# Patient Record
Sex: FEMALE | Race: WHITE | NOT HISPANIC OR LATINO | Employment: OTHER | URBAN - METROPOLITAN AREA
[De-identification: names, ages, dates, MRNs, and addresses within clinical notes are randomized per-mention and may not be internally consistent; named-entity substitution may affect disease eponyms.]

---

## 2017-01-06 ENCOUNTER — ALLSCRIPTS OFFICE VISIT (OUTPATIENT)
Dept: OTHER | Facility: OTHER | Age: 82
End: 2017-01-06

## 2017-01-06 DIAGNOSIS — I50.32 CHRONIC DIASTOLIC HEART FAILURE (HCC): ICD-10-CM

## 2017-02-03 ENCOUNTER — GENERIC CONVERSION - ENCOUNTER (OUTPATIENT)
Dept: OTHER | Facility: OTHER | Age: 82
End: 2017-02-03

## 2017-03-02 ENCOUNTER — ALLSCRIPTS OFFICE VISIT (OUTPATIENT)
Dept: OTHER | Facility: OTHER | Age: 82
End: 2017-03-02

## 2017-03-10 DIAGNOSIS — I50.32 CHRONIC DIASTOLIC HEART FAILURE (HCC): ICD-10-CM

## 2017-03-15 ENCOUNTER — ALLSCRIPTS OFFICE VISIT (OUTPATIENT)
Dept: OTHER | Facility: OTHER | Age: 82
End: 2017-03-15

## 2017-04-06 ENCOUNTER — LAB REQUISITION (OUTPATIENT)
Dept: LAB | Facility: HOSPITAL | Age: 82
End: 2017-04-06
Payer: MEDICARE

## 2017-04-06 DIAGNOSIS — I50.32 CHRONIC DIASTOLIC HEART FAILURE (HCC): ICD-10-CM

## 2017-04-06 LAB
ANION GAP SERPL CALCULATED.3IONS-SCNC: 10 MMOL/L (ref 4–13)
BASOPHILS # BLD AUTO: 0.01 THOUSANDS/ΜL (ref 0–0.1)
BASOPHILS NFR BLD AUTO: 0 % (ref 0–1)
BUN SERPL-MCNC: 35 MG/DL (ref 5–25)
CALCIUM SERPL-MCNC: 8.6 MG/DL (ref 8.3–10.1)
CHLORIDE SERPL-SCNC: 103 MMOL/L (ref 100–108)
CO2 SERPL-SCNC: 31 MMOL/L (ref 21–32)
CREAT SERPL-MCNC: 1.39 MG/DL (ref 0.6–1.3)
EOSINOPHIL # BLD AUTO: 0.06 THOUSAND/ΜL (ref 0–0.61)
EOSINOPHIL NFR BLD AUTO: 1 % (ref 0–6)
ERYTHROCYTE [DISTWIDTH] IN BLOOD BY AUTOMATED COUNT: 16.3 % (ref 11.6–15.1)
GFR SERPL CREATININE-BSD FRML MDRD: 36 ML/MIN/1.73SQ M
GLUCOSE SERPL-MCNC: 104 MG/DL (ref 65–140)
HCT VFR BLD AUTO: 35.3 % (ref 34.8–46.1)
HGB BLD-MCNC: 10.7 G/DL (ref 11.5–15.4)
LYMPHOCYTES # BLD AUTO: 0.82 THOUSANDS/ΜL (ref 0.6–4.47)
LYMPHOCYTES NFR BLD AUTO: 14 % (ref 14–44)
MCH RBC QN AUTO: 29.2 PG (ref 26.8–34.3)
MCHC RBC AUTO-ENTMCNC: 30.3 G/DL (ref 31.4–37.4)
MCV RBC AUTO: 96 FL (ref 82–98)
MONOCYTES # BLD AUTO: 0.65 THOUSAND/ΜL (ref 0.17–1.22)
MONOCYTES NFR BLD AUTO: 11 % (ref 4–12)
NEUTROPHILS # BLD AUTO: 4.14 THOUSANDS/ΜL (ref 1.85–7.62)
NEUTS SEG NFR BLD AUTO: 74 % (ref 43–75)
PLATELET # BLD AUTO: 161 THOUSANDS/UL (ref 149–390)
PMV BLD AUTO: 11.7 FL (ref 8.9–12.7)
POTASSIUM SERPL-SCNC: 4.3 MMOL/L (ref 3.5–5.3)
RBC # BLD AUTO: 3.66 MILLION/UL (ref 3.81–5.12)
SODIUM SERPL-SCNC: 144 MMOL/L (ref 136–145)
WBC # BLD AUTO: 5.68 THOUSAND/UL (ref 4.31–10.16)

## 2017-04-06 PROCEDURE — 80048 BASIC METABOLIC PNL TOTAL CA: CPT | Performed by: INTERNAL MEDICINE

## 2017-04-06 PROCEDURE — 85025 COMPLETE CBC W/AUTO DIFF WBC: CPT | Performed by: INTERNAL MEDICINE

## 2017-04-07 ENCOUNTER — GENERIC CONVERSION - ENCOUNTER (OUTPATIENT)
Dept: OTHER | Facility: OTHER | Age: 82
End: 2017-04-07

## 2017-04-21 ENCOUNTER — ALLSCRIPTS OFFICE VISIT (OUTPATIENT)
Dept: OTHER | Facility: OTHER | Age: 82
End: 2017-04-21

## 2017-05-10 ENCOUNTER — GENERIC CONVERSION - ENCOUNTER (OUTPATIENT)
Dept: OTHER | Facility: OTHER | Age: 82
End: 2017-05-10

## 2017-05-10 ENCOUNTER — ALLSCRIPTS OFFICE VISIT (OUTPATIENT)
Dept: OTHER | Facility: OTHER | Age: 82
End: 2017-05-10

## 2017-06-01 ENCOUNTER — ALLSCRIPTS OFFICE VISIT (OUTPATIENT)
Dept: OTHER | Facility: OTHER | Age: 82
End: 2017-06-01

## 2017-06-15 ENCOUNTER — GENERIC CONVERSION - ENCOUNTER (OUTPATIENT)
Dept: OTHER | Facility: OTHER | Age: 82
End: 2017-06-15

## 2017-06-16 ENCOUNTER — GENERIC CONVERSION - ENCOUNTER (OUTPATIENT)
Dept: OTHER | Facility: OTHER | Age: 82
End: 2017-06-16

## 2017-06-21 ENCOUNTER — ALLSCRIPTS OFFICE VISIT (OUTPATIENT)
Dept: OTHER | Facility: OTHER | Age: 82
End: 2017-06-21

## 2017-07-18 ENCOUNTER — ALLSCRIPTS OFFICE VISIT (OUTPATIENT)
Dept: OTHER | Facility: OTHER | Age: 82
End: 2017-07-18

## 2017-08-01 DIAGNOSIS — N18.30 CHRONIC KIDNEY DISEASE, STAGE III (MODERATE) (HCC): ICD-10-CM

## 2017-08-01 DIAGNOSIS — I50.32 CHRONIC DIASTOLIC HEART FAILURE (HCC): ICD-10-CM

## 2017-08-01 DIAGNOSIS — M81.0 AGE-RELATED OSTEOPOROSIS WITHOUT CURRENT PATHOLOGICAL FRACTURE: ICD-10-CM

## 2017-08-16 ENCOUNTER — GENERIC CONVERSION - ENCOUNTER (OUTPATIENT)
Dept: OTHER | Facility: OTHER | Age: 82
End: 2017-08-16

## 2017-08-30 ENCOUNTER — ALLSCRIPTS OFFICE VISIT (OUTPATIENT)
Dept: OTHER | Facility: OTHER | Age: 82
End: 2017-08-30

## 2017-08-30 ENCOUNTER — GENERIC CONVERSION - ENCOUNTER (OUTPATIENT)
Dept: OTHER | Facility: OTHER | Age: 82
End: 2017-08-30

## 2017-09-18 ENCOUNTER — ALLSCRIPTS OFFICE VISIT (OUTPATIENT)
Dept: OTHER | Facility: OTHER | Age: 82
End: 2017-09-18

## 2017-10-04 ENCOUNTER — GENERIC CONVERSION - ENCOUNTER (OUTPATIENT)
Dept: OTHER | Facility: OTHER | Age: 82
End: 2017-10-04

## 2017-10-06 ENCOUNTER — GENERIC CONVERSION - ENCOUNTER (OUTPATIENT)
Dept: OTHER | Facility: OTHER | Age: 82
End: 2017-10-06

## 2017-12-06 ENCOUNTER — ALLSCRIPTS OFFICE VISIT (OUTPATIENT)
Dept: OTHER | Facility: OTHER | Age: 82
End: 2017-12-06

## 2017-12-06 DIAGNOSIS — R06.02 SHORTNESS OF BREATH: ICD-10-CM

## 2017-12-19 NOTE — PROGRESS NOTES
Assessment  1  Shortness of breath on exertion (786 05) (R06 02)  2  Ascending aortic aneurysm (441 2) (I71 2)  3  Bilateral leg edema (782 3) (R60 0)  4  Chronic diastolic CHF (congestive heart failure), NYHA class 3 (428 32) (I50 32)  5  Chronic kidney disease, stage 3 (585 3) (N18 3)  6  Hypertension (401 9) (I10)  7  Lymphedema of both lower extremities (457 1) (I89 0)  8  Paroxysmal atrial fibrillation (427 31) (I48 0)    Plan  Chronic diastolic CHF (congestive heart failure), NYHA class 3    · Renew: MetOLazone 2 5 MG Oral Tablet; Take one tablet as directed only  Hypertension    · Renew: Ramipril 10 MG Oral Capsule; Take 1 capsule twice daily  Rhinitis    · Renew: Fluticasone Propionate 50 MCG/ACT Nasal Suspension; use 1 spray in each nostril twicedaily  Shortness of breath on exertion    · (1) BASIC METABOLIC PROFILE; Status:Active; Requested for:58Ans9892;     Discussion/Summary  Discussion Summary:   Cold symptomsCont FlonaseNo indication to start abxTake Metolazone daily for 1 week , continue torsemideBMP next weekWe shall reenroll her in [facility]  Counseling Documentation With Imm: The patient was counseled regarding impressions  Medication SE Review and Pt Understands Tx: Possible side effects of new medications were reviewed with the patient/guardian today  Chief Complaint  Chief Complaint Free Text Note Form: Patient presents to office today for a follow-up visit  Chief Complaint Chronic Condition [facility]: Patient is here today for follow up of chronic conditions described in HPI        History of Present Illness  HPI: She was discharged from [facility] 2 weeks agoShe does c/o increase SOB with exertion and [facility] weight has been stableNo increase in LE edema, she uses the leg pumps which has been very helpfulShe is able to put on the compression stocking on her own with a much difficulty and effortShe restricts her fluid intake to 50oz a day      Review of Systems  Complete-Female:  Constitutional: no fever,-- no recent weight gain,-- no chills-- and-- no recent weight loss  ENT: sore throat-- and-- colds  Cardiovascular: lower extremity edema, but-- no chest pain-- and-- no palpitations  Respiratory: cough-- and-- shortness of breath during exertion  Gastrointestinal: no abdominal pain,-- no constipation-- and-- no diarrhea  Active Problems  1  Advance directive discussed with patient (V65 49) (Z71 89)  2  Allergic rhinitis (477 9) (J30 9)  3  Ascending aortic aneurysm (441 2) (I71 2)  4  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)  5  Bilateral leg edema (782 3) (R60 0)  6  Cataract of right eye (366 9) (H26 9)  7  Chest pain (786 50) (R07 9)  8  Chronic diastolic CHF (congestive heart failure), NYHA class 3 (428 32) (I50 32)  9  Chronic kidney disease, stage 3 (585 3) (N18 3)  10  Chronic stasis dermatitis of right lower extremity (454 1) (I87 2)  11  Chronic venous insufficiency (459 81) (I87 2)  12  Diverticulosis (562 10) (K57 90)  13  Esophageal reflux (530 81) (K21 9)  14  Gait disturbance (781 2) (R26 9)  15  Hyperlipidemia (272 4) (E78 5)  16  Hypertension (401 9) (I10)  17  Inguinal hernia (550 90) (K40 90)  18  Insomnia (780 52) (G47 00)  19  Lymphedema of both lower extremities (457 1) (I89 0)  20  Need for prophylactic vaccination and inoculation against influenza (V04 81) (Z23)  21  Nonrheumatic aortic valve insufficiency (424 1) (I35 1)  22  Non-toxic multinodular goiter (241 1) (E04 2)  23  Osteoarthritis of knee (715 36) (M17 10)  24  Osteoporosis (733 00) (M81 0)  25  Paroxysmal atrial fibrillation (427 31) (I48 0)  26  Rhinitis (472 0) (J31 0)  27  Screening for genitourinary condition (V81 6) (Z13 89)  28  Screening for neurological condition (V80 09) (Z13 89)  29  Urinary urgency (788 63) (R39 15)  30  Urine, incontinence, stress female (625 6) (N39 3)    Past Medical History  1  History of Acute upper respiratory infection (465 9) (J06 9)  2  History of Acute upper respiratory infection (465 9) (J06 9)  3  History of Aneurysm Of The Ascending Aorta (441 9)  4  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)  5  History of anemia (V12 3) (Z86 2)  6  History of hematuria (V13 09) (Z87 448)  7  History of Intertrigo (695 89) (L30 4)  8  History of Open wound of foot, left, initial encounter (892 0) (S91 302A)  9  History of Shortness of breath (786 05) (R06 02)  10  History of Urinary tract infection (599 0) (N39 0)  Active Problems And Past Medical History Reviewed: The active problems and past medical history were reviewed and updated today  Surgical History  1  History of Cholecystectomy  2  History of Gynecologic Services Insertion Of Pessary  3  History of Salpingo-oophorectomy Bilateral  4  History of Tonsillectomy  Surgical History Reviewed: The surgical history was reviewed and updated today  Family History  Mother   1  No pertinent family history  Father   2  Family history of malignant neoplasm of stomach (V16 0) (Z80 0)  Sister   3  Family history of cardiac disorder (V17 49) (Z82 49)  4  Family history of malignant neoplasm of breast (V16 3) (Z80 3)  Brother   5  Family history of colon cancer (V16 0) (Z80 0)  6  Family history of malignant neoplasm (V16 9) (Z80 9)  Family History Reviewed: The family history was reviewed and updated today  Social History   · Assistive Devices: Walker   · Denies alcohol consumption (V49 89) (Z78 9)   · Never a smoker  Social History Reviewed: The social history was reviewed and updated today  Current Meds  1  Amiodarone HCl - 200 MG Oral Tablet; Take one tablet daily; Therapy: 12VXY2833 to (Evaluate:10Mar2018)  Requested for: 04MJR6982; Last Rx:15Mar2017 Ordered  2  AmLODIPine Besylate 10 MG Oral Tablet; TAKE 1 TABLET DAILY; Therapy: 69DWV7973 to (Liliana Kapadia)  Requested for: 75HPB2734; Last Rx:11Oct2017 Ordered  3  Caltrate 600 TABS;  Therapy: (Recorded:56Gky6944) to Recorded  4  Carvedilol 3 125 MG Oral Tablet; take 1 tablet twice daily,  with morning and evening meal; Therapy: 23NTE2237 to (Evaluate:16Jun2018)  Requested for: 21Jun2017; Last EE:58FVI5121 Ordered  5  Diclofenac Sodium 1 % Transdermal Gel; APPLY 2 GRAMS TO BOTH KNEES4 TIMES A DAY AS NEEDED; Therapy: 68Bgp8029 to (Taffy Hidden)  Requested for: 11Uin8424; Last Rx:26Iya0406 Ordered  6  Econazole Nitrate 1 % External Cream; APPLY AND GENTLY MASSAGE INTO AFFECTED AREA(S) TWICE DAILY; Therapy: 24JBM8796 to (Last Rx:28Jjk4302)  Requested for: 31NIE8994 Ordered  7  Eliquis 5 MG Oral Tablet; take 1 tablet every twelve hours; Therapy: 35VCU2573 to Lisdaniel Favors)  Requested for: 04CYH3655; Last Rx:01Nov2017 Ordered  8  Fluticasone Propionate 50 MCG/ACT Nasal Suspension; use 1 spray in each nostril twice daily; Therapy: 63ZXT1718 to (Evaluate:05Yyl9666)  Requested for: 63SBO0724; Last Rx:98Eyu6010 Ordered  9  Isosorbide Mononitrate ER 30 MG Oral Tablet Extended Release 24 Hour; take 1 tablet once daily; Therapy: 06DKV6008 to (Lisabeth Favors)  Requested for: 56BCE8161; Last Rx:01Nov2017 Ordered  10  MetOLazone 2 5 MG Oral Tablet; Take one tablet as directed only; Therapy: 33Gmn3343 to (Last Rx:20Xot5394)  Requested for: 32Yha2324 Ordered  11  Nitrostat 0 4 MG Sublingual Tablet Sublingual; PLACE 1 TABLET UNDER THE TONGUE EVERY 5  MINUTES FOR UP TO 3 DOSES AS NEEDED FOR CHEST PAIN  CALL 911 IF PAIN PERSISTS; Therapy: 58Psl4411 to (Evaluate:30Nov2016)  Requested for: 23Ako5352; Last Rx:44Pxw4429  Ordered  12  Omeprazole 20 MG Oral Capsule Delayed Release; 1 cap PO daily half an hour before breakfast;  Therapy: 07RKZ0793 to (Evaluate:05Mar2017)  Requested for: 58EOG0898; Last Rx:11Mar2016  Ordered  13  Potassium Chloride Nena ER 20 MEQ Oral Tablet Extended Release; take one tablet by mouth every  day;   Therapy: 21Apr2016 to (Evaluate:15Oct2018)  Requested for: 74AEV6451; Last Rx:20Oct2017 Ordered  14  Pravachol 40 MG Oral Tablet; take 1 tablet daily at bedtime; Therapy: 12Cth0250 to (Evaluate:88Hhh1554)  Requested for: 05Bxs8528; Last Rx:12Jul2017  Ordered  15  Prazosin HCl - 2 MG Oral Capsule; TAKE 1 CAPSULE Every morning; Therapy: 83SCQ8570 to (Evaluate:17Mar2018)  Requested for: 56WGT9593; Last Rx:22Mar2017  Ordered  16  Prazosin HCl - 5 MG Oral Capsule; TAKE 1 CAPSULE AT BEDTIME; Therapy: 30AHL0022 to (Evaluate:24Mar2018)  Requested for: 88DRZ1007; Last Rx:29Mar2017  Ordered  17  Ramipril 10 MG Oral Capsule; Take 1 capsule twice daily; Therapy: 07HGE4913 to (AOMECECU:76PGZ4214)  Requested for: 18EAJ7905; Last Rx:25Jan2017  Ordered  18  Torsemide 20 MG Oral Tablet; take 1 tablet by mouth twice a day; Therapy: 46AKN8567 to (Evaluate:82Jru7295)  Requested for: 02XNQ2541; Last Rx:04Oct2017  Ordered  19  Tylenol Arthritis Pain 650 MG TBCR; PRN; Therapy: (Recorded:52Krs1711) to Recorded  Medication List Reviewed: The medication list was reviewed and updated today  Allergies  1  Aspirin TABS  2  Percocet TABS    Vitals  Vital Signs    Recorded: 06HLI7076 11:26AM   Heart Rate 54   Systolic 264   Diastolic 66   Height 5 ft 1 in   Weight 161 lb 4 oz   BMI Calculated 30 47   BSA Calculated 1 72   O2 Saturation 95     Physical Exam   Constitutional  General appearance: No acute distress, well appearing and well nourished  obese  Ears, Nose, Mouth, and Throat  Oropharynx: Normal with no erythema, edema, exudate or lesions  Pulmonary  Respiratory effort: No increased work of breathing or signs of respiratory distress  Auscultation of lungs: Clear to auscultation  Cardiovascular  Auscultation of heart: Normal rate and rhythm, normal S1 and S2, without murmurs  Examination of extremities for edema and/or varicosities: Abnormal   nonpitting edema present,-- bilateral ankle 1+ pitting edema-- and-- bilateral pretibial 1+ pitting edema    Lymphatic  Palpation of lymph nodes in neck: No lymphadenopathy  Musculoskeletal  Gait and station: Abnormal    Psychiatric  Orientation to person, place, and time: Normal    Mood and affect: Normal          Future Appointments    Date/Time Provider Specialty Site   01/17/2018 11:30 AM JIM Nava   [facility] [place] [facility]     Signatures   Electronically signed by : JIM Greer ; Dec 18 2017 11:24AM EST                       (Author)    Electronically signed by : JIM Greer ; Dec 18 2017 11:28AM EST                       (Author)

## 2018-01-04 ENCOUNTER — GENERIC CONVERSION - ENCOUNTER (OUTPATIENT)
Dept: OTHER | Facility: OTHER | Age: 83
End: 2018-01-04

## 2018-01-10 NOTE — PROGRESS NOTES
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)   3  Cataract of right eye (366 9) (H26 9)   4  Diverticulosis (562 10) (K57 90)   5  Esophageal reflux (530 81) (K21 9)   6  Gait disturbance (781 2) (R26 9)   7  Hematuria (599 70) (R31 9)   8  Hyperlipidemia (272 4) (E78 5)   9  Hypertension (401 9) (I10)   10  Inguinal hernia (550 90) (K40 90)   11  Insomnia (780 52) (G47 00)   12  Need for prophylactic vaccination and inoculation against influenza (V04 81) (Z23)   13  Non-toxic multinodular goiter (241 1) (E04 2)   14  Osteoarthritis of knee (715 36) (M17 9)   15  Osteoporosis (733 00) (M81 0)   16  Paroxysmal atrial fibrillation (427 31) (I48 0)   17  Rhinitis (472 0) (J31 0)   18  Screening for genitourinary condition (V81 6) (Z13 89)   19  Screening for neurological condition (V80 09) (Z13 89)   20  Urinary urgency (788 63) (R39 15)   21  Urine, incontinence, stress female (625 6) (N39 3)    Ascending aortic aneurysm (441 2) (I71 2)       Bilateral leg edema (782 3) (R60 0)       Chronic diastolic CHF (congestive heart failure), NYHA class 3 (428 32) (I50 32)       Stasis dermatitis, acute, right (454 1) (I83 11)          Current Meds   1  Amiodarone HCl - 200 MG Oral Tablet; Take one tablet daily; Therapy: 18HEZ4395 to (Evaluate:32Rnk1676)  Requested for: 25HOW1552; Last   Rx:01Mar2016 Ordered   2  AmLODIPine Besylate 10 MG Oral Tablet; TAKE 1 TABLET DAILY; Therapy: 08JIM5503 to (Evaluate:41Vah4715)  Requested for: 10Aug2016; Last   Rx:10Aug2016 Ordered   3  Caltrate 600 TABS; Therapy: (Recorded:35Fxl3753) to Recorded   4   Carvedilol 3 125 MG Oral Tablet; take 1 tablet twice daily,  with morning and evening   meal;   Therapy: 97QIO1783 to (Last Rx:43Gtq7557)  Requested for: 82DUG3006 Ordered 5  Carvedilol 3 125 MG Oral Tablet; take 1 tablet twice daily,  with morning and evening   meal;   Therapy: 44BGR3396 to (Last Rx:21Apr2016)  Requested for: 21Apr2016 Ordered   6  Diclofenac Sodium 1 % Transdermal Gel; APPLY 2 GRAMS TO BOTH KNEES4 TIMES A   DAY AS NEEDED; Therapy: 30Apr2014 to (Evaluate:67Tcd7791)  Requested for: 52NBK5828; Last   Rx:24Qsa3319 Ordered   7  Econazole Nitrate 1 % External Cream; APPLY AND GENTLY MASSAGE INTO   AFFECTED AREA(S) TWICE DAILY; Therapy: 24KJQ1688 to (Last Rx:19Mar2014)  Requested for: 87BBR0477 Ordered   8  Eliquis 5 MG Oral Tablet; take 1 tablet every twelve hours; Therapy: 76WHO3087 to [phone])  Requested for: [phone]; Last   Rx:13Ozn0896 Ordered   9  Fluticasone Propionate 50 MCG/ACT Nasal Suspension; use 1 spray in each nostril twice   daily; Therapy: 75JWU0625 to (Heidy Merck)  Requested for: 89BPA6033; Last   Rx:44Sam4168 Ordered   10  Omeprazole 20 MG Oral Capsule Delayed Release; 1 cap PO daily half an hour before    breakfast;    Therapy: 23FKU9944 to (Evaluate:05Mar2017)  Requested for: 22UQT9919; Last    Rx:11Mar2016 Ordered   11  Potassium Chloride Nena ER 20 MEQ Oral Tablet Extended Release; take one tablet by    mouth every day; Therapy: 21Apr2016 to (Augustin Kenny)  Requested for: 21Apr2016; Last    Rx:21Apr2016 Ordered   12  Potassium Chloride Nena ER 20 MEQ Oral Tablet Extended Release; take one tablet by    mouth every day; Therapy: 21Apr2016 to (Last Rx:21Apr2016)  Requested for: 21Apr2016 Ordered   13  Pravachol 40 MG Oral Tablet; TAKE 1 TABLET DAILY AT BEDTIME; Therapy: 97Abm9184 to (Last Rx:15Jun2016)  Requested for: 74KDO9040 Ordered   14  Prazosin HCl - 2 MG Oral Capsule; TAKE 1 CAPSULE Every morning; Therapy: 30BDB6521 to (Karyle Setter)  Requested for: 80III6877; Last    Rx:26Nto3074 Ordered   15  Prazosin HCl - 5 MG Oral Capsule; TAKE 1 CAPSULE AT BEDTIME;     Therapy: 74ZOW3827 to (Delfino Pascual)  Requested for: 43UYZ3420; Last    Rx:11Mar2016 Ordered   16  Ramipril 10 MG Oral Capsule; 2 tabs PO QHS; Therapy: 18MYM1751 to (Evaluate:90Eog0980)  Requested for: 10JLY2024; Last    Rx:21Oct2015 Ordered   17  Torsemide 20 MG Oral Tablet; take 2 tablet daily in am 1 tab in pm;    Therapy: 28VWU1349 to (Evaluate:44Wam0495)  Requested for: 05SEW9230; Last    Rx:21Mis2658 Ordered   18  Triamcinolone Acetonide 0 1 % External Cream; Apply thinly to affected areas twice daily; Therapy: 30SSQ3018 to (Last Green Low)  Requested for: 09OWD6019 Ordered   19  Tylenol Arthritis Pain 650 MG Oral Tablet Extended Release; PRN; Therapy: (Recorded:53Mpl4283) to Recorded    Allergies    1  Aspirin TABS   2  Percocet TABS    Discussion/Summary    Spoke with Anna Gonzales sent  [employer]        Future Appointments    Date/Time Provider Specialty Site   09/26/2016 01:40 PM Lydia Huitron MD Cardiology [facility]     Signatures   Electronically signed by : JIM Buitrago ; Aug 11 2016  7:21PM EST                       (Author)

## 2018-01-10 NOTE — PROGRESS NOTES
Assessment  Assessed    1  Chronic diastolic CHF (congestive heart failure), NYHA class 3 (428 32) (I50 32)   2  Chest pain (786 50) (R07 9)   3  Paroxysmal atrial fibrillation (427 31) (I48 0)   4  Bilateral leg edema (782 3) (R60 0)    Plan  Chronic diastolic CHF (congestive heart failure), NYHA class 3, Nonrheumatic aortic  valve insufficiency    · Torsemide 20 MG Oral Tablet; Take two tablets twice daily   Rx By: Kerwin Card; Dispense: 30 Days ; #:120 Tablet; Refill: 3; For: Chronic diastolic CHF (congestive heart failure), NYHA class 3, Nonrheumatic aortic valve insufficiency; LUCHO = N; Verified Transmission to [facility]); Last Updated By: System, [employer]; 3/2/2017 12:49:11 PM  Chronic diastolic CHF (congestive heart failure), NYHA class 3, Paroxysmal atrial  fibrillation    · Limit your liquids to 6 glasses throughout the day ; Status:Complete;   Done: 53AMY6546   Ordered; For:Chronic diastolic CHF (congestive heart failure), NYHA class 3, Paroxysmal atrial fibrillation; Ordered By:Samara Kat;  Paroxysmal atrial fibrillation    · Restrict the salt in your diet by avoiding highly salted foods ; Status:Complete;   Done:  21DQV1662   Ordered; For:Paroxysmal atrial fibrillation; Ordered By:Edgar Kat;   · Weigh yourself every day ; Status:Complete;   Done: 80NNJ0291   Ordered; For:Paroxysmal atrial fibrillation; Ordered By:Edgar Kat;    Discussion/Summary  Cardiology Discussion Summary Free Text Note Form [facility]:   I had the pleasure of seeing Esperanza Caceres today in her apartment at [facility]  She is complaining of jose LE edema L>R  Her weight today is 167 2 pounds  She presents with rales in left base and LE edema  Esperanza Caceres admits to dyspnea with moderate ambulation  She appears slightly decompensated in regards to chronic diastolic heart failure, ejection fraction 60%, NYHA class III Stage C   Esperanza Caceres is refusing surgical intervention for aortic insufficiency and aortic aneurysm  Her CP is improved with Imdur  She is followed by the heart failure palliative care program  Her weight today is 167 2 pounds  I have increased her torsemide to 40mg BID  She will undergo a BMP tomorrow  I have made no other changes in her medical regimen today  She will continue on a 2 g sodium diet of 1200 cc fluid or stricture  She'll continue to be weighed daily  Our office will be notified for a week and a 3 pounds in one day, 5 pounds in one week, worsening lower extremity edema or shortness of breath  I feel there may be a component of LLE lymphedema  She is wearing compression stockings I will touch base with her PCP in regards to possible LLE lymphedema therapy  She will follow up with Dr Ángel Sanchez in April  Chief Complaint  Chief Complaint Free Text Note Form: Cardiology follow up visit in the patient's home      History of Present Illness  Cardiology HPI Free Text Note Form [facility]: Ms Hunter Hung is an 80year old male with a known past medical history of paroxysmal atrial fibrillation status post APOLINAR cardioversion on February 17, 2016 on amiodarone to help maintain her in normal sinus rhythm as well as Eliquis, descending aortic aneurysm with known moderate to severe aortic insufficiency, hypertension, hyperlipidemia, who is known to Dr Ángel Sanchez as an outpatient  She was last seen in the office by Dr Ángel Sanchez on March 1, 2016 after her cardioversion  She maintained a normal sinus rhythm on amiodarone  Lloyd Perez home dose of diuretic was torsemide 20 mg daily  She continued with significant lower extremity edema  Her home dose of Torsemide was increased to 40 mg daily  Joslyn Granados was recently admitted to [address] [place] [address] on March 31, 2016 to April 10, 2016 with acute on chronic diastolic heart failure secondary to valvular disease  Prior to admission Lloyd Perez experienced progressive dyspnea and felt she needed to come to the emergency room   She was found to be hypoxic and placed on 4 L nasal cannula  Chest x-ray showed pleural effusions  Lungs were hyperinflated  She was diuresed with IV Lasix  A 12-lead EKG showed continued normal sinus rhythm  Ashley Meyer had been seen by cardiothoracic surgery in regards to her descending aortic aneurysm of moderate to severe aortic insufficiency  She decided not to pursue cardiac surgery treatment  Her weight at discharge 160 2 pounds  Kidney function remained stable  She was discharged to [facility] for short-term rehabilitation  Ashley Meyer is followed by the heart failure palliative care program  She was last seen in our office by Dr Jim Rodriguez on 1/06/17  She appeared mildly volume overloaded  Her torsemide was increased to 40 mg twice a day for 5 days  Her blood pressure remained controlled  Ashley Meyer did not want to pursue further testing for Known ascending aortic aneurysm  Her chest pain has been controlled on Imdur  We at this office visit was 173 pounds  APOLINAR done on 2/27/16, left ventricular systolic function is normal with an ejection fraction of 60%, Aortic valve moderate regurgitation Right ventricle size is normal  Systolic function was normal  Left atrium was dilated  Right atrium is dilated  Trace MR, trace TR     Ashley Meyer has been followed by the heart failure palliative care program  He increased to [id number] heard torsemide was increased to 40 mg twice a day Ã5 days  Airway improved  Once again, her weight increased to 20/1/2017 with a weight of 171 2 pounds  She was given metolazone 2 5 mg one dose  Her weight increased to 165 pounds  Continued to complain of left lower extremity edema  Today I had the pleasure of seeing Ashley Meyer and her apartment at [facility]  She admits to continued fatigue and dyspnea with moderate exertion  Her weight at home today is 67 2 pounds  She complains of left lower extremity edema  She is very diligent in her weights, sodium intake and fluid intake   She is taking all her medications as prescribed  Should she feels her chest pain is controlled with Imdur  She denies lightheadedness or dizziness  Parents able to tolerate activities such as dusting  Lab studies done 1/17/17 , sodium 143, potassium 4 0 p m  33, creatinine 1 23, GFR 40  Review of Systems  Cardiology Female ROS:     Cardiac: has swelling in the L>R jose LE edema  Skin: No complaints of nonhealing sores or skin rash  Genitourinary: No complaints of recurrent urinary tract infections, frequent urination at night, difficult urination, blood in urine, kidney stones, loss of bladder control, kidney problems, denies any birth control or hormone replacement, is not post menopausal, not currently pregnant  Psychological: No complaints of feeling depressed, anxiety, panic attacks, or difficulty concentrating  Respiratory: shortness of breath, but as noted in HPI and no cough/sputum   dry  HEENT: No complaints of serious problems, hearing problems, nose problems, throat problems, or snoring  Gastrointestinal: No complaints of liver problems, nausea, vomiting, heartburn, constipation, bloody stools, diarrhea, problems swallowing, adbominal pain, or rectal bleeding  Hematologic: No complaints of bleeding disorders, anemia, blood clots, or excessive brusing  Neurological: No complaints of numbness, tingling, dizziness, weakness, seizures, headaches, syncope or fainting, AM fatigue, daytime sleepiness, no witnessed apnea episodes  Musculoskeletal: arthritis      Active Problems  Problems    1  Ascending aortic aneurysm (441 2) (I71 2)   2  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)   3  Bilateral leg edema (782 3) (R60 0)   4  Cataract of right eye (366 9) (H26 9)   5  Chest pain (786 50) (R07 9)   6  Chronic diastolic CHF (congestive heart failure), NYHA class 3 (428 32) (I50 32)   7  Diverticulosis (562 10) (K57 90)   8  Elevated serum creatinine (790 99) (R79 89)   9  Esophageal reflux (530 81) (K21 9)   10   Gait disturbance (781  2) (R26 9)   11  Hematuria (599 70) (R31 9)   12  Hyperlipidemia (272 4) (E78 5)   13  Hypertension (401 9) (I10)   14  Inguinal hernia (550 90) (K40 90)   15  Insomnia (780 52) (G47 00)   16  Need for prophylactic vaccination and inoculation against influenza (V04 81) (Z23)   17  Nonrheumatic aortic valve insufficiency (424 1) (I35 1)   18  Non-toxic multinodular goiter (241 1) (E04 2)   19  Osteoarthritis of knee (715 36) (M17 9)   20  Osteoporosis (733 00) (M81 0)   21  Paroxysmal atrial fibrillation (427 31) (I48 0)   22  Rhinitis (472 0) (J31 0)   23  Screening for genitourinary condition (V81 6) (Z13 89)   24  Screening for neurological condition (V80 09) (Z13 89)   25  Stasis dermatitis, acute, right (454 1) (I83 11)   26  Urinary urgency (788 63) (R39 15)   27  Urine, incontinence, stress female (625 6) (N39 3)    Past Medical History  Problems    1  History of Acute upper respiratory infection (465 9) (J06 9)   2  History of Acute upper respiratory infection (465 9) (J06 9)   3  History of Aneurysm Of The Ascending Aorta (441 9)   4  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)   5  History of anemia (V12 3) (Z86 2)   6  History of Intertrigo (695 89) (L30 4)   7  History of Open wound of foot, left, initial encounter (892 0) (S91 302A)   8  History of Shortness of breath (786 05) (R06 02)   9  History of Urinary tract infection (599 0) (N39 0)  Active Problems And Past Medical History Reviewed: The active problems and past medical history were reviewed and updated today  Surgical History  Problems    1  History of Cholecystectomy   2  History of Gynecologic Services Insertion Of Pessary   3  History of Salpingo-oophorectomy Bilateral   4  History of Tonsillectomy  Surgical History Reviewed: The surgical history was reviewed and updated today  Family History  Mother    1  No pertinent family history  Father    2   Family history of malignant neoplasm of stomach (V16 0) (Z80 0)  Sister    3  Family history of cardiac disorder (V17 49) (Z82 49)   4  Family history of malignant neoplasm of breast (V16 3) (Z80 3)  Brother    5  Family history of colon cancer (V16 0) (Z80 0)   6  Family history of malignant neoplasm (V16 9) (Z80 9)  Family History Reviewed: The family history was reviewed and updated today  Social History  Problems    · Assistive Devices: Walker   · Denies alcohol consumption (V49 89) (Z78 9)   · Never a smoker    Current Meds   1  Amiodarone HCl - 200 MG Oral Tablet; Take one tablet daily; Therapy: 14ATN5230 to (Evaluate:03Sfm2865)  Requested for: 60RLL9189; Last   Rx:01Mar2016 Ordered   2  AmLODIPine Besylate 10 MG Oral Tablet; TAKE 1 TABLET DAILY; Therapy: 84TMS0189 to (Evaluate:74Pvc2521)  Requested for: 10Aug2016; Last   Rx:10Aug2016 Ordered   3  Caltrate 600 TABS; Therapy: (Recorded:44Fqy8767) to Recorded   4  Carvedilol 3 125 MG Oral Tablet; take 1 tablet twice daily,  with morning and evening   meal;   Therapy: 49GPH7233 to (Last Rx:21Apr2016)  Requested for: 21Apr2016 Ordered   5  Diclofenac Sodium 1 % Transdermal Gel; APPLY 2 GRAMS TO BOTH KNEES4 TIMES A   DAY AS NEEDED; Therapy: 28Sqq0982 to (Evaluate:45Wel6022)  Requested for: 75IBZ1092; Last   Rx:05Jan2017 Ordered   6  Econazole Nitrate 1 % External Cream; APPLY AND GENTLY MASSAGE INTO AFFECTED   AREA(S) TWICE DAILY; Therapy: 50FJH1411 to (Last Rx:78Zwf9330)  Requested for: 27ULJ0329 Ordered   7  Eliquis 5 MG Oral Tablet; take 1 tablet every twelve hours; Therapy: 37SHS9669 to (Yazan Obrieno)  Requested for: 78GUR7447; Last   Rx:26Hoa6655 Ordered   8  Fluticasone Propionate 50 MCG/ACT Nasal Suspension; use 1 spray in each nostril   twice daily; Therapy: 78XKB0835 to (Esthela Bodjeff)  Requested for: 54ZWN7254; Last   Rx:49Ewq3058 Ordered   9  Isosorbide Mononitrate ER 30 MG Oral Tablet Extended Release 24 Hour; take 1 tablet   once daily;    Therapy: 85AXB4157 to (Ellyn Mahan)  Requested for: 31SWZ3332; Last   Rx:68Fuk2749 Ordered   10  MetOLazone 2 5 MG Oral Tablet; Take one tablet as directed only; Therapy: 71Vpm2332 to (Last Rx:64Ebn6318)  Requested for: 62Wte7404 Ordered   11  Nitrostat 0 4 MG Sublingual Tablet Sublingual; PLACE 1 TABLET UNDER THE TONGUE    EVERY 5 MINUTES FOR UP TO 3 DOSES AS NEEDED FOR CHEST PAIN  CALL    911 IF PAIN PERSISTS; Therapy: 10Stz8778 to (Evaluate:30Nov2016)  Requested for: 06Avg1878; Last    Rx:01Sep2016 Ordered   12  Omeprazole 20 MG Oral Capsule Delayed Release; 1 cap PO daily half an hour before    breakfast;    Therapy: 52WWV2721 to (Evaluate:05Mar2017)  Requested for: 08DRK5880; Last    Rx:11Mar2016 Ordered   13  Potassium Chloride Nena ER 20 MEQ Oral Tablet Extended Release; take one tablet by    mouth every day; Therapy: 70Cbv8193 to (Evaluate:12Oct2017)  Requested for: 17Oct2016; Last    Rx:17Oct2016 Ordered   14  Pravachol 40 MG Oral Tablet; TAKE 1 TABLET DAILY AT BEDTIME; Therapy: 97Igq0490 to (Last Rx:15Jun2016)  Requested for: 61QZX7526 Ordered   15  Prazosin HCl - 2 MG Oral Capsule; TAKE 1 CAPSULE Every morning; Therapy: 29ARR0604 to (Tarri Le)  Requested for: 20HBO8432; Last    Rx:11May2016 Ordered   16  Prazosin HCl - 5 MG Oral Capsule; TAKE 1 CAPSULE AT BEDTIME; Therapy: 13AGV5002 to (Tarri Le)  Requested for: 29OMG3317; Last    Rx:11Mar2016 Ordered   17  Ramipril 10 MG Oral Capsule; Take 1 capsule twice daily; Therapy: 52EUA2144 to (JXOVXPTT:68DXY2035)  Requested for: 25RZL0840; Last    Rx:25Jan2017 Ordered   18  Torsemide 20 MG Oral Tablet; take 2 tablet daily in am 1 tab in pm;    Therapy: 73BTP2139 to (Evaluate:13May2017)  Requested for: 72EGU7713; Last    Rx:18May2016 Ordered   19  Tylenol Arthritis Pain 650 MG TBCR; PRN; Therapy: (Recorded:22Vow4155) to Recorded    Allergies  Medication    1  Aspirin TABS   2   Percocet TABS    Vitals  Vital Signs    Recorded: 52VUL1813 01: 11PM   Weight 167 lb 3 2 oz   BMI Calculated 31 59   BSA Calculated 1 75     Physical Exam    Constitutional   General appearance: No acute distress, well appearing and well nourished  Neck   Neck and thyroid: Normal, supple, trachea midline, no thyromegaly  Pulmonary   Respiratory effort: No increased work of breathing or signs of respiratory distress  Auscultation of lungs: Abnormal   rales in left base  Cardiovascular   Auscultation of heart: Normal rate and rhythm, normal S1 and S2, no murmurs  Examination of extremities for edema and/or varicosities: Abnormal   1+ RLE edema, 3+ LLE edema  Abdomen   Abdomen: Abnormal   0bese  Musculoskeletal sitting in chair  Skin - Skin and subcutaneous tissue: Normal without rashes or lesions  Skin is warm and well perfused, normal turgor  jose LE compression stockings  Neurologic - Speech: Normal     Psychiatric - Orientation to person, place, and time: Normal  Mood and affect: Normal       Results/Data  Diagnostic Studies Reviewed Cardio:   Lab Review: 4/14/16 sodium 144, potassium 3 5, BUN 29, creatinine 1 35, hemoglobin 9 6, hematocrit 29 4, platelets 046, mag 2 3  Future Appointments    Date/Time Provider Specialty Site   04/21/2017 01:00 PM Jani Mae MD Cardiology [facility]   03/15/2017 11:30 AM JIM Pickard   Internal Medicine [facility] OFFICE     Signatures   Electronically signed by : Haily Beltran, [address] [place] [address]; Mar  2 2017  1:19PM EST                       (Author)

## 2018-01-10 NOTE — MISCELLANEOUS
Message   Recorded as Task   Date: 10/04/2017 02:17 PM, Created By: Lesly Hernandez   Task Name: Care Coordination   Assigned To: Awa Avelar   Regarding Patient: Citlaly Mccurdy, Status: Active   Comment:    Awa Avelar - 04 Oct 2017 2:17 PM     TASK CREATED  Call from home nurse to report BP trending up, today it was R-172/54, L 170/52 HR 48 usually runs low but asymptomatic  Weight down to 160 8 and LE lymphaedema much improved  She continues to void frequent large amounts  She doesn't have a working bp cuff but asked is she may try the Pharmacy cuffs weekly just to trend the bp  Is threre a chance of decresing her diuretic? 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)   5  Bilateral leg edema (782 3) (R60 0)   6  Cataract of right eye (366 9) (H26 9)   7  Chest pain (786 50) (R07 9)   8  Chronic diastolic CHF (congestive heart failure), NYHA class 3 (428 32) (I50 32)   9  Chronic kidney disease, stage 3 (585 3) (N18 3)   10  Chronic stasis dermatitis of right lower extremity (454 1) (I87 2)   11  Chronic venous insufficiency (459 81) (I87 2)   12  Diverticulosis (562 10) (K57 90)   13  Esophageal reflux (530 81) (K21 9)   14  Gait disturbance (781 2) (R26 9)   15  Hyperlipidemia (272 4) (E78 5)   16  Hypertension (401 9) (I10)   17  Inguinal hernia (550 90) (K40 90)   18  Insomnia (780 52) (G47 00)   19  Lymphedema of both lower extremities (457 1) (I89 0)   20  Need for prophylactic vaccination and inoculation against influenza (V04 81) (Z23)   21   Nonrheumatic aortic valve insufficiency (424 1) (I35 1)   22  Non-toxic multinodular goiter (241 1) (E04 2)   23  Osteoarthritis of knee (715 36) (M17 10)   24  Osteoporosis (733 00) (M81 0)   25  Paroxysmal atrial fibrillation (427 31) (I48 0)   26  Rhinitis (472 0) (J31 0)   27  Screening for genitourinary condition (V81 6) (Z13 89)   28  Screening for neurological condition (V80 09) (Z13 89)   29  Urinary urgency (788 63) (R39 15)   30  Urine, incontinence, stress female (625 6) (N39 3)    Current Meds   1  Amiodarone HCl - 200 MG Oral Tablet; Take one tablet daily; Therapy: 81AHB6615 to (Evaluate:10Mar2018)  Requested for: 75EVU2923; Last   Rx:15Mar2017 Ordered   2  AmLODIPine Besylate 10 MG Oral Tablet; TAKE 1 TABLET DAILY; Therapy: 88ZBP0376 to (Evaluate:02Apr2018)  Requested for: 82EAB4015; Last   Rx:04Oct2017 Ordered   3  Caltrate 600 TABS; Therapy: (Recorded:15Kdq6453) to Recorded   4  Carvedilol 3 125 MG Oral Tablet; take 1 tablet twice daily,  with morning and evening   meal;   Therapy: 62KBF2084 to (Evaluate:16Jun2018)  Requested for: 21Jun2017; Last   RT:27WUY6448 Ordered   5  Diclofenac Sodium 1 % Transdermal Gel (Voltaren); APPLY 2 GRAMS TO BOTH   KNEES4 TIMES A DAY AS NEEDED; Therapy: 30Apr2014 to ([id number])  Requested for: 77Iin7527; Last   Rx:62Qfs3583 Ordered   6  Econazole Nitrate 1 % External Cream; APPLY AND GENTLY MASSAGE INTO   AFFECTED AREA(S) TWICE DAILY; Therapy: 07SCC2308 to (Last Rx:22Qkz6623)  Requested for: 79XVV8747 Ordered   7  Eliquis 5 MG Oral Tablet; take 1 tablet every twelve hours; Therapy: 32QJZ5163 to (Marianela Meraz)  Requested for: 63ZVD3989; Last   Rx:05Gyv5074 Ordered   8  Fluticasone Propionate 50 MCG/ACT Nasal Suspension; use 1 spray in each nostril twice   daily; Therapy: 87RIS7457 to (Tuyet Palmer)  Requested for: 23HPV9568; Last   Rx:60Xzo0290 Ordered   9   Isosorbide Mononitrate ER 30 MG Oral Tablet Extended Release 24 Hour; take 1 tablet   once daily; Therapy: 74GSM2984 to (Tomasz Barger)  Requested for: 38WQM1954; Last   Rx:31Ndd1584 Ordered   10  MetOLazone 2 5 MG Oral Tablet; Take one tablet as directed only; Therapy: 07App2620 to (Last Rx:21Feb2017)  Requested for: 35Odl6958 Ordered   11  Nitrostat 0 4 MG Sublingual Tablet Sublingual (Nitroglycerin); PLACE 1 TABLET UNDER    THE TONGUE EVERY 5 MINUTES FOR UP TO 3 DOSES AS NEEDED    FOR CHEST PAIN  CALL 911 IF PAIN PERSISTS; Therapy: 11Mzb7448 to (Evaluate:30Nov2016)  Requested for: 48Zrn6440; Last    Rx:01Sep2016 Ordered   12  Omeprazole 20 MG Oral Capsule Delayed Release; 1 cap PO daily half an hour before    breakfast;    Therapy: 75XAP1387 to (Evaluate:05Mar2017)  Requested for: 71QGI0684; Last    Rx:11Mar2016 Ordered   13  Potassium Chloride Nena ER 20 MEQ Oral Tablet Extended Release; take one tablet by    mouth every day; Therapy: 07Vxj1744 to (Evaluate:12Oct2017)  Requested for: 17Oct2016; Last    Rx:17Oct2016 Ordered   14  Pravachol 40 MG Oral Tablet (Pravastatin Sodium); take 1 tablet daily at bedtime; Therapy: 03Voh6029 to (Evaluate:07Jul2018)  Requested for: 78Eyl6472; Last    Rx:07Dgn9185 Ordered   15  Prazosin HCl - 2 MG Oral Capsule; TAKE 1 CAPSULE Every morning; Therapy: 24PSE9661 to (Evaluate:17Mar2018)  Requested for: 07ZUW7542; Last    Rx:22Mar2017 Ordered   16  Prazosin HCl - 5 MG Oral Capsule; TAKE 1 CAPSULE AT BEDTIME; Therapy: 78WWU7971 to (Evaluate:24Mar2018)  Requested for: 37GIJ2436; Last    Rx:29Mar2017 Ordered   17  Ramipril 10 MG Oral Capsule; Take 1 capsule twice daily; Therapy: 05QOM2073 to (JSNHKDQJ:61IQO2227)  Requested for: 11DYC6005; Last    Rx:25Jan2017 Ordered   18  Torsemide 20 MG Oral Tablet; take 1 tablet by mouth twice a day; Therapy: 58UDQ1395 to (Evaluate:13Ria6611)  Requested for: 95LAV5452; Last    Rx:04Oct2017 Ordered   19  Tylenol Arthritis Pain 650 MG TBCR; PRN; Therapy: (Recorded:54Yvw0949) to Recorded    Allergies    1  Aspirin TABS   2   Percocet TABS    Signatures   Electronically signed by : Jb Brown, ; Oct  4 2017  3:10PM EST                       (Author)

## 2018-01-11 NOTE — MISCELLANEOUS
Message  Message Free Text Note Form: Spoke with patient, she has been taking Torsemide 10 mg daily, but has not noted any significant decrease in weight or significant improvement in LE edema (usually better in morning, but reaccumulates over course of day)  I will increase Torsemide to 20 mg daily, she is scheduled for CV this Wednesday she reports  Plan    1  From  Torsemide 10 MG Oral Tablet TAKE 1 TABLET DAILY To Torsemide 20   MG Oral Tablet Take 1 tablet daily    Signatures   Electronically signed by :  Salomon Mustafa MD; Feb 15 2016  9:16AM EST                       (Author)

## 2018-01-11 NOTE — MISCELLANEOUS
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)   5  Bilateral leg edema (782 3) (R60 0)   6  Cataract of right eye (366 9) (H26 9)   7  Chest pain (786 50) (R07 9)   8  Chronic diastolic CHF (congestive heart failure), NYHA class 3 (428 32) (I50 32)   9  Chronic kidney disease, stage 3 (585 3) (N18 3)   10  Chronic stasis dermatitis of right lower extremity (454 1) (I87 2)   11  Chronic venous insufficiency (459 81) (I87 2)   12  Diverticulosis (562 10) (K57 90)   13  Esophageal reflux (530 81) (K21 9)   14  Gait disturbance (781 2) (R26 9)   15  Hyperlipidemia (272 4) (E78 5)   16  Hypertension (401 9) (I10)   17  Inguinal hernia (550 90) (K40 90)   18  Insomnia (780 52) (G47 00)   19  Lymphedema of both lower extremities (457 1) (I89 0)   20  Need for prophylactic vaccination and inoculation against influenza (V04 81) (Z23)   21  Nonrheumatic aortic valve insufficiency (424 1) (I35 1)   22  Non-toxic multinodular goiter (241 1) (E04 2)   23  Osteoarthritis of knee (715 36) (M17 10)   24  Osteoporosis (733 00) (M81 0)   25  Paroxysmal atrial fibrillation (427 31) (I48 0)   26  Rhinitis (472 0) (J31 0)   27  Screening for genitourinary condition (V81 6) (Z13 89)   28  Screening for neurological condition (V80 09) (Z13 89)   29  Urinary urgency (788 63) (R39 15)   30  Urine, incontinence, stress female (625 6) (N39 3)    Current Meds   1  Amiodarone HCl - 200 MG Oral Tablet; Take one tablet daily; Therapy: 79WSN5201 to (Evaluate:10Mar2018)  Requested for: 29QRT9128; Last   Rx:15Mar2017 Ordered   2  AmLODIPine Besylate 10 MG Oral Tablet; TAKE 1 TABLET DAILY;    Therapy: 10TQB9649 to [phone])  Requested for: 95JHB4097; Last   Rx:04Oct2017 Ordered   3  Caltrate 600 TABS; Therapy: (Recorded:65Vjn0985) to Recorded   4  Carvedilol 3 125 MG Oral Tablet; take 1 tablet twice daily,  with morning and evening   meal;   Therapy: 23XQN5596 to (Evaluate:16Jun2018)  Requested for: 21Jun2017; Last   LV:67TYJ4263 Ordered   5  Diclofenac Sodium 1 % Transdermal Gel (Voltaren); APPLY 2 GRAMS TO BOTH   KNEES4 TIMES A DAY AS NEEDED; Therapy: 57Csq5908 to (Jonathan Orosco)  Requested for: 91Ajw6434; Last   Rx:14Xmi3234 Ordered   6  Econazole Nitrate 1 % External Cream; APPLY AND GENTLY MASSAGE INTO   AFFECTED AREA(S) TWICE DAILY; Therapy: 13THK6665 to (Last Rx:54Kxg3664)  Requested for: 34XPY5768 Ordered   7  Eliquis 5 MG Oral Tablet; take 1 tablet every twelve hours; Therapy: 54SME0284 to (Eliberto Klinefelter)  Requested for: 43YEL3351; Last   Rx:55Zzr4976 Ordered   8  Fluticasone Propionate 50 MCG/ACT Nasal Suspension; use 1 spray in each nostril twice   daily; Therapy: 54ZFU2371 to (Rhoderick Bamberger)  Requested for: 26QQD7904; Last   Rx:92Laj6177 Ordered   9  Isosorbide Mononitrate ER 30 MG Oral Tablet Extended Release 24 Hour; take 1 tablet   once daily; Therapy: 71TPW5019 to (Eliberto Klinefelter)  Requested for: 01PYC0212; Last   Rx:30Fve4149 Ordered   10  MetOLazone 2 5 MG Oral Tablet; Take one tablet as directed only; Therapy: 94Rqg0177 to (Last Rx:73Btk4267)  Requested for: 50Yfh9328 Ordered   11  Nitrostat 0 4 MG Sublingual Tablet Sublingual (Nitroglycerin); PLACE 1 TABLET UNDER    THE TONGUE EVERY 5 MINUTES FOR UP TO 3 DOSES AS NEEDED    FOR CHEST PAIN  CALL 911 IF PAIN PERSISTS; Therapy: 21Pik5949 to (Evaluate:30Nov2016)  Requested for: 90Abd2527; Last    Rx:79Uuj5184 Ordered   12  Omeprazole 20 MG Oral Capsule Delayed Release; 1 cap PO daily half an hour before    breakfast;    Therapy: 07ONH1915 to (Evaluate:05Mar2017)  Requested for: 48LWL2960; Last    Rx:11Mar2016 Ordered   13   Potassium Chloride Nena ER 20 MEQ Oral Tablet Extended Release; take one tablet by    mouth every day; Therapy: 78Doq5513 to (Evaluate:12Oct2017)  Requested for: 17Oct2016; Last    Rx:17Oct2016 Ordered   14  Pravachol 40 MG Oral Tablet (Pravastatin Sodium); take 1 tablet daily at bedtime; Therapy: 65Ceb6331 to (Evaluate:89Ktw8005)  Requested for: 47Qvj6317; Last    Rx:74Lvh6271 Ordered   15  Prazosin HCl - 2 MG Oral Capsule; TAKE 1 CAPSULE Every morning; Therapy: 98MZK4548 to (Evaluate:17Mar2018)  Requested for: 24SHZ0862; Last    Rx:22Mar2017 Ordered   16  Prazosin HCl - 5 MG Oral Capsule; TAKE 1 CAPSULE AT BEDTIME; Therapy: 00MMT0714 to (Evaluate:24Mar2018)  Requested for: 09SMA1718; Last    Rx:29Mar2017 Ordered   17  Ramipril 10 MG Oral Capsule; Take 1 capsule twice daily; Therapy: 37PNI7276 to ([facility]:99BCL9268)  Requested for: 28JRU5064; Last    Rx:25Jan2017 Ordered   18  Torsemide 20 MG Oral Tablet; take 1 tablet by mouth twice a day; Therapy: 18ICW5458 to (Evaluate:88Nej5236)  Requested for: 57SAR4712; Last    Rx:04Oct2017 Ordered   19  Tylenol Arthritis Pain 650 MG TBCR; PRN; Therapy: (Recorded:08Vsa5790) to Recorded    Allergies    1  Aspirin TABS   2  Percocet TABS    Signatures   Electronically signed by :  Laurel Rincon, ; Oct  6 2017  2:48PM EST                       (Author)

## 2018-01-12 VITALS
BODY MASS INDEX: 33.07 KG/M2 | RESPIRATION RATE: 16 BRPM | SYSTOLIC BLOOD PRESSURE: 146 MMHG | WEIGHT: 175.19 LBS | HEART RATE: 60 BPM | DIASTOLIC BLOOD PRESSURE: 64 MMHG | HEIGHT: 61 IN

## 2018-01-12 VITALS
HEART RATE: 83 BPM | SYSTOLIC BLOOD PRESSURE: 124 MMHG | OXYGEN SATURATION: 97 % | WEIGHT: 171.25 LBS | BODY MASS INDEX: 32.33 KG/M2 | DIASTOLIC BLOOD PRESSURE: 74 MMHG | HEIGHT: 61 IN

## 2018-01-12 VITALS — TEMPERATURE: 98.1 F

## 2018-01-12 VITALS
HEIGHT: 61 IN | SYSTOLIC BLOOD PRESSURE: 158 MMHG | OXYGEN SATURATION: 94 % | WEIGHT: 162 LBS | BODY MASS INDEX: 30.58 KG/M2 | HEART RATE: 59 BPM | DIASTOLIC BLOOD PRESSURE: 66 MMHG

## 2018-01-12 NOTE — MISCELLANEOUS
Message   Recorded as Task   Date: 06/14/2017 01:36 PM, Created By: Zion Bourgeois   Task Name: Care Coordination   Assigned To: Awa Avelar   Regarding Patient: Mike Burkitt, Status: Active   Comment:    Awa Avelar - 14 Jun 2017 1:36 PM     Health system nurse in to see patient today and noted increase in weight from 169 4 to 172 since her last visit 1 month ago  She did want it brought to your attention that the patient had an OV with vascular surgery on 6/1 and they did order Lymphedema compression stockings and would like her to use lymphedema pumps twice daily for 1 hour each time, they also suggested decreasing the amlodipine to 5 mg daily 2/2 the edema  Vascular is requesting Cardiac clearance to use the pumps  The nurse noted the weight gain is only in her legs, no SOB  She is taking Torsemide 20 mg tabs, 2 in the AM and 1 in the PM  Please advise  TY   Serenity Patricio - 15 Ricky 2017 1:28 PM     TASK REPLIED TO: Previously Assigned To Serenity Patricio  She can increase Torsemide to 2 tabs in AM and 2 tabs in PM for 3 days, then go back to usual dosing  She has clearance for lymphedema pumps, I already cleared her  She can decrease Amlodipine to 5 mt daily and see if BP goes up with that change  Active Problems    1  Ascending aortic aneurysm (441 2) (I71 2)   2  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)   3  Bilateral leg edema (782 3) (R60 0)   4  Cataract of right eye (366 9) (H26 9)   5  Chest pain (786 50) (R07 9)   6  Chronic diastolic CHF (congestive heart failure), NYHA class 3 (428 32) (I50 32)   7  Chronic kidney disease, stage 3 (585 3) (N18 3)   8  Chronic stasis dermatitis of right lower extremity (454 1) (I87 2)   9  Diverticulosis (562 10) (K57 90)   10  Esophageal reflux (530 81) (K21 9)   11  Gait disturbance (781 2) (R26 9)   12  Hematuria (599 70) (R31 9)   13  Hyperlipidemia (272 4) (E78 5)   14  Hypertension (401 9) (I10)   15  Inguinal hernia (550 90) (K40 90)   16   Insomnia (780 52) (G47 00) 17  Lymphedema of both lower extremities (457 1) (I89 0)   18  Need for prophylactic vaccination and inoculation against influenza (V04 81) (Z23)   19  Nonrheumatic aortic valve insufficiency (424 1) (I35 1)   20  Non-toxic multinodular goiter (241 1) (E04 2)   21  Osteoarthritis of knee (715 36) (M17 10)   22  Osteoporosis (733 00) (M81 0)   23  Paroxysmal atrial fibrillation (427 31) (I48 0)   24  Rhinitis (472 0) (J31 0)   25  Screening for genitourinary condition (V81 6) (Z13 89)   26  Screening for neurological condition (V80 09) (Z13 89)   27  Urinary urgency (788 63) (R39 15)   28  Urine, incontinence, stress female (625 6) (N39 3)    Current Meds   1  Amiodarone HCl - 200 MG Oral Tablet; Take one tablet daily; Therapy: 93OKI9587 to (Evaluate:10Mar2018)  Requested for: 40BPV9220; Last   Rx:15Mar2017 Ordered   2  AmLODIPine Besylate 5 MG Oral Tablet; Take 1 tablet daily; Therapy: 56CLE2415 to (Evaluate:53Tdy6058)  Requested for: 95JLS5498; Last   Rx:15Jun2017 Ordered   3  Caltrate 600 TABS; Therapy: (Recorded:16Lcg9476) to Recorded   4  Carvedilol 3 125 MG Oral Tablet; take 1 tablet twice daily,  with morning and evening   meal;   Therapy: 21HCO0773 to (Last Rx:21Apr2016)  Requested for: 94Rga4381 Ordered   5  Diclofenac Sodium 1 % Transdermal Gel (Voltaren); APPLY 2 GRAMS TO BOTH   KNEES4 TIMES A DAY AS NEEDED; Therapy: 56Exw3731 to (Evaluate:48Tbu9338)  Requested for: 31RKH5525; Last   Rx:05Jan2017 Ordered   6  Econazole Nitrate 1 % External Cream; APPLY AND GENTLY MASSAGE INTO   AFFECTED AREA(S) TWICE DAILY; Therapy: 74CFA1445 to (Last Rx:56Dzb6952)  Requested for: 99RPW0365 Ordered   7  Eliquis 5 MG Oral Tablet; take 1 tablet every twelve hours; Therapy: 89RPV2890 to (Winston Zhang)  Requested for: 00DBT0674; Last   Rx:70Ilv2822 Ordered   8  Fluticasone Propionate 50 MCG/ACT Nasal Suspension; use 1 spray in each nostril twice   daily;    Therapy: 98ZLG2344 to (Alyssa Meraz)  Requested for: 11SSJ6024; Last   Rx:41Swd8638 Ordered   9  Isosorbide Mononitrate ER 30 MG Oral Tablet Extended Release 24 Hour; take 1 tablet   once daily; Therapy: 33AKY0424 to (Marlinda Shows)  Requested for: 23QNG7515; Last   Rx:44Caw3803 Ordered   10  MetOLazone 2 5 MG Oral Tablet; Take one tablet as directed only; Therapy: 97Poe5216 to (Last Rx:21Feb2017)  Requested for: 33Bmt3663 Ordered   11  Nitrostat 0 4 MG Sublingual Tablet Sublingual (Nitroglycerin); PLACE 1 TABLET UNDER    THE TONGUE EVERY 5 MINUTES FOR UP TO 3 DOSES AS NEEDED    FOR CHEST PAIN  CALL 911 IF PAIN PERSISTS; Therapy: 83Jde7981 to (Evaluate:30Nov2016)  Requested for: 15Ibd4150; Last    Rx:01Sep2016 Ordered   12  Omeprazole 20 MG Oral Capsule Delayed Release; 1 cap PO daily half an hour before    breakfast;    Therapy: 64RTH9955 to (Evaluate:05Mar2017)  Requested for: 65XLJ9800; Last    Rx:11Mar2016 Ordered   13  Potassium Chloride Nena ER 20 MEQ Oral Tablet Extended Release; take one tablet by    mouth every day; Therapy: 46Uiq2986 to (Evaluate:12Oct2017)  Requested for: 17Oct2016; Last    Rx:17Oct2016 Ordered   14  Pravachol 40 MG Oral Tablet (Pravastatin Sodium); TAKE 1 TABLET DAILY AT BEDTIME; Therapy: 82Wis7165 to (Last Rx:15Jun2016)  Requested for: 35CFH8272 Ordered   15  Prazosin HCl - 2 MG Oral Capsule; TAKE 1 CAPSULE Every morning; Therapy: 74TWK2829 to (Evaluate:17Mar2018)  Requested for: 80XZH9823; Last    Rx:22Mar2017 Ordered   16  Prazosin HCl - 5 MG Oral Capsule; TAKE 1 CAPSULE AT BEDTIME; Therapy: 76ZAA0730 to (Evaluate:24Mar2018)  Requested for: 81VBW3901; Last    Rx:29Mar2017 Ordered   17  Ramipril 10 MG Oral Capsule; Take 1 capsule twice daily; Therapy: 76FIY8157 to (CVBQNPRY:72NPS0241)  Requested for: 05AMG7802; Last    Rx:25Jan2017 Ordered   18   Torsemide 20 MG Oral Tablet; take 2 tablet daily in am 1 tab in pm;    Therapy: 82MNT4551 to (Evaluate:16Apr2018)  Requested for: 21Apr2017; Last    Rx:21Apr2017 Ordered   19  Tylenol Arthritis Pain 650 MG TBCR; PRN; Therapy: (Recorded:69Sqw8939) to Recorded    Allergies    1  Aspirin TABS   2   Percocet TABS    Signatures   Electronically signed by : Quinn Whitfield, ; Jun 16 2017  3:15PM EST                       (Author)

## 2018-01-12 NOTE — MISCELLANEOUS
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)   3  Bilateral leg edema (782 3) (R60 0)   4  Cataract of right eye (366 9) (H26 9)   5  Chest pain (786 50) (R07 9)   6  Chronic diastolic CHF (congestive heart failure), NYHA class 3 (428 32) (I50 32)   7  Chronic kidney disease, stage 3 (585 3) (N18 3)   8  Chronic stasis dermatitis of right lower extremity (454 1) (I87 2)   9  Diverticulosis (562 10) (K57 90)   10  Esophageal reflux (530 81) (K21 9)   11  Gait disturbance (781 2) (R26 9)   12  Hematuria (599 70) (R31 9)   13  Hyperlipidemia (272 4) (E78 5)   14  Hypertension (401 9) (I10)   15  Inguinal hernia (550 90) (K40 90)   16  Insomnia (780 52) (G47 00)   17  Lymphedema of both lower extremities (457 1) (I89 0)   18  Need for prophylactic vaccination and inoculation against influenza (V04 81) (Z23)   19  Nonrheumatic aortic valve insufficiency (424 1) (I35 1)   20  Non-toxic multinodular goiter (241 1) (E04 2)   21  Osteoarthritis of knee (715 36) (M17 10)   22  Osteoporosis (733 00) (M81 0)   23  Paroxysmal atrial fibrillation (427 31) (I48 0)   24  Rhinitis (472 0) (J31 0)   25  Screening for genitourinary condition (V81 6) (Z13 89)   26  Screening for neurological condition (V80 09) (Z13 89)   27  Urinary urgency (788 63) (R39 15)   28  Urine, incontinence, stress female (625 6) (N39 3)    Current Meds   1  Amiodarone HCl - 200 MG Oral Tablet; Take one tablet daily; Therapy: 99QZV5780 to (Evaluate:10Mar2018)  Requested for: 89ABM7008; Last   Rx:15Mar2017 Ordered   2  AmLODIPine Besylate 5 MG Oral Tablet; Take 1 tablet daily;    Therapy: 11SRU2577 to (Evaluate:16Jun2018)  Requested for: 21Jun2017; Last   Rx:21Jun2017 Ordered   3  Caltrate 600 TABS; Therapy: (Recorded:77Kzn4667) to Recorded   4  Carvedilol 3 125 MG Oral Tablet; take 1 tablet twice daily,  with morning and evening   meal;   Therapy: 27JYU2914 to (Evaluate:16Jun2018)  Requested for: 21Jun2017; Last   HJ:92BPH5377 Ordered   5  Diclofenac Sodium 1 % Transdermal Gel (Voltaren); APPLY 2 GRAMS TO BOTH   KNEES4 TIMES A DAY AS NEEDED; Therapy: 82Voj0202 to (26 747821)  Requested for: 01Fmc1790; Last   Rx:69Hnx6523 Ordered   6  Econazole Nitrate 1 % External Cream; APPLY AND GENTLY MASSAGE INTO   AFFECTED AREA(S) TWICE DAILY; Therapy: 75JDU4612 to (Last Rx:97Xhc7844)  Requested for: 84TCP0021 Ordered   7  Eliquis 5 MG Oral Tablet; take 1 tablet every twelve hours; Therapy: 59COW5227 to (Johnisa Collie)  Requested for: 75YCQ9953; Last   Rx:49Hoe9945 Ordered   8  Fluticasone Propionate 50 MCG/ACT Nasal Suspension; use 1 spray in each nostril twice   daily; Therapy: 38DWV7756 to (Pascale Phillips)  Requested for: 22DLB9632; Last   Rx:47Apc9996 Ordered   9  Isosorbide Mononitrate ER 30 MG Oral Tablet Extended Release 24 Hour; take 1 tablet   once daily; Therapy: 29EYN7640 to (Johnisa Collkirit)  Requested for: 59CGG5528; Last   Rx:88Zxw3254 Ordered   10  MetOLazone 2 5 MG Oral Tablet; Take one tablet as directed only; Therapy: 27Dkj2080 to (Last Rx:23Xrq7627)  Requested for: 21Mjd9130 Ordered   11  Nitrostat 0 4 MG Sublingual Tablet Sublingual (Nitroglycerin); PLACE 1 TABLET UNDER    THE TONGUE EVERY 5 MINUTES FOR UP TO 3 DOSES AS NEEDED    FOR CHEST PAIN  CALL 911 IF PAIN PERSISTS; Therapy: 07Yec7590 to (Evaluate:30Nov2016)  Requested for: 35Apz5447; Last    Rx:89Gis5869 Ordered   12  Omeprazole 20 MG Oral Capsule Delayed Release; 1 cap PO daily half an hour before    breakfast;    Therapy: 43MXE7659 to (Evaluate:05Mar2017)  Requested for: 57CAO5361; Last    Rx:11Mar2016 Ordered   13   Potassium Chloride Nena ER 20 MEQ Oral Tablet Extended Release; take one tablet by    mouth every day; Therapy: 23Syt5787 to (Evaluate:12Oct2017)  Requested for: 84Llq7934; Last    Rx:17Oct2016 Ordered   14  Pravachol 40 MG Oral Tablet (Pravastatin Sodium); take 1 tablet daily at bedtime; Therapy: 58Npe2245 to (Evaluate:57Phx7158)  Requested for: 21Xpo0158; Last    Rx:64Vwa5255 Ordered   15  Prazosin HCl - 2 MG Oral Capsule; TAKE 1 CAPSULE Every morning; Therapy: 53LQL8043 to (Evaluate:17Mar2018)  Requested for: 81PES4491; Last    Rx:22Mar2017 Ordered   16  Prazosin HCl - 5 MG Oral Capsule; TAKE 1 CAPSULE AT BEDTIME; Therapy: 37SVI6665 to (Evaluate:24Mar2018)  Requested for: 27MTF0361; Last    Rx:29Mar2017 Ordered   17  Ramipril 10 MG Oral Capsule; Take 1 capsule twice daily; Therapy: 56VQG5790 to (ADBXBKAT:58MUS5793)  Requested for: 86TAE8097; Last    Rx:25Jan2017 Ordered   18  Torsemide 20 MG Oral Tablet; take 2 tablet daily in am 1 tab in pm;    Therapy: 02WHC0445 to (Evaluate:16Apr2018)  Requested for: 21Apr2017; Last    Rx:21Apr2017 Ordered   19  Tylenol Arthritis Pain 650 MG TBCR; PRN; Therapy: (Recorded:27Uas0334) to Recorded    Allergies    1  Aspirin TABS   2  Percocet TABS    Signatures   Electronically signed by :  Tan Troy, ; Aug 16 2017 10:09AM EST                       (Author)

## 2018-01-13 VITALS
HEART RATE: 60 BPM | HEIGHT: 61 IN | DIASTOLIC BLOOD PRESSURE: 40 MMHG | BODY MASS INDEX: 32.58 KG/M2 | SYSTOLIC BLOOD PRESSURE: 146 MMHG | WEIGHT: 172.56 LBS

## 2018-01-13 VITALS
HEART RATE: 56 BPM | HEIGHT: 61 IN | WEIGHT: 173.38 LBS | SYSTOLIC BLOOD PRESSURE: 142 MMHG | BODY MASS INDEX: 32.73 KG/M2 | DIASTOLIC BLOOD PRESSURE: 48 MMHG

## 2018-01-13 VITALS
RESPIRATION RATE: 16 BRPM | BODY MASS INDEX: 32.29 KG/M2 | WEIGHT: 171.04 LBS | SYSTOLIC BLOOD PRESSURE: 138 MMHG | HEART RATE: 54 BPM | OXYGEN SATURATION: 94 % | HEIGHT: 61 IN | DIASTOLIC BLOOD PRESSURE: 50 MMHG

## 2018-01-13 VITALS — BODY MASS INDEX: 31.59 KG/M2 | WEIGHT: 167.2 LBS

## 2018-01-13 VITALS — HEIGHT: 61 IN | BODY MASS INDEX: 31.58 KG/M2 | WEIGHT: 167.25 LBS

## 2018-01-13 NOTE — RESULT NOTES
Message   Recent blood work shows that she is slightly anemic, hemoglobin 10 7, normal is 11 6  , kidney function is stable  Is she still having diarrhea? Verified Results  (1) CBC/PLT/DIFF 55CCH6549 10:35AM Vanita Brock     Test Name Result Flag Reference   WBC COUNT 5 68 Thousand/uL  4 31-10 16   RBC COUNT 3 66 Million/uL L 3 81-5 12   HEMOGLOBIN 10 7 g/dL L 11 5-15 4   HEMATOCRIT 35 3 %  34 8-46  1   MCV 96 fL  82-98   MCH 29 2 pg  26 8-34 3   MCHC 30 3 g/dL L 31 4-37 4   RDW 16 3 % H 11 6-15 1   MPV 11 7 fL  8 9-12 7   PLATELET COUNT 156 Thousands/uL  149-390   NEUTROPHILS RELATIVE PERCENT 74 %  43-75   LYMPHOCYTES RELATIVE PERCENT 14 %  14-44   MONOCYTES RELATIVE PERCENT 11 %  4-12   EOSINOPHILS RELATIVE PERCENT 1 %  0-6   BASOPHILS RELATIVE PERCENT 0 %  0-1   NEUTROPHILS ABSOLUTE COUNT 4 14 Thousands/? ??L  1 85-7 62   LYMPHOCYTES ABSOLUTE COUNT 0 82 Thousands/? ??L  0 60-4 47   MONOCYTES ABSOLUTE COUNT 0 65 Thousand/? ??L  0 17-1 22   EOSINOPHILS ABSOLUTE COUNT 0 06 Thousand/? ??L  0 00-0 61   BASOPHILS ABSOLUTE COUNT 0 01 Thousands/? ??L  0 00-0 10   This bloodwork is non-fasting  Please drink two glasses of water morning of  bloodwork  This bloodwork is non-fasting  Please drink two glasses of water morning of bloodwork  (1) BASIC METABOLIC PROFILE 92URN1227 10:35AM Vanita Brock     Test Name Result Flag Reference   GLUCOSE,RANDM 104 mg/dL     If the patient is fasting, the ADA then defines impaired fasting glucose as > 100 mg/dL and diabetes as > or equal to 123 mg/dL     SODIUM 144 mmol/L  136-145   POTASSIUM 4 3 mmol/L  3 5-5 3   CHLORIDE 103 mmol/L  100-108   CARBON DIOXIDE 31 mmol/L  21-32   ANION GAP (CALC) 10 mmol/L  4-13   BLOOD UREA NITROGEN 35 mg/dL H 5-25   CREATININE 1 39 mg/dL H 0 60-1 30   Standardized to IDMS reference method   CALCIUM 8 6 mg/dL  8 3-10 1   eGFR Non-African American 36 0 ml/min/1 73sq m     [facility] Disease Education Program recommendations are as

## 2018-01-13 NOTE — MISCELLANEOUS
Message   Recorded as Task   Date: 02/01/2017 02:06 PM, Created By: Kurt Jimenez   Task Name: Care Coordination   Assigned To: Awa Avelar   Regarding Patient: Fabi Ferro, Status: Active   Comment:    Awa Avelar - 01 Feb 2017 2:06 PM     TASK CREATED  Patient weight at 169 on the higher end of usual for her, home nurse in to see her today the patient is complaining of LE edema and is having problems with compression stockings not going all the way to her knees  Would you like any increase in diuretics? 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)   3  Bilateral leg edema (782 3) (R60 0)   4  Cataract of right eye (366 9) (H26 9)   5  Chest pain (786 50) (R07 9)   6  Chronic diastolic CHF (congestive heart failure), NYHA class 3 (428 32) (I50 32)   7  Diverticulosis (562 10) (K57 90)   8  Elevated serum creatinine (790 99) (R79 89)   9  Esophageal reflux (530 81) (K21 9)   10  Gait disturbance (781 2) (R26 9)   11  Hematuria (599 70) (R31 9)   12  Hyperlipidemia (272 4) (E78 5)   13  Hypertension (401 9) (I10)   14  Inguinal hernia (550 90) (K40 90)   15  Insomnia (780 52) (G47 00)   16  Need for prophylactic vaccination and inoculation against influenza (V04 81) (Z23)   17  Nonrheumatic aortic valve insufficiency (424 1) (I35 1)   18  Non-toxic multinodular goiter (241 1) (E04 2)   19  Osteoarthritis of knee (715 36) (M17 9)   20  Osteoporosis (733 00) (M81 0)   21  Paroxysmal atrial fibrillation (427 31) (I48 0)   22  Rhinitis (472 0) (J31 0)   23  Screening for genitourinary condition (V81 6) (Z13 89)   24  Screening for neurological condition (V80 09) (Z13 89)   25   Stasis dermatitis, acute, right (454 1) (I83 11)   26  Urinary urgency (788 63) (R39 15)   27  Urine, incontinence, stress female (625 6) (N39 3)    Current Meds   1  Amiodarone HCl - 200 MG Oral Tablet; Take one tablet daily; Therapy: 66NCI8045 to (Evaluate:44Umr1418)  Requested for: 64PAY8908; Last   Rx:01Mar2016 Ordered   2  AmLODIPine Besylate 10 MG Oral Tablet; TAKE 1 TABLET DAILY; Therapy: 92MEC1475 to (Evaluate:41Vle1700)  Requested for: 10Aug2016; Last   Rx:10Aug2016 Ordered   3  Caltrate 600 TABS; Therapy: (Recorded:28Rwv1886) to Recorded   4  Carvedilol 3 125 MG Oral Tablet; take 1 tablet twice daily,  with morning and evening   meal;   Therapy: 15ZRZ4009 to (Last Rx:21Apr2016)  Requested for: 21Apr2016 Ordered   5  Diclofenac Sodium 1 % Transdermal Gel (Voltaren); APPLY 2 GRAMS TO BOTH   KNEES4 TIMES A DAY AS NEEDED; Therapy: 51Hiv2273 to (Evaluate:56Sec3650)  Requested for: 14UYS0120; Last   Rx:05Jan2017 Ordered   6  Econazole Nitrate 1 % External Cream; APPLY AND GENTLY MASSAGE INTO   AFFECTED AREA(S) TWICE DAILY; Therapy: 82NCS0025 to (Last Rx:06Aug3235)  Requested for: 37BHG3560 Ordered   7  Eliquis 5 MG Oral Tablet; take 1 tablet every twelve hours; Therapy: 81YXZ1020 to (Donal Fabry)  Requested for: 42DTT5987; Last   Rx:55Pjg9978 Ordered   8  Fluticasone Propionate 50 MCG/ACT Nasal Suspension; use 1 spray in each nostril twice   daily; Therapy: 81EKG6681 to (Chip Curiel)  Requested for: 94TMX4216; Last   Rx:40Rtj7976 Ordered   9  Isosorbide Mononitrate ER 30 MG Oral Tablet Extended Release 24 Hour; take 1 tablet   once daily; Therapy: 75EWP0200 to (Donal Fabry)  Requested for: 04PFA1681; Last   Rx:46Mhf5950 Ordered   10  Nitrostat 0 4 MG Sublingual Tablet Sublingual (Nitroglycerin); PLACE 1 TABLET UNDER    THE TONGUE EVERY 5 MINUTES FOR UP TO 3 DOSES AS NEEDED    FOR CHEST PAIN  CALL 911 IF PAIN PERSISTS;     Therapy: 01Sep2016 to (Severiano Calico)  Requested for: 01Sep2016; Last    Rx:01Sep2016 Ordered   11  Omeprazole 20 MG Oral Capsule Delayed Release; 1 cap PO daily half an hour before    breakfast;    Therapy: 41ZIK0649 to (Evaluate:05Mar2017)  Requested for: 74LEE8921; Last    Rx:11Mar2016 Ordered   12  Potassium Chloride Nena ER 20 MEQ Oral Tablet Extended Release; take one tablet by    mouth every day; Therapy: 11Dnf1928 to (Evaluate:12Oct2017)  Requested for: 15Peh9971; Last    Rx:17Oct2016 Ordered   13  Pravachol 40 MG Oral Tablet (Pravastatin Sodium); TAKE 1 TABLET DAILY AT BEDTIME; Therapy: 39Gnz6681 to (Last Rx:15Jun2016)  Requested for: 09UWG3996 Ordered   14  Prazosin HCl - 2 MG Oral Capsule; TAKE 1 CAPSULE Every morning; Therapy: 99LHL0206 to (Tiffanie Burrell)  Requested for: 60ILS7403; Last    Rx:11May2016 Ordered   15  Prazosin HCl - 5 MG Oral Capsule; TAKE 1 CAPSULE AT BEDTIME; Therapy: 48HYQ8697 to (Arielle Johnson)  Requested for: 00QGQ3487; Last    Rx:11Mar2016 Ordered   16  Ramipril 10 MG Oral Capsule; Take 1 capsule twice daily; Therapy: 74WNO9401 to (XXYTPBJH:64PXS4738)  Requested for: 65AEU4521; Last    Rx:25Jan2017 Ordered   17  Torsemide 20 MG Oral Tablet; take 2 tablet daily in am 1 tab in pm;    Therapy: 85BNL5257 to (Evaluate:13May2017)  Requested for: 26NUT6804; Last    Rx:18May2016 Ordered   18  Tylenol Arthritis Pain 650 MG TBCR; PRN; Therapy: (Recorded:21Ohz5535) to Recorded    Allergies    1  Aspirin TABS   2   Percocet TABS    Signatures   Electronically signed by : Hubert Luther, ; Feb  3 2017  2:30PM EST                       (Author)

## 2018-01-14 VITALS
HEIGHT: 61 IN | HEART RATE: 52 BPM | DIASTOLIC BLOOD PRESSURE: 40 MMHG | WEIGHT: 170.06 LBS | BODY MASS INDEX: 32.11 KG/M2 | SYSTOLIC BLOOD PRESSURE: 138 MMHG

## 2018-01-15 NOTE — MISCELLANEOUS
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)   4  Bilateral leg edema (782 3) (R60 0)   5  Cataract of right eye (366 9) (H26 9)   6  Diverticulosis (562 10) (K57 90)   7  Esophageal reflux (530 81) (K21 9)   8  Gait disturbance (781 2) (R26 9)   9  Hematuria (599 70) (R31 9)   10  Hyperlipidemia (272 4) (E78 5)   11  Hypertension (401 9) (I10)   12  Inguinal hernia (550 90) (K40 90)   13  Insomnia (780 52) (G47 00)   14  Murmur (785 2) (R01 1)   15  Need for prophylactic vaccination and inoculation against influenza (V04 81) (Z23)   16  Non-toxic multinodular goiter (241 1) (E04 2)   17  Osteoarthritis of knee (715 36) (M17 9)   18  Osteoporosis (733 00) (M81 0)   19  Paroxysmal atrial fibrillation (427 31) (I48 0)   20  Rhinitis (472 0) (J31 0)   21  Screening for genitourinary condition (V81 6) (Z13 89)   22  Screening for neurological condition (V80 09) (Z13 89)   23  Urinary urgency (788 63) (R39 15)   24  Urine, incontinence, stress female (625 6) (N39 3)    Past Medical History    1  History of Acute upper respiratory infection (465 9) (J06 9)   2  History of Acute upper respiratory infection (465 9) (J06 9)   3  History of Aneurysm Of The Ascending Aorta (441 9)   4  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)   5  History of Intertrigo (695 89) (L30 4)   6  History of Open wound of foot, left, initial encounter (892 0) (S91 302A)   7  History of Shortness of breath (786 05) (R06 02)   8  History of Urinary tract infection (599 0) (N39 0)    Surgical History    1  History of Cholecystectomy   2  History of Gynecologic Services Insertion Of Pessary   3  History of Salpingo-oophorectomy Bilateral   4   History of Tonsillectomy    Family History    1  No pertinent family history    2  Family history of malignant neoplasm of stomach (V16 0) (Z80 0)    3  Family history of cardiac disorder (V17 49) (Z82 49)   4  Family history of malignant neoplasm of breast (V16 3) (Z80 3)    5  Family history of colon cancer (V16 0) (Z80 0)   6  Family history of malignant neoplasm (V16 9) (Z80 9)    Social History    · Assistive Devices: Walker   · Denies alcohol consumption (V49 89) (Z78 9)   · Never a smoker    Current Meds   1  Amiodarone HCl - 200 MG Oral Tablet; Take one tablet daily; Therapy: 05AKZ7824 to (Evaluate:30Yoe7316)  Requested for: 99JAQ9134; Last   Rx:01Mar2016 Ordered   2  AmLODIPine Besylate 10 MG Oral Tablet; TAKE 1 TABLET DAILY; Therapy: 84MWR1005 to (Evaluate:15Oct2016)  Requested for: 40GYH1070; Last   Rx:47Env4067 Ordered   3  Caltrate 600 TABS; Therapy: (Recorded:15Pcl9316) to Recorded   4  Carvedilol 6 25 MG Oral Tablet; TAKE 1 TABLET TWICE DAILY WITH MEALS; Therapy: 90YMU5859 to (Evaluate:30Wfj8669)  Requested for: 42AXY3858; Last   Rx:88Kou6657 Ordered   5  Econazole Nitrate 1 % External Cream; APPLY AND GENTLY MASSAGE INTO AFFECTED   AREA(S) TWICE DAILY; Therapy: 27TDI1438 to (Last Rx:19Mar2014)  Requested for: 09LKA6582 Ordered   6  Eliquis 5 MG Oral Tablet; take 1 tablet every twelve hours; Therapy: 26RAV3107 to [phone])  Requested for: [phone]; Last   Rx:61Cvl3333 Ordered   7  Fluticasone Propionate 50 MCG/ACT Nasal Suspension; use 1 spray in each nostril   twice daily; Therapy: 92AAO0683 to (Gerardo Peacock)  Requested for: 62ESA6754; Last   Rx:58Jhh4818 Ordered   8  Omeprazole 20 MG Oral Capsule Delayed Release; 1 cap PO daily half an hour before   breakfast;   Therapy: 83WOX9677 to (Evaluate:05Mar2017)  Requested for: 07DLH3623; Last   Rx:11Mar2016 Ordered   9  Pravachol 40 MG Oral Tablet; TAKE 1 TABLET DAILY AT BEDTIME;    Therapy: 94Iuz9217 to (Last Rx:03Jun2015)  Requested for: 10JTK1972 Ordered   10  Prazosin HCl - 2 MG Oral Capsule; TAKE 1 CAPSULE Every morning; Therapy: 22MBQ9126 to (Evaluate:15Oct2016)  Requested for: 30PXS3307; Last    Rx:21Oct2015 Ordered   11  Prazosin HCl - 5 MG Oral Capsule; TAKE 1 CAPSULE AT BEDTIME; Therapy: 55NNQ7376 to (Flo Bone)  Requested for: 05REH9246; Last    Rx:11Mar2016 Ordered   12  Ramipril 10 MG Oral Capsule; 2 tabs PO QHS; Therapy: 62KYV4371 to (Evaluate:15Oct2016)  Requested for: 51ZLE5806; Last    Rx:21Oct2015 Ordered   13  Torsemide 20 MG Oral Tablet; take 2 tablet daily; Therapy: 31PDG8735 to (Evaluate:48Svo4159)  Requested for: 12KYP9772; Last    Rx:01Mar2016 Ordered   14  Tylenol Arthritis Pain 650 MG Oral Tablet Extended Release; PRN; Therapy: (Recorded:15Wln0479) to Recorded   15  Voltaren 1 % Transdermal Gel; Apply to both knees 2 g  QID prn; Therapy: 30Apr2014 to (Last ZOLTAN:66CVX1117)  Requested for: 03Jun2015 Ordered    Allergies    1  Aspirin TABS   2   Percocet TABS    Future Appointments    Date/Time Provider Specialty Site   06/01/2016 11:00 AM Yumiko Prince DO [facility]   Electronically signed by : Creasie Jeans, ; Apr 11 2016  8:08AM EST                       (Author)    Electronically signed by : JIM Chaney ; Apr 11 2016 12:59PM EST                       (Author)

## 2018-01-15 NOTE — PROGRESS NOTES
Assessment    1  Lymphedema of both lower extremities (457 1) (I89 0)   2  Chronic stasis dermatitis of right lower extremity (454 1) (I87 2)   3  Bilateral leg edema (782 3) (R60 0)   4  Chronic venous insufficiency (459 81) (I87 2)    Plan  Bilateral leg edema, Chronic stasis dermatitis of right lower extremity, Chronic venous  insufficiency, Lymphedema of both lower extremities    · Apply moisturizing cream or lotion several times a day ; Status:Complete;   Done:  78MPG7195   Ordered; For:Bilateral leg edema, Chronic stasis dermatitis of right lower extremity, Chronic venous insufficiency, Lymphedema of both lower extremities; Ordered By:Haven Lang;   · Keep your leg elevated whenever you can to decrease swelling and increase circulation ;  Status:Complete;   Done: 60SHR8840   Ordered; For:Bilateral leg edema, Chronic stasis dermatitis of right lower extremity, Chronic venous insufficiency, Lymphedema of both lower extremities; Ordered By:Haven Lang;   · Support stockings can help keep the blood from pooling in the small veins in your feet  and legs ; Status:Complete;   Done: 78QUV8641   Ordered; For:Bilateral leg edema, Chronic stasis dermatitis of right lower extremity, Chronic venous insufficiency, Lymphedema of both lower extremities; Ordered By:Haven Lang;   · Follow-up PRN Evaluation and Treatment  Follow-up  Status: Complete  Done:  93GLH8888   Ordered; For: Bilateral leg edema, Chronic stasis dermatitis of right lower extremity, Chronic venous insufficiency, Lymphedema of both lower extremities; Ordered By: Meron Giraldo Performed:  Due: 92OXX7718  Bilateral leg edema, Chronic stasis dermatitis of right lower extremity, Lymphedema of  both lower extremities    · Assistive Device (stocking application); Status:Complete;   Done: 86KAK5890   Perform:Other; Due:17Biv5899;Ordered;   For:Bilateral leg edema, Chronic stasis dermatitis of right lower extremity, Lymphedema of both lower extremities; Ordered By:Haven Lang;   · Gradient compression stocking, below knee, 20-30mm Hg, each; Status:Complete;    Done: 68KDD7893   Perform:Not Applicable; [facility]:05SEX5651;[facility]; For:Bilateral leg edema, Chronic stasis dermatitis of right lower extremity, Lymphedema of both lower extremities; Ordered By:Bibiana Lang; Discussion/Summary  Discussion Summary:   80year old female with HTN,HLD,Aortic Aneurysm, CHF, CKD, chronic venous insufficiency and secondary lymphedema who returns to the office to check lower extremity swelling with the use of pneumatic compression pumps    -Lower extremity swelling is much improved with use of pumps  Continue to use daily    -Continue compression  Current custom compression is too large as her legs have decreased in size with use of pumps  New Rx given for compression and script for assistive device to help with donning  Her leg size is greatly reduced and I expect she can get fitted for formal gradient compression  If not will consider CircAid or Compreflex compression for ease of use as custom compression is too costly    -Continue moisturizers to maintain skin integrity, periodic leg elevation and reduced sodium diet  -I will see her back in the office on an as needed basis  Counseling Documentation With Imm: The patient was counseled regarding instructions for management, risk factor reductions, prognosis, patient and family education, impressions, risks and benefits of treatment options, importance of compliance with treatment  Chief Complaint  Chief Complaint Free Text Note Form: " I am here to get my legs checked "  Ms Mendoza is here for her 3M F/U for lymphedema pumps  Pt states since wearing the compression stockings and pumps her swelling has went down a lot  Pt denies redness  Pt denies open areas or weeping  History of Present Illness  HPI: Patient returns to the office to check lower extremity swelling with use of pumps   She has been using pumps twice daily for the last 3 months and notes an improvement in her lower extremity swelling  She had difficulty donning compression secondary to SOB with bending over  She required custom zipper compression due to the size and shape of her legs for $485/pair  Her leg size is reduced with the use of pumps and her custom compression stockings are loose  She is using moisturizers on the legs daily  She closely monitors her salt intake and elevates her legs when siting  She has no increase in SOB with use of pumps  He medial ankle weeping healed with pumps and has not re occurred  Review of Systems  Complete Female - Vasc:   Constitutional: No fever or chills, feels well, no tiredness, no recent weight gain or weight loss  Eyes: No sudden vision loss, no blurred vision, no double vision  ENT: no loss of hearing, no nosebleeds, no hoarseness  Respiratory: shortness of breath during exertion  Gastrointestinal: No nausea, No vomiting, no diarrhea, no blood in stool  Genitourinary: no dysuria, no Hematuria,no urinary incontinence  Musculoskeletal: limb swelling  Integumentary: dry skin  Psychiatric: no depression, no mood disorders, no anxiety  Hematologic/Lymphatic: no bleeding disorder, no easy bruising  ROS Reviewed:   ROS reviewed  Active Problems    1  Advance directive discussed with patient (V65 49) (Z71 89)   2  Allergic rhinitis (477 9) (J30 9)   3  Ascending aortic aneurysm (441 2) (I71 2)   4  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)   5  Bilateral leg edema (782 3) (R60 0)   6  Cataract of right eye (366 9) (H26 9)   7  Chest pain (786 50) (R07 9)   8  Chronic diastolic CHF (congestive heart failure), NYHA class 3 (428 32) (I50 32)   9  Chronic kidney disease, stage 3 (585 3) (N18 3)   10  Chronic stasis dermatitis of right lower extremity (454 1) (I87 2)   11  Diverticulosis (562 10) (K57 90)   12  Esophageal reflux (530 81) (K21 9)   13   Gait disturbance (781 2) (R26 9) 14  Hyperlipidemia (272 4) (E78 5)   15  Hypertension (401 9) (I10)   16  Inguinal hernia (550 90) (K40 90)   17  Insomnia (780 52) (G47 00)   18  Lymphedema of both lower extremities (457 1) (I89 0)   19  Need for prophylactic vaccination and inoculation against influenza (V04 81) (Z23)   20  Nonrheumatic aortic valve insufficiency (424 1) (I35 1)   21  Non-toxic multinodular goiter (241 1) (E04 2)   22  Osteoarthritis of knee (715 36) (M17 10)   23  Osteoporosis (733 00) (M81 0)   24  Paroxysmal atrial fibrillation (427 31) (I48 0)   25  Rhinitis (472 0) (J31 0)   26  Screening for genitourinary condition (V81 6) (Z13 89)   27  Screening for neurological condition (V80 09) (Z13 89)   28  Urinary urgency (788 63) (R39 15)   29  Urine, incontinence, stress female (625 6) (N39 3)    Past Medical History    1  History of Acute upper respiratory infection (465 9) (J06 9)   2  History of Acute upper respiratory infection (465 9) (J06 9)   3  History of Aneurysm Of The Ascending Aorta (441 9)   4  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)   5  History of anemia (V12 3) (Z86 2)   6  History of hematuria (V13 09) (Z87 448)   7  History of Intertrigo (695 89) (L30 4)   8  History of Open wound of foot, left, initial encounter (892 0) (S91 302A)   9  History of Shortness of breath (786 05) (R06 02)   10  History of Urinary tract infection (599 0) (N39 0)  Active Problems And Past Medical History Reviewed: The active problems and past medical history were reviewed and updated today  Surgical History    1  History of Cholecystectomy   2  History of Gynecologic Services Insertion Of Pessary   3  History of Salpingo-oophorectomy Bilateral   4  History of Tonsillectomy  Surgical History Reviewed: The surgical history was reviewed and updated today  Family History  Mother    1  No pertinent family history  Father    2   Family history of malignant neoplasm of stomach (V16 0) (Z80 0)  Sister 3  Family history of cardiac disorder (V17 49) (Z82 49)   4  Family history of malignant neoplasm of breast (V16 3) (Z80 3)  Brother    5  Family history of colon cancer (V16 0) (Z80 0)   6  Family history of malignant neoplasm (V16 9) (Z80 9)  Family History Reviewed: The family history was reviewed and updated today  Social History    · Assistive Devices: Walker   · Denies alcohol consumption (V49 89) (Z78 9)   · Never a smoker  Social History Reviewed: The social history was reviewed and updated today  Current Meds   1  Amiodarone HCl - 200 MG Oral Tablet; Take one tablet daily; Therapy: 80VCN5952 to (Evaluate:10Mar2018)  Requested for: 52BDK8898; Last   Rx:15Mar2017 Ordered   2  AmLODIPine Besylate 5 MG Oral Tablet; Take 1 tablet daily; Therapy: 17KPG6583 to (Evaluate:16Jun2018)  Requested for: 21Jun2017; Last   FC:54PTG1873 Ordered   3  Caltrate 600 TABS; Therapy: (Recorded:42Ukm4839) to Recorded   4  Carvedilol 3 125 MG Oral Tablet; take 1 tablet twice daily,  with morning and evening   meal;   Therapy: 92SPO6742 to (Evaluate:16Jun2018)  Requested for: 21Jun2017; Last   SI:28LSH4151 Ordered   5  Diclofenac Sodium 1 % Transdermal Gel; APPLY 2 GRAMS TO BOTH KNEES4 TIMES A   DAY AS NEEDED; Therapy: 94Sij2275 to (Zoraida Juan J)  Requested for: 25Wnv9481; Last   Rx:95Qsw0951 Ordered   6  Econazole Nitrate 1 % External Cream; APPLY AND GENTLY MASSAGE INTO AFFECTED   AREA(S) TWICE DAILY; Therapy: 66LNL2662 to (Last Rx:80Wth5075)  Requested for: 91TKW3602 Ordered   7  Eliquis 5 MG Oral Tablet; take 1 tablet every twelve hours; Therapy: 46IYJ3079 to (Parul Wilson)  Requested for: 27TEJ4043; Last   Rx:46Kpy9324 Ordered   8  Fluticasone Propionate 50 MCG/ACT Nasal Suspension; use 1 spray in each nostril   twice daily; Therapy: 44PLT4129 to (Ashwini Schafer)  Requested for: 63EOJ7377; Last   Rx:71Fpu4483 Ordered   9   Isosorbide Mononitrate ER 30 MG Oral Tablet Extended Release 24 Hour; take 1 tablet   once daily; Therapy: 29NDE5671 to (Jerome Nj)  Requested for: 66UMF0642; Last   Rx:43Mvi7778 Ordered   10  MetOLazone 2 5 MG Oral Tablet; Take one tablet as directed only; Therapy: 47Xpb7698 to (Last Rx:38Oyw0443)  Requested for: 94Qpn4461 Ordered   11  Nitrostat 0 4 MG Sublingual Tablet Sublingual; PLACE 1 TABLET UNDER THE TONGUE    EVERY 5 MINUTES FOR UP TO 3 DOSES AS NEEDED FOR CHEST PAIN  CALL    911 IF PAIN PERSISTS; Therapy: 81Eof4544 to (Evaluate:30Nov2016)  Requested for: 41Zlx6924; Last    Rx:50Gdt6841 Ordered   12  Omeprazole 20 MG Oral Capsule Delayed Release; 1 cap PO daily half an hour before    breakfast;    Therapy: 62CNR9904 to (Evaluate:05Mar2017)  Requested for: 57ACM8180; Last    Rx:11Mar2016 Ordered   13  Potassium Chloride Nena ER 20 MEQ Oral Tablet Extended Release; take one tablet by    mouth every day; Therapy: 54Edn0373 to (Evaluate:12Oct2017)  Requested for: 17Oct2016; Last    Rx:17Oct2016 Ordered   14  Pravachol 40 MG Oral Tablet; take 1 tablet daily at bedtime; Therapy: 52Wzz9170 to (Evaluate:35Oxw7552)  Requested for: 92Ehm9655; Last    Rx:94Shb2851 Ordered   15  Prazosin HCl - 2 MG Oral Capsule; TAKE 1 CAPSULE Every morning; Therapy: 12YCA6902 to (Evaluate:17Mar2018)  Requested for: 54FRV9638; Last    Rx:22Mar2017 Ordered   16  Prazosin HCl - 5 MG Oral Capsule; TAKE 1 CAPSULE AT BEDTIME; Therapy: 75NIZ1164 to (Evaluate:24Mar2018)  Requested for: 32PHQ1964; Last    Rx:29Mar2017 Ordered   17  Ramipril 10 MG Oral Capsule; Take 1 capsule twice daily; Therapy: 38VSH5596 to (FUKWPNLV:19UWR4889)  Requested for: 63TET5211; Last    Rx:25Jan2017 Ordered   18  Torsemide 20 MG Oral Tablet; take 2 tablet daily in am 1 tab in pm;    Therapy: 63HSP8767 to (Evaluate:16Apr2018)  Requested for: 21Apr2017; Last    Rx:21Apr2017 Ordered   19  Tylenol Arthritis Pain 650 MG TBCR; PRN;     Therapy: (Recorded:70Jxn0613) to Recorded  Medication List Reviewed: The medication list was reviewed and updated today  Allergies    1  Aspirin TABS   2  Percocet TABS    Vitals  Vital Signs    Recorded: 36Xdu5528 02:11PM   Heart Rate 66   Respiration Quality Normal   Respiration 18   Systolic 146, RUE, Sitting   Diastolic 76, RUE, Sitting   Height 5 ft 1 in     Physical Exam    Extremities: bilateral pretibial 1-2+ pitting edema  LE Varicose Veins: right leg reticular veins, left leg reticular veins, right leg spider veins and left leg spider veins  The heart rate was normal  The rhythm was regular  Heart sounds: normal S1 and normal S2    Murmurs: systolic ejection murmur 2/6 Left sternal border   Pulmonary   Respiratory effort: No increased work of breathing or signs of respiratory distress  Auscultation of lungs: Clear to auscultation  No wheezing, no rales, no rhonchi  Psychiatric   Orientation to person, place and time: Normal    Mood and affect: Normal    Neurologic Sensory exam normal   Motor skills intact  Musculoskeletal ambulating with rolling walker  Skin chronic stasis changes bilaterally R>L  No wounds, ulcerations or weeping  Skin is dry  Venous Disease: Lipodermatosclerosis, hyperpigmentation      Future Appointments    Date/Time Provider Specialty Site   12/06/2017 11:30 AM JIM Rhodes   [facility] OFF[facility]     Signatures   Electronically signed by : Sachi Beltran; Sep 18 2017  3:09PM EST                       (Author)    Electronically signed by : Elizabeth Vargas MD; Sep 21 2017  3:47PM EST                       (Author)

## 2018-01-16 NOTE — RESULT NOTES
Message  Patient having worsening LE edema, Vascular recommending decreasing Amlodipine from 10 to 5  OK to decrease Amlodipine to 5 mg, monitor BP to ensure if remains controlled  Plan  Hypertension    · From  AmLODIPine Besylate 10 MG Oral Tablet TAKE 1 TABLET DAILY To  AmLODIPine Besylate 5 MG Oral Tablet Take 1 tablet daily    Signatures   Electronically signed by :  Dana Carlos MD; Ricky 15 2017  1:31PM EST                       (Author)

## 2018-01-17 ENCOUNTER — ALLSCRIPTS OFFICE VISIT (OUTPATIENT)
Dept: OTHER | Facility: OTHER | Age: 83
End: 2018-01-17

## 2018-01-17 NOTE — MISCELLANEOUS
Message   Recorded as Task   Date: 02/04/2016 03:51 PM, Created By: Elif Day   Task Name: Medical Complaint Callback   Assigned To: Arturo Pak   Regarding Patient: Boy Arzate, Status: Active   CommentJonathan Ayala - 04 Feb 2016 3:51 PM     TASK CREATED  Caller: Self; Medical Complaint; [phone] (Home)  Patient called because she said that Dr DULCE CALZADA started her on Furosemide 20mg and she took the first pill this mornig and after taking it she felt weak and sweaty  She said that since then it has passed over and when she went to stand up this afternoon she felt dizzy so she wanted Dr DULCE CALZADA to be aware of this and does she want her to stop the Furosemide or maybe cut the pill in half? Call pt to let her know at [phone]  Janine Painting - 04 Feb 2016 3:53 PM     TASK REASSIGNED: Previously Assigned To Arturo Pak  She can also be reached at [phone]     Spoke with pt  Feeling normal now  I asked her to take only half of the Furosemide so 10mg only and skip the potassium  Call me hai with how she does on this dose      Plan  Paroxysmal atrial fibrillation    · Potassium Chloride Nena ER 10 MEQ Oral Tablet Extended Release   · From  Furosemide 20 MG Oral Tablet TAKE 1 TABLET DAILY AS DIRECTED  To Furosemide 20 MG Oral Tablet TAKE 1/2 TABLET DAILY AS DIRECTED    Signatures   Electronically signed by : JIM Kay ; Feb 4 2016  4:26PM EST                       (Author)

## 2018-01-18 NOTE — PROGRESS NOTES
Assessment   1  Ascending aortic aneurysm (441 2) (I71 2)   2  Chronic diastolic CHF (congestive heart failure), NYHA class 3 (428 32) (I50 32)   3  Chronic kidney disease, stage 3 (585 3) (N18 3)   4  Hypertension (401 9) (I10)    Plan   Hypertension    · Isosorbide Mononitrate ER 30 MG Oral Tablet Extended Release 24 Hour    Discussion/Summary   Discussion Summary:    BP controlled on current dose of isosorbide at 60mg a day instructed her to continue at this dose and to not increase to 90mg once she gets the new supply monitoring BP, VNA coming weekly stabel  Counseling Documentation With Imm: The patient was counseled regarding impressions  Medication SE Review and Pt Understands Tx: Possible side effects of new medications were reviewed with the patient/guardian today  The treatment plan was reviewed with the patient/guardian  The patient/guardian understands and agrees with the treatment plan      Chief Complaint   Chief Complaint Chronic Condition  Julia Helen: Patient is here today for follow up of chronic conditions described in HPI  History of Present Illness   HPI: PALS VNA requested to come in again for SOB increasing LE edema, knows aortic aneurysm   BP high 2 weeks ago 180-200 /50s and isosorbide increased to 60mg a day was reported to improve but was still high 150s/50s a few days later and isosorbide was increased to 90mg a day  Brandee Contreras has been unable to increase up to 90mg a day bec she is afraid she will run out of the 30mg has yet to receive the new supply from her mail order  She is currently on 60mg a day weight has been stable 156lbs was in to see her yesterday -she recalls that her SBP was 130s          Review of Systems   Complete-Female:      Constitutional: no fever,-- no recent weight gain,-- no chills-- and-- no recent weight loss  Cardiovascular: lower extremity edema, but-- no chest pain-- and-- no palpitations        Respiratory: shortness of breath-- and-- shortness of breath during exertion  Active Problems   1  Advance directive discussed with patient (V65 49) (Z71 89)   2  Allergic rhinitis (477 9) (J30 9)   3  Ascending aortic aneurysm (441 2) (I71 2)   4  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)   5  Bilateral leg edema (782 3) (R60 0)   6  Cataract of right eye (366 9) (H26 9)   7  Chest pain (786 50) (R07 9)   8  Chronic diastolic CHF (congestive heart failure), NYHA class 3 (428 32) (I50 32)   9  Chronic kidney disease, stage 3 (585 3) (N18 3)   10  Chronic stasis dermatitis of right lower extremity (454 1) (I87 2)   11  Chronic venous insufficiency (459 81) (I87 2)   12  Diverticulosis (562 10) (K57 90)   13  Esophageal reflux (530 81) (K21 9)   14  Gait disturbance (781 2) (R26 9)   15  Hyperlipidemia (272 4) (E78 5)   16  Hypertension (401 9) (I10)   17  Inguinal hernia (550 90) (K40 90)   18  Insomnia (780 52) (G47 00)   19  Lymphedema of both lower extremities (457 1) (I89 0)   20  Need for prophylactic vaccination and inoculation against influenza (V04 81) (Z23)   21  Nonrheumatic aortic valve insufficiency (424 1) (I35 1)   22  Non-toxic multinodular goiter (241 1) (E04 2)   23  Osteoarthritis of knee (715 36) (M17 10)   24  Osteoporosis (733 00) (M81 0)   25  Paroxysmal atrial fibrillation (427 31) (I48 0)   26  Rhinitis (472 0) (J31 0)   27  Screening for genitourinary condition (V81 6) (Z13 89)   28  Screening for neurological condition (V80 09) (Z13 89)   29  Shortness of breath on exertion (786 05) (R06 02)   30  Urinary urgency (788 63) (R39 15)   31  Urine, incontinence, stress female (625 6) (N39 3)    Past Medical History   1  History of Acute upper respiratory infection (465 9) (J06 9)   2  History of Acute upper respiratory infection (465 9) (J06 9)   3  History of Aneurysm Of The Ascending Aorta (441 9)   4  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)   5  History of anemia (V12 3) (Z86 2)   6   History of hematuria (V13 09) (Z87 448)   7  History of Intertrigo (695 89) (L30 4)   8  History of Open wound of foot, left, initial encounter (892 0) (S91 302A)   9  History of Shortness of breath (786 05) (R06 02)   10  History of Urinary tract infection (599 0) (N39 0)  Active Problems And Past Medical History Reviewed: The active problems and past medical history were reviewed and updated today  Surgical History   1  History of Cholecystectomy   2  History of Gynecologic Services Insertion Of Pessary   3  History of Salpingo-oophorectomy Bilateral   4  History of Tonsillectomy  Surgical History Reviewed: The surgical history was reviewed and updated today  Family History   Mother    1  No pertinent family history  Father    2  Family history of malignant neoplasm of stomach (V16 0) (Z80 0)  Sister    3  Family history of cardiac disorder (V17 49) (Z82 49)   4  Family history of malignant neoplasm of breast (V16 3) (Z80 3)  Brother    5  Family history of colon cancer (V16 0) (Z80 0)   6  Family history of malignant neoplasm (V16 9) (Z80 9)  Family History Reviewed: The family history was reviewed and updated today  Social History    · Assistive Devices: Walker   · Denies alcohol consumption (V49 89) (Z78 9)   · Never a smoker  Social History Reviewed: The social history was reviewed and updated today  Current Meds    1  Amiodarone HCl - 200 MG Oral Tablet; Take one tablet daily; Therapy: 00ANS7621 to (Evaluate:10Mar2018)  Requested for: 21YCL9950; Last Rx:15Mar2017     Ordered   2  AmLODIPine Besylate 10 MG Oral Tablet; TAKE 1 TABLET DAILY; Therapy: 15HFO6242 to [phone])  Requested for: 91CFX6989; Last Rx:11Oct2017     Ordered   3  Caltrate 600 TABS; Therapy: (Recorded:76Jtr9321) to Recorded   4  Carvedilol 3 125 MG Oral Tablet; TAKE 1 TABLET TWICE DAILY WITH MEALS; Therapy: 47FYP0922 to (Adriane Calvillo)  Requested for: 39XGX5567; Last Rx:02Jan2018     Ordered   5   Diclofenac Sodium 1 % Transdermal Gel; APPLY 2 GRAMS TO BOTH KNEES4 TIMES A DAY AS     NEEDED; Therapy: 32Xlb2381 to (Grant Galarza)  Requested for: 73Rrw1982; Last Rx:41Mau8877     Ordered   6  Econazole Nitrate 1 % External Cream; APPLY AND GENTLY MASSAGE INTO AFFECTED AREA(S)     TWICE DAILY; Therapy: 22WKO4735 to (Last Rx:54Xhe5690)  Requested for: 23HEU1087 Ordered   7  Eliquis 5 MG Oral Tablet; take 1 tablet every twelve hours; Therapy: 89DRD9218 to [phone])  Requested for: 53XGS9298; Last Rx:01Nov2017     Ordered   8  Fluticasone Propionate 50 MCG/ACT Nasal Suspension; use 1 spray in each nostril twice daily; Therapy: 20KNN2436 to (Evaluate:04Jun2018)  Requested for: 81RPT6158; Last Rx:51Zki6817     Ordered   9  Isosorbide Mononitrate ER 30 MG Oral Tablet Extended Release 24 Hour; take 1 tablet every day     with 60mg total of 90mg; Therapy: 40NNH6668 to (Grant Galarza)  Requested for: 08ADV4095; Last Rx:09Jan2018     Ordered   10  Isosorbide Mononitrate ER 60 MG Oral Tablet Extended Release 24 Hour; take 1 tablet once daily      with 30mg total of 90mg; Therapy: 32ALP3271 to (Garnt Galarza)  Requested for: 86BYH6802; Last Rx:09Jan2018      Ordered   11  MetOLazone 2 5 MG Oral Tablet; Take one tablet as directed only; Therapy: 09Avq1847 to (Last Rx:65Mzc3214)  Requested for: 50ATK5531 Ordered   12  Nitrostat 0 4 MG Sublingual Tablet Sublingual; PLACE 1 TABLET UNDER THE TONGUE EVERY 5      MINUTES FOR UP TO 3 DOSES AS NEEDED FOR CHEST PAIN  CALL 911 IF PAIN PERSISTS; Therapy: 98Wjb5398 to (Evaluate:30Nov2016)  Requested for: 98Jsr7586; Last Rx:01Sep2016      Ordered   13  Omeprazole 20 MG Oral Capsule Delayed Release; 1 cap PO daily half an hour before breakfast;      Therapy: 91VQD7248 to (Evaluate:05Mar2017)  Requested for: 04JSS4939; Last Rx:11Mar2016      Ordered   14   Potassium Chloride Nena ER 20 MEQ Oral Tablet Extended Release; take one tablet by mouth every      day; Therapy: 24Mdt5037 to (Evaluate:15Oct2018)  Requested for: 20Oct2017; Last Rx:20Oct2017      Ordered   15  Pravachol 40 MG Oral Tablet; take 1 tablet daily at bedtime; Therapy: 67Bmr7161 to (Evaluate:52Iis1249)  Requested for: 50Ihb3280; Last Rx:70Lop1020      Ordered   16  Prazosin HCl - 2 MG Oral Capsule; TAKE 1 CAPSULE Every morning; Therapy: 99UUG2160 to (Evaluate:17Mar2018)  Requested for: 50TIS1704; Last Rx:22Mar2017      Ordered   17  Prazosin HCl - 5 MG Oral Capsule; TAKE 1 CAPSULE AT BEDTIME; Therapy: 19ZXE6159 to (Evaluate:24Mar2018)  Requested for: 80SLM4994; Last Rx:29Mar2017      Ordered   18  Ramipril 10 MG Oral Capsule; Take 1 capsule twice daily; Therapy: 10AIR0559 to (Fawn Lr)  Requested for: 03GAM3856; Last Rx:50Yqh1522      Ordered   19  Torsemide 20 MG Oral Tablet; take 1 tablet by mouth twice a day; Therapy: 42YFD6352 to (Evaluate:36Muf1582)  Requested for: 58GTS4904; Last Rx:04Oct2017      Ordered   20  Tylenol Arthritis Pain 650 MG TBCR; PRN; Therapy: (Recorded:18Jho5921) to Recorded  Medication List Reviewed: The medication list was reviewed and updated today  Allergies   1  Aspirin TABS   2  Percocet TABS    Vitals   Vital Signs    Recorded: 90BMF4972 10:21AM Recorded: 33UNE4843 10:12AM   Heart Rate  54   Systolic 746 784   Diastolic 50 66   Height  5 ft 1 in   Weight  156 lb 9 6 oz   BMI Calculated  29 59   BSA Calculated  1 7   O2 Saturation  94     Physical Exam        Constitutional      General appearance: Abnormal   chronically ill  Pulmonary      Respiratory effort: No increased work of breathing or signs of respiratory distress  Auscultation of lungs: Clear to auscultation  Cardiovascular      Auscultation of heart: Abnormal   The heart rate was normal  The rhythm was regular  A grade 3 systolic murmur was heard at the RUSB        Examination of extremities for edema and/or varicosities: Abnormal  nonpitting edema present-- and-- bilateral ankle 1+ pitting edema        Musculoskeletal      Gait and station: Abnormal        Psychiatric      Orientation to person, place, and time: Normal        Mood and affect: Normal           Signatures    Electronically signed by : JIM Hunt ; Jan 17 2018 10:35AM EST                       (Author)

## 2018-01-18 NOTE — PROGRESS NOTES
Assessment    1  Ascending aortic aneurysm (441 2) (I71 2)   2  Bilateral leg edema (782 3) (R60 0)   3  Chronic diastolic CHF (congestive heart failure), NYHA class 3 (428 32) (I50 32)   4  Chronic kidney disease, stage 3 (585 3) (N18 3)   5  Chronic stasis dermatitis of right lower extremity (454 1) (I87 2)   6  Esophageal reflux (530 81) (K21 9)   7  Hyperlipidemia (272 4) (E78 5)   8  Lymphedema of both lower extremities (457 1) (I89 0)   9  Hypertension (401 9) (I10)   10  Allergic rhinitis (477 9) (J30 9)    Discussion/Summary  Discussion Summary:   She is extremely compliant with daily weight, fluid restrictions   She is on the PALS for CHF and is seen by VNA once a month  The LE edema has responded well to the pumps   I do not think she needs to use new compression stockings  Recent labs- creatinine stable  BP acceptable  Resume Fluticasone nasal spray BID and if not improving, will try Azelastine or an oral antihistamine  RTO in 3months  Counseling Documentation With Imm: The patient was counseled regarding diagnostic results, impressions  Medication SE Review and Pt Understands Tx: Possible side effects of new medications were reviewed with the patient/guardian today  The treatment plan was reviewed with the patient/guardian  The patient/guardian understands and agrees with the treatment plan      Chief Complaint  Chief Complaint Chronic Condition [facility]: Patient is here today for follow up of chronic conditions described in HPI        History of Present Illness  HPI: She has pneumatic pumps now which has significantly helped with the LE edema  Her $500 compression zip up stockings are loose now but she continues to wear them  She thinks the pull up stocking will be too tight and difficult to put on  Weight is now in the low 160s lbs from 170s prior to to the pump  She is very compliant with fluid restriction  SOB with exertion unchanged  Post nasal drip, sneezing, mucus in the throat/post nasal drip when she is laying down, in the morning when she wakes up and at in the dining room  She does not feel that the [address] worked but she did not use it regularly in the past      Review of Systems  Complete-Female:   Constitutional: recent weight loss, but no fever and no chills  Cardiovascular: lower extremity edema, but as noted in HPI, no chest pain and no palpitations  Respiratory: shortness of breath during exertion  Gastrointestinal: no abdominal pain  Preventive Quality 65 Older:   Preventive Quality 65 and Older: The patient currently has no urinary incontinence symptoms  Active Problems    1  Advance directive discussed with patient (V65 49) (Z71 89)   2  Ascending aortic aneurysm (441 2) (I71 2)   3  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)   4  Bilateral leg edema (782 3) (R60 0)   5  Cataract of right eye (366 9) (H26 9)   6  Chest pain (786 50) (R07 9)   7  Chronic diastolic CHF (congestive heart failure), NYHA class 3 (428 32) (I50 32)   8  Chronic kidney disease, stage 3 (585 3) (N18 3)   9  Chronic stasis dermatitis of right lower extremity (454 1) (I87 2)   10  Diverticulosis (562 10) (K57 90)   11  Esophageal reflux (530 81) (K21 9)   12  Gait disturbance (781 2) (R26 9)   13  Hyperlipidemia (272 4) (E78 5)   14  Hypertension (401 9) (I10)   15  Inguinal hernia (550 90) (K40 90)   16  Insomnia (780 52) (G47 00)   17  Lymphedema of both lower extremities (457 1) (I89 0)   18  Need for prophylactic vaccination and inoculation against influenza (V04 81) (Z23)   19  Nonrheumatic aortic valve insufficiency (424 1) (I35 1)   20  Non-toxic multinodular goiter (241 1) (E04 2)   21  Osteoarthritis of knee (715 36) (M17 10)   22  Osteoporosis (733 00) (M81 0)   23  Paroxysmal atrial fibrillation (427 31) (I48 0)   24  Rhinitis (472 0) (J31 0)   25  Screening for genitourinary condition (V81 6) (Z13 89)   26  Screening for neurological condition (V80 09) (Z13 89)   27   Urinary urgency (788 63) (R39 15)   28  Urine, incontinence, stress female (625 6) (N39 3)    Past Medical History    1  History of Acute upper respiratory infection (465 9) (J06 9)   2  History of Acute upper respiratory infection (465 9) (J06 9)   3  History of Aneurysm Of The Ascending Aorta (441 9)   4  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)   5  History of anemia (V12 3) (Z86 2)   6  History of hematuria (V13 09) (Z87 448)   7  History of Intertrigo (695 89) (L30 4)   8  History of Open wound of foot, left, initial encounter (892 0) (S91 302A)   9  History of Shortness of breath (786 05) (R06 02)   10  History of Urinary tract infection (599 0) (N39 0)  Active Problems And Past Medical History Reviewed: The active problems and past medical history were reviewed and updated today  Surgical History    1  History of Cholecystectomy   2  History of Gynecologic Services Insertion Of Pessary   3  History of Salpingo-oophorectomy Bilateral   4  History of Tonsillectomy  Surgical History Reviewed: The surgical history was reviewed and updated today  Family History  Mother    1  No pertinent family history  Father    2  Family history of malignant neoplasm of stomach (V16 0) (Z80 0)  Sister    3  Family history of cardiac disorder (V17 49) (Z82 49)   4  Family history of malignant neoplasm of breast (V16 3) (Z80 3)  Brother    5  Family history of colon cancer (V16 0) (Z80 0)   6  Family history of malignant neoplasm (V16 9) (Z80 9)  Family History Reviewed: The family history was reviewed and updated today  Social History    · Assistive Devices: Walker   · Denies alcohol consumption (V49 89) (Z78 9)   · Never a smoker  Social History Reviewed: The social history was reviewed and updated today  Current Meds   1  Amiodarone HCl - 200 MG Oral Tablet; Take one tablet daily; Therapy: 49ZQC5800 to (Evaluate:10Mar2018)  Requested for: 81RRZ3874; Last Rx:15Mar2017   Ordered   2   AmLODIPine Besylate 5 MG Oral Tablet; Take 1 tablet daily; Therapy: 43WEU0267 to (Evaluate:16Jun2018)  Requested for: 21Jun2017; Last HK:60NRL7879   Ordered   3  Caltrate 600 TABS; Therapy: (Recorded:55Nvb4616) to Recorded   4  Carvedilol 3 125 MG Oral Tablet; take 1 tablet twice daily,  with morning and evening meal;   Therapy: 41FMB1004 to (Evaluate:16Jun2018)  Requested for: 21Jun2017; Last ZL:04ROY1789   Ordered   5  Diclofenac Sodium 1 % Transdermal Gel; APPLY 2 GRAMS TO BOTH KNEES4 TIMES A DAY AS   NEEDED; Therapy: 41Eih3461 to (Sarahi Frank)  Requested for: 65Ujq7330; Last Rx:35Jwp0498   Ordered   6  Econazole Nitrate 1 % External Cream; APPLY AND GENTLY MASSAGE INTO AFFECTED AREA(S)   TWICE DAILY; Therapy: 55QVX7353 to (Last Rx:98Kss7304)  Requested for: 65GBZ1324 Ordered   7  Eliquis 5 MG Oral Tablet; take 1 tablet every twelve hours; Therapy: 58NCW7846 to (Ashley Delcid)  Requested for: 43MVS6937; Last Rx:63Hcc5922   Ordered   8  Fluticasone Propionate 50 MCG/ACT Nasal Suspension; use 1 spray in each nostril twice daily; Therapy: 86INF1044 to (Wild Thomas)  Requested for: 39RSV6379; Last Rx:02Zsh2021   Ordered   9  Isosorbide Mononitrate ER 30 MG Oral Tablet Extended Release 24 Hour; take 1 tablet once daily; Therapy: 60WGM6499 to (Ashley Delcid)  Requested for: 26RCI7028; Last Rx:89Ntx6372   Ordered   10  MetOLazone 2 5 MG Oral Tablet; Take one tablet as directed only; Therapy: 16Usf8250 to (Last Rx:01Mwc2321)  Requested for: 90Rrs2702 Ordered   11  Nitrostat 0 4 MG Sublingual Tablet Sublingual; PLACE 1 TABLET UNDER THE TONGUE EVERY 5    MINUTES FOR UP TO 3 DOSES AS NEEDED FOR CHEST PAIN  CALL 911 IF PAIN PERSISTS; Therapy: 96Agl3215 to (Evaluate:30Nov2016)  Requested for: 37Sib3609; Last Rx:43Oun7338    Ordered   12   Omeprazole 20 MG Oral Capsule Delayed Release; 1 cap PO daily half an hour before breakfast;    Therapy: 05SWN0477 to (Evaluate:05Mar2017)  Requested for: 30KHH2719; Last Rx:11Mar2016    Ordered   13  Potassium Chloride Nena ER 20 MEQ Oral Tablet Extended Release; take one tablet by mouth every    day; Therapy: 20Xgs3450 to (Evaluate:12Oct2017)  Requested for: 17Oct2016; Last Rx:17Oct2016    Ordered   14  Pravachol 40 MG Oral Tablet; take 1 tablet daily at bedtime; Therapy: 88Hta5185 to (Evaluate:27Tty2752)  Requested for: 02Zcw0898; Last Rx:51Hia1438    Ordered   15  Prazosin HCl - 2 MG Oral Capsule; TAKE 1 CAPSULE Every morning; Therapy: 14IQB1376 to (Evaluate:17Mar2018)  Requested for: 01UDV4217; Last Rx:22Mar2017    Ordered   16  Prazosin HCl - 5 MG Oral Capsule; TAKE 1 CAPSULE AT BEDTIME; Therapy: 05ZXE1393 to (Evaluate:24Mar2018)  Requested for: 37YZY5430; Last Rx:29Mar2017    Ordered   17  Ramipril 10 MG Oral Capsule; Take 1 capsule twice daily; Therapy: 96PFT0052 to (LNDFBWJM:03ZWU7958)  Requested for: 18RGW5212; Last Rx:25Jan2017    Ordered   18  Torsemide 20 MG Oral Tablet; take 2 tablet daily in am 1 tab in pm;    Therapy: 42ARL4064 to (Evaluate:16Apr2018)  Requested for: 21Apr2017; Last Rx:21Apr2017    Ordered   19  Tylenol Arthritis Pain 650 MG TBCR; PRN; Therapy: (Recorded:85Mjw1217) to Recorded  Medication List Reviewed: The medication list was reviewed and updated today  Allergies    1  Aspirin TABS   2  Percocet TABS    Physical Exam    Constitutional   General appearance: No acute distress, well appearing and well nourished  comfortable and obese  Ears, Nose, Mouth, and Throat   Otoscopic examination: Tympanic membranes translucent with normal light reflex  Canals patent without erythema  Oropharynx: Normal with no erythema, edema, exudate or lesions  Pulmonary   Respiratory effort: No increased work of breathing or signs of respiratory distress  Auscultation of lungs: Clear to auscultation  Cardiovascular   Auscultation of heart: Abnormal   The heart rate was normal  The rhythm was regular   A grade 3 systolic murmur was heard at the RUSB  Examination of extremities for edema and/or varicosities: Abnormal   nonpitting edema present, bilateral ankle 2+ pitting edema and bilateral pretibial 2+ pitting edema  Musculoskeletal   Gait and station: Abnormal     Skin brownish discoloration of the skin on both shins  Psychiatric   Orientation to person, place, and time: Normal     Mood and affect: Normal          Future Appointments    Date/Time Provider Specialty Site   09/18/2017 02:15 PM DAWN [facility] [address]   12/06/2017 11:30 AM JIM Pickard   [facility] OFF[facility]     Signatures   Electronically signed by : JIM Quinn ; Aug 30 2017  1:20PM EST                       (Author)

## 2018-01-18 NOTE — MISCELLANEOUS
History of Present Illness    The patient is being contacted for follow-up after hospitalization  Hospitalization The hospitalization was not a readmission, The patient was discharged on 4/10/16  She has a moderate risk for readmission  She was discharged to a SNF for rehabilitation  The patient's status is short term  The facility name is: [facility]  Floor/Location: [facility] Cardiac unit  I spoke with e-mail Holly Friday Unit manager  The patient is on HF pathway  Dr Rick Welch  a report was called and the patient was identified as a HF patient  The patient's discharge weight is 176- hospital   Treatment Questions: Diet ordered is low sodium, staff is aware of symptoms to report, discharge medications were reconciled with the facility provider/PCP, the patient/family is compliant with diet, daily weights are being done and a plan is in place for who to call for worsening symptoms  The patient has the following appointments:  Will be seen by Jeff Arora HF NP at facility this Thursday  Current Meds   1  Amiodarone HCl - 200 MG Oral Tablet; Take one tablet daily; Therapy: 98BOL9181 to (Evaluate:31Vaq8867)  Requested for: 79RIT6713; Last   Rx:01Mar2016 Ordered   2  AmLODIPine Besylate 10 MG Oral Tablet; TAKE 1 TABLET DAILY; Therapy: 02DHF1245 to (Evaluate:15Oct2016)  Requested for: 98GVH9352; Last   Rx:21Oct2015 Ordered   3  Caltrate 600 TABS; Therapy: (Recorded:10Mrz4401) to Recorded   4  Carvedilol 6 25 MG Oral Tablet; TAKE 1 TABLET TWICE DAILY WITH MEALS; Therapy: 93ASG6324 to (Evaluate:34Lfa9136)  Requested for: 88RBL9806; Last   Rx:82Mts8158 Ordered   5  Econazole Nitrate 1 % External Cream; APPLY AND GENTLY MASSAGE INTO AFFECTED   AREA(S) TWICE DAILY; Therapy: 46LIS1417 to (Last Rx:19Mar2014)  Requested for: 73WRJ6820 Ordered   6  Eliquis 5 MG Oral Tablet; take 1 tablet every twelve hours; Therapy: 83EWZ5516 to )  Requested for: [phone];  Last   Rx:27Oct2015 Ordered   7  Fluticasone Propionate 50 MCG/ACT Nasal Suspension; use 1 spray in each nostril   twice daily; Therapy: 54CMC8701 to (Tuyet Spencer)  Requested for: 68LKJ4466; Last   Rx:03Feb2016 Ordered   8  Omeprazole 20 MG Oral Capsule Delayed Release; 1 cap PO daily half an hour before   breakfast;   Therapy: 11CSQ6718 to (Evaluate:05Mar2017)  Requested for: 91GNW4338; Last   Rx:11Mar2016 Ordered   9  Pravachol 40 MG Oral Tablet (Pravastatin Sodium); TAKE 1 TABLET DAILY AT BEDTIME; Therapy: 88Fhp5434 to (Last Rx:03Jun2015)  Requested for: 03Jun2015 Ordered   10  Prazosin HCl - 2 MG Oral Capsule; TAKE 1 CAPSULE Every morning; Therapy: 39CMP6201 to (Evaluate:15Oct2016)  Requested for: 38KTJ7059; Last    Rx:21Oct2015 Ordered   11  Prazosin HCl - 5 MG Oral Capsule; TAKE 1 CAPSULE AT BEDTIME; Therapy: 56QKY7451 to (Lana Cannon)  Requested for: 46XAS3048; Last    Rx:11Mar2016 Ordered   12  Ramipril 10 MG Oral Capsule; 2 tabs PO QHS; Therapy: 78BSN4094 to (Evaluate:15Oct2016)  Requested for: 46UNI3888; Last    Rx:21Oct2015 Ordered   13  Torsemide 20 MG Oral Tablet; take 2 tablet daily; Therapy: 59WEX9528 to (Evaluate:98Hve6242)  Requested for: 15UDQ3479; Last    Rx:01Mar2016 Ordered   14  Tylenol Arthritis Pain 650 MG Oral Tablet Extended Release; PRN; Therapy: (Recorded:10Euz3404) to Recorded   15  Voltaren 1 % Transdermal Gel (Diclofenac Sodium); Apply to both knees 2 g  QID prn; Therapy: 30Apr2014 to (Last MX:11EHI7398)  Requested for: 03Jun2015 Ordered    Allergies    1  Aspirin TABS   2   Percocet TABS    Future Appointments    Date/Time Provider Specialty Site   06/01/2016 11:00 AM Forest Montelongo DO [facility]     Signatures   Electronically signed by : Zuleyka De Dios, ; Apr 11 2016  1:55PM EST                       (Author)

## 2018-01-22 VITALS
SYSTOLIC BLOOD PRESSURE: 138 MMHG | HEART RATE: 54 BPM | WEIGHT: 156.6 LBS | DIASTOLIC BLOOD PRESSURE: 50 MMHG | OXYGEN SATURATION: 94 % | BODY MASS INDEX: 29.57 KG/M2 | HEIGHT: 61 IN

## 2018-01-22 VITALS
SYSTOLIC BLOOD PRESSURE: 150 MMHG | HEART RATE: 66 BPM | RESPIRATION RATE: 18 BRPM | DIASTOLIC BLOOD PRESSURE: 76 MMHG | HEIGHT: 61 IN

## 2018-01-23 VITALS
WEIGHT: 161.25 LBS | HEART RATE: 54 BPM | HEIGHT: 61 IN | DIASTOLIC BLOOD PRESSURE: 66 MMHG | OXYGEN SATURATION: 95 % | BODY MASS INDEX: 30.44 KG/M2 | SYSTOLIC BLOOD PRESSURE: 144 MMHG

## 2018-01-23 NOTE — MISCELLANEOUS
Message   Recorded as Task   Date: 01/02/2018 01:03 PM, Created By: Breanna Kaminski   Task Name: Care Coordination   Assigned To: Awa Avelar   Regarding Patient: Surya Montenegro, Status: Active   Comment:    Awa Avelar - 02 Jan 2018 1:03 PM     TASK CREATED  Call from [address] [place] [address] Kristian Walton nurse re: elevated BPs  R 184/50; L 200/70 Patient on Coreg 3 125 BID, Isorobide 30 QD and Norvasc 10 QD  Reaching out to you as Dr Ramo Low is off all week along with our [address]  The patient is without any complaints  She is scheduled for another home visit on Thursday 11/4  TY   Reyes,Lea - 02 Jan 2018 1:10 PM     TASK EDITED  I think we should increase the Coreg  What is her heart rate? Cecelia Torres - 02 Jan 2018 2:55 PM     TASK EDITED   Awa Avelar - 02 Jan 2018 4:11 PM     TASK REPLIED TO: Previously Assigned To [address] was 64   Reyes,Lea - 02 Jan 2018 4:13 PM     TASK EDITED  We can increase the Isosorbide instead to 60mg a day  If they will they communicate it to the patient, no need to send this back to me  I will update the med list   Maricarmen Venegas - 04 Jan 2018 1:51 PM     TASK REASSIGNED: Previously Assigned To MEDICAL  [employer] CHANG,TEAM        Active Problems    1  Advance directive discussed with patient (V65 49) (Z71 89)   2  Allergic rhinitis (477 9) (J30 9)   3  Ascending aortic aneurysm (441 2) (I71 2)   4  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)   5  Bilateral leg edema (782 3) (R60 0)   6  Cataract of right eye (366 9) (H26 9)   7  Chest pain (786 50) (R07 9)   8  Chronic diastolic CHF (congestive heart failure), NYHA class 3 (428 32) (I50 32)   9  Chronic kidney disease, stage 3 (585 3) (N18 3)   10  Chronic stasis dermatitis of right lower extremity (454 1) (I87 2)   11  Chronic venous insufficiency (459 81) (I87 2)   12  Diverticulosis (562 10) (K57 90)   13  Esophageal reflux (530 81) (K21 9)   14  Gait disturbance (781 2) (R26 9)   15  Hyperlipidemia (272 4) (E78 5)   16  Hypertension (401 9) (I10)   17   Inguinal hernia (550 90) (K40 90)   18  Insomnia (780 52) (G47 00)   19  Lymphedema of both lower extremities (457 1) (I89 0)   20  Need for prophylactic vaccination and inoculation against influenza (V04 81) (Z23)   21  Nonrheumatic aortic valve insufficiency (424 1) (I35 1)   22  Non-toxic multinodular goiter (241 1) (E04 2)   23  Osteoarthritis of knee (715 36) (M17 10)   24  Osteoporosis (733 00) (M81 0)   25  Paroxysmal atrial fibrillation (427 31) (I48 0)   26  Rhinitis (472 0) (J31 0)   27  Screening for genitourinary condition (V81 6) (Z13 89)   28  Screening for neurological condition (V80 09) (Z13 89)   29  Shortness of breath on exertion (786 05) (R06 02)   30  Urinary urgency (788 63) (R39 15)   31  Urine, incontinence, stress female (625 6) (N39 3)    Current Meds   1  Amiodarone HCl - 200 MG Oral Tablet; Take one tablet daily; Therapy: 65NQY7080 to (Evaluate:10Mar2018)  Requested for: 58WFK9784; Last   Rx:15Mar2017 Ordered   2  AmLODIPine Besylate 10 MG Oral Tablet; TAKE 1 TABLET DAILY; Therapy: 74DZT8214 to (Mirela Vela)  Requested for: 13PZM8488; Last   Rx:11Oct2017 Ordered   3  Caltrate 600 TABS; Therapy: (Recorded:35Lvp5241) to Recorded   4  Carvedilol 3 125 MG Oral Tablet; TAKE 1 TABLET TWICE DAILY WITH MEALS; Therapy: 10PCA8038 to (Nhan Khanna)  Requested for: 19VND4950; Last   Rx:02Jan2018 Ordered   5  Diclofenac Sodium 1 % Transdermal Gel (Voltaren); APPLY 2 GRAMS TO BOTH   KNEES4 TIMES A DAY AS NEEDED; Therapy: 97Ces5177 to (01 [phone] 02)  Requested for: 47Vfk2766; Last   Rx:26Alu8292 Ordered   6  Econazole Nitrate 1 % External Cream; APPLY AND GENTLY MASSAGE INTO   AFFECTED AREA(S) TWICE DAILY; Therapy: 19TCO3409 to (Last Rx:45Ewz0658)  Requested for: 03VIW3158 Ordered   7  Eliquis 5 MG Oral Tablet; take 1 tablet every twelve hours; Therapy: 71RLA5747 to [phone])  Requested for: 25QNR5917; Last   Rx:01Nov2017 Ordered   8   Fluticasone Propionate 50 MCG/ACT Nasal Suspension; use 1 spray in each nostril twice   daily; Therapy: 73AGR5803 to (Evaluate:04Jun2018)  Requested for: 83OTC8607; Last   Rx:09Ffk4778 Ordered   9  Isosorbide Mononitrate ER 60 MG Oral Tablet Extended Release 24 Hour; take 1 tablet   once daily; Therapy: 79ASY9401 to (Zoraida Juan J)  Requested for: 65FNS6406; Last   Rx:02Jan2018 Ordered   10  MetOLazone 2 5 MG Oral Tablet; Take one tablet as directed only; Therapy: 50Epn1763 to (Last Rx:86Cqp3921)  Requested for: 76Yck1521 Ordered   11  Nitrostat 0 4 MG Sublingual Tablet Sublingual (Nitroglycerin); PLACE 1 TABLET UNDER    THE TONGUE EVERY 5 MINUTES FOR UP TO 3 DOSES AS NEEDED    FOR CHEST PAIN  CALL 911 IF PAIN PERSISTS; Therapy: 84Ues4008 to (Evaluate:30Nov2016)  Requested for: 98Wwj2994; Last    Rx:83Bje6528 Ordered   12  Omeprazole 20 MG Oral Capsule Delayed Release; 1 cap PO daily half an hour before    breakfast;    Therapy: 10MED1237 to (Evaluate:05Mar2017)  Requested for: 27AFQ6091; Last    Rx:11Mar2016 Ordered   13  Potassium Chloride Nena ER 20 MEQ Oral Tablet Extended Release; take one tablet by    mouth every day; Therapy: 91Dht0961 to (Evaluate:15Oct2018)  Requested for: 20Oct2017; Last    Rx:76Xvz7640 Ordered   14  Pravachol 40 MG Oral Tablet (Pravastatin Sodium); take 1 tablet daily at bedtime; Therapy: 19Lhj5921 to (Evaluate:84Rzd6033)  Requested for: 64Wto9042; Last    Rx:39Lyo0612 Ordered   15  Prazosin HCl - 2 MG Oral Capsule; TAKE 1 CAPSULE Every morning; Therapy: 37AIM7019 to (Evaluate:17Mar2018)  Requested for: 66CLE9482; Last    Rx:22Mar2017 Ordered   16  Prazosin HCl - 5 MG Oral Capsule; TAKE 1 CAPSULE AT BEDTIME; Therapy: 25TZF2269 to (Evaluate:24Mar2018)  Requested for: 90KFS1557; Last    Rx:29Mar2017 Ordered   17  Ramipril 10 MG Oral Capsule; Take 1 capsule twice daily; Therapy: 92SSO9186 to (Jose J Jay)  Requested for: 31BEH9386; Last    Rx:87Mmx2265 Ordered   18   Torsemide 20 MG Oral Tablet; take 1 tablet by mouth twice a day; Therapy: 30XFO8666 to (Evaluate:81Ucx7167)  Requested for: 76GFC9312; Last    Rx:04Oct2017 Ordered   19  Tylenol Arthritis Pain 650 MG TBCR; PRN; Therapy: (Recorded:28Jlk9340) to Recorded    Allergies    1  Aspirin TABS   2   Percocet TABS    Signatures   Electronically signed by : Nikki Hughes, ; Jan 4 2018  1:59PM EST                       (Author)

## 2018-02-06 ENCOUNTER — TELEPHONE (OUTPATIENT)
Dept: CARDIOLOGY CLINIC | Facility: CLINIC | Age: 83
End: 2018-02-06

## 2018-02-06 DIAGNOSIS — I20.1 ANGINA PECTORIS WITH DOCUMENTED SPASM (HCC): Primary | ICD-10-CM

## 2018-02-06 RX ORDER — NITROGLYCERIN 0.4 MG/1
0.4 TABLET SUBLINGUAL
Qty: 30 TABLET | Refills: 0 | Status: SHIPPED | OUTPATIENT
Start: 2018-02-06 | End: 2019-02-11 | Stop reason: HOSPADM

## 2018-02-06 RX ORDER — NITROGLYCERIN 0.4 MG/1
1 TABLET SUBLINGUAL
COMMUNITY
Start: 2016-09-01 | End: 2018-02-06 | Stop reason: SDUPTHER

## 2018-02-06 NOTE — TELEPHONE ENCOUNTER
Beverley Ta called from [address] wanting to make you aware that Lluvia Harley is back on Isosorbide 60 mg because of her blood pressure  She also wanted to schedule her 6 month f/u  Scheduled appt  You can complete/done/review to finish task    Thank you

## 2018-03-14 DIAGNOSIS — I48.91 ATRIAL FIBRILLATION, UNSPECIFIED TYPE (HCC): Primary | ICD-10-CM

## 2018-03-14 RX ORDER — AMIODARONE HYDROCHLORIDE 200 MG/1
200 TABLET ORAL DAILY
Qty: 90 TABLET | Refills: 3 | Status: SHIPPED | OUTPATIENT
Start: 2018-03-14 | End: 2019-02-11 | Stop reason: HOSPADM

## 2018-03-21 RX ORDER — METOLAZONE 2.5 MG/1
TABLET ORAL
COMMUNITY
Start: 2017-02-21 | End: 2018-07-06 | Stop reason: CLARIF

## 2018-03-21 RX ORDER — FLUTICASONE PROPIONATE 50 MCG
1 SPRAY, SUSPENSION (ML) NASAL 2 TIMES DAILY
COMMUNITY
Start: 2016-02-03 | End: 2019-02-11 | Stop reason: HOSPADM

## 2018-03-26 PROBLEM — R07.89 OTHER CHEST PAIN: Status: ACTIVE | Noted: 2018-03-26

## 2018-03-26 PROBLEM — I89.0 LYMPHEDEMA OF BOTH LOWER EXTREMITIES: Status: ACTIVE | Noted: 2018-03-26

## 2018-03-26 NOTE — PROGRESS NOTES
Cardiology Follow up Note    Zulema Singletary 80 y o  female MRN: [id number]    03/27/18          Assessment/Plan:    1  Ascending aortic aneurysm, with moderate to severe AI and chronic diastolic HF  LV function was normal  She met with Dr Lindsey Le, who recommended serial CTA in 6 months with echo to assess for worsening of AI or ascending aortic aneurysm  She tells me she does not wish to pursue serial testing or surgery  She wishes only for treatment of her symptoms, despite acknowledging that medical management will eventually fail  She is in the [facility] HF program  She was on Torsemide 40 mg in AM and 20 mg in PM, this was decreased at some point to 20 bid, she appears more volume overloaded today, will increase to 40 bid for 5 days, then resume 40 in AM and 20 in PM  BMP 12/17 showed sodium 141, potassium 3 4, BUN 48, creatinine 1 46  Will give melatonin to take to help with sleep  2  HTN  She is already on Prazosin 2, Amlodipine 10, Ramipril 10, Coreg 3 125 bid, Imdur 60, and Torsemide  BP controlled at home  BP more elevated today, possibly due to volume overload  3  HL  She is on Pravastatin 40  Lipid panel 7/31/15 showed total cholesterol 157, TG 72, HDL 67, and LDL 76  Lipid panel 10/16 showed total cholesterol 129, TG 44, HDL 69, LDL 51  Lipid panel 3/17 showed total cholesterol 133, TG 40, HDL 80, LDL 45      4  Atrial fibrillation, paroxysmal  She underwent APOLINAR/CV 9/15 and again in 2/16 with Amiodarone on board  She is on Eliquis 5 bid  She is maintaining SR on Amio 200 qd  TFTs normal 1/17  5  Chest pain  She does not wish for further workup as she is in PALS program, she feels chest pain is controlled with Imdur  HPI: 80year old woman a history of HTN and HL, who is here for follow up of ascending aortic aneurysm and moderate to severe aortic regurgitation as well as paroxysmal atrial fibrillation       Echo 7/15 showed normal LV size and function, EF 55%, no RWMA, moderate concentric hypertrophy, grade II diastolic dysfunction, evidence of elevated LVEDP  Moderate to severe central AI, with  ms  PASP 40 mm Hg consistent with mild pulmonary HTN  The ascending aorta was dilated up to 5 0 cm in ascending aorta  Normal RV size and function  CTA of chest/abdomen 8/15 showed fusiform aneurysm of ascending aorta measuring up to 5 1 cm, increased from 45 mm in 2012  Descending thoracic and abdominal aorta were normal in caliber  She saw Dr Rohit Crowder, who recommended continued surveillance with repeat CTA and echo in 6 months  She reports to me that after speaking with Dr Rohit Crowder, she does not wish to pursue surveillance because she would not want surgery in any case  She was in ED 9/13/15 for hematuria, EKG was done and showed what appeared to be atrial fibrillation, although it was interpreted as multifocal atrial tachycardia  EKG in office 9/21 also showed atrial fibrillation  She denied any dizziness or lightheadedness  No syncope or presyncope  She was feeling more tired than previously  No further hematuria  She underwent APOLINAR guided CV on 9/23/15  APOLINAR showed normal LV size and function, EF 55%, concentric hypertrophy, normal RV size and function, LA and EARNEST were dilated with no thrombus, no PFO, mild MR, aortic sclerosis, moderate to severe central AI, PASP 25 mm Hg  Aorta was dilated up to 5 0 cm in the ascending aorta, with moderate atheroma in descending aorta and aortic arch  She was successfully cardioverted to sinus rhythm  She saw Dr Girish Jones 2/16 and reported a 2 week history of LE edema and shortness of breath with exertion/excitement  She was noted to be back in atrial fibrillation  She started her on low dose Furosemide and asked her to come see me  She reported when she took Lasix and potassium, she felt short of breath, sweaty, and thought she was going to pass out  She was switched to Torsemide 20 mg daily on 2/8/16       She was started on Amiodarone as antiarrhythmic as well  She underwent APOLINAR/CV on 2/17/16  A single 200 J shock was successful in converting her to sinus rhythm  APOLINAR showed normal LV size and function, EF 60%, no RWMA, normal RV Size and function  Ascending aorta measured 5 cm  Moderate AI  No PFO  LA and EARNEST were moderately dilated but no thrombus  She was admitted to [place] [address] for worsening LE edema, shortness of breath, and weight gain despite increasing Torsemide from 20 to 40 mg daily  She was diuresed with IV Lasix and transitioned to Torsemide 40 bid at discharge  She was discharged to [facility] for skilled nursing, and did enroll in the PALS HF program  She was seen by HF nurse practitioners while at [facility] [place], operated by [facility], her Torsemide was decreased to 40 mg in AM and 20 mg in PM due to rising creatinine  She has been having more LE edema, was seen by Soren Rodríguez at [facility], [place], operated by [facility], who increased her Torsemide to 40 bid on 3/2/17  She reports she was having intermittent chest discomfort at rest (also with exertion) in summer 2016, she was prescribed SLNG, and then Dr Dunia Nelson put her on standing Imdur  She reports that with the Imdur, she is no longer having any chest pain  I referred her to Vascular, who ordered custom lower extremity compression stockings and lymphedema pumps, and she reports she has had significant improvement in LE edema, and also weight loss from 172 lbs to 156 lbs  She reports her weights have been stable  She is having issues with insomnia for the last several weeks  Despite not sleeping well at night, she is only taking 10 minute nap during day  She checks her BP at home, 149/50  [facility] checks her BP once a month, and BP has been stable  She walks with a walker, she does get SOB with exertion walking down the levine  No orthopnea, she is sleeping in a recliner chair with one pillow which is unchanged, keeps her legs elevated, no PND  She is being very careful about her sodium and fluid intake   No dizziness or lightheadedness  She is coughing up some white phlegm  She does have postnasal drip      Patient Active Problem List   Diagnosis    Paroxysmal atrial fibrillation (HCC)    Essential hypertension    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)    GERD (gastroesophageal reflux disease)    Other hyperlipidemia    Aortic valve regurgitation, nonrheumatic    Ascending aortic aneurysm (HCC)    Other chest pain    Lymphedema of both lower extremities       Allergies   Allergen Reactions    Aspirin     Percocet [Oxycodone-Acetaminophen]          Current Outpatient Prescriptions:     acetaminophen (TYLENOL) 325 mg tablet, Take 650 mg by mouth every 6 (six) hours as needed for mild pain , Disp: , Rfl:     amiodarone 200 mg tablet, Take 1 tablet (200 mg total) by mouth daily, Disp: 90 tablet, Rfl: 3    amLODIPine (NORVASC) 10 mg tablet, Take 5 mg by mouth daily  , Disp: , Rfl:     apixaban (ELIQUIS) 5 mg, Take 5 mg by mouth 2 (two) times a day , Disp: , Rfl:     Calcium Carbonate (CALTRATE 600 PO), Take 600 mg by mouth daily  , Disp: , Rfl:     carvedilol (COREG) 6 25 mg tablet, Take 0 5 tablets (3 125 mg total) by mouth 2 (two) times a day with meals  , Disp: , Rfl: 0    diclofenac sodium (VOLTAREN) 1 %, Place on the skin, Disp: , Rfl:     econazole nitrate 1 % cream, Apply topically daily  , Disp: , Rfl:     fluticasone (FLONASE) 50 mcg/act nasal spray, 1 spray into each nostril 2 (two) times a day, Disp: , Rfl:     isosorbide mononitrate (IMDUR) 60 mg 24 hr tablet, Take 60 mg by mouth daily, Disp: , Rfl:     KLOR-CON M20 20 MEQ tablet, daily, Disp: , Rfl:     metolazone (ZAROXOLYN) 2 5 mg tablet, Take by mouth, Disp: , Rfl:     nitroglycerin (NITROSTAT) 0 4 mg SL tablet, Place 1 tablet (0 4 mg total) under the tongue every 5 (five) minutes as needed for chest pain, Disp: 30 tablet, Rfl: 0    pravastatin (PRAVACHOL) 40 mg tablet, Take 40 mg by mouth daily  , Disp: , Rfl:     prazosin (MINIPRESS) 2 mg capsule, Take 2 mg by mouth every morning , Disp: , Rfl:     prazosin (MINIPRESS) 5 mg capsule, Take 5 mg by mouth daily at bedtime  , Disp: , Rfl:     ramipril (ALTACE) 10 MG capsule, Take 10 mg by mouth daily  , Disp: , Rfl:     torsemide (DEMADEX) 20 mg tablet, Take 2 tablets (40 mg total) by mouth 2 (two) times a day Take 40 bid for 5 days, then decrease to 40 mg in AM and 20 mg in PM , Disp: 360 tablet, Rfl: 3    Melatonin 3 MG CAPS, Take 3 mg by mouth daily at bedtime, Disp: 30 capsule, Rfl: 5    Past Medical History:   Diagnosis Date    Acid reflux disease     Anemia     Last assessed: 10/26/16    Aortic valve regurgitation, nonrheumatic     Ascending aortic aneurysm (HCC)     Last assessed: 6/5/13    Atrial fibrillation ([facility] )     Hyperlipidemia     Hypertension 2/17/2016    Paroxysmal atrial fibrillation ([facility] ) 2/17/2016       Family History   Problem Relation Age of Onset    No Known Problems Mother     Stomach cancer Father     Heart disease Sister      Cardiac Disorder    Breast cancer Sister     Colon cancer Brother     Cancer Brother        Past Surgical History:   Procedure Laterality Date    CARDIOVERSION      CHOLECYSTECTOMY      LAPAROSCOPIC SALPINGOOPHERECTOMY      TONSILLECTOMY         Social History     Social History    Marital status:      Spouse name: N/A    Number of children: N/A    Years of education: N/A     Occupational History    Not on file  Social History Main Topics    Smoking status: Never Smoker    Smokeless tobacco: Never Used    Alcohol use No    Drug use: No    Sexual activity: No     Other Topics Concern    Not on file     Social History Narrative    Assistive devices: walker       Review of Systems   Constitution: Negative for chills, decreased appetite, diaphoresis, fever, weakness, malaise/fatigue, night sweats, weight gain and weight loss     HENT: Negative for ear pain, hearing loss, hoarse voice, nosebleeds, sore throat and tinnitus  Eyes: Negative for blurred vision and pain  Cardiovascular: Positive for dyspnea on exertion and leg swelling  Negative for chest pain, claudication, cyanosis, irregular heartbeat, near-syncope, orthopnea, palpitations, paroxysmal nocturnal dyspnea and syncope  Respiratory: Negative for cough, hemoptysis, shortness of breath, sleep disturbances due to breathing, snoring, sputum production and wheezing  Hematologic/Lymphatic: Negative for adenopathy and bleeding problem  Does not bruise/bleed easily  Skin: Negative for color change, dry skin, flushing, itching, poor wound healing and rash  Musculoskeletal: Negative for arthritis, back pain, falls, joint pain, muscle cramps, muscle weakness, myalgias and neck pain  Gastrointestinal: Negative for abdominal pain, constipation, diarrhea, dysphagia, heartburn, hematemesis, hematochezia, melena, nausea and vomiting  Genitourinary: Positive for frequency  Negative for dysuria, hematuria, hesitancy, non-menstrual bleeding and urgency  Neurological: Negative for excessive daytime sleepiness, dizziness, focal weakness, headaches, light-headedness, loss of balance, numbness, paresthesias, tremors and vertigo  Psychiatric/Behavioral: Negative for altered mental status, depression and memory loss  The patient has insomnia  The patient is not nervous/anxious  Allergic/Immunologic: Negative for environmental allergies and persistent infections  Vitals: BP (!) 180/48 (BP Location: Right arm, Patient Position: Sitting, Cuff Size: Standard)   Pulse 58   Ht 5' 1" (1 549 m)   Wt 73 3 kg (161 lb 9 6 oz)   SpO2 92%   BMI 30 53 kg/m²       Physical Exam:     GEN: Alert and oriented x 3, in no acute distress  Well appearing and well nourished  HEENT: Sclera anicteric, conjunctivae pink, mucous membranes moist  Oropharynx clear  NECK: Supple, no carotid bruits, JVD to 7 cm above sternal angle  Trachea midline, no thyromegaly  HEART: Regular rhythm, occasional ectopic beats, normal S1 and S2, III/VI systolic murmur with blowing diastolic murmur, no clicks, gallops or rubs  PMI nondisplaced, no thrills  LUNGS: Crackles at bases bilaterally; no wheezes, or rhonchi  No increased work of breathing or signs of respiratory distress  ABDOMEN: Soft, nontender, nondistended, normoactive bowel sounds  EXTREMITIES: Skin warm and well perfused, no clubbing, cyanosis, 1+ edema  Wearing compression stockings  NEURO: No focal findings  Walks with walker  Normal speech  Mood and affect normal    SKIN: Normal without suspicious lesions on exposed skin        Lab Results:       No results found for: HGBA1C  No results found for: CHOL  No results found for: HDL  No results found for: LDLCALC  No results found for: TRIG  No components found for: CHOLHDL

## 2018-03-27 ENCOUNTER — OFFICE VISIT (OUTPATIENT)
Dept: CARDIOLOGY CLINIC | Facility: CLINIC | Age: 83
End: 2018-03-27
Payer: MEDICARE

## 2018-03-27 VITALS
WEIGHT: 161.6 LBS | DIASTOLIC BLOOD PRESSURE: 48 MMHG | HEART RATE: 58 BPM | HEIGHT: 61 IN | OXYGEN SATURATION: 92 % | SYSTOLIC BLOOD PRESSURE: 180 MMHG | BODY MASS INDEX: 30.51 KG/M2

## 2018-03-27 DIAGNOSIS — R07.89 OTHER CHEST PAIN: ICD-10-CM

## 2018-03-27 DIAGNOSIS — I50.32 CHRONIC DIASTOLIC HEART FAILURE DUE TO VALVULAR DISEASE (HCC): ICD-10-CM

## 2018-03-27 DIAGNOSIS — I35.1 AORTIC VALVE REGURGITATION, NONRHEUMATIC: ICD-10-CM

## 2018-03-27 DIAGNOSIS — I71.2 ASCENDING AORTIC ANEURYSM (HCC): ICD-10-CM

## 2018-03-27 DIAGNOSIS — I10 ESSENTIAL HYPERTENSION: ICD-10-CM

## 2018-03-27 DIAGNOSIS — I48.0 PAROXYSMAL ATRIAL FIBRILLATION (HCC): Primary | ICD-10-CM

## 2018-03-27 DIAGNOSIS — E78.49 OTHER HYPERLIPIDEMIA: ICD-10-CM

## 2018-03-27 DIAGNOSIS — I38 CHRONIC DIASTOLIC HEART FAILURE DUE TO VALVULAR DISEASE (HCC): ICD-10-CM

## 2018-03-27 DIAGNOSIS — I89.0 LYMPHEDEMA OF BOTH LOWER EXTREMITIES: ICD-10-CM

## 2018-03-27 PROCEDURE — 99214 OFFICE O/P EST MOD 30 MIN: CPT | Performed by: INTERNAL MEDICINE

## 2018-03-27 RX ORDER — ISOSORBIDE MONONITRATE 60 MG/1
60 TABLET, EXTENDED RELEASE ORAL DAILY
COMMUNITY
End: 2018-07-17 | Stop reason: SDUPTHER

## 2018-03-27 RX ORDER — TORSEMIDE 20 MG/1
40 TABLET ORAL 2 TIMES DAILY
Qty: 360 TABLET | Refills: 3 | Status: SHIPPED | OUTPATIENT
Start: 2018-03-27 | End: 2018-06-04 | Stop reason: SDUPTHER

## 2018-03-27 RX ORDER — POTASSIUM CHLORIDE 1500 MG/1
TABLET, EXTENDED RELEASE ORAL DAILY
COMMUNITY
Start: 2018-01-15 | End: 2018-10-31 | Stop reason: SDUPTHER

## 2018-03-28 ENCOUNTER — OFFICE VISIT (OUTPATIENT)
Dept: INTERNAL MEDICINE CLINIC | Facility: SKILLED NURSING FACILITY | Age: 83
End: 2018-03-28
Payer: MEDICARE

## 2018-03-28 VITALS — SYSTOLIC BLOOD PRESSURE: 164 MMHG | DIASTOLIC BLOOD PRESSURE: 52 MMHG

## 2018-03-28 DIAGNOSIS — I50.33 ACUTE ON CHRONIC DIASTOLIC CONGESTIVE HEART FAILURE (HCC): ICD-10-CM

## 2018-03-28 DIAGNOSIS — I71.2 ASCENDING AORTIC ANEURYSM (HCC): ICD-10-CM

## 2018-03-28 DIAGNOSIS — I35.1 AORTIC VALVE REGURGITATION, NONRHEUMATIC: ICD-10-CM

## 2018-03-28 DIAGNOSIS — I10 ESSENTIAL HYPERTENSION: Primary | ICD-10-CM

## 2018-03-28 DIAGNOSIS — I50.32 CHRONIC DIASTOLIC HEART FAILURE DUE TO VALVULAR DISEASE (HCC): ICD-10-CM

## 2018-03-28 DIAGNOSIS — I38 CHRONIC DIASTOLIC HEART FAILURE DUE TO VALVULAR DISEASE (HCC): ICD-10-CM

## 2018-03-28 PROCEDURE — 99214 OFFICE O/P EST MOD 30 MIN: CPT | Performed by: INTERNAL MEDICINE

## 2018-03-28 RX ORDER — PRAZOSIN HYDROCHLORIDE 5 MG/1
5 CAPSULE ORAL
Qty: 90 CAPSULE | Refills: 3 | Status: SHIPPED | OUTPATIENT
Start: 2018-03-28 | End: 2019-02-11 | Stop reason: HOSPADM

## 2018-03-28 NOTE — PROGRESS NOTES
Assessment/Plan:    Message sent to her nurse at [facility] to see her sooner than scheduled  Diuretic increase per Dr Asad Sroensen  She remains independent in her apartment at this time       Problem List Items Addressed This Visit     Essential hypertension - Primary    Relevant Medications    prazosin (MINIPRESS) 5 mg capsule    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)    Aortic valve regurgitation, nonrheumatic    Ascending aortic aneurysm (HCC)    Acute on chronic diastolic congestive heart failure (HCC)            Subjective:      Patient ID: Bailey Felipe is a 80 y o  female  HPI   Worsening SOB CP   Known asc aortic aneurysm CHF   Declined surgery but has been very compliant with fluid and salt  restriction that she has been stable  Weight at 160-165 lbs  She is in the PALS program  Cannot sleep but not because of SOB  She just saw Dr Asad Sorensen and torsemide has been increased    The following portions of the patient's history were reviewed and updated as appropriate: allergies, current medications, past family history, past social history, past surgical history and problem list     Review of Systems   Constitutional: Negative for chills and fever  HENT: Negative for sinus pain, sinus pressure and sore throat  Respiratory: Positive for cough and shortness of breath  Cardiovascular: Positive for chest pain and leg swelling  Negative for palpitations  Gastrointestinal: Positive for abdominal distention  Negative for diarrhea and vomiting  Neurological: Negative for dizziness and headaches  Objective:      /52          Physical Exam   Constitutional: She is oriented to person, place, and time  No distress  HENT:   Right Ear: External ear normal    Left Ear: External ear normal    Eyes: Conjunctivae are normal    Neck: Neck supple  Cardiovascular: Normal rate and regular rhythm  Murmur heard  Pulmonary/Chest: Effort normal  She has rales (left base)     Musculoskeletal: She exhibits edema  Neurological: She is alert and oriented to person, place, and time  Skin: Skin is warm and dry  She is not diaphoretic  Psychiatric: She has a normal mood and affect   Her behavior is normal  Judgment and thought content normal

## 2018-03-30 PROBLEM — I50.33 ACUTE ON CHRONIC DIASTOLIC CONGESTIVE HEART FAILURE (HCC): Status: ACTIVE | Noted: 2018-03-30

## 2018-04-05 ENCOUNTER — TELEPHONE (OUTPATIENT)
Dept: INTERNAL MEDICINE CLINIC | Facility: CLINIC | Age: 83
End: 2018-04-05

## 2018-04-05 NOTE — TELEPHONE ENCOUNTER
[facility] called to give a report on Pt for 4/4/18 visit-    Bp: 162/52  Temp: 96 8  Pulse: 55  Resp: 18  Wt:155 4  Denied chest pain over the last week  Lungs: Clear    Moderate edema to lower legs (both)  Has been using her pumps 2x a day     She gets short of breath when walking 100ft pulse ox 94% on room ai    dieretic torsemide in the morning she is taking 40mg 20mg in the evening    She will see her again on 4/17/18      Jessica#: [phone]

## 2018-04-11 ENCOUNTER — OFFICE VISIT (OUTPATIENT)
Dept: INTERNAL MEDICINE CLINIC | Facility: SKILLED NURSING FACILITY | Age: 83
End: 2018-04-11
Payer: MEDICARE

## 2018-04-11 VITALS
BODY MASS INDEX: 29.23 KG/M2 | DIASTOLIC BLOOD PRESSURE: 58 MMHG | SYSTOLIC BLOOD PRESSURE: 150 MMHG | HEIGHT: 61 IN | HEART RATE: 56 BPM | WEIGHT: 154.8 LBS

## 2018-04-11 DIAGNOSIS — I50.32 CHRONIC DIASTOLIC HEART FAILURE DUE TO VALVULAR DISEASE (HCC): ICD-10-CM

## 2018-04-11 DIAGNOSIS — I50.33 ACUTE ON CHRONIC DIASTOLIC CONGESTIVE HEART FAILURE (HCC): Primary | ICD-10-CM

## 2018-04-11 DIAGNOSIS — I71.2 ASCENDING AORTIC ANEURYSM (HCC): ICD-10-CM

## 2018-04-11 DIAGNOSIS — I89.0 LYMPHEDEMA OF BOTH LOWER EXTREMITIES: ICD-10-CM

## 2018-04-11 DIAGNOSIS — I10 ESSENTIAL HYPERTENSION: ICD-10-CM

## 2018-04-11 DIAGNOSIS — I48.0 PAROXYSMAL ATRIAL FIBRILLATION (HCC): ICD-10-CM

## 2018-04-11 DIAGNOSIS — I38 CHRONIC DIASTOLIC HEART FAILURE DUE TO VALVULAR DISEASE (HCC): ICD-10-CM

## 2018-04-11 PROCEDURE — 99214 OFFICE O/P EST MOD 30 MIN: CPT | Performed by: INTERNAL MEDICINE

## 2018-04-11 RX ORDER — CARVEDILOL 3.12 MG/1
3.12 TABLET ORAL 2 TIMES DAILY WITH MEALS
Qty: 180 TABLET | Refills: 3 | Status: SHIPPED | OUTPATIENT
Start: 2018-04-11 | End: 2019-02-11 | Stop reason: HOSPADM

## 2018-04-11 NOTE — PROGRESS NOTES
Assessment/Plan:    She has improved with the recent increase in torsemide  I do not see the blood work she had done yesterday  She has been very compliant with fluid restriction and daily weights  She is in the PALS HF program and will be seen   once a month which she is not happy with  I will have her return to see me more frequently  For now, she will se Dr Mary Hampton in 2-3weeks and me a few weeks after that  Problem List Items Addressed This Visit     Paroxysmal atrial fibrillation (HCC)    Relevant Medications    carvedilol (COREG) 3 125 mg tablet    Essential hypertension    Relevant Medications    carvedilol (COREG) 3 125 mg tablet    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)    Relevant Medications    carvedilol (COREG) 3 125 mg tablet    Ascending aortic aneurysm (HCC)    Relevant Medications    carvedilol (COREG) 3 125 mg tablet    Lymphedema of both lower extremities    Acute on chronic diastolic congestive heart failure (HCC) - Primary    Relevant Medications    carvedilol (COREG) 3 125 mg tablet            Subjective:      Patient ID: Shayan Irizarry is a 80 y o  female  HPI  Known ascending aortic aneurysm, with moderate to severe AI and chronic diastolic heart failure  Her torsemide was increased recently to 40mg BID and now 40mg in the morning and in the 20mg in the afternoon   Weight has been 153-155 lbs  Still following fluid restriction  She used compression and leg pumps  She is less SOB overall  Also sleeping better with melatonin      The following portions of the patient's history were reviewed and updated as appropriate: allergies, current medications, past family history, past medical history, past social history, past surgical history and problem list     Review of Systems   Constitutional: Negative for fatigue, fever and unexpected weight change  HENT: Negative for ear pain, hearing loss, sinus pain, sinus pressure and sore throat      Respiratory: Negative for cough, shortness of breath and wheezing  Cardiovascular: Negative for chest pain, palpitations and leg swelling  Gastrointestinal: Negative for abdominal pain, constipation, diarrhea, nausea and vomiting  Musculoskeletal: Negative for arthralgias and myalgias  Neurological: Negative for dizziness and headaches  Objective:      /58   Pulse 56   Ht 5' 1" (1 549 m)   Wt 70 2 kg (154 lb 12 8 oz)   BMI 29 25 kg/m²          Physical Exam   Constitutional: She is oriented to person, place, and time  No distress  Cardiovascular: Normal rate and regular rhythm  Murmur heard  Pulmonary/Chest: Effort normal  She has rales (left base)  Musculoskeletal: She exhibits edema (bilateral 2+ LE)  Neurological: She is alert and oriented to person, place, and time  Skin: She is not diaphoretic

## 2018-05-02 DIAGNOSIS — I10 HYPERTENSION, UNSPECIFIED TYPE: Primary | ICD-10-CM

## 2018-05-02 RX ORDER — PRAZOSIN HYDROCHLORIDE 2 MG/1
2 CAPSULE ORAL EVERY MORNING
Qty: 90 CAPSULE | Refills: 3 | Status: SHIPPED | OUTPATIENT
Start: 2018-05-02 | End: 2019-02-11 | Stop reason: HOSPADM

## 2018-05-17 ENCOUNTER — TELEPHONE (OUTPATIENT)
Dept: INTERNAL MEDICINE CLINIC | Facility: CLINIC | Age: 83
End: 2018-05-17

## 2018-05-23 ENCOUNTER — OFFICE VISIT (OUTPATIENT)
Dept: INTERNAL MEDICINE CLINIC | Facility: SKILLED NURSING FACILITY | Age: 83
End: 2018-05-23
Payer: MEDICARE

## 2018-05-23 VITALS
OXYGEN SATURATION: 93 % | WEIGHT: 152.4 LBS | HEIGHT: 61 IN | BODY MASS INDEX: 28.77 KG/M2 | HEART RATE: 55 BPM | SYSTOLIC BLOOD PRESSURE: 150 MMHG | DIASTOLIC BLOOD PRESSURE: 42 MMHG

## 2018-05-23 DIAGNOSIS — I50.32 CHRONIC DIASTOLIC HEART FAILURE DUE TO VALVULAR DISEASE (HCC): ICD-10-CM

## 2018-05-23 DIAGNOSIS — I38 CHRONIC DIASTOLIC HEART FAILURE DUE TO VALVULAR DISEASE (HCC): ICD-10-CM

## 2018-05-23 DIAGNOSIS — I10 ESSENTIAL HYPERTENSION: ICD-10-CM

## 2018-05-23 DIAGNOSIS — I71.2 ASCENDING AORTIC ANEURYSM (HCC): Primary | ICD-10-CM

## 2018-05-23 DIAGNOSIS — I48.0 PAROXYSMAL ATRIAL FIBRILLATION (HCC): ICD-10-CM

## 2018-05-23 PROCEDURE — 99214 OFFICE O/P EST MOD 30 MIN: CPT | Performed by: INTERNAL MEDICINE

## 2018-05-23 NOTE — PROGRESS NOTES
Assessment/Plan:  BP slightly high today but readings at home are fine 120-140/40s  Her LE edema is stable-compliant with her fluid restriction, low Na diet, compression stockings and leg pumps   She has an appt with Dr Rashaad sterling  PALS nurse sees her monthly     Problem List Items Addressed This Visit     Paroxysmal atrial fibrillation [facility])    Essential hypertension    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 ([address] )    Ascending aortic aneurysm (HCC) - Primary            Subjective:      Patient ID: Tierney Garcia is a 80 y o  female  HPI  150-152 lbs at home  Bps at home stable, controlled 120-140/40s  Appetite had been poorer  More tired lately -was good until 2 weeks ago  SOB with exertion, stable  She fell about a month ago, slid off her recliner reaching to turn off the  leg pumps  She was able to call for help and staff came in to help her up ; Sore low back     The following portions of the patient's history were reviewed and updated as appropriate: allergies, current medications, past family history, past medical history, past social history, past surgical history and problem list     Review of Systems   Constitutional: Positive for fatigue  Negative for fever and unexpected weight change  HENT: Positive for rhinorrhea, sinus pain and sinus pressure  Negative for ear pain, hearing loss and sore throat  Respiratory: Positive for cough and shortness of breath  Negative for wheezing  Cardiovascular: Positive for leg swelling  Negative for chest pain and palpitations  Gastrointestinal: Negative for abdominal pain, constipation, diarrhea, nausea and vomiting  Genitourinary:        Incontinence, has a pessary that will be changed tomorrow by Dr Ada Sales   Neurological: Negative for dizziness and headaches           Objective:      BP (!) 150/42   Pulse 55   Ht 5' 1" (1 549 m)   Wt 69 1 kg (152 lb 6 4 oz)   SpO2 93%   BMI 28 80 kg/m²          Physical Exam   Constitutional: She is oriented to person, place, and time  She appears well-developed and well-nourished  HENT:   Head: Normocephalic and atraumatic  Right Ear: External ear normal    Left Ear: External ear normal    Mouth/Throat: Oropharynx is clear and moist    Cerumen AS   Eyes: Conjunctivae are normal    Neck: Neck supple  Cardiovascular: Normal rate and regular rhythm  Murmur heard  Pulmonary/Chest: Effort normal  No respiratory distress  She has no wheezes  She has rales (Left base)  Abdominal: Soft  Bowel sounds are normal  She exhibits no distension and no mass  There is no tenderness  There is no rebound and no guarding  Musculoskeletal: Normal range of motion  She exhibits edema (2+ bilateral edema)  Neurological: She is alert and oriented to person, place, and time  Skin: Skin is warm and dry  Psychiatric: She has a normal mood and affect   Her behavior is normal  Judgment and thought content normal

## 2018-06-04 ENCOUNTER — OFFICE VISIT (OUTPATIENT)
Dept: CARDIOLOGY CLINIC | Facility: CLINIC | Age: 83
End: 2018-06-04
Payer: MEDICARE

## 2018-06-04 VITALS
SYSTOLIC BLOOD PRESSURE: 176 MMHG | HEIGHT: 60 IN | DIASTOLIC BLOOD PRESSURE: 66 MMHG | HEART RATE: 66 BPM | OXYGEN SATURATION: 90 % | BODY MASS INDEX: 30.82 KG/M2 | WEIGHT: 157 LBS

## 2018-06-04 DIAGNOSIS — I50.32 CHRONIC DIASTOLIC CONGESTIVE HEART FAILURE (HCC): Primary | ICD-10-CM

## 2018-06-04 DIAGNOSIS — I38 CHRONIC DIASTOLIC HEART FAILURE DUE TO VALVULAR DISEASE (HCC): ICD-10-CM

## 2018-06-04 DIAGNOSIS — I71.2 ASCENDING AORTIC ANEURYSM (HCC): ICD-10-CM

## 2018-06-04 DIAGNOSIS — I50.32 CHRONIC DIASTOLIC HEART FAILURE DUE TO VALVULAR DISEASE (HCC): ICD-10-CM

## 2018-06-04 DIAGNOSIS — I35.1 AORTIC VALVE REGURGITATION, NONRHEUMATIC: ICD-10-CM

## 2018-06-04 DIAGNOSIS — R07.89 OTHER CHEST PAIN: ICD-10-CM

## 2018-06-04 DIAGNOSIS — I48.0 PAROXYSMAL ATRIAL FIBRILLATION (HCC): ICD-10-CM

## 2018-06-04 DIAGNOSIS — I10 ESSENTIAL HYPERTENSION: ICD-10-CM

## 2018-06-04 DIAGNOSIS — E78.49 OTHER HYPERLIPIDEMIA: ICD-10-CM

## 2018-06-04 PROCEDURE — 99215 OFFICE O/P EST HI 40 MIN: CPT | Performed by: INTERNAL MEDICINE

## 2018-06-04 RX ORDER — TORSEMIDE 20 MG/1
40 TABLET ORAL 2 TIMES DAILY
Qty: 360 TABLET | Refills: 3 | Status: SHIPPED | OUTPATIENT
Start: 2018-06-04 | End: 2018-12-10 | Stop reason: SDUPTHER

## 2018-06-04 RX ORDER — METOLAZONE 2.5 MG/1
2.5 TABLET ORAL 2 TIMES WEEKLY
Qty: 15 TABLET | Refills: 5 | Status: SHIPPED | OUTPATIENT
Start: 2018-06-04 | End: 2018-07-06 | Stop reason: CLARIF

## 2018-06-04 NOTE — PROGRESS NOTES
Cardiology Follow up Note    Rich Farah 80 y o  female MRN: [id number]    06/04/18          Assessment/Plan:    1  Ascending aortic aneurysm, with moderate to severe AI and chronic diastolic HF  LV function was normal  She met with Dr Kevin Gaviria, who recommended serial CTA in 6 months with echo to assess for worsening of AI or ascending aortic aneurysm  She tells me she does not wish to pursue serial testing or surgery  She wishes only for treatment of her symptoms, despite acknowledging that medical management will eventually fail  She is in the PALS HF program  She was on Torsemide 40 mg in AM and 20 mg in PM, this was decreased at some point to 20 bid, was more volume overloaded in 3/17, recommended increase to 40 bid for 5 days, then resume maintenance Torsemide at 40 in AM and 20 in PM  She again appears volume overloaded today, will increase Torsemide to 40 bid for maintenance, and add Metolazone 2 5 30 minutes prior to morning dose of Torsemide twice a week for 2 doses  BMP 4/18 showed sodium 140, potassium 4 4, creatinine 1 24  Will order follow up BMP after two doses of metolazone to ensure potassium adequate, currently taking 20 meq daily  2  HTN  She is already on Prazosin 2, Amlodipine 10, Ramipril 10, Coreg 3 125 bid, Imdur 60, and Torsemide  BP controlled at home  BP more elevated today, possibly due to volume overload  3  HL  She is on Pravastatin 40  Lipid panel 7/31/15 showed total cholesterol 157, TG 72, HDL 67, and LDL 76  Lipid panel 10/16 showed total cholesterol 129, TG 44, HDL 69, LDL 51  Lipid panel 3/17 showed total cholesterol 133, TG 40, HDL 80, LDL 45      4  Atrial fibrillation, paroxysmal  She underwent APOLINAR/CV 9/15 and again in 2/16 with Amiodarone on board  She is on Eliquis 5 bid  She is maintaining SR on Amio 200 qd  TFTs normal 1/17  5  Chest pain  She does not wish for further workup as she is in PALS program, she feels chest pain is controlled with Imdur       1  Chronic diastolic congestive heart failure (HCC)  metolazone (ZAROXOLYN) 2 5 mg tablet    Basic metabolic panel   2  Ascending aortic aneurysm ([address] )     3  Aortic valve regurgitation, nonrheumatic  metolazone (ZAROXOLYN) 2 5 mg tablet   4  Essential hypertension     5  Paroxysmal atrial fibrillation (HCC)     6  Other chest pain     7  Other hyperlipidemia     8  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)  torsemide (DEMADEX) 20 mg tablet         HPI: 80year old woman a history of HTN and HL, who is here for follow up of ascending aortic aneurysm and moderate to severe aortic regurgitation as well as paroxysmal atrial fibrillation  Echo 7/15 showed normal LV size and function, EF 55%, no RWMA, moderate concentric hypertrophy, grade II diastolic dysfunction, evidence of elevated LVEDP  Moderate to severe central AI, with  ms  PASP 40 mm Hg consistent with mild pulmonary HTN  The ascending aorta was dilated up to 5 0 cm in ascending aorta  Normal RV size and function  CTA of chest/abdomen 8/15 showed fusiform aneurysm of ascending aorta measuring up to 5 1 cm, increased from 45 mm in 2012  Descending thoracic and abdominal aorta were normal in caliber  She saw Dr Shane Waters, who recommended continued surveillance with repeat CTA and echo in 6 months  She reports to me that after speaking with Dr Shane Waters, she does not wish to pursue surveillance because she would not want surgery in any case  She was in ED 9/13/15 for hematuria, EKG was done and showed what appeared to be atrial fibrillation, although it was interpreted as multifocal atrial tachycardia  EKG in office 9/21 also showed atrial fibrillation  She denied any dizziness or lightheadedness  No syncope or presyncope  She was feeling more tired than previously  No further hematuria  She underwent APOLINAR guided CV on 9/23/15   APOLINAR showed normal LV size and function, EF 55%, concentric hypertrophy, normal RV size and function, LA and EARNEST were dilated with no thrombus, no PFO, mild MR, aortic sclerosis, moderate to severe central AI, PASP 25 mm Hg  Aorta was dilated up to 5 0 cm in the ascending aorta, with moderate atheroma in descending aorta and aortic arch  She was successfully cardioverted to sinus rhythm  She saw Dr Nitza Cash 2/16 and reported a 2 week history of LE edema and shortness of breath with exertion/excitement  She was noted to be back in atrial fibrillation  She started her on low dose Furosemide and asked her to come see me  She reported when she took Lasix and potassium, she felt short of breath, sweaty, and thought she was going to pass out  She was switched to Torsemide 20 mg daily on 2/8/16  She was started on Amiodarone as antiarrhythmic as well  She underwent APOLINAR/CV on 2/17/16  A single 200 J shock was successful in converting her to sinus rhythm  APOLINAR showed normal LV size and function, EF 60%, no RWMA, normal RV Size and function  Ascending aorta measured 5 cm  Moderate AI  No PFO  LA and EARNEST were moderately dilated but no thrombus  She was admitted to [place] [address] for worsening LE edema, shortness of breath, and weight gain despite increasing Torsemide from 20 to 40 mg daily  She was diuresed with IV Lasix and transitioned to Torsemide 40 bid at discharge  She was discharged to [facility] for skilled nursing, and did enroll in the PALS HF program  She was seen by HF nurse practitioners while at [facility], her Torsemide was decreased to 40 mg in AM and 20 mg in PM due to rising creatinine  She has been having more LE edema, was seen by Ngoc Gross at [facility], who increased her Torsemide to 40 bid on 3/2/17  She reports she was having intermittent chest discomfort at rest (also with exertion) in summer 2016, she was prescribed SLNG, and then Dr Nitza Cash put her on standing Imdur  She reports that with the Imdur, she is no longer having any chest pain       I referred her to Vascular, who ordered custom lower extremity compression stockings and lymphedema pumps, and she reports she has had significant improvement in LE edema, and also weight loss from 172 lbs to 156 lbs  She reports her weights have been stable around 153, but she is not hungry and has not been eating much  She is having more shortness of breath and MONTANEZ  Her LE edema is stable  She sleeps in a chair, but reports she is still not sleeping well  Her BP had been controlled, but for the last one week has been higher  She walks with a walker, she does get SOB with exertion walking down the levine  No dizziness or lightheadedness  Patient Active Problem List   Diagnosis    Paroxysmal atrial fibrillation (HCC)    Essential hypertension    GERD (gastroesophageal reflux disease)    Other hyperlipidemia    Aortic valve regurgitation, nonrheumatic    Ascending aortic aneurysm (HCC)    Other chest pain    Lymphedema of both lower extremities    Chronic diastolic congestive heart failure (HCC)       Allergies   Allergen Reactions    Aspirin     Percocet [Oxycodone-Acetaminophen]          Current Outpatient Prescriptions:     acetaminophen (TYLENOL) 325 mg tablet, Take 650 mg by mouth every 6 (six) hours as needed for mild pain , Disp: , Rfl:     amiodarone 200 mg tablet, Take 1 tablet (200 mg total) by mouth daily, Disp: 90 tablet, Rfl: 3    amLODIPine (NORVASC) 10 mg tablet, Take 5 mg by mouth daily  , Disp: , Rfl:     apixaban (ELIQUIS) 5 mg, Take 5 mg by mouth 2 (two) times a day , Disp: , Rfl:     Calcium Carbonate (CALTRATE 600 PO), Take 600 mg by mouth daily  , Disp: , Rfl:     carvedilol (COREG) 3 125 mg tablet, Take 1 tablet (3 125 mg total) by mouth 2 (two) times a day with meals, Disp: 180 tablet, Rfl: 3    diclofenac sodium (VOLTAREN) 1 %, Place on the skin, Disp: , Rfl:     econazole nitrate 1 % cream, Apply topically daily  , Disp: , Rfl:     fluticasone (FLONASE) 50 mcg/act nasal spray, 1 spray into each nostril 2 (two) times a day, Disp: , Rfl:     isosorbide mononitrate (IMDUR) 60 mg 24 hr tablet, Take 60 mg by mouth daily, Disp: , Rfl:     KLOR-CON M20 20 MEQ tablet, daily, Disp: , Rfl:     Melatonin 3 MG CAPS, Take 3 mg by mouth daily at bedtime, Disp: 30 capsule, Rfl: 5    metolazone (ZAROXOLYN) 2 5 mg tablet, Take by mouth, Disp: , Rfl:     nitroglycerin (NITROSTAT) 0 4 mg SL tablet, Place 1 tablet (0 4 mg total) under the tongue every 5 (five) minutes as needed for chest pain, Disp: 30 tablet, Rfl: 0    pravastatin (PRAVACHOL) 40 mg tablet, Take 40 mg by mouth daily  , Disp: , Rfl:     prazosin (MINIPRESS) 2 mg capsule, Take 1 capsule (2 mg total) by mouth every morning, Disp: 90 capsule, Rfl: 3    prazosin (MINIPRESS) 5 mg capsule, Take 1 capsule (5 mg total) by mouth daily at bedtime, Disp: 90 capsule, Rfl: 3    ramipril (ALTACE) 10 MG capsule, Take 10 mg by mouth daily  , Disp: , Rfl:     torsemide (DEMADEX) 20 mg tablet, Take 2 tablets (40 mg total) by mouth 2 (two) times a day Take 40 bid for 5 days, then decrease to 40 mg in AM and 20 mg in PM , Disp: 360 tablet, Rfl: 3    metolazone (ZAROXOLYN) 2 5 mg tablet, Take 1 tablet (2 5 mg total) by mouth 2 (two) times a week Take 30 minutes prior to morning dose of Metolazone, Disp: 15 tablet, Rfl: 5    Past Medical History:   Diagnosis Date    Acid reflux disease     Anemia     Last assessed: 10/26/16    Aortic valve regurgitation, nonrheumatic     Ascending aortic aneurysm (HCC)     Last assessed: 6/5/13    Atrial [address] [facility] )     Hyperlipidemia     Hypertension 2/17/2016    Paroxysmal atrial fibrillation ([facility] ) 2/17/2016       Family History   Problem Relation Age of Onset    No Known Problems Mother     Stomach cancer Father     Heart disease Sister      Cardiac Disorder    Breast cancer Sister     Colon cancer Brother     Cancer Brother        Past Surgical History:   Procedure Laterality Date    CARDIOVERSION      CHOLECYSTECTOMY      LAPAROSCOPIC SALPINGOOPHERECTOMY      TONSILLECTOMY         Social History     Social History    Marital status:      Spouse name: N/A    Number of children: N/A    Years of education: N/A     Occupational History    Not on file  Social History Main Topics    Smoking status: Never Smoker    Smokeless tobacco: Never Used    Alcohol use No    Drug use: No    Sexual activity: No     Other Topics Concern    Not on file     Social History Narrative    Assistive devices: walker       Review of Systems   Constitution: Negative for chills, decreased appetite, diaphoresis, fever, weakness, malaise/fatigue, night sweats, weight gain and weight loss  HENT: Negative for ear pain, hearing loss, hoarse voice, nosebleeds, sore throat and tinnitus  Eyes: Negative for blurred vision and pain  Cardiovascular: Positive for dyspnea on exertion and leg swelling  Negative for chest pain, claudication, cyanosis, irregular heartbeat, near-syncope, orthopnea, palpitations, paroxysmal nocturnal dyspnea and syncope  Respiratory: Negative for cough, hemoptysis, shortness of breath, sleep disturbances due to breathing, snoring, sputum production and wheezing  Hematologic/Lymphatic: Negative for adenopathy and bleeding problem  Does not bruise/bleed easily  Skin: Negative for color change, dry skin, flushing, itching, poor wound healing and rash  Musculoskeletal: Negative for arthritis, back pain, falls, joint pain, muscle cramps, muscle weakness, myalgias and neck pain  Gastrointestinal: Negative for abdominal pain, constipation, diarrhea, dysphagia, heartburn, hematemesis, hematochezia, melena, nausea and vomiting  Genitourinary: Positive for frequency  Negative for dysuria, hematuria, hesitancy, non-menstrual bleeding and urgency     Neurological: Negative for excessive daytime sleepiness, dizziness, focal weakness, headaches, light-headedness, loss of balance, numbness, paresthesias, tremors and vertigo  Psychiatric/Behavioral: Negative for altered mental status, depression and memory loss  The patient has insomnia  The patient is not nervous/anxious  Allergic/Immunologic: Negative for environmental allergies and persistent infections  Vitals: BP (!) 176/66 (BP Location: Left leg, Patient Position: Sitting, Cuff Size: Standard)   Pulse 66   Ht 5' (1 524 m)   Wt 71 2 kg (157 lb)   SpO2 90%   BMI 30 66 kg/m²       Physical Exam:     GEN: Alert and oriented x 3, in no acute distress  Well appearing and well nourished  HEENT: Sclera anicteric, conjunctivae pink, mucous membranes moist  Oropharynx clear  NECK: Supple, no carotid bruits, JVD to 7 cm above sternal angle  Trachea midline, no thyromegaly  HEART: Regular rhythm, occasional ectopic beats, normal S1 and S2, III/VI systolic murmur with blowing diastolic murmur, no clicks, gallops or rubs  PMI nondisplaced, no thrills  LUNGS: Crackles at bases bilaterally; no wheezes, or rhonchi  No increased work of breathing or signs of respiratory distress  ABDOMEN: Soft, nontender, nondistended, normoactive bowel sounds  EXTREMITIES: Skin warm and well perfused, no clubbing, cyanosis, 1+ edema  Wearing compression stockings  NEURO: No focal findings  Walks with walker  Normal speech  Mood and affect normal    SKIN: Normal without suspicious lesions on exposed skin        Lab Results:       No results found for: HGBA1C  No results found for: CHOL  No results found for: HDL  No results found for: LDLCALC  No results found for: TRIG  No components found for: CHOLHDL

## 2018-06-11 ENCOUNTER — TELEPHONE (OUTPATIENT)
Dept: CARDIOLOGY CLINIC | Facility: CLINIC | Age: 83
End: 2018-06-11

## 2018-06-11 NOTE — TELEPHONE ENCOUNTER
Received a call from [facility] asking if pt is to take Metolazone every Thurs and Sun   or was pt supposed to take for 2 doses for 1 week only?  Please advise  NMXTZX#[phone]

## 2018-06-13 ENCOUNTER — OFFICE VISIT (OUTPATIENT)
Dept: INTERNAL MEDICINE CLINIC | Facility: SKILLED NURSING FACILITY | Age: 83
End: 2018-06-13
Payer: MEDICARE

## 2018-06-13 VITALS
HEIGHT: 60 IN | SYSTOLIC BLOOD PRESSURE: 134 MMHG | BODY MASS INDEX: 28.98 KG/M2 | HEART RATE: 60 BPM | WEIGHT: 147.6 LBS | DIASTOLIC BLOOD PRESSURE: 64 MMHG

## 2018-06-13 DIAGNOSIS — I71.2 ASCENDING AORTIC ANEURYSM (HCC): ICD-10-CM

## 2018-06-13 DIAGNOSIS — I48.0 PAROXYSMAL ATRIAL FIBRILLATION (HCC): ICD-10-CM

## 2018-06-13 DIAGNOSIS — I35.1 AORTIC VALVE REGURGITATION, NONRHEUMATIC: ICD-10-CM

## 2018-06-13 DIAGNOSIS — I50.32 CHRONIC DIASTOLIC CONGESTIVE HEART FAILURE (HCC): Primary | ICD-10-CM

## 2018-06-13 PROCEDURE — 99214 OFFICE O/P EST MOD 30 MIN: CPT | Performed by: INTERNAL MEDICINE

## 2018-06-13 NOTE — PROGRESS NOTES
Assessment/Plan:  Stable bilateral LE edema, clear breaths sounds, improved fatigue and SOB on Torsemide 40mg BID and after 2 doses of Metolazone  She had labs done yesterday and will hear from  [facility] office about it  Continue current regimen       Problem List Items Addressed This Visit     Paroxysmal atrial fibrillation (HCC)    Aortic valve regurgitation, nonrheumatic    Ascending aortic aneurysm (HCC)    Chronic diastolic congestive heart failure (HCC) - Primary            Subjective:      Patient ID: Tierney Garcia is a 80 y o  female  HPI   She saw Dr Rashaad Michael recently for her routine appt  She had increased SOB and fatigue   Torsemide was increased to 40mg BID and she took [place] 2 x  Thursday and Sunday last week  Bps better  at 130/40 at home now  SOB is much better now  [facility] just saw her too 2 days ago    The following portions of the patient's history were reviewed and updated as appropriate: allergies, current medications, past family history, past social history, past surgical history and problem list     Review of Systems   Constitutional: Negative for fatigue, fever and unexpected weight change  HENT: Negative for ear pain, hearing loss, sinus pain, sinus pressure and sore throat  Respiratory: Positive for shortness of breath  Negative for cough and wheezing  Cardiovascular: Positive for leg swelling  Negative for chest pain and palpitations  Gastrointestinal: Negative for abdominal pain, constipation, diarrhea, nausea and vomiting  Musculoskeletal: Negative for arthralgias and myalgias  Neurological: Negative for dizziness and headaches  Objective:      /64   Pulse 60   Ht 5' (1 524 m)   Wt 67 kg (147 lb 9 6 oz)   BMI 28 83 kg/m²          Physical Exam   Constitutional: She is oriented to person, place, and time  She appears well-developed and well-nourished  No distress  HENT:   Head: Normocephalic and atraumatic     Mouth/Throat: Oropharynx is clear and moist  Cardiovascular: Normal rate  An irregularly irregular rhythm present  Murmur heard  Systolic murmur is present with a grade of 3/6    Diastolic murmur is present with a grade of 3/6   Pulmonary/Chest: Effort normal and breath sounds normal  No respiratory distress  She has no wheezes  She has no rales  Abdominal: Soft  Bowel sounds are normal  She exhibits no distension and no mass  There is no tenderness  There is no rebound and no guarding  Musculoskeletal: Normal range of motion  She exhibits edema (1+pretibial bilaterally)  Neurological: She is alert and oriented to person, place, and time  Skin: Skin is warm and dry  She is not diaphoretic  Psychiatric: She has a normal mood and affect   Her behavior is normal  Judgment and thought content normal

## 2018-06-18 ENCOUNTER — OFFICE VISIT (OUTPATIENT)
Dept: INTERNAL MEDICINE CLINIC | Facility: CLINIC | Age: 83
End: 2018-06-18
Payer: MEDICARE

## 2018-06-18 VITALS
DIASTOLIC BLOOD PRESSURE: 62 MMHG | HEART RATE: 61 BPM | TEMPERATURE: 97.8 F | HEIGHT: 60 IN | SYSTOLIC BLOOD PRESSURE: 134 MMHG

## 2018-06-18 DIAGNOSIS — N30.01 ACUTE CYSTITIS WITH HEMATURIA: Primary | ICD-10-CM

## 2018-06-18 LAB
BACTERIA UR QL AUTO: ABNORMAL /HPF
BILIRUB UR QL STRIP: NEGATIVE
CLARITY UR: CLEAR
COLOR UR: YELLOW
GLUCOSE UR STRIP-MCNC: NEGATIVE MG/DL
HGB UR QL STRIP.AUTO: ABNORMAL
HYALINE CASTS #/AREA URNS LPF: ABNORMAL /LPF
KETONES UR STRIP-MCNC: NEGATIVE MG/DL
LEUKOCYTE ESTERASE UR QL STRIP: ABNORMAL
NITRITE UR QL STRIP: NEGATIVE
NON-SQ EPI CELLS URNS QL MICRO: ABNORMAL /HPF
PH UR STRIP.AUTO: 7.5 [PH] (ref 4.5–8)
PROT UR STRIP-MCNC: ABNORMAL MG/DL
RBC #/AREA URNS AUTO: ABNORMAL /HPF
SL AMB  POCT GLUCOSE, UA: NEGATIVE
SL AMB LEUKOCYTE ESTERASE,UA: ABNORMAL
SL AMB POCT BILIRUBIN,UA: NEGATIVE
SL AMB POCT BLOOD,UA: ABNORMAL
SL AMB POCT CLARITY,UA: CLEAR
SL AMB POCT COLOR,UA: YELLOW
SL AMB POCT KETONES,UA: NEGATIVE
SL AMB POCT NITRITE,UA: NEGATIVE
SL AMB POCT PH,UA: 7.5
SL AMB POCT SPECIFIC GRAVITY,UA: 1.01
SL AMB POCT URINE PROTEIN: NEGATIVE
SL AMB POCT UROBILINOGEN: NEGATIVE
SP GR UR STRIP.AUTO: 1.01 (ref 1–1.03)
UROBILINOGEN UR QL STRIP.AUTO: 0.2 E.U./DL
WBC #/AREA URNS AUTO: ABNORMAL /HPF

## 2018-06-18 PROCEDURE — 81003 URINALYSIS AUTO W/O SCOPE: CPT | Performed by: NURSE PRACTITIONER

## 2018-06-18 PROCEDURE — 99213 OFFICE O/P EST LOW 20 MIN: CPT | Performed by: NURSE PRACTITIONER

## 2018-06-18 PROCEDURE — 81001 URINALYSIS AUTO W/SCOPE: CPT | Performed by: NURSE PRACTITIONER

## 2018-06-18 RX ORDER — CIPROFLOXACIN 500 MG/1
500 TABLET, FILM COATED ORAL EVERY 12 HOURS SCHEDULED
Qty: 10 TABLET | Refills: 0 | Status: SHIPPED | OUTPATIENT
Start: 2018-06-18 | End: 2018-06-23

## 2018-06-18 RX ORDER — SULFAMETHOXAZOLE AND TRIMETHOPRIM 800; 160 MG/1; MG/1
1 TABLET ORAL EVERY 12 HOURS SCHEDULED
Qty: 6 TABLET | Refills: 0 | Status: SHIPPED | OUTPATIENT
Start: 2018-06-18 | End: 2018-06-21

## 2018-06-18 RX ORDER — SULFAMETHOXAZOLE AND TRIMETHOPRIM 800; 160 MG/1; MG/1
1 TABLET ORAL EVERY 12 HOURS SCHEDULED
Qty: 6 TABLET | Refills: 0 | Status: SHIPPED | OUTPATIENT
Start: 2018-06-18 | End: 2018-06-18 | Stop reason: SDUPTHER

## 2018-06-18 NOTE — TELEPHONE ENCOUNTER
Breathing is better, began feeling better last week  wt today 145, stated her wt is gradually decreasing   Wt in office last week was 157

## 2018-06-18 NOTE — TELEPHONE ENCOUNTER
She was only supposed to take for 2 doses and then get bloodwork  If weights have been improving and breathing is better, she should stop the Metolazone

## 2018-06-18 NOTE — TELEPHONE ENCOUNTER
Creatinine higher than previously at 2 32, but that is likely from metolazone use, potassium is normal  Are her weights trending down? How is her breathing?

## 2018-06-18 NOTE — TELEPHONE ENCOUNTER
Mark Jackson then she can hold metolazone and continue torsemide at current dosing  Metolazone should be used PRN if weight starts going up or breathing worsens again (just take one dose of PRN metolazone)

## 2018-06-18 NOTE — PROGRESS NOTES
Assessment/Plan:       Diagnoses and all orders for this visit:    Acute cystitis with hematuria  -     ciprofloxacin (CIPRO) 500 mg tablet; Take 1 tablet (500 mg total) by mouth every 12 (twelve) hours for 5 days  -     Discontinue: sulfamethoxazole-trimethoprim (BACTRIM DS) 800-160 mg per tablet; Take 1 tablet by mouth every 12 (twelve) hours for 3 days  -     sulfamethoxazole-trimethoprim (BACTRIM DS) 800-160 mg per tablet; Take 1 tablet by mouth every 12 (twelve) hours for 3 days  -     POCT urine dip auto non-scope  -     UA (URINE) with reflex to Microscopic  -     Urine Microscopic        start bactrim  Fluids and rest    Subjective:      Patient ID: Viola Veliz is a 80 y o  female  2 days of hematuria  No burning        The following portions of the patient's history were reviewed and updated as appropriate: allergies, current medications, past family history, past medical history, past social history, past surgical history and problem list     Review of Systems   Constitutional: Negative  HENT: Negative  Eyes: Negative  Respiratory: Negative  Cardiovascular: Negative  Gastrointestinal: Negative  Musculoskeletal: Negative  Neurological: Negative  Objective:      /62   Pulse 61   Temp 97 8 °F (36 6 °C)   Ht 5' (1 524 m)          Physical Exam   Constitutional: She is oriented to person, place, and time  She appears well-developed and well-nourished  HENT:   Head: Normocephalic and atraumatic  Eyes: Conjunctivae are normal  Pupils are equal, round, and reactive to light  Neck: Normal range of motion  Neck supple  Cardiovascular: Normal rate and regular rhythm  Pulmonary/Chest: Effort normal and breath sounds normal    Abdominal: Soft  Bowel sounds are normal    Musculoskeletal: Normal range of motion  Neurological: She is alert and oriented to person, place, and time  Skin: Skin is warm and dry

## 2018-06-18 NOTE — TELEPHONE ENCOUNTER
Pt feeling better except at PCP today for hematuria  BW available for review under media tab  Pt asking for results   Please advise

## 2018-06-27 DIAGNOSIS — E78.2 MIXED HYPERLIPIDEMIA: Primary | ICD-10-CM

## 2018-06-27 RX ORDER — PRAVASTATIN SODIUM 40 MG
40 TABLET ORAL DAILY
Qty: 90 TABLET | Refills: 3 | Status: SHIPPED | OUTPATIENT
Start: 2018-06-27 | End: 2018-09-27 | Stop reason: SDUPTHER

## 2018-07-03 NOTE — PROGRESS NOTES
Cardiology Follow up Note    Elayne Dickerson 80 y o  female MRN: [id number]    07/06/18          Assessment/Plan:    1  Ascending aortic aneurysm, with moderate to severe AI and chronic diastolic HF  LV function was normal  She met with Dr Hemanth Zhang, who recommended serial CTA in 6 months with echo to assess for worsening of AI or ascending aortic aneurysm  She tells me she does not wish to pursue serial testing or surgery  She wishes only for treatment of her symptoms, despite acknowledging that medical management will eventually fail  She is in the [facility] HF program  She was on Torsemide 40 mg in AM and 20 mg in PM, this was decreased at some point to 20 bid, increased back to Torsemide to 40 bid for maintenance in 6/18, and added Metolazone 2 5 PRN 30 minutes prior to morning dose of Torsemide up to twice a week  BMP 6/12/18 showed sodium 137, potassium 4 0, BUN 86, creatinine 2 32  I told her she can take Metolazone if weights go up above 148 lbs by home scale  2  HTN  She is already on Prazosin 2, Amlodipine 10, Ramipril 10, Coreg 3 125 bid, Imdur 60, and Torsemide  BP controlled at home  BP controlled  3  HL  She is on Pravastatin 40  Lipid panel 7/31/15 showed total cholesterol 157, TG 72, HDL 67, and LDL 76  Lipid panel 10/16 showed total cholesterol 129, TG 44, HDL 69, LDL 51  Lipid panel 3/17 showed total cholesterol 133, TG 40, HDL 80, LDL 45      4  Atrial fibrillation, paroxysmal  She underwent APOLINAR/CV 9/15 and again in 2/16 with Amiodarone on board  She is on Eliquis 5 bid  She is maintaining SR on Amio 200 qd  TFTs normal 1/17  5  Chest pain  She does not wish for further workup as she is in [facility] program, she feels chest pain is controlled with Imdur  1  Paroxysmal atrial fibrillation (HCC)  POCT ECG   2  Essential hypertension     3  Chronic diastolic congestive heart failure ([address] )     4  Ascending aortic aneurysm ([address] )     5  Aortic valve regurgitation, nonrheumatic     6   Other hyperlipidemia     7  Other chest pain     8  Lymphedema of both lower extremities           HPI: 80 y o  woman a history of HTN and HL, who is here for follow up of ascending aortic aneurysm and moderate to severe aortic regurgitation as well as paroxysmal atrial fibrillation  Echo 7/15 showed normal LV size and function, EF 55%, no RWMA, moderate concentric hypertrophy, grade II diastolic dysfunction, evidence of elevated LVEDP  Moderate to severe central AI, with  ms  PASP 40 mm Hg consistent with mild pulmonary HTN  The ascending aorta was dilated up to 5 0 cm in ascending aorta  Normal RV size and function  CTA of chest/abdomen 8/15 showed fusiform aneurysm of ascending aorta measuring up to 5 1 cm, increased from 45 mm in 2012  Descending thoracic and abdominal aorta were normal in caliber  She saw Dr Vandana Jenkins, who recommended continued surveillance with repeat CTA and echo in 6 months  She reports to me that after speaking with Dr Vandana Jenkins, she does not wish to pursue surveillance because she would not want surgery in any case  She was in ED 9/13/15 for hematuria, EKG was done and showed what appeared to be atrial fibrillation, although it was interpreted as multifocal atrial tachycardia  EKG in office 9/21 also showed atrial fibrillation  She denied any dizziness or lightheadedness  No syncope or presyncope  She was feeling more tired than previously  No further hematuria  She underwent APOLINAR guided CV on 9/23/15  APOLINAR showed normal LV size and function, EF 55%, concentric hypertrophy, normal RV size and function, LA and EARNEST were dilated with no thrombus, no PFO, mild MR, aortic sclerosis, moderate to severe central AI, PASP 25 mm Hg  Aorta was dilated up to 5 0 cm in the ascending aorta, with moderate atheroma in descending aorta and aortic arch  She was successfully cardioverted to sinus rhythm       She saw Dr Pedro Salguero 2/16 and reported a 2 week history of LE edema and shortness of breath with exertion/excitement  She was noted to be back in atrial fibrillation  She started her on low dose Furosemide and asked her to come see me  She reported when she took Lasix and potassium, she felt short of breath, sweaty, and thought she was going to pass out  She was switched to Torsemide 20 mg daily on 2/8/16  She was started on Amiodarone as antiarrhythmic as well  She underwent APOLINAR/CV on 2/17/16  A single 200 J shock was successful in converting her to sinus rhythm  APOLINAR showed normal LV size and function, EF 60%, no RWMA, normal RV Size and function  Ascending aorta measured 5 cm  Moderate AI  No PFO  LA and EARNEST were moderately dilated but no thrombus  She was admitted to [facility] for worsening LE edema, shortness of breath, and weight gain despite increasing Torsemide from 20 to 40 mg daily  She was diuresed with IV Lasix and transitioned to Torsemide 40 bid at discharge  She was discharged to [facility] for skilled nursing, and did enroll in the [facility] HF program  She was seen by HF nurse practitioners while at [facility], her Torsemide was decreased to 40 mg in AM and 20 mg in PM due to rising creatinine  She has been having more LE edema, was seen by Megan Jones at [facility], who increased her Torsemide to 40 bid on 3/2/17  She reports she was having intermittent chest discomfort at rest (also with exertion) in summer 2016, she was prescribed SLNG, and then Dr Jorge Gann put her on standing Imdur  She reports that with the Imdur, she is no longer having any chest pain  I referred her to Vascular, who ordered custom lower extremity compression stockings and lymphedema pumps, and she reports she has had significant improvement in LE edema, and also weight loss from 172 lbs to 156 lbs  She had UTI with hematuria in 6/18  No current hematuria  She reports her weights have been stable around 145-148, previously was 153   She reports she is not as hungry as previously, not eating much for lunch, but also we added Metolazone PRN and increased Torsemide to 40 bid  Overall her breathing at rest is good for the last 2 weeks  Has only mild MONTANEZ, but reports she is able to do more activities currently than last time I saw her  Her LE edema is stable  She sleeps in a chair  BP has been controlled most recently  She walks with a walker  No dizziness or lightheadedness  Patient Active Problem List   Diagnosis    Paroxysmal atrial fibrillation (HCC)    Essential hypertension    GERD (gastroesophageal reflux disease)    Other hyperlipidemia    Aortic valve regurgitation, nonrheumatic    Ascending aortic aneurysm (HCC)    Other chest pain    Lymphedema of both lower extremities    Chronic diastolic congestive heart failure (HCC)       Allergies   Allergen Reactions    Aspirin     Percocet [Oxycodone-Acetaminophen]          Current Outpatient Prescriptions:     acetaminophen (TYLENOL) 325 mg tablet, Take 650 mg by mouth every 6 (six) hours as needed for mild pain , Disp: , Rfl:     amiodarone 200 mg tablet, Take 1 tablet (200 mg total) by mouth daily, Disp: 90 tablet, Rfl: 3    amLODIPine (NORVASC) 10 mg tablet, Take 10 mg by mouth daily  , Disp: , Rfl:     apixaban (ELIQUIS) 5 mg, Take 5 mg by mouth 2 (two) times a day , Disp: , Rfl:     Calcium Carbonate (CALTRATE 600 PO), Take 600 mg by mouth daily  , Disp: , Rfl:     carvedilol (COREG) 3 125 mg tablet, Take 1 tablet (3 125 mg total) by mouth 2 (two) times a day with meals, Disp: 180 tablet, Rfl: 3    diclofenac sodium (VOLTAREN) 1 %, Place on the skin, Disp: , Rfl:     econazole nitrate 1 % cream, Apply topically daily  , Disp: , Rfl:     fluticasone (FLONASE) 50 mcg/act nasal spray, 1 spray into each nostril 2 (two) times a day, Disp: , Rfl:     isosorbide mononitrate (IMDUR) 60 mg 24 hr tablet, Take 60 mg by mouth daily, Disp: , Rfl:     KLOR-CON M20 20 MEQ tablet, daily, Disp: , Rfl:     Melatonin 3 MG CAPS, Take 3 mg by mouth daily at bedtime, Disp: 30 capsule, Rfl: 5    nitroglycerin (NITROSTAT) 0 4 mg SL tablet, Place 1 tablet (0 4 mg total) under the tongue every 5 (five) minutes as needed for chest pain, Disp: 30 tablet, Rfl: 0    pravastatin (PRAVACHOL) 40 mg tablet, Take 1 tablet (40 mg total) by mouth daily, Disp: 90 tablet, Rfl: 3    prazosin (MINIPRESS) 2 mg capsule, Take 1 capsule (2 mg total) by mouth every morning, Disp: 90 capsule, Rfl: 3    prazosin (MINIPRESS) 5 mg capsule, Take 1 capsule (5 mg total) by mouth daily at bedtime, Disp: 90 capsule, Rfl: 3    ramipril (ALTACE) 10 MG capsule, Take 10 mg by mouth daily  , Disp: , Rfl:     torsemide (DEMADEX) 20 mg tablet, Take 2 tablets (40 mg total) by mouth 2 (two) times a day Take 40 bid for 5 days, then decrease to 40 mg in AM and 20 mg in PM , Disp: 360 tablet, Rfl: 3    Past Medical History:   Diagnosis Date    Acid reflux disease     Anemia     Last assessed: 10/26/16    Aortic valve regurgitation, nonrheumatic     Ascending aortic aneurysm (HCC)     Last assessed: 6/5/13    [facility]     Hyperlipidemia     Hypertension 2/17/2016    Paroxysmal atrial fibrillation ([facility] ) 2/17/2016       Family History   Problem Relation Age of Onset    No Known Problems Mother     Stomach cancer Father     Heart disease Sister         Cardiac Disorder    Breast cancer Sister     Colon cancer Brother     Cancer Brother        Past Surgical History:   Procedure Laterality Date    CARDIOVERSION      CHOLECYSTECTOMY      LAPAROSCOPIC SALPINGOOPHERECTOMY      TONSILLECTOMY         Social History     Social History    Marital status:      Spouse name: N/A    Number of children: N/A    Years of education: N/A     Occupational History    Not on file       Social History Main Topics    Smoking status: Never Smoker    Smokeless tobacco: Never Used    Alcohol use No    Drug use: No    Sexual activity: No     Other Topics Concern    Not on file     Social History Narrative    Assistive devices: walker       Review of Systems   Constitution: Negative for chills, decreased appetite, diaphoresis, fever, weakness, malaise/fatigue, night sweats, weight gain and weight loss  HENT: Negative for ear pain, hearing loss, hoarse voice, nosebleeds, sore throat and tinnitus  Eyes: Negative for blurred vision and pain  Cardiovascular: Positive for leg swelling  Negative for chest pain, claudication, cyanosis, dyspnea on exertion, irregular heartbeat, near-syncope, orthopnea, palpitations, paroxysmal nocturnal dyspnea and syncope  Respiratory: Negative for cough, hemoptysis, shortness of breath, sleep disturbances due to breathing, snoring, sputum production and wheezing  Hematologic/Lymphatic: Negative for adenopathy and bleeding problem  Does not bruise/bleed easily  Skin: Negative for color change, dry skin, flushing, itching, poor wound healing and rash  Musculoskeletal: Negative for arthritis, back pain, falls, joint pain, muscle cramps, muscle weakness, myalgias and neck pain  Gastrointestinal: Negative for abdominal pain, constipation, diarrhea, dysphagia, heartburn, hematemesis, hematochezia, melena, nausea and vomiting  Genitourinary: Positive for frequency  Negative for dysuria, hematuria, hesitancy, non-menstrual bleeding and urgency  Neurological: Negative for excessive daytime sleepiness, dizziness, focal weakness, headaches, light-headedness, loss of balance, numbness, paresthesias, tremors and vertigo  Psychiatric/Behavioral: Negative for altered mental status, depression and memory loss  The patient does not have insomnia and is not nervous/anxious  Allergic/Immunologic: Negative for environmental allergies and persistent infections         Vitals: BP (!) 156/42 (BP Location: Left arm, Patient Position: Sitting, Cuff Size: Large)   Pulse 58   Ht 5' (1 524 m)   Wt 69 5 kg (153 lb 4 8 oz)   BMI 29 94 kg/m²       Physical Exam:     GEN: Alert and oriented x 3, in no acute distress  Well appearing and well nourished  HEENT: Sclera anicteric, conjunctivae pink, mucous membranes moist  Oropharynx clear  NECK: Supple, no carotid bruits, JVD to 7 cm above sternal angle  Trachea midline, no thyromegaly  HEART: Regular rhythm, occasional ectopic beats, normal S1 and S2, III/VI systolic murmur with blowing diastolic murmur, no clicks, gallops or rubs  PMI nondisplaced, no thrills  LUNGS: Clear bilaterally; no wheezes, or rhonchi  No increased work of breathing or signs of respiratory distress  ABDOMEN: Obese, soft, nontender, nondistended, normoactive bowel sounds  EXTREMITIES: Skin warm and well perfused, no clubbing, cyanosis, 1+ edema  Wearing compression stockings  NEURO: No focal findings  Walks with walker  Normal speech  Mood and affect normal    SKIN: Normal without suspicious lesions on exposed skin        Lab Results:       No results found for: HGBA1C  No results found for: CHOL  No results found for: HDL  No results found for: LDLCALC  No results found for: TRIG  No components found for: CHOLHDL

## 2018-07-06 ENCOUNTER — OFFICE VISIT (OUTPATIENT)
Dept: CARDIOLOGY CLINIC | Facility: CLINIC | Age: 83
End: 2018-07-06
Payer: MEDICARE

## 2018-07-06 VITALS
HEIGHT: 60 IN | WEIGHT: 153.3 LBS | DIASTOLIC BLOOD PRESSURE: 42 MMHG | SYSTOLIC BLOOD PRESSURE: 156 MMHG | BODY MASS INDEX: 30.1 KG/M2 | HEART RATE: 58 BPM

## 2018-07-06 DIAGNOSIS — E78.49 OTHER HYPERLIPIDEMIA: ICD-10-CM

## 2018-07-06 DIAGNOSIS — I48.0 PAROXYSMAL ATRIAL FIBRILLATION (HCC): Primary | ICD-10-CM

## 2018-07-06 DIAGNOSIS — I50.32 CHRONIC DIASTOLIC CONGESTIVE HEART FAILURE (HCC): ICD-10-CM

## 2018-07-06 DIAGNOSIS — I35.1 AORTIC VALVE REGURGITATION, NONRHEUMATIC: ICD-10-CM

## 2018-07-06 DIAGNOSIS — I89.0 LYMPHEDEMA OF BOTH LOWER EXTREMITIES: ICD-10-CM

## 2018-07-06 DIAGNOSIS — R07.89 OTHER CHEST PAIN: ICD-10-CM

## 2018-07-06 DIAGNOSIS — I71.2 ASCENDING AORTIC ANEURYSM (HCC): ICD-10-CM

## 2018-07-06 DIAGNOSIS — I10 ESSENTIAL HYPERTENSION: ICD-10-CM

## 2018-07-06 PROCEDURE — 99214 OFFICE O/P EST MOD 30 MIN: CPT | Performed by: INTERNAL MEDICINE

## 2018-07-06 PROCEDURE — 93000 ELECTROCARDIOGRAM COMPLETE: CPT | Performed by: INTERNAL MEDICINE

## 2018-07-17 ENCOUNTER — OFFICE VISIT (OUTPATIENT)
Dept: INTERNAL MEDICINE CLINIC | Facility: CLINIC | Age: 83
End: 2018-07-17
Payer: MEDICARE

## 2018-07-17 VITALS
SYSTOLIC BLOOD PRESSURE: 158 MMHG | HEART RATE: 61 BPM | WEIGHT: 151 LBS | OXYGEN SATURATION: 92 % | BODY MASS INDEX: 29.64 KG/M2 | HEIGHT: 60 IN | DIASTOLIC BLOOD PRESSURE: 68 MMHG

## 2018-07-17 DIAGNOSIS — I10 ESSENTIAL HYPERTENSION: ICD-10-CM

## 2018-07-17 DIAGNOSIS — N18.30 STAGE 3 CHRONIC KIDNEY DISEASE (HCC): ICD-10-CM

## 2018-07-17 DIAGNOSIS — N30.01 ACUTE CYSTITIS WITH HEMATURIA: Primary | ICD-10-CM

## 2018-07-17 LAB
SL AMB  POCT GLUCOSE, UA: NORMAL
SL AMB LEUKOCYTE ESTERASE,UA: NORMAL
SL AMB POCT BILIRUBIN,UA: NORMAL
SL AMB POCT BLOOD,UA: NORMAL
SL AMB POCT CLARITY,UA: NORMAL
SL AMB POCT COLOR,UA: YELLOW
SL AMB POCT KETONES,UA: NORMAL
SL AMB POCT NITRITE,UA: NORMAL
SL AMB POCT PH,UA: 7
SL AMB POCT SPECIFIC GRAVITY,UA: 1.01
SL AMB POCT URINE PROTEIN: NORMAL
SL AMB POCT UROBILINOGEN: NORMAL

## 2018-07-17 PROCEDURE — 99214 OFFICE O/P EST MOD 30 MIN: CPT | Performed by: NURSE PRACTITIONER

## 2018-07-17 PROCEDURE — 87086 URINE CULTURE/COLONY COUNT: CPT | Performed by: NURSE PRACTITIONER

## 2018-07-17 PROCEDURE — 81002 URINALYSIS NONAUTO W/O SCOPE: CPT | Performed by: NURSE PRACTITIONER

## 2018-07-17 RX ORDER — ISOSORBIDE MONONITRATE 60 MG/1
60 TABLET, EXTENDED RELEASE ORAL DAILY
Qty: 90 TABLET | Refills: 0 | Status: SHIPPED | OUTPATIENT
Start: 2018-07-17 | End: 2018-07-25 | Stop reason: SDUPTHER

## 2018-07-17 RX ORDER — SULFAMETHOXAZOLE AND TRIMETHOPRIM 400; 80 MG/1; MG/1
1 TABLET ORAL EVERY 12 HOURS SCHEDULED
Qty: 14 TABLET | Refills: 0 | Status: SHIPPED | OUTPATIENT
Start: 2018-07-17 | End: 2018-07-24

## 2018-07-19 LAB — BACTERIA UR CULT: NORMAL

## 2018-07-19 NOTE — PROGRESS NOTES
Assessment/Plan:     Diagnoses and all orders for this visit:    Acute cystitis with hematuria  -     POCT urine dip  -     Urine culture  -     sulfamethoxazole-trimethoprim (BACTRIM) 400-80 mg per tablet; Take 1 tablet by mouth every 12 (twelve) hours for 7 days    Stage 3 chronic kidney disease  -     Comprehensive metabolic panel; Future    Essential hypertension  -     isosorbide mononitrate (IMDUR) 60 mg 24 hr tablet; Take 1 tablet (60 mg total) by mouth daily          Subjective:      Patient ID: Wilman Wilson is a 80 y o  female  Urinary Tract Infection    This is a new problem  The current episode started in the past 7 days  The problem occurs every urination  The problem has been unchanged  The quality of the pain is described as burning  The pain is moderate  There has been no fever  The following portions of the patient's history were reviewed and updated as appropriate: allergies, current medications, past family history, past medical history, past social history, past surgical history and problem list     Review of Systems   Constitutional: Negative  HENT: Negative  Eyes: Negative  Respiratory: Negative  Cardiovascular: Negative  Gastrointestinal: Negative  Genitourinary: Positive for dysuria  Musculoskeletal: Negative  Neurological: Negative          Family History   Problem Relation Age of Onset    No Known Problems Mother     Stomach cancer Father     Heart disease Sister         Cardiac Disorder    Breast cancer Sister     Colon cancer Brother     Cancer Brother      Past Medical History:   Diagnosis Date    Acid reflux disease     Anemia     Last assessed: 10/26/16    Aortic valve regurgitation, nonrheumatic     Ascending aortic aneurysm (HCC)     Last assessed: 6/5/13    [facility]     Hyperlipidemia     Hypertension 2/17/2016    Paroxysmal atrial fibrillation ([facility] 2/17/2016     Social History     Social History    Marital status:      Spouse name: N/A    Number of children: N/A    Years of education: N/A     Occupational History    Not on file  Social History Main Topics    Smoking status: Never Smoker    Smokeless tobacco: Never Used    Alcohol use No    Drug use: No    Sexual activity: No     Other Topics Concern    Not on file     Social History Narrative    Assistive devices: walker       Current Outpatient Prescriptions:     acetaminophen (TYLENOL) 325 mg tablet, Take 650 mg by mouth every 6 (six) hours as needed for mild pain , Disp: , Rfl:     amiodarone 200 mg tablet, Take 1 tablet (200 mg total) by mouth daily, Disp: 90 tablet, Rfl: 3    amLODIPine (NORVASC) 10 mg tablet, Take 10 mg by mouth daily  , Disp: , Rfl:     apixaban (ELIQUIS) 5 mg, Take 5 mg by mouth 2 (two) times a day , Disp: , Rfl:     Calcium Carbonate (CALTRATE 600 PO), Take 600 mg by mouth daily  , Disp: , Rfl:     carvedilol (COREG) 3 125 mg tablet, Take 1 tablet (3 125 mg total) by mouth 2 (two) times a day with meals, Disp: 180 tablet, Rfl: 3    diclofenac sodium (VOLTAREN) 1 %, Place on the skin, Disp: , Rfl:     econazole nitrate 1 % cream, Apply topically daily  , Disp: , Rfl:     fluticasone (FLONASE) 50 mcg/act nasal spray, 1 spray into each nostril 2 (two) times a day, Disp: , Rfl:     isosorbide mononitrate (IMDUR) 60 mg 24 hr tablet, Take 1 tablet (60 mg total) by mouth daily, Disp: 90 tablet, Rfl: 0    KLOR-CON M20 20 MEQ tablet, daily, Disp: , Rfl:     Melatonin 3 MG CAPS, Take 3 mg by mouth daily at bedtime, Disp: 30 capsule, Rfl: 5    nitroglycerin (NITROSTAT) 0 4 mg SL tablet, Place 1 tablet (0 4 mg total) under the tongue every 5 (five) minutes as needed for chest pain, Disp: 30 tablet, Rfl: 0    pravastatin (PRAVACHOL) 40 mg tablet, Take 1 tablet (40 mg total) by mouth daily, Disp: 90 tablet, Rfl: 3    prazosin (MINIPRESS) 2 mg capsule, Take 1 capsule (2 mg total) by mouth every morning, Disp: 90 capsule, Rfl: 3   prazosin (MINIPRESS) 5 mg capsule, Take 1 capsule (5 mg total) by mouth daily at bedtime, Disp: 90 capsule, Rfl: 3    ramipril (ALTACE) 10 MG capsule, Take 10 mg by mouth daily  , Disp: , Rfl:     sulfamethoxazole-trimethoprim (BACTRIM) 400-80 mg per tablet, Take 1 tablet by mouth every 12 (twelve) hours for 7 days, Disp: 14 tablet, Rfl: 0    torsemide (DEMADEX) 20 mg tablet, Take 2 tablets (40 mg total) by mouth 2 (two) times a day Take 40 bid for 5 days, then decrease to 40 mg in AM and 20 mg in PM , Disp: 360 tablet, Rfl: 3  Allergies   Allergen Reactions    Aspirin     Percocet [Oxycodone-Acetaminophen]      Objective:      /68   Pulse 61   Ht 5' (1 524 m)   Wt 68 5 kg (151 lb)   SpO2 92%   BMI 29 49 kg/m²          Physical Exam   Constitutional: She is oriented to person, place, and time  She appears well-developed and well-nourished  HENT:   Head: Normocephalic and atraumatic  Eyes: Conjunctivae are normal  Pupils are equal, round, and reactive to light  Neck: Normal range of motion  Neck supple  Cardiovascular: Normal rate and regular rhythm  Pulmonary/Chest: Effort normal and breath sounds normal    Abdominal: Soft  Bowel sounds are normal    Musculoskeletal: Normal range of motion  Neurological: She is alert and oriented to person, place, and time  Skin: Skin is warm and dry

## 2018-07-25 ENCOUNTER — OFFICE VISIT (OUTPATIENT)
Dept: INTERNAL MEDICINE CLINIC | Facility: SKILLED NURSING FACILITY | Age: 83
End: 2018-07-25
Payer: MEDICARE

## 2018-07-25 VITALS
WEIGHT: 148.6 LBS | DIASTOLIC BLOOD PRESSURE: 60 MMHG | SYSTOLIC BLOOD PRESSURE: 136 MMHG | BODY MASS INDEX: 29.17 KG/M2 | HEIGHT: 60 IN | OXYGEN SATURATION: 94 % | HEART RATE: 48 BPM

## 2018-07-25 DIAGNOSIS — R31.0 GROSS HEMATURIA: Primary | ICD-10-CM

## 2018-07-25 DIAGNOSIS — I10 ESSENTIAL HYPERTENSION: ICD-10-CM

## 2018-07-25 DIAGNOSIS — I71.2 ASCENDING AORTIC ANEURYSM (HCC): ICD-10-CM

## 2018-07-25 DIAGNOSIS — I50.32 CHRONIC DIASTOLIC CONGESTIVE HEART FAILURE (HCC): ICD-10-CM

## 2018-07-25 PROBLEM — R31.9 HEMATURIA: Status: ACTIVE | Noted: 2018-07-25

## 2018-07-25 PROCEDURE — 99214 OFFICE O/P EST MOD 30 MIN: CPT | Performed by: INTERNAL MEDICINE

## 2018-07-25 NOTE — PROGRESS NOTES
Assessment/Plan:    Hematuria from cystitis  Pt reassured  May repeat UA to show that hematuria has cleared  CHF stable     Problem List Items Addressed This Visit     Ascending aortic aneurysm ([address] )    Chronic diastolic congestive heart failure ([address] )    Hematuria - Primary    Relevant Orders    Urinalysis with reflex to microscopic            Subjective:      Patient ID: James Payton is a 80 y o  female  HPI  Here for a follow up  Hematuria, 2 episodes a month apart  Dx Cystitis treated with Bactrim  Symptoms resolve with treatment  Denies dysuria  Denies fever chills  Recent blood work (not available at time of visit)    The following portions of the patient's history were reviewed and updated as appropriate: allergies, current medications, past family history, past social history, past surgical history and problem list     Review of Systems   Constitutional: Positive for fatigue  Negative for chills and fever  Respiratory: Positive for shortness of breath  Cardiovascular: Positive for chest pain and leg swelling  Negative for palpitations  Gastrointestinal: Negative for abdominal pain  Genitourinary: Negative for dysuria and hematuria  Objective:      /60   Pulse (!) 48   Ht 5' (1 524 m)   Wt 67 4 kg (148 lb 9 6 oz)   SpO2 94%   BMI 29 02 kg/m²          Physical Exam   Constitutional: No distress  Cardiovascular: Normal rate  Murmur heard  Systolic murmur is present with a grade of 3/6   LE edema 2+   Pulmonary/Chest: Effort normal and breath sounds normal  No respiratory distress  She has no wheezes  She has no rales  She exhibits no tenderness  Skin: She is not diaphoretic

## 2018-07-26 RX ORDER — ISOSORBIDE MONONITRATE 60 MG/1
60 TABLET, EXTENDED RELEASE ORAL DAILY
Qty: 90 TABLET | Refills: 3 | Status: SHIPPED | OUTPATIENT
Start: 2018-07-26 | End: 2019-02-11 | Stop reason: HOSPADM

## 2018-09-07 ENCOUNTER — OFFICE VISIT (OUTPATIENT)
Dept: INTERNAL MEDICINE CLINIC | Facility: CLINIC | Age: 83
End: 2018-09-07
Payer: MEDICARE

## 2018-09-07 VITALS
HEART RATE: 59 BPM | BODY MASS INDEX: 30.08 KG/M2 | SYSTOLIC BLOOD PRESSURE: 156 MMHG | DIASTOLIC BLOOD PRESSURE: 58 MMHG | HEIGHT: 60 IN | OXYGEN SATURATION: 95 % | WEIGHT: 153.2 LBS | TEMPERATURE: 97.9 F

## 2018-09-07 DIAGNOSIS — I89.0 LYMPHEDEMA OF BOTH LOWER EXTREMITIES: Primary | ICD-10-CM

## 2018-09-07 DIAGNOSIS — I50.32 CHRONIC DIASTOLIC CONGESTIVE HEART FAILURE (HCC): ICD-10-CM

## 2018-09-07 PROCEDURE — 99214 OFFICE O/P EST MOD 30 MIN: CPT | Performed by: INTERNAL MEDICINE

## 2018-09-07 NOTE — PROGRESS NOTES
Assessment/Plan:    Weeping area covered with 4/x4 clean gauze then janae  She will change the dressing prn  I asked her to come down next week in [facility] so we can check this  No signs of an infection  Cont current meds       Problem List Items Addressed This Visit        Cardiovascular and Mediastinum    Chronic diastolic congestive heart failure ([address] )       Other    Lymphedema of both lower extremities - Primary            Subjective:      Patient ID: Quinn Goins is a 80 y o  female  HPI  Here with her daughter  The other night, noted a weeping/leaking area on her right medial distal calf above the ankle  Known lymphedema, CHF  She has been very compliant with leg elevation, tubigrip, compression pumps, diuretics, salt and fluid restriction  Weight has been stable  She has needed to reduce the torsemide recently bec insurance only covers 2 tabs a day  So she is taking 20mg 2 in am and 1 in pm instead of 2tabs BID  Denies new/worsening SOB      The following portions of the patient's history were reviewed and updated as appropriate: allergies, current medications, past family history, past medical history, past social history, past surgical history and problem list     Review of Systems   Constitutional: Negative for chills and fever  Respiratory: Positive for shortness of breath  Cardiovascular: Positive for leg swelling  Skin: Negative for wound  Objective:      /58   Pulse 59   Temp 97 9 °F (36 6 °C)   Ht 5' (1 524 m)   Wt 69 5 kg (153 lb 3 2 oz)   SpO2 95%   BMI 29 92 kg/m²          Physical Exam   Constitutional: No distress  Cardiovascular: Normal rate and regular rhythm  Murmur heard  2+ edema bilateral LE left worse than right  Small amount of weeping in the distal medial right calf without an open ulcer   Pulmonary/Chest: Effort normal and breath sounds normal  No respiratory distress  She has no wheezes  She has no rales  Skin: She is not diaphoretic

## 2018-09-11 ENCOUNTER — TELEPHONE (OUTPATIENT)
Dept: CARDIOLOGY CLINIC | Facility: CLINIC | Age: 83
End: 2018-09-11

## 2018-09-11 NOTE — TELEPHONE ENCOUNTER
S/w Trevor  Advised  Verbalized understanding  She will give us a call with update   Will fax instructions to Sindy ORTIZ per request  [phone]

## 2018-09-11 NOTE — TELEPHONE ENCOUNTER
Take metolazone 2 5 mg 30 min prior to her morning dose of Torsemide for one day, and increase potassium to 40 meq for 3 days  Call back to let me know how she responds to dose of metolazone so we can decide how frequently to use metolazone

## 2018-09-27 DIAGNOSIS — I10 HYPERTENSION, ESSENTIAL, BENIGN: Primary | ICD-10-CM

## 2018-09-27 DIAGNOSIS — E78.2 MIXED HYPERLIPIDEMIA: ICD-10-CM

## 2018-09-27 RX ORDER — PRAVASTATIN SODIUM 40 MG
40 TABLET ORAL DAILY
Qty: 90 TABLET | Refills: 3 | Status: SHIPPED | OUTPATIENT
Start: 2018-09-27 | End: 2019-02-11 | Stop reason: HOSPADM

## 2018-09-27 RX ORDER — AMLODIPINE BESYLATE 10 MG/1
10 TABLET ORAL DAILY
Qty: 90 TABLET | Refills: 3 | Status: SHIPPED | OUTPATIENT
Start: 2018-09-27 | End: 2018-12-10 | Stop reason: ALTCHOICE

## 2018-10-03 ENCOUNTER — OFFICE VISIT (OUTPATIENT)
Dept: INTERNAL MEDICINE CLINIC | Facility: SKILLED NURSING FACILITY | Age: 83
End: 2018-10-03
Payer: MEDICARE

## 2018-10-03 VITALS
DIASTOLIC BLOOD PRESSURE: 48 MMHG | WEIGHT: 148.4 LBS | OXYGEN SATURATION: 96 % | BODY MASS INDEX: 29.13 KG/M2 | HEIGHT: 60 IN | HEART RATE: 45 BPM | SYSTOLIC BLOOD PRESSURE: 150 MMHG

## 2018-10-03 DIAGNOSIS — I50.32 CHRONIC DIASTOLIC CONGESTIVE HEART FAILURE (HCC): ICD-10-CM

## 2018-10-03 DIAGNOSIS — B37.2 CANDIDAL INTERTRIGO: Primary | ICD-10-CM

## 2018-10-03 DIAGNOSIS — I48.0 PAROXYSMAL ATRIAL FIBRILLATION (HCC): ICD-10-CM

## 2018-10-03 DIAGNOSIS — M17.0 PRIMARY OSTEOARTHRITIS OF BOTH KNEES: ICD-10-CM

## 2018-10-03 DIAGNOSIS — I35.1 AORTIC VALVE REGURGITATION, NONRHEUMATIC: ICD-10-CM

## 2018-10-03 DIAGNOSIS — I89.0 LYMPHEDEMA OF BOTH LOWER EXTREMITIES: ICD-10-CM

## 2018-10-03 DIAGNOSIS — I71.2 ASCENDING AORTIC ANEURYSM (HCC): ICD-10-CM

## 2018-10-03 DIAGNOSIS — I10 ESSENTIAL HYPERTENSION: ICD-10-CM

## 2018-10-03 DIAGNOSIS — E78.49 OTHER HYPERLIPIDEMIA: ICD-10-CM

## 2018-10-03 PROCEDURE — 99214 OFFICE O/P EST MOD 30 MIN: CPT | Performed by: INTERNAL MEDICINE

## 2018-10-03 RX ORDER — METOLAZONE 2.5 MG/1
2.5 TABLET ORAL 2 TIMES WEEKLY
Qty: 15 TABLET | Refills: 0
Start: 2018-10-04 | End: 2018-12-03 | Stop reason: SDUPTHER

## 2018-10-25 ENCOUNTER — OFFICE VISIT (OUTPATIENT)
Dept: CARDIOLOGY CLINIC | Facility: CLINIC | Age: 83
End: 2018-10-25
Payer: MEDICARE

## 2018-10-25 VITALS
HEIGHT: 60 IN | BODY MASS INDEX: 30.27 KG/M2 | DIASTOLIC BLOOD PRESSURE: 66 MMHG | SYSTOLIC BLOOD PRESSURE: 132 MMHG | WEIGHT: 154.2 LBS | HEART RATE: 56 BPM

## 2018-10-25 DIAGNOSIS — I35.1 AORTIC VALVE REGURGITATION, NONRHEUMATIC: ICD-10-CM

## 2018-10-25 DIAGNOSIS — I48.0 PAROXYSMAL ATRIAL FIBRILLATION (HCC): Primary | ICD-10-CM

## 2018-10-25 DIAGNOSIS — E78.49 OTHER HYPERLIPIDEMIA: ICD-10-CM

## 2018-10-25 DIAGNOSIS — I10 ESSENTIAL HYPERTENSION: ICD-10-CM

## 2018-10-25 DIAGNOSIS — R07.89 OTHER CHEST PAIN: ICD-10-CM

## 2018-10-25 DIAGNOSIS — I71.2 ASCENDING AORTIC ANEURYSM (HCC): ICD-10-CM

## 2018-10-25 DIAGNOSIS — I50.32 CHRONIC DIASTOLIC CONGESTIVE HEART FAILURE (HCC): ICD-10-CM

## 2018-10-25 PROCEDURE — 99214 OFFICE O/P EST MOD 30 MIN: CPT | Performed by: INTERNAL MEDICINE

## 2018-10-25 PROCEDURE — 93000 ELECTROCARDIOGRAM COMPLETE: CPT | Performed by: INTERNAL MEDICINE

## 2018-10-25 NOTE — PROGRESS NOTES
Cardiology Follow up Note    Sienna Campbell 80 y o  female MRN: [id number]    10/25/18          Assessment/Plan:    1  Ascending aortic aneurysm, with moderate to severe AI and chronic diastolic HF  LV function was normal  She met with Dr Oziel Og, who recommended serial CTA in 6 months with echo to assess for worsening of AI or ascending aortic aneurysm  She did not wish to pursue serial testing or surgery  She wishes only for treatment of her symptoms, despite acknowledging that medical management will eventually fail  She is in the [facility] HF program  She was on Torsemide 40 mg in AM and 20 mg in PM, this was decreased at some point to 20 bid, increased back to Torsemide to 40 bid for maintenance in 6/18  BMP 6/12/18 showed sodium 137, potassium 4 0, BUN 86, creatinine 2 32  She is currently taking Torsemide 40 in AM and 20 in PM  BMP 10/16/18 showed sodium 141, potassium 4 4, BUN 40, creatinine 1 38  I told her she can take Metolazone if weights go up above 148 lbs by home scale, she reports she has not needed to take more than once a week  Will continue current supportive care, although it seems she is overall slowly declining somewhat     2  HTN  She is on Prazosin 2, Amlodipine 10, Ramipril 10, Coreg 3 125 bid, Imdur 60, and Torsemide  BP controlled at home  BP controlled in office today as well  3  HL  She is on Pravastatin 40  Lipid panel 7/31/15 showed total cholesterol 157, TG 72, HDL 67, and LDL 76  Lipid panel 10/16 showed total cholesterol 129, TG 44, HDL 69, LDL 51  Lipid panel 3/17 showed total cholesterol 133, TG 40, HDL 80, LDL 45      4  Atrial fibrillation, paroxysmal  She underwent APOLINAR/CV 9/15 and again in 2/16 with Amiodarone on board  She is on Eliquis 5 bid  She is maintaining SR on Amio 200 qd  TFTs normal 1/17  5  Chest pain  She does not wish for further workup as she is in [facility] program, she feels chest pain is controlled with Imdur       1  Paroxysmal atrial fibrillation ([address] ) POCT ECG   2  Essential hypertension     3  Aortic valve regurgitation, nonrheumatic     4  Ascending aortic aneurysm ([address] )     5  Chronic diastolic congestive heart failure ([address] )     6  Other hyperlipidemia     7  Other chest pain           HPI: 80 y o  woman a history of HTN and HL, who is here for follow up of ascending aortic aneurysm and moderate to severe aortic regurgitation as well as paroxysmal atrial fibrillation  Echo 7/15 showed normal LV size and function, EF 55%, no RWMA, moderate concentric hypertrophy, grade II diastolic dysfunction, evidence of elevated LVEDP  Moderate to severe central AI, with  ms  PASP 40 mm Hg consistent with mild pulmonary HTN  The ascending aorta was dilated up to 5 0 cm in ascending aorta  Normal RV size and function  CTA of chest/abdomen 8/15 showed fusiform aneurysm of ascending aorta measuring up to 5 1 cm, increased from 45 mm in 2012  Descending thoracic and abdominal aorta were normal in caliber  She saw Dr Marleny Dale, who recommended continued surveillance with repeat CTA and echo in 6 months  She reports to me that after speaking with Dr Marleny Dale, she does not wish to pursue surveillance because she would not want surgery in any case  She was in ED 9/13/15 for hematuria, EKG was done and showed what appeared to be atrial fibrillation, although it was interpreted as multifocal atrial tachycardia  EKG in office 9/21 also showed atrial fibrillation  She denied any dizziness or lightheadedness  No syncope or presyncope  She was feeling more tired than previously  No further hematuria  She underwent APOLINAR guided CV on 9/23/15  APOLINAR showed normal LV size and function, EF 55%, concentric hypertrophy, normal RV size and function, LA and EARNEST were dilated with no thrombus, no PFO, mild MR, aortic sclerosis, moderate to severe central AI, PASP 25 mm Hg   Aorta was dilated up to 5 0 cm in the ascending aorta, with moderate atheroma in descending aorta and aortic arch  She was successfully cardioverted to sinus rhythm  She saw Dr Fabio Beltran 2/16 and reported a 2 week history of LE edema and shortness of breath with exertion/excitement  She was noted to be back in atrial fibrillation  She started her on low dose Furosemide and asked her to come see me  She reported when she took Lasix and potassium, she felt short of breath, sweaty, and thought she was going to pass out  She was switched to Torsemide 20 mg daily on 2/8/16  She was started on Amiodarone as antiarrhythmic as well  She underwent APOLINAR/CV on 2/17/16  A single 200 J shock was successful in converting her to sinus rhythm  APOLINAR showed normal LV size and function, EF 60%, no RWMA, normal RV Size and function  Ascending aorta measured 5 cm  Moderate AI  No PFO  LA and EARNEST were moderately dilated but no thrombus  She was admitted to [place] [address] for worsening LE edema, shortness of breath, and weight gain despite increasing Torsemide from 20 to 40 mg daily  She was diuresed with IV Lasix and transitioned to Torsemide 40 bid at discharge  She was discharged to [facility] for skilled nursing, and did enroll in the [facility] HF program  She was seen by HF nurse practitioners while at [facility], her Torsemide was decreased to 40 mg in AM and 20 mg in PM due to rising creatinine  She has been having more LE edema, was seen by Theresa Lewis at [facility], who increased her Torsemide to 40 bid on 3/2/17  She reports she was having intermittent chest discomfort at rest (also with exertion) in summer 2016, she was prescribed SLNG, and then Dr Fabio Beltran put her on standing Imdur  She reports that with the Imdur, she is no longer having any chest pain  I referred her to Vascular, who ordered custom lower extremity compression stockings and lymphedema pumps, and she had significant improvement in LE edema, and also weight loss from 172 lbs to 156 lbs  She reports her weights have been stable around 148   She reports her appetite at lunch is not great  Her LE edema is stable  Still uses pumps  She sleeps in a chair because her bed is too high  BP has been controlled most recently  She walks with a walker  No dizziness or lightheadedness  She gets more SOB with exertion, but it comes and goes  She gets more tired than previously  Visiting nurse comes once a month  Has been having some issues with raw skin in the perineal region due to frequent urination  Also had some constipation recently, with some blood when she wiped, but no active bleeding  Patient Active Problem List   Diagnosis    Paroxysmal atrial fibrillation (HCC)    Essential hypertension    GERD (gastroesophageal reflux disease)    Other hyperlipidemia    Aortic valve regurgitation, nonrheumatic    Ascending aortic aneurysm (HCC)    Other chest pain    Lymphedema of both lower extremities    Chronic diastolic congestive heart failure (HCC)    Hematuria       Allergies   Allergen Reactions    Aspirin     Percocet [Oxycodone-Acetaminophen]          Current Outpatient Prescriptions:     acetaminophen (TYLENOL) 325 mg tablet, Take 650 mg by mouth every 6 (six) hours as needed for mild pain , Disp: , Rfl:     amiodarone 200 mg tablet, Take 1 tablet (200 mg total) by mouth daily, Disp: 90 tablet, Rfl: 3    amLODIPine (NORVASC) 10 mg tablet, Take 1 tablet (10 mg total) by mouth daily, Disp: 90 tablet, Rfl: 3    apixaban (ELIQUIS) 5 mg, Take 5 mg by mouth 2 (two) times a day , Disp: , Rfl:     Calcium Carbonate (CALTRATE 600 PO), Take 600 mg by mouth daily  , Disp: , Rfl:     carvedilol (COREG) 3 125 mg tablet, Take 1 tablet (3 125 mg total) by mouth 2 (two) times a day with meals, Disp: 180 tablet, Rfl: 3    diclofenac sodium (VOLTAREN) 1 %, Apply 2 g topically 4 (four) times a day, Disp: 100 g, Rfl: 1    econazole nitrate 1 % cream, Apply topically 2 (two) times a day, Disp: 85 g, Rfl: 1    fluticasone (FLONASE) 50 mcg/act nasal spray, 1 spray into each nostril 2 (two) times a day, Disp: , Rfl:     isosorbide mononitrate (IMDUR) 60 mg 24 hr tablet, Take 1 tablet (60 mg total) by mouth daily, Disp: 90 tablet, Rfl: 3    KLOR-CON M20 20 MEQ tablet, daily, Disp: , Rfl:     Melatonin 3 MG CAPS, Take 3 mg by mouth daily at bedtime, Disp: 30 capsule, Rfl: 5    metolazone (ZAROXOLYN) 2 5 mg tablet, Take 1 tablet (2 5 mg total) by mouth 2 (two) times a week Take as needed for increased leg swelling (148lbs), Disp: 15 tablet, Rfl: 0    nitroglycerin (NITROSTAT) 0 4 mg SL tablet, Place 1 tablet (0 4 mg total) under the tongue every 5 (five) minutes as needed for chest pain, Disp: 30 tablet, Rfl: 0    pravastatin (PRAVACHOL) 40 mg tablet, Take 1 tablet (40 mg total) by mouth daily, Disp: 90 tablet, Rfl: 3    prazosin (MINIPRESS) 2 mg capsule, Take 1 capsule (2 mg total) by mouth every morning, Disp: 90 capsule, Rfl: 3    prazosin (MINIPRESS) 5 mg capsule, Take 1 capsule (5 mg total) by mouth daily at bedtime, Disp: 90 capsule, Rfl: 3    ramipril (ALTACE) 10 MG capsule, Take 10 mg by mouth daily  , Disp: , Rfl:     torsemide (DEMADEX) 20 mg tablet, Take 2 tablets (40 mg total) by mouth 2 (two) times a day Take 40 bid for 5 days, then decrease to 40 mg in AM and 20 mg in PM , Disp: 360 tablet, Rfl: 3    Past Medical History:   Diagnosis Date    Acid reflux disease     Anemia     Last assessed: 10/26/16    Aortic valve regurgitation, nonrheumatic     Ascending aortic aneurysm (HCC)     Last assessed: 6/5/13    Atrial fibrillation ([facility] )     Hyperlipidemia     Hypertension 2/17/2016    Paroxysmal atrial fibrillation ([facility] ) 2/17/2016       Family History   Problem Relation Age of Onset    No Known Problems Mother     Stomach cancer Father     Heart disease Sister         Cardiac Disorder    Breast cancer Sister     Colon cancer Brother     Cancer Brother        Past Surgical History:   Procedure Laterality Date    CARDIOVERSION      CHOLECYSTECTOMY      LAPAROSCOPIC SALPINGOOPHERECTOMY      TONSILLECTOMY         Social History     Social History    Marital status:      Spouse name: N/A    Number of children: N/A    Years of education: N/A     Occupational History    Not on file  Social History Main Topics    Smoking status: Never Smoker    Smokeless tobacco: Never Used    Alcohol use No    Drug use: No    Sexual activity: No     Other Topics Concern    Not on file     Social History Narrative    Assistive devices: walker       Review of Systems   Constitution: Negative for chills, decreased appetite, diaphoresis, fever, weakness, malaise/fatigue, night sweats, weight gain and weight loss  HENT: Negative for ear pain, hearing loss, hoarse voice, nosebleeds, sore throat and tinnitus  Eyes: Negative for blurred vision and pain  Cardiovascular: Positive for leg swelling  Negative for chest pain, claudication, cyanosis, dyspnea on exertion, irregular heartbeat, near-syncope, orthopnea, palpitations, paroxysmal nocturnal dyspnea and syncope  Respiratory: Negative for cough, hemoptysis, shortness of breath, sleep disturbances due to breathing, snoring, sputum production and wheezing  Hematologic/Lymphatic: Negative for adenopathy and bleeding problem  Does not bruise/bleed easily  Skin: Negative for color change, dry skin, flushing, itching, poor wound healing and rash  Musculoskeletal: Negative for arthritis, back pain, falls, joint pain, muscle cramps, muscle weakness, myalgias and neck pain  Gastrointestinal: Negative for abdominal pain, constipation, diarrhea, dysphagia, heartburn, hematemesis, hematochezia, melena, nausea and vomiting  Genitourinary: Positive for frequency  Negative for dysuria, hematuria, hesitancy, non-menstrual bleeding and urgency     Neurological: Negative for excessive daytime sleepiness, dizziness, focal weakness, headaches, light-headedness, loss of balance, numbness, paresthesias, tremors and vertigo  Psychiatric/Behavioral: Negative for altered mental status, depression and memory loss  The patient does not have insomnia and is not nervous/anxious  Allergic/Immunologic: Negative for environmental allergies and persistent infections  Vitals: /66 (BP Location: Left arm, Patient Position: Sitting, Cuff Size: Standard)   Pulse 56   Ht 5' (1 524 m)   Wt 69 9 kg (154 lb 3 2 oz)   BMI 30 12 kg/m²       Physical Exam:     GEN: Alert and oriented x 3, in no acute distress  Chronically ill appearing  HEENT: Sclera anicteric, conjunctivae pink, mucous membranes moist  Oropharynx clear  NECK: Supple, no carotid bruits, JVD to 7 cm above sternal angle  Trachea midline, no thyromegaly  HEART: Regular rhythm, normal S1 and S2, III/VI systolic murmur with blowing diastolic murmur, no clicks, gallops or rubs  PMI nondisplaced, no thrills  LUNGS: Clear bilaterally; no wheezes, or rhonchi  No increased work of breathing or signs of respiratory distress  ABDOMEN: Obese, soft, nontender, nondistended, normoactive bowel sounds  EXTREMITIES: Skin warm and well perfused, no clubbing, cyanosis, 1+ edema  Wearing compression stockings  NEURO: No focal findings  Walks with walker  Normal speech  Mood and affect normal    SKIN: Normal without suspicious lesions on exposed skin        Lab Results:       No results found for: HGBA1C  No results found for: CHOL  No results found for: HDL  No results found for: LDLCALC  No results found for: TRIG  No components found for: CHOLHDL

## 2018-10-31 DIAGNOSIS — E87.6 HYPOKALEMIA: Primary | ICD-10-CM

## 2018-11-01 RX ORDER — POTASSIUM CHLORIDE 1500 MG/1
20 TABLET, EXTENDED RELEASE ORAL DAILY
Qty: 90 TABLET | Refills: 3 | Status: SHIPPED | OUTPATIENT
Start: 2018-11-01 | End: 2018-12-26 | Stop reason: SDUPTHER

## 2018-11-27 ENCOUNTER — TELEPHONE (OUTPATIENT)
Dept: INTERNAL MEDICINE CLINIC | Facility: CLINIC | Age: 83
End: 2018-11-27

## 2018-11-27 DIAGNOSIS — R26.9 GAIT DIFFICULTY: ICD-10-CM

## 2018-11-27 DIAGNOSIS — I71.2 ASCENDING AORTIC ANEURYSM (HCC): ICD-10-CM

## 2018-11-27 DIAGNOSIS — I48.0 PAROXYSMAL ATRIAL FIBRILLATION (HCC): Primary | ICD-10-CM

## 2018-11-27 DIAGNOSIS — I35.1 AORTIC VALVE REGURGITATION, NONRHEUMATIC: ICD-10-CM

## 2018-11-27 DIAGNOSIS — I50.32 CHRONIC DIASTOLIC CONGESTIVE HEART FAILURE (HCC): ICD-10-CM

## 2018-11-27 DIAGNOSIS — I89.0 LYMPHEDEMA OF BOTH LOWER EXTREMITIES: ICD-10-CM

## 2018-11-27 NOTE — TELEPHONE ENCOUNTER
Patient's daughter is asking to speak with you in regards to her mother's health  She has some concerns      Please advise

## 2018-12-02 ENCOUNTER — TELEPHONE (OUTPATIENT)
Dept: OTHER | Facility: OTHER | Age: 83
End: 2018-12-02

## 2018-12-03 ENCOUNTER — OFFICE VISIT (OUTPATIENT)
Dept: INTERNAL MEDICINE CLINIC | Facility: CLINIC | Age: 83
End: 2018-12-03
Payer: MEDICARE

## 2018-12-03 VITALS
DIASTOLIC BLOOD PRESSURE: 56 MMHG | HEIGHT: 60 IN | OXYGEN SATURATION: 89 % | BODY MASS INDEX: 31.33 KG/M2 | HEART RATE: 54 BPM | WEIGHT: 159.6 LBS | SYSTOLIC BLOOD PRESSURE: 130 MMHG

## 2018-12-03 DIAGNOSIS — I50.32 CHRONIC DIASTOLIC CONGESTIVE HEART FAILURE (HCC): ICD-10-CM

## 2018-12-03 DIAGNOSIS — I89.0 LYMPHEDEMA OF BOTH LOWER EXTREMITIES: ICD-10-CM

## 2018-12-03 DIAGNOSIS — I50.33 ACUTE ON CHRONIC DIASTOLIC (CONGESTIVE) HEART FAILURE (HCC): Primary | ICD-10-CM

## 2018-12-03 DIAGNOSIS — I35.1 AORTIC VALVE REGURGITATION, NONRHEUMATIC: ICD-10-CM

## 2018-12-03 DIAGNOSIS — Z23 NEED FOR PNEUMOCOCCAL VACCINATION: ICD-10-CM

## 2018-12-03 DIAGNOSIS — I48.91 ATRIAL FIBRILLATION, UNSPECIFIED TYPE (HCC): Primary | ICD-10-CM

## 2018-12-03 PROCEDURE — 99214 OFFICE O/P EST MOD 30 MIN: CPT | Performed by: INTERNAL MEDICINE

## 2018-12-03 RX ORDER — METOLAZONE 2.5 MG/1
TABLET ORAL
Qty: 30 TABLET | Refills: 0 | Status: SHIPPED | OUTPATIENT
Start: 2018-12-03 | End: 2018-12-12 | Stop reason: SDUPTHER

## 2018-12-03 NOTE — PROGRESS NOTES
Assessment/Plan:  O2 was 80% after walking into the room, 89% at rest  Increase metolazone to 5mg a day and take for the next 3 days until I see her again in [place]  We will get in touch with the PALS nurse Yazan Mascorro saw her last and coordinate care, O2  Will inform Dr Vivek Stafford  She needs more help from the [facility] staff- will discuss with Sinan Leahy there    Problem List Items Addressed This Visit        Cardiovascular and Mediastinum    Aortic valve regurgitation, nonrheumatic    Relevant Medications    metolazone (ZAROXOLYN) 2 5 mg tablet    Chronic diastolic congestive heart failure (HCC)    Relevant Medications    metolazone (ZAROXOLYN) 2 5 mg tablet       Other    Lymphedema of both lower extremities      Other Visit Diagnoses     Acute on chronic diastolic (congestive) heart failure ([address] )    -  Primary    Relevant Orders    Home Oxygen with Portability    Need for pneumococcal vaccination                Subjective:      Patient ID: Db Tse is a 80 y o  female  HPI  Worsening bilateral  LE edema, weeping from the right distal leg for over the past week  Increasing SOB, weight gain as well  Known CHF, AR, ascending aortic aneurysm on torsemide 40mg in am 20mg in pm and metolazone 2 5  prn  She took the metolazone a few days ago without improvement  She has been compliant with her diet and daily weight  Enrolled in [facility]ALS CHF program      The following portions of the patient's history were reviewed and updated as appropriate: allergies, current medications, past family history, past medical history, past surgical history and problem list     Review of Systems   Constitutional: Positive for unexpected weight change  Respiratory: Positive for shortness of breath  Cardiovascular: Positive for leg swelling     Skin:        Weeping from the right leg         Objective:      /56   Pulse (!) 54   Ht 5' (1 524 m)   Wt 72 4 kg (159 lb 9 6 oz)   SpO2 (!) 80%   BMI 31 17 kg/m²          Physical Exam Cardiovascular: Normal rate and regular rhythm  Murmur heard  Systolic murmur is present with a grade of 3/6    Diastolic murmur is present with a grade of 3/6   Gr 2 bilateral LE edema with weeping in the inner leg, above the ankle  No open wounds   Pulmonary/Chest: Effort normal  She has decreased breath sounds in the left lower field  Skin: She is not diaphoretic

## 2018-12-03 NOTE — PATIENT INSTRUCTIONS
Take metolazone 2 5mg, 2 daily until I see you on Wednesday at noon in [address] [place] [address] Drive the torsemide 2 tablets in the morning and 1 before dinner

## 2018-12-05 ENCOUNTER — TELEPHONE (OUTPATIENT)
Dept: INTERNAL MEDICINE CLINIC | Facility: CLINIC | Age: 83
End: 2018-12-05

## 2018-12-05 ENCOUNTER — OFFICE VISIT (OUTPATIENT)
Dept: INTERNAL MEDICINE CLINIC | Facility: SKILLED NURSING FACILITY | Age: 83
End: 2018-12-05
Payer: MEDICARE

## 2018-12-05 VITALS
OXYGEN SATURATION: 88 % | BODY MASS INDEX: 31.17 KG/M2 | HEART RATE: 50 BPM | HEIGHT: 60 IN | SYSTOLIC BLOOD PRESSURE: 146 MMHG | DIASTOLIC BLOOD PRESSURE: 70 MMHG

## 2018-12-05 DIAGNOSIS — I50.32 CHRONIC DIASTOLIC CONGESTIVE HEART FAILURE (HCC): ICD-10-CM

## 2018-12-05 DIAGNOSIS — I89.0 LYMPHEDEMA OF BOTH LOWER EXTREMITIES: ICD-10-CM

## 2018-12-05 DIAGNOSIS — R60.0 LEG EDEMA: Primary | ICD-10-CM

## 2018-12-05 DIAGNOSIS — I35.1 AORTIC VALVE REGURGITATION, NONRHEUMATIC: ICD-10-CM

## 2018-12-05 DIAGNOSIS — I71.2 ASCENDING AORTIC ANEURYSM (HCC): Primary | ICD-10-CM

## 2018-12-05 PROCEDURE — 99213 OFFICE O/P EST LOW 20 MIN: CPT | Performed by: INTERNAL MEDICINE

## 2018-12-06 ENCOUNTER — TELEPHONE (OUTPATIENT)
Dept: CARDIOLOGY CLINIC | Facility: MEDICAL CENTER | Age: 83
End: 2018-12-06

## 2018-12-06 NOTE — TELEPHONE ENCOUNTER
Called and spoke with patient, as she rescheduled appt with me from today to Monday  She reports she is currently having issues with constipation so couldn't make it out today  She is currently taking Torsemide 40 in AM with Metolazone 5 mg 30 min before AM dose of torsemide, and Torsemide 20 in PM  I asked her to increase Torsemide in PM to 40 mg as well  Thus far has not noted significant improvement in her LE edema

## 2018-12-10 ENCOUNTER — OFFICE VISIT (OUTPATIENT)
Dept: CARDIOLOGY CLINIC | Facility: CLINIC | Age: 83
End: 2018-12-10
Payer: MEDICARE

## 2018-12-10 VITALS
HEART RATE: 60 BPM | DIASTOLIC BLOOD PRESSURE: 70 MMHG | WEIGHT: 157.3 LBS | SYSTOLIC BLOOD PRESSURE: 124 MMHG | HEIGHT: 60 IN | BODY MASS INDEX: 30.88 KG/M2

## 2018-12-10 DIAGNOSIS — I50.32 CHRONIC DIASTOLIC CONGESTIVE HEART FAILURE (HCC): ICD-10-CM

## 2018-12-10 DIAGNOSIS — I50.32 CHRONIC DIASTOLIC HEART FAILURE DUE TO VALVULAR DISEASE (HCC): ICD-10-CM

## 2018-12-10 DIAGNOSIS — E78.49 OTHER HYPERLIPIDEMIA: ICD-10-CM

## 2018-12-10 DIAGNOSIS — R07.89 OTHER CHEST PAIN: ICD-10-CM

## 2018-12-10 DIAGNOSIS — I38 CHRONIC DIASTOLIC HEART FAILURE DUE TO VALVULAR DISEASE (HCC): ICD-10-CM

## 2018-12-10 DIAGNOSIS — I35.1 AORTIC VALVE REGURGITATION, NONRHEUMATIC: ICD-10-CM

## 2018-12-10 DIAGNOSIS — I10 ESSENTIAL HYPERTENSION: ICD-10-CM

## 2018-12-10 DIAGNOSIS — I48.0 PAROXYSMAL ATRIAL FIBRILLATION (HCC): Primary | ICD-10-CM

## 2018-12-10 DIAGNOSIS — I71.2 ASCENDING AORTIC ANEURYSM (HCC): ICD-10-CM

## 2018-12-10 PROCEDURE — 99215 OFFICE O/P EST HI 40 MIN: CPT | Performed by: INTERNAL MEDICINE

## 2018-12-10 RX ORDER — TORSEMIDE 20 MG/1
60 TABLET ORAL 2 TIMES DAILY
Qty: 180 TABLET | Refills: 11 | Status: SHIPPED | OUTPATIENT
Start: 2018-12-10 | End: 2018-12-26 | Stop reason: SDUPTHER

## 2018-12-10 RX ORDER — TORSEMIDE 20 MG/1
60 TABLET ORAL 2 TIMES DAILY
Qty: 540 TABLET | Refills: 3 | Status: SHIPPED | OUTPATIENT
Start: 2018-12-10 | End: 2018-12-10 | Stop reason: SDUPTHER

## 2018-12-10 NOTE — PROGRESS NOTES
Assessment/Plan:  Enrolled in [facility]ALS HF program   Nurse contacted to see her sooner than scheduled  Appt arranged to be seen by cardiology sooner  Continue current diuretics (torsemide 40mg in am 20mg in pm and metolazone 5mg in am)  Obtain BMP  Problem List Items Addressed This Visit        Cardiovascular and Mediastinum    Aortic valve regurgitation, nonrheumatic    Ascending aortic aneurysm (HCC) - Primary    Chronic diastolic congestive heart failure (HCC)       Other    Lymphedema of both lower extremities            Subjective:      Patient ID: Ericka Crockett is a 80 y o  female  HPI  Asc aortic aneurysm severe aortic insufficiency CHF on torsemide 40mg in am and 20mg in pm and metolazone prn  Patient was seen 2 days ago in the office for worsening LE edema and weeping in the distal right lower extremity  Metolazone increased to 5mg daily  Swelling has not improved  Uses compression pumps daily in the morning and compression stockings  The following portions of the patient's history were reviewed and updated as appropriate: allergies, current medications, past family history, past social history, past surgical history and problem list     Review of Systems   Constitutional: Negative for chills, fatigue and fever  Respiratory: Positive for shortness of breath  Cardiovascular: Positive for leg swelling  Negative for palpitations  Gastrointestinal: Negative for abdominal pain  Objective:      /70   Pulse (!) 50   Ht 5' (1 524 m)   SpO2 (!) 88%   BMI 31 17 kg/m²          Physical Exam   Constitutional: No distress  Cardiovascular:   2+ bilateral LE edema, no erythema  No active weeping from the RLE   Neurological: She is alert  Skin: She is not diaphoretic

## 2018-12-10 NOTE — PROGRESS NOTES
Cardiology Follow up Note    Katie Champion 80 y o  female MRN: [id number]    12/10/18          Assessment/Plan:    1  Ascending aortic aneurysm, with moderate to severe AI and chronic diastolic HF  LV function was normal  She met with Dr Sue Valentine, who recommended serial CTA in 6 months with echo to assess for worsening of AI or ascending aortic aneurysm  She did not wish to pursue serial testing or surgery  She wishes only for treatment of her symptoms, despite acknowledging that medical management will eventually fail  She is in the [facility] HF program  She was on Torsemide 40 mg in AM and 20 mg in PM, due to increasing LE edema and SOB Metolazone 5 mg daily was added and Torsemide increased to 40 bid in early 12/18  Still volume overloaded on exam, will increase Torsemide to 60 bid and see if we can decrease Metolazone in long term  BMP 12/7/18 showed creatinine 2 13, potassium 4 2  Will recheck BMP in one week after increasing Torsemide to ensure potassium levels are stable  2  HTN  She is on Prazosin 2, Amlodipine 10, Ramipril 10, Coreg 3 125 bid, Imdur 60, and Torsemide  BP lower now, likely due to worsening LV dysfunction  Will stop Amlodipine 10 to see if this helps reduce LE edema and gives more room to increase diuretic  3  HL  She is on Pravastatin 40  Lipid panel 7/31/15 showed total cholesterol 157, TG 72, HDL 67, and LDL 76  Lipid panel 10/16 showed total cholesterol 129, TG 44, HDL 69, LDL 51  Lipid panel 3/17 showed total cholesterol 133, TG 40, HDL 80, LDL 45      4  Atrial fibrillation, paroxysmal  She underwent APOLINAR/CV 9/15 and again in 2/16 with Amiodarone on board  She is on Eliquis 5 bid  She is maintaining SR on Amio 200 qd  TFTs normal 1/17  5  Chest pain  She does not wish for further workup as she is in PALS program, she feels chest pain is controlled with Imdur       1  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)  torsemide (DEMADEX) 20 mg tablet    DISCONTINUED: torsemide (DEMADEX) 20 mg tablet         HPI: 80 y o  woman a history of HTN and HL, who is here for follow up of ascending aortic aneurysm and moderate to severe aortic regurgitation as well as paroxysmal atrial fibrillation  Echo 7/15 showed normal LV size and function, EF 55%, no RWMA, moderate concentric hypertrophy, grade II diastolic dysfunction, evidence of elevated LVEDP  Moderate to severe central AI, with  ms  PASP 40 mm Hg consistent with mild pulmonary HTN  The ascending aorta was dilated up to 5 0 cm in ascending aorta  Normal RV size and function  CTA of chest/abdomen 8/15 showed fusiform aneurysm of ascending aorta measuring up to 5 1 cm, increased from 45 mm in 2012  Descending thoracic and abdominal aorta were normal in caliber  She saw Dr Eric Medina, who recommended continued surveillance with repeat CTA and echo in 6 months  She reports to me that after speaking with Dr Eric Medina, she does not wish to pursue surveillance because she would not want surgery in any case  She was in ED 9/13/15 for hematuria, EKG was done and showed what appeared to be atrial fibrillation, although it was interpreted as multifocal atrial tachycardia  EKG in office 9/21 also showed atrial fibrillation  She denied any dizziness or lightheadedness  No syncope or presyncope  She was feeling more tired than previously  No further hematuria  She underwent APOLINAR guided CV on 9/23/15  APOLINAR showed normal LV size and function, EF 55%, concentric hypertrophy, normal RV size and function, LA and EARNEST were dilated with no thrombus, no PFO, mild MR, aortic sclerosis, moderate to severe central AI, PASP 25 mm Hg  Aorta was dilated up to 5 0 cm in the ascending aorta, with moderate atheroma in descending aorta and aortic arch  She was successfully cardioverted to sinus rhythm  She saw Dr Humberto Posey 2/16 and reported a 2 week history of LE edema and shortness of breath with exertion/excitement   She was noted to be back in atrial fibrillation  She started her on low dose Furosemide and asked her to come see me  She reported when she took Lasix and potassium, she felt short of breath, sweaty, and thought she was going to pass out  She was switched to Torsemide 20 mg daily on 2/8/16  She was started on Amiodarone as antiarrhythmic as well  She underwent APOLINAR/CV on 2/17/16  A single 200 J shock was successful in converting her to sinus rhythm  APOLINAR showed normal LV size and function, EF 60%, no RWMA, normal RV Size and function  Ascending aorta measured 5 cm  Moderate AI  No PFO  LA and EARNEST were moderately dilated but no thrombus  She was admitted to [place] [address] for worsening LE edema, shortness of breath, and weight gain despite increasing Torsemide from 20 to 40 mg daily  She was diuresed with IV Lasix and transitioned to Torsemide 40 bid at discharge  She was discharged to [facility] for skilled nursing, and did enroll in the [facility] HF program  She was seen by HF nurse practitioners while at [facility], her Torsemide was decreased to 40 mg in AM and 20 mg in PM due to rising creatinine  She has been having more LE edema, was seen by Erin Gilbert at [facility], who increased her Torsemide to 40 bid on 3/2/17  She reports she was having intermittent chest discomfort at rest (also with exertion) in summer 2016, she was prescribed SLNG, and then Dr Cristiane Steel put her on standing Imdur  She reports that with the Imdur, she is no longer having any chest pain  I referred her to Vascular, who ordered custom lower extremity compression stockings and lymphedema pumps, and she had significant improvement in LE edema, and also weight loss from 172 lbs to 156 lbs  She has been having more issues with SOB and increased LE edema, she saw Dr Cristiane Steel who put her on daily Metolazone, I called her and increased her Torsemide to 40 bid (was taking 40 in AM and 20 in PM)   She reports her weights had been going up, went up to 155, down to 150 8 since addition of Metolazone and increase in Torsemide  Previous weight was 148  She is having home oxgyen arranged  Her daughter bought her a scooter, but she has difficulty maneuvering it  She reports her appetite is still poor, has no appetite for lunch  She can't use pumps currently due to recent weeping of right leg  She sleeps in a chair because her bed is too high  BP has been controlled most recently  She walks with a walker  No dizziness or lightheadedness  She has SOB with exertion  She gets more tired than previously  Visiting nurse comes once a month, but working on getting VNA more frequently  Perineal area still raw from frequent wiping, but no worse  No further bleeding in stool  Patient Active Problem List   Diagnosis    Paroxysmal atrial fibrillation (HCC)    Essential hypertension    GERD (gastroesophageal reflux disease)    Other hyperlipidemia    Aortic valve regurgitation, nonrheumatic    Ascending aortic aneurysm (HCC)    Other chest pain    Lymphedema of both lower extremities    Chronic diastolic congestive heart failure (HCC)    Hematuria    Gait difficulty       Allergies   Allergen Reactions    Aspirin     Percocet [Oxycodone-Acetaminophen]          Current Outpatient Prescriptions:     acetaminophen (TYLENOL) 325 mg tablet, Take 650 mg by mouth every 6 (six) hours as needed for mild pain , Disp: , Rfl:     amiodarone 200 mg tablet, Take 1 tablet (200 mg total) by mouth daily, Disp: 90 tablet, Rfl: 3    apixaban (ELIQUIS) 5 mg, Take 1 tablet (5 mg total) by mouth 2 (two) times a day, Disp: 180 tablet, Rfl: 1    Calcium Carbonate (CALTRATE 600 PO), Take 600 mg by mouth daily  , Disp: , Rfl:     carvedilol (COREG) 3 125 mg tablet, Take 1 tablet (3 125 mg total) by mouth 2 (two) times a day with meals, Disp: 180 tablet, Rfl: 3    econazole nitrate 1 % cream, Apply topically 2 (two) times a day, Disp: 85 g, Rfl: 1    fluticasone (FLONASE) 50 mcg/act nasal spray, 1 spray into each nostril 2 (two) times a day, Disp: , Rfl:     isosorbide mononitrate (IMDUR) 60 mg 24 hr tablet, Take 1 tablet (60 mg total) by mouth daily, Disp: 90 tablet, Rfl: 3    KLOR-CON M20 20 MEQ tablet, Take 1 tablet (20 mEq total) by mouth daily, Disp: 90 tablet, Rfl: 3    Melatonin 3 MG CAPS, Take 3 mg by mouth daily at bedtime (Patient taking differently: Take 5 mg by mouth daily at bedtime  ), Disp: 30 capsule, Rfl: 5    metolazone (ZAROXOLYN) 2 5 mg tablet, Take 2 for the next 3 days and as directed take as needed for increased leg swelling (148lbs), Disp: 30 tablet, Rfl: 0    nitroglycerin (NITROSTAT) 0 4 mg SL tablet, Place 1 tablet (0 4 mg total) under the tongue every 5 (five) minutes as needed for chest pain, Disp: 30 tablet, Rfl: 0    prazosin (MINIPRESS) 2 mg capsule, Take 1 capsule (2 mg total) by mouth every morning, Disp: 90 capsule, Rfl: 3    prazosin (MINIPRESS) 5 mg capsule, Take 1 capsule (5 mg total) by mouth daily at bedtime, Disp: 90 capsule, Rfl: 3    ramipril (ALTACE) 10 MG capsule, Take 10 mg by mouth 2 (two) times a day  , Disp: , Rfl:     torsemide (DEMADEX) 20 mg tablet, Take 3 tablets (60 mg total) by mouth 2 (two) times a day, Disp: 180 tablet, Rfl: 11    diclofenac sodium (VOLTAREN) 1 %, Apply 2 g topically 4 (four) times a day (Patient not taking: Reported on 12/10/2018 ), Disp: 100 g, Rfl: 1    pravastatin (PRAVACHOL) 40 mg tablet, Take 1 tablet (40 mg total) by mouth daily (Patient not taking: Reported on 12/10/2018 ), Disp: 90 tablet, Rfl: 3    Past Medical History:   Diagnosis Date    Acid reflux disease     Anemia     Last assessed: 10/26/16    Aortic valve regurgitation, nonrheumatic     Ascending aortic aneurysm (HCC)     Last assessed: 6/5/13    Atrial fibrillation (HCC)     Hyperlipidemia     Hypertension 2/17/2016    Paroxysmal atrial fibrillation ([facility] [address] ) 2/17/2016       Family History   Problem Relation Age of Onset    No Known Problems Mother     Stomach cancer Father     Heart disease Sister         Cardiac Disorder    Breast cancer Sister     Colon cancer Brother     Cancer Brother        Past Surgical History:   Procedure Laterality Date    CARDIOVERSION      CHOLECYSTECTOMY      LAPAROSCOPIC SALPINGOOPHERECTOMY      TONSILLECTOMY         Social History     Social History    Marital status:      Spouse name: N/A    Number of children: N/A    Years of education: N/A     Occupational History    Not on file  Social History Main Topics    Smoking status: Never Smoker    Smokeless tobacco: Never Used    Alcohol use No    Drug use: No    Sexual activity: No     Other Topics Concern    Not on file     Social History Narrative    Assistive devices: walker       Review of Systems   Constitution: Negative for chills, decreased appetite, diaphoresis, fever, weakness, malaise/fatigue, night sweats, weight gain and weight loss  HENT: Negative for ear pain, hearing loss, hoarse voice, nosebleeds, sore throat and tinnitus  Eyes: Negative for blurred vision and pain  Cardiovascular: Positive for leg swelling  Negative for chest pain, claudication, cyanosis, dyspnea on exertion, irregular heartbeat, near-syncope, orthopnea, palpitations, paroxysmal nocturnal dyspnea and syncope  Respiratory: Negative for cough, hemoptysis, shortness of breath, sleep disturbances due to breathing, snoring, sputum production and wheezing  Hematologic/Lymphatic: Negative for adenopathy and bleeding problem  Does not bruise/bleed easily  Skin: Negative for color change, dry skin, flushing, itching, poor wound healing and rash  Musculoskeletal: Negative for arthritis, back pain, falls, joint pain, muscle cramps, muscle weakness, myalgias and neck pain  Gastrointestinal: Negative for abdominal pain, constipation, diarrhea, dysphagia, heartburn, hematemesis, hematochezia, melena, nausea and vomiting     Genitourinary: Positive for frequency  Negative for dysuria, hematuria, hesitancy, non-menstrual bleeding and urgency  Neurological: Negative for excessive daytime sleepiness, dizziness, focal weakness, headaches, light-headedness, loss of balance, numbness, paresthesias, tremors and vertigo  Psychiatric/Behavioral: Negative for altered mental status, depression and memory loss  The patient does not have insomnia and is not nervous/anxious  Allergic/Immunologic: Negative for environmental allergies and persistent infections  Vitals: /70 (BP Location: Left arm, Patient Position: Sitting, Cuff Size: Standard)   Pulse 60   Ht 5' (1 524 m)   Wt 71 4 kg (157 lb 4 8 oz)   BMI 30 72 kg/m²       Physical Exam:     GEN: Alert and oriented x 3, in no acute distress  Chronically ill appearing  HEENT: Sclera anicteric, conjunctivae pink, mucous membranes moist  Oropharynx clear  NECK: Supple, no carotid bruits, JVD to 7 cm above sternal angle  Trachea midline, no thyromegaly  HEART: Bradycardic, egular rhythm, normal S1 and S2, III/VI systolic murmur with blowing diastolic murmur, no clicks, gallops or rubs  PMI nondisplaced, no thrills  LUNGS: decreased breath sounds on left base; no wheezes, or rhonchi  No increased work of breathing or signs of respiratory distress  ABDOMEN: Obese, soft, nontender, nondistended, normoactive bowel sounds  EXTREMITIES: Skin warm and well perfused, no clubbing, cyanosis, 2+ edema  NEURO: No focal findings  Walks with walker  Normal speech  Mood and affect normal    SKIN: Normal without suspicious lesions on exposed skin        Lab Results:       No results found for: HGBA1C  No results found for: CHOL  No results found for: HDL  No results found for: LDLCALC  No results found for: TRIG  No results found for: CHOLHDL

## 2018-12-12 ENCOUNTER — TELEPHONE (OUTPATIENT)
Dept: INTERNAL MEDICINE CLINIC | Facility: CLINIC | Age: 83
End: 2018-12-12

## 2018-12-12 ENCOUNTER — OFFICE VISIT (OUTPATIENT)
Dept: INTERNAL MEDICINE CLINIC | Facility: SKILLED NURSING FACILITY | Age: 83
End: 2018-12-12
Payer: MEDICARE

## 2018-12-12 VITALS
SYSTOLIC BLOOD PRESSURE: 132 MMHG | DIASTOLIC BLOOD PRESSURE: 80 MMHG | BODY MASS INDEX: 29.51 KG/M2 | HEIGHT: 60 IN | HEART RATE: 45 BPM | WEIGHT: 150.3 LBS | OXYGEN SATURATION: 89 %

## 2018-12-12 DIAGNOSIS — I89.0 LYMPHEDEMA OF BOTH LOWER EXTREMITIES: ICD-10-CM

## 2018-12-12 DIAGNOSIS — R26.9 GAIT DIFFICULTY: Primary | ICD-10-CM

## 2018-12-12 DIAGNOSIS — I71.2 ASCENDING AORTIC ANEURYSM (HCC): ICD-10-CM

## 2018-12-12 DIAGNOSIS — I35.1 AORTIC VALVE REGURGITATION, NONRHEUMATIC: ICD-10-CM

## 2018-12-12 DIAGNOSIS — I50.32 CHRONIC DIASTOLIC CONGESTIVE HEART FAILURE (HCC): ICD-10-CM

## 2018-12-12 PROCEDURE — 99214 OFFICE O/P EST MOD 30 MIN: CPT | Performed by: INTERNAL MEDICINE

## 2018-12-12 RX ORDER — METOLAZONE 2.5 MG/1
5 TABLET ORAL DAILY
Qty: 180 TABLET | Refills: 1 | Status: SHIPPED | OUTPATIENT
Start: 2018-12-12 | End: 2019-02-03

## 2018-12-12 NOTE — PROGRESS NOTES
Assessment/Plan:  Continue current diuretic regimen        Problem List Items Addressed This Visit        Cardiovascular and Mediastinum    Aortic valve regurgitation, nonrheumatic    Relevant Medications    metolazone (ZAROXOLYN) 2 5 mg tablet    Ascending aortic aneurysm (HCC)    Chronic diastolic congestive heart failure (HCC)    Relevant Medications    metolazone (ZAROXOLYN) 2 5 mg tablet       Other    Lymphedema of both lower extremities    Gait difficulty - Primary            Subjective:      Patient ID: Zulema Singletary is a 80 y o  female  HPI  Worsening LE edema so far refractory to diuretics  Torsemide increased to 60mg BID 2 days ago  She remains on metolazone 5mg in the morning  Cr was 2 13  There is no more weeping from the right leg  Weight is slightly less, was around 155lbs and now 151lbs  Baseline was around 145lbs  +SOB with exertion  Awaiting O2  VNA has come this week and will return in a few days, ? Unna boot to be started  She has a scooter her family got her and is learning to operate it which has been a challenge  She never drove in the past    She is using her but is unable to ambulate  More than a few feet without SOB  The following portions of the patient's history were reviewed and updated as appropriate: allergies, current medications, past medical history, past social history, past surgical history and problem list     Review of Systems   Constitutional: Positive for fatigue and unexpected weight change  Negative for fever  HENT: Negative for sinus pain, sinus pressure and sore throat  Respiratory: Positive for shortness of breath  Negative for cough and wheezing  Cardiovascular: Positive for leg swelling  Negative for chest pain and palpitations  Gastrointestinal: Negative for abdominal pain, constipation, diarrhea, nausea and vomiting           Objective:      /80   Pulse (!) 45   Ht 5' (1 524 m)   Wt 68 2 kg (150 lb 4 8 oz)   SpO2 (!) 89%   BMI 29 35 kg/m²          Physical Exam   Constitutional: No distress  Cardiovascular: Normal rate and regular rhythm  Murmur heard  Systolic murmur is present with a grade of 3/6    Diastolic murmur is present with a grade of 3/6   2+ pitting edema feet to below the knees  No weeping from either LE  Brownish skin discoloration on the right shin   Pulmonary/Chest: Effort normal    Skin: She is not diaphoretic

## 2018-12-13 NOTE — TELEPHONE ENCOUNTER
Spoke with patient's daughter  Explained to her that her mother has CHF, worsening LE edema and worsening renal function   Dialysis or comfort care at some point  They bought her a scooter-no need to send script to [facility] (form received from them)

## 2018-12-26 ENCOUNTER — OFFICE VISIT (OUTPATIENT)
Dept: INTERNAL MEDICINE CLINIC | Facility: SKILLED NURSING FACILITY | Age: 83
End: 2018-12-26
Payer: MEDICARE

## 2018-12-26 VITALS
HEIGHT: 60 IN | BODY MASS INDEX: 27.37 KG/M2 | OXYGEN SATURATION: 97 % | DIASTOLIC BLOOD PRESSURE: 54 MMHG | SYSTOLIC BLOOD PRESSURE: 142 MMHG | WEIGHT: 139.4 LBS

## 2018-12-26 DIAGNOSIS — I71.2 ASCENDING AORTIC ANEURYSM (HCC): ICD-10-CM

## 2018-12-26 DIAGNOSIS — I50.32 CHRONIC DIASTOLIC HEART FAILURE DUE TO VALVULAR DISEASE (HCC): ICD-10-CM

## 2018-12-26 DIAGNOSIS — I38 CHRONIC DIASTOLIC HEART FAILURE DUE TO VALVULAR DISEASE (HCC): ICD-10-CM

## 2018-12-26 DIAGNOSIS — I50.32 CHRONIC DIASTOLIC CONGESTIVE HEART FAILURE (HCC): Primary | ICD-10-CM

## 2018-12-26 DIAGNOSIS — E87.6 HYPOKALEMIA: ICD-10-CM

## 2018-12-26 PROCEDURE — 99213 OFFICE O/P EST LOW 20 MIN: CPT | Performed by: INTERNAL MEDICINE

## 2018-12-26 RX ORDER — TORSEMIDE 20 MG/1
60 TABLET ORAL 2 TIMES DAILY
Qty: 540 TABLET | Refills: 1 | Status: SHIPPED | OUTPATIENT
Start: 2018-12-26 | End: 2019-02-02 | Stop reason: HOSPADM

## 2018-12-26 RX ORDER — POTASSIUM CHLORIDE 1500 MG/1
20 TABLET, EXTENDED RELEASE ORAL 2 TIMES DAILY
Qty: 180 TABLET | Refills: 0 | Status: SHIPPED | OUTPATIENT
Start: 2018-12-26 | End: 2019-02-11 | Stop reason: HOSPADM

## 2018-12-26 NOTE — PROGRESS NOTES
Assessment/Plan:  Doing much better since taking the extra torsemide  Continue 60mg BID  Stay off metolazone  BMP before she sees Dr Lily Valdez  Problem List Items Addressed This Visit        Cardiovascular and Mediastinum    Ascending aortic aneurysm (HCC)    Chronic diastolic congestive heart failure (HCC) - Primary    Relevant Orders    Basic metabolic panel      Other Visit Diagnoses     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 ([facility] [address] )        Relevant Medications    torsemide (DEMADEX) 20 mg tablet            Subjective:      Patient ID: Rory Miller is a 80 y o  female  HPI  LE edema improved in the past week, since she has been on 60mg of torsemide BID  She stopped the metolazone on her own 3 days ago  This did not really help much with the edema  Weight down to 139lbs   SOB with exertion about the same  The following portions of the patient's history were reviewed and updated as appropriate: current medications, past family history, past medical history, past social history, past surgical history and problem list     Review of Systems   Constitutional: Negative for chills and fever  Respiratory: Positive for cough and shortness of breath  Cardiovascular: Positive for leg swelling  Negative for chest pain  Objective:      /54   Ht 5' (1 524 m)   Wt 63 2 kg (139 lb 6 4 oz)   SpO2 97%   BMI 27 22 kg/m²          Physical Exam   Constitutional: She is oriented to person, place, and time  No distress  Cardiovascular: Normal rate and regular rhythm  Murmur heard  Systolic murmur is present with a grade of 3/6    Diastolic murmur is present with a grade of 3/6   Bilateral gr 1 ankle edema   Neurological: She is alert and oriented to person, place, and time  Skin: She is not diaphoretic  Psychiatric: She has a normal mood and affect   Her behavior is normal  Judgment and thought content normal

## 2018-12-28 ENCOUNTER — TELEPHONE (OUTPATIENT)
Dept: CARDIOLOGY CLINIC | Facility: CLINIC | Age: 83
End: 2018-12-28

## 2018-12-28 NOTE — TELEPHONE ENCOUNTER
Nurse called , pt had 2 episodes last 24 hrs where she was so fatigued lasted 30 min  HR 46-48 usually in the 50's  Bp 172/68 rechecked then 152/56        Please advise

## 2019-01-03 NOTE — TELEPHONE ENCOUNTER
Presently Bp 128/43  Feeling better, but still has ocassionally fatigued and that's when Bp is elevated  Vn comes tomorrow ( 3x's weekly)        Thank you

## 2019-01-03 NOTE — TELEPHONE ENCOUNTER
She can take amlodipine 5 mg PRN for SBP greater than 150  I would not recommend she resume it daily, as her BP on average was lower when I sAw her last in office

## 2019-01-16 DIAGNOSIS — I10 HYPERTENSION, ESSENTIAL, BENIGN: Primary | ICD-10-CM

## 2019-01-16 RX ORDER — RAMIPRIL 10 MG/1
10 CAPSULE ORAL 2 TIMES DAILY
Qty: 180 CAPSULE | Refills: 2 | Status: SHIPPED | OUTPATIENT
Start: 2019-01-16 | End: 2019-02-02 | Stop reason: HOSPADM

## 2019-01-23 ENCOUNTER — APPOINTMENT (EMERGENCY)
Dept: RADIOLOGY | Facility: HOSPITAL | Age: 84
DRG: 564 | End: 2019-01-23
Payer: MEDICARE

## 2019-01-23 ENCOUNTER — APPOINTMENT (INPATIENT)
Dept: RADIOLOGY | Facility: HOSPITAL | Age: 84
DRG: 564 | End: 2019-01-23
Payer: MEDICARE

## 2019-01-23 ENCOUNTER — HOSPITAL ENCOUNTER (INPATIENT)
Facility: HOSPITAL | Age: 84
LOS: 10 days | Discharge: NURSING, REHAB OR LONG-TERM CARE | DRG: 564 | End: 2019-02-02
Attending: EMERGENCY MEDICINE | Admitting: SURGERY
Payer: MEDICARE

## 2019-01-23 DIAGNOSIS — S22.42XA MULTIPLE FRACTURES OF RIBS, LEFT SIDE, INITIAL ENCOUNTER FOR CLOSED FRACTURE: Primary | ICD-10-CM

## 2019-01-23 DIAGNOSIS — E87.2 CHRONIC RESPIRATORY ACIDOSIS: ICD-10-CM

## 2019-01-23 DIAGNOSIS — N17.9 ACUTE KIDNEY INJURY (HCC): ICD-10-CM

## 2019-01-23 DIAGNOSIS — J44.9 COPD (CHRONIC OBSTRUCTIVE PULMONARY DISEASE) (HCC): ICD-10-CM

## 2019-01-23 DIAGNOSIS — S42.102A CLOSED FRACTURE OF LEFT SCAPULA, UNSPECIFIED PART OF SCAPULA, INITIAL ENCOUNTER: ICD-10-CM

## 2019-01-23 DIAGNOSIS — I50.20 SYSTOLIC CONGESTIVE HEART FAILURE, UNSPECIFIED HF CHRONICITY (HCC): ICD-10-CM

## 2019-01-23 DIAGNOSIS — S22.42XA CLOSED FRACTURE OF MULTIPLE RIBS OF LEFT SIDE, INITIAL ENCOUNTER: ICD-10-CM

## 2019-01-23 PROBLEM — S22.49XA MULTIPLE RIB FRACTURES: Status: ACTIVE | Noted: 2019-01-23

## 2019-01-23 LAB
ANION GAP SERPL CALCULATED.3IONS-SCNC: 4 MMOL/L (ref 4–13)
BASOPHILS # BLD AUTO: 0.02 THOUSANDS/ΜL (ref 0–0.1)
BASOPHILS NFR BLD AUTO: 0 % (ref 0–1)
BUN SERPL-MCNC: 38 MG/DL (ref 5–25)
CALCIUM SERPL-MCNC: 8.9 MG/DL (ref 8.3–10.1)
CHLORIDE SERPL-SCNC: 102 MMOL/L (ref 100–108)
CO2 SERPL-SCNC: 32 MMOL/L (ref 21–32)
CREAT SERPL-MCNC: 1.51 MG/DL (ref 0.6–1.3)
EOSINOPHIL # BLD AUTO: 0.08 THOUSAND/ΜL (ref 0–0.61)
EOSINOPHIL NFR BLD AUTO: 1 % (ref 0–6)
ERYTHROCYTE [DISTWIDTH] IN BLOOD BY AUTOMATED COUNT: 19.2 % (ref 11.6–15.1)
GFR SERPL CREATININE-BSD FRML MDRD: 31 ML/MIN/1.73SQ M
GLUCOSE SERPL-MCNC: 105 MG/DL (ref 65–140)
HCT VFR BLD AUTO: 33.3 % (ref 34.8–46.1)
HGB BLD-MCNC: 9.5 G/DL (ref 11.5–15.4)
IMM GRANULOCYTES # BLD AUTO: 0.05 THOUSAND/UL (ref 0–0.2)
IMM GRANULOCYTES NFR BLD AUTO: 1 % (ref 0–2)
LYMPHOCYTES # BLD AUTO: 0.43 THOUSANDS/ΜL (ref 0.6–4.47)
LYMPHOCYTES NFR BLD AUTO: 6 % (ref 14–44)
MCH RBC QN AUTO: 24.9 PG (ref 26.8–34.3)
MCHC RBC AUTO-ENTMCNC: 28.5 G/DL (ref 31.4–37.4)
MCV RBC AUTO: 87 FL (ref 82–98)
MONOCYTES # BLD AUTO: 0.45 THOUSAND/ΜL (ref 0.17–1.22)
MONOCYTES NFR BLD AUTO: 6 % (ref 4–12)
NEUTROPHILS # BLD AUTO: 6.47 THOUSANDS/ΜL (ref 1.85–7.62)
NEUTS SEG NFR BLD AUTO: 86 % (ref 43–75)
NRBC BLD AUTO-RTO: 0 /100 WBCS
PLATELET # BLD AUTO: 164 THOUSANDS/UL (ref 149–390)
PMV BLD AUTO: 10.6 FL (ref 8.9–12.7)
POTASSIUM SERPL-SCNC: 4.3 MMOL/L (ref 3.5–5.3)
RBC # BLD AUTO: 3.81 MILLION/UL (ref 3.81–5.12)
SODIUM SERPL-SCNC: 138 MMOL/L (ref 136–145)
TROPONIN I SERPL-MCNC: <0.02 NG/ML
WBC # BLD AUTO: 7.5 THOUSAND/UL (ref 4.31–10.16)

## 2019-01-23 PROCEDURE — 72170 X-RAY EXAM OF PELVIS: CPT

## 2019-01-23 PROCEDURE — 85025 COMPLETE CBC W/AUTO DIFF WBC: CPT | Performed by: EMERGENCY MEDICINE

## 2019-01-23 PROCEDURE — 72125 CT NECK SPINE W/O DYE: CPT

## 2019-01-23 PROCEDURE — 82803 BLOOD GASES ANY COMBINATION: CPT

## 2019-01-23 PROCEDURE — 93005 ELECTROCARDIOGRAM TRACING: CPT

## 2019-01-23 PROCEDURE — 80048 BASIC METABOLIC PNL TOTAL CA: CPT | Performed by: EMERGENCY MEDICINE

## 2019-01-23 PROCEDURE — 84484 ASSAY OF TROPONIN QUANT: CPT | Performed by: EMERGENCY MEDICINE

## 2019-01-23 PROCEDURE — 71250 CT THORAX DX C-: CPT

## 2019-01-23 PROCEDURE — 82947 ASSAY GLUCOSE BLOOD QUANT: CPT

## 2019-01-23 PROCEDURE — 84132 ASSAY OF SERUM POTASSIUM: CPT

## 2019-01-23 PROCEDURE — 84295 ASSAY OF SERUM SODIUM: CPT

## 2019-01-23 PROCEDURE — 70450 CT HEAD/BRAIN W/O DYE: CPT

## 2019-01-23 PROCEDURE — 85014 HEMATOCRIT: CPT

## 2019-01-23 PROCEDURE — 94760 N-INVAS EAR/PLS OXIMETRY 1: CPT

## 2019-01-23 PROCEDURE — 71101 X-RAY EXAM UNILAT RIBS/CHEST: CPT

## 2019-01-23 PROCEDURE — 73030 X-RAY EXAM OF SHOULDER: CPT

## 2019-01-23 PROCEDURE — 99285 EMERGENCY DEPT VISIT HI MDM: CPT

## 2019-01-23 PROCEDURE — 73590 X-RAY EXAM OF LOWER LEG: CPT

## 2019-01-23 PROCEDURE — 99222 1ST HOSP IP/OBS MODERATE 55: CPT | Performed by: SURGERY

## 2019-01-23 PROCEDURE — 82330 ASSAY OF CALCIUM: CPT

## 2019-01-23 PROCEDURE — 36415 COLL VENOUS BLD VENIPUNCTURE: CPT | Performed by: EMERGENCY MEDICINE

## 2019-01-23 PROCEDURE — 73070 X-RAY EXAM OF ELBOW: CPT

## 2019-01-23 RX ORDER — AMLODIPINE BESYLATE 10 MG/1
10 TABLET ORAL DAILY
COMMUNITY
End: 2019-02-11 | Stop reason: HOSPADM

## 2019-01-23 RX ORDER — LIDOCAINE 50 MG/G
1 PATCH TOPICAL DAILY
Status: COMPLETED | OUTPATIENT
Start: 2019-01-24 | End: 2019-01-24

## 2019-01-23 RX ORDER — ACETAMINOPHEN 325 MG/1
975 TABLET ORAL ONCE
Status: COMPLETED | OUTPATIENT
Start: 2019-01-23 | End: 2019-01-23

## 2019-01-23 RX ORDER — CALCIUM CARBONATE 500(1250)
1 TABLET ORAL
Status: DISCONTINUED | OUTPATIENT
Start: 2019-01-24 | End: 2019-02-02 | Stop reason: HOSPADM

## 2019-01-23 RX ORDER — METOLAZONE 5 MG/1
5 TABLET ORAL DAILY
Status: DISCONTINUED | OUTPATIENT
Start: 2019-01-24 | End: 2019-01-23

## 2019-01-23 RX ORDER — PRAZOSIN HYDROCHLORIDE 2 MG/1
2 CAPSULE ORAL EVERY MORNING
Status: DISCONTINUED | OUTPATIENT
Start: 2019-01-24 | End: 2019-01-28

## 2019-01-23 RX ORDER — FENTANYL CITRATE 50 UG/ML
25 INJECTION, SOLUTION INTRAMUSCULAR; INTRAVENOUS EVERY 2 HOUR PRN
Status: DISCONTINUED | OUTPATIENT
Start: 2019-01-23 | End: 2019-01-25

## 2019-01-23 RX ORDER — AMIODARONE HYDROCHLORIDE 200 MG/1
200 TABLET ORAL DAILY
Status: DISCONTINUED | OUTPATIENT
Start: 2019-01-24 | End: 2019-02-02 | Stop reason: HOSPADM

## 2019-01-23 RX ORDER — FLUTICASONE PROPIONATE 50 MCG
1 SPRAY, SUSPENSION (ML) NASAL 2 TIMES DAILY
Status: DISCONTINUED | OUTPATIENT
Start: 2019-01-23 | End: 2019-02-02 | Stop reason: HOSPADM

## 2019-01-23 RX ORDER — ACETAMINOPHEN 325 MG/1
975 TABLET ORAL EVERY 8 HOURS SCHEDULED
Status: DISCONTINUED | OUTPATIENT
Start: 2019-01-23 | End: 2019-02-02 | Stop reason: HOSPADM

## 2019-01-23 RX ORDER — ISOSORBIDE MONONITRATE 60 MG/1
60 TABLET, EXTENDED RELEASE ORAL DAILY
Status: DISCONTINUED | OUTPATIENT
Start: 2019-01-24 | End: 2019-01-28

## 2019-01-23 RX ORDER — TORSEMIDE 20 MG/1
60 TABLET ORAL 2 TIMES DAILY
Status: DISCONTINUED | OUTPATIENT
Start: 2019-01-23 | End: 2019-01-23

## 2019-01-23 RX ORDER — CARVEDILOL 3.12 MG/1
3.12 TABLET ORAL 2 TIMES DAILY WITH MEALS
Status: DISCONTINUED | OUTPATIENT
Start: 2019-01-24 | End: 2019-01-28

## 2019-01-23 RX ORDER — POTASSIUM CHLORIDE 20 MEQ/1
20 TABLET, EXTENDED RELEASE ORAL 2 TIMES DAILY
Status: DISCONTINUED | OUTPATIENT
Start: 2019-01-23 | End: 2019-01-27

## 2019-01-23 RX ORDER — AMLODIPINE BESYLATE 10 MG/1
10 TABLET ORAL DAILY
Status: DISCONTINUED | OUTPATIENT
Start: 2019-01-24 | End: 2019-01-28

## 2019-01-23 RX ORDER — NITROGLYCERIN 0.4 MG/1
0.4 TABLET SUBLINGUAL
Status: DISCONTINUED | OUTPATIENT
Start: 2019-01-23 | End: 2019-02-02 | Stop reason: HOSPADM

## 2019-01-23 RX ORDER — LISINOPRIL 10 MG/1
10 TABLET ORAL DAILY
Status: DISCONTINUED | OUTPATIENT
Start: 2019-01-24 | End: 2019-01-23

## 2019-01-23 RX ORDER — PRAVASTATIN SODIUM 40 MG
40 TABLET ORAL DAILY
Status: DISCONTINUED | OUTPATIENT
Start: 2019-01-24 | End: 2019-02-02 | Stop reason: HOSPADM

## 2019-01-23 RX ADMIN — PRAZOSIN HYDROCHLORIDE 5 MG: 2 CAPSULE ORAL at 22:13

## 2019-01-23 RX ADMIN — DICLOFENAC 2 G: 10 GEL TOPICAL at 22:15

## 2019-01-23 RX ADMIN — ACETAMINOPHEN 975 MG: 325 TABLET, FILM COATED ORAL at 13:22

## 2019-01-23 RX ADMIN — POTASSIUM CHLORIDE 20 MEQ: 1500 TABLET, EXTENDED RELEASE ORAL at 19:38

## 2019-01-23 RX ADMIN — TORSEMIDE 60 MG: 20 TABLET ORAL at 19:38

## 2019-01-23 RX ADMIN — ACETAMINOPHEN 975 MG: 325 TABLET ORAL at 22:24

## 2019-01-23 NOTE — ED ATTENDING ATTESTATION
Brian Snider MD, saw and evaluated the patient  I have discussed the patient with the resident/non-physician practitioner and agree with the resident's/non-physician practitioner's findings, Plan of Care, and MDM as documented in the resident's/non-physician practitioner's note, except where noted  All available labs and Radiology studies were reviewed  At this point I agree with the current assessment done in the Emergency Department  I have conducted an independent evaluation of this patient a history and physical is as follows: Pt presents for  NH and fell on kitchen Pt fell onto L arma and chest pt denies hitting head Pt co pain in L axilla and shoulder Pt family also stats pt has had increased sheppard and fatigue   PE: lert neck nontender heart reg lungs few rales bialt decreased bs bilat tender L chest wall laterally and l humerous no deformity from good nv MDM: will do ct and xrays of arm     Critical Care Time  CritCare Time    Procedures

## 2019-01-23 NOTE — ED PROVIDER NOTES
History  Chief Complaint   Patient presents with    Fall     fall this morning in the kitchen, c/o left shoulder pain and left "side" pain, pt is on eliquis but denies hitting head     HPI    25-year-old female pmhx CHF, AVR, ascending aortic aneurysm, Afib, HLD, HTN presents for evaluation s/p fall  Patient is coming from [facility] where she says she slipped in her kitchen and landed on the left side of her body  Patient was unable to get up on her own  On Eliquis but denies head trauma or LOC  Patient currently complains of left upper extremity pain from her shoulder down to her elbow  Patient says she is unable to move it  Patient also noted left-sided chest wall/axillary pain  Patient feels short of breath and tachypneic, worse when she takes a deep breath  Denies lower extremity pain  Patient normally ambulates with a walker  Patient denies  lightheadedness, headache, visual changes, neck pain, abdominal pain, nausea, vomiting, abdominal pain, diarrhea, dysuria or hematuria  Prior to Admission Medications   Prescriptions Last Dose Informant Patient Reported? Taking? Calcium Carbonate (CALTRATE 600 PO)  Self Yes Yes   Sig: Take 600 mg by mouth daily  KLOR-CON M20 20 MEQ tablet   No Yes   Sig: Take 1 tablet (20 mEq total) by mouth 2 (two) times a day   acetaminophen (TYLENOL) 325 mg tablet  Self Yes No   Sig: Take 650 mg by mouth every 6 (six) hours as needed for mild pain     amLODIPine (NORVASC) 10 mg tablet   Yes Yes   Sig: Take 10 mg by mouth daily   amiodarone 200 mg tablet  Self No Yes   Sig: Take 1 tablet (200 mg total) by mouth daily   apixaban (ELIQUIS) 5 mg 1/23/2019 at Unknown time Self No Yes   Sig: Take 1 tablet (5 mg total) by mouth 2 (two) times a day   carvedilol (COREG) 3 125 mg tablet  Self No Yes   Sig: Take 1 tablet (3 125 mg total) by mouth 2 (two) times a day with meals   diclofenac sodium (VOLTAREN) 1 %  Self No Yes   Sig: Apply 2 g topically 4 (four) times a day   econazole nitrate 1 % cream  Self No Yes   Sig: Apply topically 2 (two) times a day   fluticasone (FLONASE) 50 mcg/act nasal spray  Self Yes Yes   Si spray into each nostril 2 (two) times a day   isosorbide mononitrate (IMDUR) 60 mg 24 hr tablet  Self No Yes   Sig: Take 1 tablet (60 mg total) by mouth daily   metolazone (ZAROXOLYN) 2 5 mg tablet   No Yes   Sig: Take 2 tablets (5 mg total) by mouth daily Take 2 for the next 3 days and as directed take as needed for increased leg swelling (148lbs)   nitroglycerin (NITROSTAT) 0 4 mg SL tablet  Self No Yes   Sig: Place 1 tablet (0 4 mg total) under the tongue every 5 (five) minutes as needed for chest pain   pravastatin (PRAVACHOL) 40 mg tablet  Self No Yes   Sig: Take 1 tablet (40 mg total) by mouth daily   prazosin (MINIPRESS) 2 mg capsule  Self No Yes   Sig: Take 1 capsule (2 mg total) by mouth every morning   prazosin (MINIPRESS) 5 mg capsule  Self No Yes   Sig: Take 1 capsule (5 mg total) by mouth daily at bedtime   ramipril (ALTACE) 10 MG capsule   No Yes   Sig: Take 1 capsule (10 mg total) by mouth 2 (two) times a day   torsemide (DEMADEX) 20 mg tablet   No Yes   Sig: Take 3 tablets (60 mg total) by mouth 2 (two) times a day      Facility-Administered Medications: None       Past Medical History:   Diagnosis Date    Acid reflux disease     Anemia     Last assessed: 10/26/16    Aortic valve regurgitation, nonrheumatic     Ascending aortic aneurysm (HCC)     Last assessed: 13    Atrial fibrillation (HCC)     Hyperlipidemia     Hypertension 2016    Paroxysmal atrial fibrillation ([facility] [address] ) 2016       Past Surgical History:   Procedure Laterality Date    CARDIOVERSION      CHOLECYSTECTOMY      LAPAROSCOPIC SALPINGOOPHERECTOMY      TONSILLECTOMY         Family History   Problem Relation Age of Onset    No Known Problems Mother     Stomach cancer Father     Heart disease Sister         Cardiac Disorder    Breast cancer Sister     Colon cancer Brother     Cancer Brother      I have reviewed and agree with the history as documented  Social History   Substance Use Topics    Smoking status: Never Smoker    Smokeless tobacco: Never Used    Alcohol use No        Review of Systems   Constitutional: Negative for chills, diaphoresis, fatigue and fever  HENT: Negative for congestion, rhinorrhea, sore throat and trouble swallowing  Eyes: Negative for photophobia and visual disturbance  Respiratory: Positive for shortness of breath  Negative for cough and chest tightness  Cardiovascular: Positive for chest pain  Negative for palpitations  Gastrointestinal: Negative for abdominal pain, blood in stool, constipation, diarrhea, nausea and vomiting  Genitourinary: Negative for dysuria, frequency and hematuria  Musculoskeletal: Positive for back pain and myalgias  Negative for gait problem, neck pain and neck stiffness  Skin: Negative for pallor and rash  Neurological: Negative for weakness, light-headedness, numbness and headaches  Hematological: Negative for adenopathy  Does not bruise/bleed easily  All other systems reviewed and are negative  Physical Exam  ED Triage Vitals [01/23/19 1158]   Temperature Pulse Respirations Blood Pressure SpO2   97 7 °F (36 5 °C) 66 22 (!) 211/86 94 %      Temp src Heart Rate Source Patient Position - Orthostatic VS BP Location FiO2 (%)   -- Monitor Sitting Right arm --      Pain Score       Worst Possible Pain           Orthostatic Vital Signs  Vitals:    01/23/19 1814 01/23/19 1847 01/23/19 1848 01/23/19 2213   BP: (!) 193/77 129/52 129/52 170/50   Pulse: 60 66 67    Patient Position - Orthostatic VS: Lying          Physical Exam   Constitutional: She is oriented to person, place, and time  No distress  Patient is alert and oriented, appears uncomfortable   HENT:   Head: Normocephalic and atraumatic  Mouth/Throat: Oropharynx is clear and moist  No oropharyngeal exudate     No signs of head trauma Eyes: Pupils are equal, round, and reactive to light  Conjunctivae and EOM are normal    Neck: Normal range of motion  Neck supple  No cervical midline tenderness on palpation    Cardiovascular: Normal rate, regular rhythm, normal heart sounds and intact distal pulses  Pulmonary/Chest: Breath sounds normal  No respiratory distress  She has no wheezes  She exhibits tenderness  Appears to be splinting, hypoxic to 87% on room air, came up to 95% on 3L O2  Left sided chest wall tenderness on palpation   Abdominal: Soft  Bowel sounds are normal  She exhibits no distension  There is no tenderness  There is no guarding  Musculoskeletal: She exhibits tenderness  She exhibits no edema or deformity  No obvious deformity or swelling noted, unable to abduct or flex at shoulder due to pain, tender to palpation of left scapula and humeral head down shaft, no elbow/wrist/hand tenderness or swelling   Lymphadenopathy:     She has no cervical adenopathy  Neurological: She is alert and oriented to person, place, and time  No facial asymmetry noted, CN 2-12 intact, muscle strength 5/5 throughout, DTRs normal, sensation intact throughout   Skin: Skin is warm and dry  Capillary refill takes less than 2 seconds  No rash noted  She is not diaphoretic  No erythema  No pallor  Psychiatric: She has a normal mood and affect  Her behavior is normal  Judgment and thought content normal    Nursing note and vitals reviewed        ED Medications  Medications   amiodarone tablet 200 mg (not administered)   amLODIPine (NORVASC) tablet 10 mg (not administered)   calcium carbonate (OYSTER SHELL,OSCAL) 500 mg tablet 1 tablet (not administered)   carvedilol (COREG) tablet 3 125 mg (not administered)   diclofenac sodium (VOLTAREN) 1 % topical gel 2 g (2 g Topical Given 1/23/19 2215)   econazole nitrate 1 % cream (0 application Topical Hold 1/23/19 2108)   fluticasone (FLONASE) 50 mcg/act nasal spray 1 spray (0 sprays Nasal Hold 1/23/19 2108)   isosorbide mononitrate (IMDUR) 24 hr tablet 60 mg (not administered)   potassium chloride (K-DUR,KLOR-CON) CR tablet 20 mEq (20 mEq Oral Given 1/23/19 1938)   nitroglycerin (NITROSTAT) SL tablet 0 4 mg (not administered)   pravastatin (PRAVACHOL) tablet 40 mg (not administered)   prazosin (MINIPRESS) capsule 2 mg (not administered)   prazosin (MINIPRESS) capsule 5 mg (5 mg Oral Given 1/23/19 2213)   acetaminophen (TYLENOL) tablet 975 mg (975 mg Oral Given 1/23/19 2224)   lidocaine (LIDODERM) 5 % patch 1 patch (not administered)   fentanyl citrate (PF) 100 MCG/2ML 25 mcg (not administered)   acetaminophen (TYLENOL) tablet 975 mg (975 mg Oral Given 1/23/19 1322)       Diagnostic Studies  Results Reviewed     Procedure Component Value Units Date/Time    Troponin I [[id number]]  (Normal) Collected:  01/23/19 1354    Lab Status:  Final result Specimen:  Blood from Arm, Right Updated:  01/23/19 1447     Troponin I <0 02 ng/mL     Basic metabolic panel [[id number]]  (Abnormal) Collected:  01/23/19 1354    Lab Status:  Final result Specimen:  Blood from Arm, Right Updated:  01/23/19 1441     Sodium 138 mmol/L      Potassium 4 3 mmol/L      Chloride 102 mmol/L      CO2 32 mmol/L      ANION GAP 4 mmol/L      BUN 38 (H) mg/dL      Creatinine 1 51 (H) mg/dL      Glucose 105 mg/dL      Calcium 8 9 mg/dL      eGFR 31 ml/min/1 73sq m     Narrative:         National Kidney Disease Education Program recommendations are as follows:  GFR calculation is accurate only with a steady state creatinine  Chronic Kidney disease less than 60 ml/min/1 73 sq  meters  Kidney failure less than 15 ml/min/1 73 sq  meters      CBC and differential [[id number]]  (Abnormal) Collected:  01/23/19 1354    Lab Status:  Final result Specimen:  Blood from Arm, Right Updated:  01/23/19 1412     WBC 7 50 Thousand/uL      RBC 3 81 Million/uL      Hemoglobin 9 5 (L) g/dL      Hematocrit 33 3 (L) %      MCV 87 fL      MCH 24 9 (L) pg      MCHC 28 5 (L) g/dL RDW 19 2 (H) %      MPV 10 6 fL      Platelets 409 Thousands/uL      nRBC 0 /100 WBCs      Neutrophils Relative 86 (H) %      Immat GRANS % 1 %      Lymphocytes Relative 6 (L) %      Monocytes Relative 6 %      Eosinophils Relative 1 %      Basophils Relative 0 %      Neutrophils Absolute 6 47 Thousands/µL      Immature Grans Absolute 0 05 Thousand/uL      Lymphocytes Absolute 0 43 (L) Thousands/µL      Monocytes Absolute 0 45 Thousand/µL      Eosinophils Absolute 0 08 Thousand/µL      Basophils Absolute 0 02 Thousands/µL                  CT head without contrast   Final Result by Arlon Lanes, MD (01/23 1619)      1  No acute intracranial CT abnormality  2   Approximately 1 3 cm dural base appearing ovoid soft tissue density along the right posterior inferior falx with associated peripheral calcification, and minimal mass effect upon adjacent right occipital gyrus  This most likely represent a    meningioma and can be further assessed with MRI if clinically indicated  3   Age-related cerebral atrophy and chronic white matter microangiopathy  Workstation performed: [id number]         CT cervical spine without contrast   Final Result by Arlon Lanes, MD (01/23 1619)      1  No acute osseous injury in the cervical spine  2   Thyroid nodules, please refer to prior dedicated ultrasound on 4/15/2014  Workstation performed: [id number]         CT chest without contrast   Final Result by Flower Bhatti MD (01/23 1607)      1  Cardiomegaly with moderate water density pericardial effusion   2  Ascending thoracic aortic aneurysm, which has increased in size since 2015 (5 7 cm versus 5 0 cm)   3  Left scapular fracture, and fractures of the left 3rd, 4th and 5th ribs  4  Left basilar subsegmental atelectasis  No pneumothorax or hemothorax  I personally discussed this study with DR Joao Matthew N on 1/23/2019 at 3:59 PM                Workstation performed: [id number]         XR ribs with pa chest min 3 views LEFT   Final Result by Artemio Davila MD (01/23 1604)      1  Left-sided rib fractures, involving the 3rd 4th and 5th ribs and potentially nondisplaced fractures of the 9th and 10th ribs as well  2  Enlargement of cardiac silhouette   3  Left scapular fracture            Workstation performed: [id number]         XR elbow 2 vw left   Final Result by Artemio Davila MD (01/23 1550)      No acute osseous abnormality  Workstation performed: [id number]         XR shoulder 2+ views LEFT   Final Result by Artemio Davila MD (01/23 1549)      Slightly displaced fracture of the left scapular body  Workstation performed: [id number]         XR pelvis ap only 1 or 2 vw    (Results Pending)   XR tibia fibula 2 vw left    (Results Pending)   XR chest pa & lateral    (Results Pending)         Procedures  Procedures      Phone Consults  ED Phone Contact    ED Course  ED Course as of Jan 23 2304   Wed Jan 23, 2019   1552 Xray left shoulder: Slightly displaced fracture of the left scapular body  1719 1  Cardiomegaly with moderate water density pericardial effusion  2  Ascending thoracic aortic aneurysm, which has increased in size since 2015 (5 7 cm versus 5 0 cm)  3  Left scapular fracture, and fractures of the left 3rd, 4th and 5th ribs  4  Left basilar subsegmental atelectasis   No pneumothorax or hemothorax  Identification of Seniors at Risk      Most Recent Value   (ISAR) Identification of Seniors at Risk   Before the illness or injury that brought you to the Emergency, did you need someone to help you on a regular basis? 1 Filed at: 01/23/2019 1159   In the last 24 hours, have you needed more help than usual?  1 Filed at: 01/23/2019 1159   Have you been hospitalized for one or more nights during the past 6 months?   0 Filed at: 01/23/2019 1159   In general, do you see well?  0 Filed at: 01/23/2019 1159   In general, do you have serious problems with your memory? 0 Filed at: 01/23/2019 1159   Do you take more than three different medications every day?   1 Filed at: 01/23/2019 1158   ISAR Score  3 Filed at: 01/23/2019 1159                          [facility]  Number of Diagnoses or Management Options  Closed fracture of left scapula, unspecified part of scapula, initial encounter:   Multiple fractures of ribs, left side, initial encounter for closed fracture:   Diagnosis management comments: Impression:  71-year-old female presents for evaluation status post fall  Ddx:  Rib fractures, left upper extremity fractures  Plan:  CT chest, CT head/neck, x-rays of left upper extremity and rib series, patient only requests Tylenol for pain    CritCare Time    Disposition  Final diagnoses:   Multiple fractures of ribs, left side, initial encounter for closed fracture   Closed fracture of left scapula, unspecified part of scapula, initial encounter     Time reflects when diagnosis was documented in both MDM as applicable and the Disposition within this note     Time User Action Codes Description Comment    1/23/2019  5:25 PM Francisca Resendiz Add [S22 42XA] Multiple fractures of ribs, left side, initial encounter for closed fracture     1/23/2019  5:53 PM Indra Murrell Add [S42 102A] Closed fracture of left scapula, unspecified part of scapula, initial encounter     1/23/2019  5:53 PM Kristian Murrell Modify [S42 102A] Closed fracture of left scapula, unspecified part of scapula, initial encounter       ED Disposition     None      Follow-up Information    None         Current Discharge Medication List      CONTINUE these medications which have NOT CHANGED    Details   amiodarone 200 mg tablet Take 1 tablet (200 mg total) by mouth daily  Qty: 90 tablet, Refills: 3    Associated Diagnoses: Atrial fibrillation, unspecified type (HCC)      amLODIPine (NORVASC) 10 mg tablet Take 10 mg by mouth daily      apixaban (ELIQUIS) 5 mg Take 1 tablet (5 mg total) by mouth 2 (two) times a day  Qty: 180 tablet, Refills: 1    Associated Diagnoses: Atrial fibrillation, unspecified type ([facility])      Calcium Carbonate (CALTRATE 600 PO) Take 600 mg by mouth daily  carvedilol (COREG) 3 125 mg tablet Take 1 tablet (3 125 mg total) by mouth 2 (two) times a day with meals  Qty: 180 tablet, Refills: 3    Associated Diagnoses: Ascending aortic aneurysm ([facility] [address] ); 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 ([facility])      diclofenac sodium (VOLTAREN) 1 % Apply 2 g topically 4 (four) times a day  Qty: 100 g, Refills: 1    Associated Diagnoses: Primary osteoarthritis of both knees      econazole nitrate 1 % cream Apply topically 2 (two) times a day  Qty: 85 g, Refills: 1    Associated Diagnoses: Candidal intertrigo      fluticasone (FLONASE) 50 mcg/act nasal spray 1 spray into each nostril 2 (two) times a day      isosorbide mononitrate (IMDUR) 60 mg 24 hr tablet Take 1 tablet (60 mg total) by mouth daily  Qty: 90 tablet, Refills: 3    Associated Diagnoses: Essential hypertension      KLOR-CON M20 20 MEQ tablet Take 1 tablet (20 mEq total) by mouth 2 (two) times a day  Qty: 180 tablet, Refills: 0    Associated Diagnoses: Hypokalemia      metolazone (ZAROXOLYN) 2 5 mg tablet Take 2 tablets (5 mg total) by mouth daily Take 2 for the next 3 days and as directed take as needed for increased leg swelling (148lbs)  Qty: 180 tablet, Refills: 1    Associated Diagnoses: [facility] diastolic congestive [facility] ); Aortic valve regurgitation, nonrheumatic      nitroglycerin (NITROSTAT) 0 4 mg SL tablet Place 1 tablet (0 4 mg total) under the tongue every 5 (five) minutes as needed for chest pain  Qty: 30 tablet, Refills: 0    Associated Diagnoses: Angina pectoris with documented spasm ([facility])      pravastatin (PRAVACHOL) 40 mg tablet Take 1 tablet (40 mg total) by mouth daily  Qty: 90 tablet, Refills: 3    Associated Diagnoses: Mixed hyperlipidemia      ! ! prazosin (MINIPRESS) 2 mg capsule Take 1 capsule (2 mg total) by mouth every morning  Qty: 90 capsule, Refills: 3    Associated Diagnoses: Hypertension, unspecified type      !! 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 - Potential duplicate medications found  Please discuss with provider  No discharge procedures on file  ED Provider  Attending physically available and evaluated Db Carmencita  USMAN managed the patient along with the ED Attending      Electronically Signed by         Ena Pollock MD  01/23/19 7421

## 2019-01-23 NOTE — H&P
H&P Exam - Trauma   Malka Vaughan 80 y o  female MRN: [id number]  [facility] Encounter: [id number]    Assessment/Plan   Trauma Alert: Evaluation  Model of Arrival: Self  Trauma Team: Attending Ethyl Dicker and Residents Handy  Consultants: Orthopaedics:    Time Called routine     Trauma Active Problems:   Fall  Multiple left rib fractures  Left scapular fracture   Hypoxia    Trauma Plan:   Admit to trauma   Respiratory protocol  Pain control  PT/OT    Chief Complaint: Chest wall pain     History of Present Illness   HPI:  Malka Vaughan is a 80 y o  female who presents with left chest wall pain and SOB after a mechanical slip and fall in her kitchen  She presented to the emergency department for worsening chest pain and SOB  She had a CT which demonstrated acute left sided rib fractures and left scapular fracture  She denies headstrike, loc, numbness weakness tingling     Mechanism:Fall    Review of Systems   Constitutional: Negative for appetite change, chills, fatigue and fever  HENT: Negative for sneezing and sore throat  Eyes: Negative for visual disturbance  Respiratory: Positive for shortness of breath  Negative for cough, choking, chest tightness and wheezing  Cardiovascular: Negative for chest pain and palpitations  Gastrointestinal: Negative for abdominal pain, constipation, diarrhea, nausea and vomiting  Genitourinary: Negative for difficulty urinating and dysuria  Musculoskeletal: Positive for arthralgias and myalgias  Neurological: Negative for dizziness, weakness, light-headedness, numbness and headaches  All other systems reviewed and are negative        Historical Information        Past Medical History:   Diagnosis Date    Acid reflux disease     Anemia     Last assessed: 10/26/16    Aortic valve regurgitation, nonrheumatic     Ascending aortic aneurysm (HCC)     Last assessed: 6/5/13    Atrial fibrillation ([facility] [address] )     Hyperlipidemia     Hypertension 2/17/2016    Paroxysmal atrial fibrillation ([facility] [address] ) 2/17/2016     Past Surgical History:   Procedure Laterality Date    CARDIOVERSION      CHOLECYSTECTOMY      LAPAROSCOPIC SALPINGOOPHERECTOMY      TONSILLECTOMY       Social History   History   Alcohol Use No     History   Drug Use No     History   Smoking Status    Never Smoker   Smokeless Tobacco    Never Used     Immunization History   Administered Date(s) Administered    Influenza 10/04/2018    Influenza Split High Dose Preservative Free IM 10/07/2015    Influenza TIV (IM) 10/01/2014, 10/11/2017    Pneumococcal Conjugate 13-Valent 04/23/2015     Last Tetanus: n/a  Family History: Non-contributory  Unable to obtain/limited by none      Meds/Allergies   all current active meds have been reviewed    Allergies   Allergen Reactions    Aspirin     Percocet [Oxycodone-Acetaminophen]          PHYSICAL EXAM    PE limited by: none    Objective   Vitals:   First set: Temperature: 97 7 °F (36 5 °C) (01/23/19 1158)  Pulse: 66 (01/23/19 1158)  Respirations: 22 (01/23/19 1158)  Blood Pressure: (!) 211/86 (01/23/19 1158)    Primary Survey:   (A) Airway: intact   (B) Breathing: bs equal b/l   (C) Circulation: Pulses:   carotid  2/4, pedal  2/4 and radial  2/4  (D) Disabliity:  GCS Total:  15  (E) Expose:  Completed    Secondary Survey: (Click on Physical Exam tab above)  Physical Exam   Constitutional: She is oriented to person, place, and time  She appears well-developed and well-nourished  No distress  HENT:   Head: Normocephalic and atraumatic  Eyes: Pupils are equal, round, and reactive to light  Right eye exhibits no discharge  Left eye exhibits no discharge  Neck: Normal range of motion  No JVD present  No tracheal deviation present  Cardiovascular: Normal rate  Exam reveals no gallop and no friction rub  No murmur heard  Pulmonary/Chest: Effort normal  No respiratory distress  She has no wheezes  She has no rales  Chest wall tenderness   Abdominal: Soft   Bowel sounds are normal  She exhibits no distension  There is no tenderness  Musculoskeletal: Normal range of motion  She exhibits no edema or deformity  Neurological: She is alert and oriented to person, place, and time  She displays normal reflexes  No cranial nerve deficit  She exhibits normal muscle tone  Coordination normal    Skin: Skin is warm and dry  No rash noted  She is not diaphoretic  No erythema  Nursing note and vitals reviewed  Invasive Devices     Peripheral Intravenous Line            Peripheral IV 01/23/19 Right Antecubital less than 1 day                Lab Results:   BMP/CMP:   Lab Results   Component Value Date    SODIUM 138 01/23/2019    K 4 3 01/23/2019     01/23/2019    CO2 32 01/23/2019    BUN 38 (H) 01/23/2019    CREATININE 1 51 (H) 01/23/2019    CALCIUM 8 9 01/23/2019    EGFR 31 01/23/2019     Imaging/EKG Studies: Results: I have personally reviewed pertinent reports      Other Studies: n/a     Code Status: Level 1 - Full Code  Advance Directive and Living Will: Yes    Power of :    POLST:

## 2019-01-23 NOTE — CONSULTS
Orthopedics   Benji Mendoza 80 y o  female MRN: [id number]  Unit/Bed#: [facility]      Chief Complaint:   left shoulder pain    HPI:  80 y o  RHD female who lives at [facility] s/p fall complaining of left shoulder pain  Patient was in her kitchen and slipped when she landed on her left side of her body  Pain is from her shoulder to her elbow  She did fall and injure her left shoulder 2 years ago but no history of surgeries  No other complaints currently  No numbness or tingling     Review Of Systems:   · Skin: Ecchymoses over left shoulder  · Neuro: See HPI  · Musculoskeletal: See HPI  · 14 point review of systems negative except as stated above     Past Medical History:   Past Medical History:   Diagnosis Date    Acid reflux disease     Anemia     Last assessed: 10/26/16    Aortic valve regurgitation, nonrheumatic     Ascending aortic aneurysm (HCC)     Last assessed: 6/5/13    Atrial fibrillation ([facility] )     Hyperlipidemia     Hypertension 2/17/2016    Paroxysmal atrial fibrillation ([facility] ) 2/17/2016       Past Surgical History:   Past Surgical History:   Procedure Laterality Date    CARDIOVERSION      CHOLECYSTECTOMY      LAPAROSCOPIC SALPINGOOPHERECTOMY      TONSILLECTOMY         Family History:  Family history reviewed and non-contributory  Family History   Problem Relation Age of Onset    No Known Problems Mother     Stomach cancer Father     Heart disease Sister         Cardiac Disorder    Breast cancer Sister     Colon cancer Brother     Cancer Brother        Social History:  Social History     Social History    Marital status:       Spouse name: N/A    Number of children: N/A    Years of education: N/A     Social History Main Topics    Smoking status: Never Smoker    Smokeless tobacco: Never Used    Alcohol use No    Drug use: No    Sexual activity: No     Other Topics Concern    None     Social History Narrative    Assistive devices: walker       Allergies: Allergies   Allergen Reactions    Aspirin     Percocet [Oxycodone-Acetaminophen]            Labs:    0  Lab Value Date/Time   HCT 33 3 (L) 01/23/2019 1354   HCT 35 3 04/06/2017 1035   HCT 31 3 (L) 04/01/2016 0646   HCT 38 6 09/21/2015 1212   HCT 38 6 09/13/2015 0818   HGB 9 5 (L) 01/23/2019 1354   HGB 10 7 (L) 04/06/2017 1035   HGB 9 2 (L) 04/01/2016 0646   HGB 12 5 09/21/2015 1212   HGB 12 7 09/13/2015 0818   INR 1 97 (H) 03/31/2016 0835   INR 1 03 09/21/2015 1212   WBC 7 50 01/23/2019 1354   WBC 5 68 04/06/2017 1035   WBC 5 39 04/01/2016 0646   WBC 6 23 09/21/2015 1212   WBC 9 14 09/13/2015 0818       Meds:    Current Facility-Administered Medications:     acetaminophen (TYLENOL) tablet 975 mg, 975 mg, Oral, Q8H [address], Chloé Davila MD    fentanyl citrate (PF) 100 MCG/2ML 25 mcg, 25 mcg, Intravenous, Q2H PRN, MD Cristiane Victoria  [START ON 1/24/2019] lidocaine (LIDODERM) 5 % patch 1 patch, 1 patch, Topical, Daily, Chloé Davila MD    Current Outpatient Prescriptions:     amiodarone 200 mg tablet, Take 1 tablet (200 mg total) by mouth daily, Disp: 90 tablet, Rfl: 3    amLODIPine (NORVASC) 10 mg tablet, Take 10 mg by mouth daily, Disp: , Rfl:     apixaban (ELIQUIS) 5 mg, Take 1 tablet (5 mg total) by mouth 2 (two) times a day, Disp: 180 tablet, Rfl: 1    Calcium Carbonate (CALTRATE 600 PO), Take 600 mg by mouth daily  , Disp: , Rfl:     carvedilol (COREG) 3 125 mg tablet, Take 1 tablet (3 125 mg total) by mouth 2 (two) times a day with meals, Disp: 180 tablet, Rfl: 3    diclofenac sodium (VOLTAREN) 1 %, Apply 2 g topically 4 (four) times a day, Disp: 100 g, Rfl: 1    econazole nitrate 1 % cream, Apply topically 2 (two) times a day, Disp: 85 g, Rfl: 1    fluticasone (FLONASE) 50 mcg/act nasal spray, 1 spray into each nostril 2 (two) times a day, Disp: , Rfl:     isosorbide mononitrate (IMDUR) 60 mg 24 hr tablet, Take 1 tablet (60 mg total) by mouth daily, Disp: 90 tablet, Rfl: 3    KLOR-CON M20 20 MEQ tablet, Take 1 tablet (20 mEq total) by mouth 2 (two) times a day, Disp: 180 tablet, Rfl: 0    metolazone (ZAROXOLYN) 2 5 mg tablet, Take 2 tablets (5 mg total) by mouth daily Take 2 for the next 3 days and as directed take as needed for increased leg swelling (148lbs), Disp: 180 tablet, Rfl: 1    nitroglycerin (NITROSTAT) 0 4 mg SL tablet, Place 1 tablet (0 4 mg total) under the tongue every 5 (five) minutes as needed for chest pain, Disp: 30 tablet, Rfl: 0    pravastatin (PRAVACHOL) 40 mg tablet, Take 1 tablet (40 mg total) by mouth daily, Disp: 90 tablet, Rfl: 3    prazosin (MINIPRESS) 2 mg capsule, Take 1 capsule (2 mg total) by mouth every morning, Disp: 90 capsule, Rfl: 3    prazosin (MINIPRESS) 5 mg capsule, Take 1 capsule (5 mg total) by mouth daily at bedtime, Disp: 90 capsule, Rfl: 3    ramipril (ALTACE) 10 MG capsule, Take 1 capsule (10 mg total) by mouth 2 (two) times a day, Disp: 180 capsule, Rfl: 2    torsemide (DEMADEX) 20 mg tablet, Take 3 tablets (60 mg total) by mouth 2 (two) times a day, Disp: 540 tablet, Rfl: 1    acetaminophen (TYLENOL) 325 mg tablet, Take 650 mg by mouth every 6 (six) hours as needed for mild pain , Disp: , Rfl:     Blood Culture:   No results found for: BLOODCX    Wound Culture:   No results found for: WOUNDCULT    Ins and Outs:  No intake/output data recorded  Physical Exam:   BP (!) 193/77   Pulse 60   Temp 97 7 °F (36 5 °C)   Resp 18   Wt 63 2 kg (139 lb 5 3 oz)   SpO2 97%   BMI 27 21 kg/m²   Gen: Alert and oriented to person, place, time  HEENT: EOMI, eyes clear, moist mucus membranes, hearing intact  Respiratory: Bilateral chest rise  No audible wheezing found  Cardiovascular: No audible murmurs  Abdomen: soft  Musculoskeletal: left upper extremity  · Ecchymoses over lateral left shoulder  · TTP over posterior shoulder  · Painful range of motion  · Motor and sensory examination is grossly intact distally  · 2+ radial pulse    Radiology:    I personally reviewed the films  X-rays and CT of left shoulder shows left scapula fracture with glenohumeral arthritis  X-rays of left elbow show no acute fractures or dislocations    Assessment:  80 y  o female with  left scapula fracture    Plan:   · NWB LUE in sling  · PT/OT  · Pain Control  · Dispo: Ortho will follow      Gabino Higuera MD

## 2019-01-24 ENCOUNTER — APPOINTMENT (INPATIENT)
Dept: RADIOLOGY | Facility: HOSPITAL | Age: 84
DRG: 564 | End: 2019-01-24
Payer: MEDICARE

## 2019-01-24 ENCOUNTER — ANESTHESIA EVENT (OUTPATIENT)
Dept: INTERNAL MEDICINE CLINIC | Facility: SKILLED NURSING FACILITY | Age: 84
End: 2019-01-24

## 2019-01-24 LAB
ANION GAP SERPL CALCULATED.3IONS-SCNC: 6 MMOL/L (ref 4–13)
ATRIAL RATE: 68 BPM
BASE EXCESS BLDA CALC-SCNC: 7 MMOL/L (ref -2–3)
BUN SERPL-MCNC: 35 MG/DL (ref 5–25)
CA-I BLD-SCNC: 1.17 MMOL/L (ref 1.12–1.32)
CALCIUM SERPL-MCNC: 8.6 MG/DL (ref 8.3–10.1)
CHLORIDE SERPL-SCNC: 104 MMOL/L (ref 100–108)
CO2 SERPL-SCNC: 32 MMOL/L (ref 21–32)
CREAT SERPL-MCNC: 1.38 MG/DL (ref 0.6–1.3)
ERYTHROCYTE [DISTWIDTH] IN BLOOD BY AUTOMATED COUNT: 18.8 % (ref 11.6–15.1)
GFR SERPL CREATININE-BSD FRML MDRD: 34 ML/MIN/1.73SQ M
GLUCOSE SERPL-MCNC: 112 MG/DL (ref 65–140)
GLUCOSE SERPL-MCNC: 93 MG/DL (ref 65–140)
HCO3 BLDA-SCNC: 33.6 MMOL/L (ref 24–30)
HCT VFR BLD AUTO: 29.4 % (ref 34.8–46.1)
HCT VFR BLD CALC: 32 % (ref 34.8–46.1)
HGB BLD-MCNC: 8.3 G/DL (ref 11.5–15.4)
HGB BLDA-MCNC: 10.9 G/DL (ref 11.5–15.4)
MCH RBC QN AUTO: 25.1 PG (ref 26.8–34.3)
MCHC RBC AUTO-ENTMCNC: 28.2 G/DL (ref 31.4–37.4)
MCV RBC AUTO: 89 FL (ref 82–98)
P AXIS: 93 DEGREES
PCO2 BLD: 35 MMOL/L (ref 21–32)
PCO2 BLD: 57.3 MM HG (ref 42–50)
PH BLD: 7.38 [PH] (ref 7.3–7.4)
PLATELET # BLD AUTO: 156 THOUSANDS/UL (ref 149–390)
PMV BLD AUTO: 11.7 FL (ref 8.9–12.7)
PO2 BLD: 35 MM HG (ref 35–45)
POTASSIUM BLD-SCNC: 4.2 MMOL/L (ref 3.5–5.3)
POTASSIUM SERPL-SCNC: 4.5 MMOL/L (ref 3.5–5.3)
PR INTERVAL: 202 MS
QRS AXIS: 134 DEGREES
QRSD INTERVAL: 158 MS
QT INTERVAL: 484 MS
QTC INTERVAL: 514 MS
RBC # BLD AUTO: 3.31 MILLION/UL (ref 3.81–5.12)
SAO2 % BLD FROM PO2: 65 % (ref 95–98)
SODIUM BLD-SCNC: 142 MMOL/L (ref 136–145)
SODIUM SERPL-SCNC: 142 MMOL/L (ref 136–145)
SPECIMEN SOURCE: ABNORMAL
T WAVE AXIS: 38 DEGREES
VENTRICULAR RATE: 68 BPM
WBC # BLD AUTO: 6.17 THOUSAND/UL (ref 4.31–10.16)

## 2019-01-24 PROCEDURE — G8978 MOBILITY CURRENT STATUS: HCPCS

## 2019-01-24 PROCEDURE — G8988 SELF CARE GOAL STATUS: HCPCS

## 2019-01-24 PROCEDURE — 97163 PT EVAL HIGH COMPLEX 45 MIN: CPT

## 2019-01-24 PROCEDURE — 99232 SBSQ HOSP IP/OBS MODERATE 35: CPT | Performed by: EMERGENCY MEDICINE

## 2019-01-24 PROCEDURE — 85027 COMPLETE CBC AUTOMATED: CPT | Performed by: EMERGENCY MEDICINE

## 2019-01-24 PROCEDURE — 94760 N-INVAS EAR/PLS OXIMETRY 1: CPT

## 2019-01-24 PROCEDURE — 99232 SBSQ HOSP IP/OBS MODERATE 35: CPT | Performed by: SURGERY

## 2019-01-24 PROCEDURE — 97166 OT EVAL MOD COMPLEX 45 MIN: CPT

## 2019-01-24 PROCEDURE — 99222 1ST HOSP IP/OBS MODERATE 55: CPT | Performed by: ORTHOPAEDIC SURGERY

## 2019-01-24 PROCEDURE — G8987 SELF CARE CURRENT STATUS: HCPCS

## 2019-01-24 PROCEDURE — 71046 X-RAY EXAM CHEST 2 VIEWS: CPT

## 2019-01-24 PROCEDURE — G8979 MOBILITY GOAL STATUS: HCPCS

## 2019-01-24 PROCEDURE — 93010 ELECTROCARDIOGRAM REPORT: CPT | Performed by: INTERNAL MEDICINE

## 2019-01-24 PROCEDURE — 80048 BASIC METABOLIC PNL TOTAL CA: CPT | Performed by: EMERGENCY MEDICINE

## 2019-01-24 PROCEDURE — 23570 CLTX SCAPULAR FX W/O MNPJ: CPT | Performed by: ORTHOPAEDIC SURGERY

## 2019-01-24 RX ORDER — TORSEMIDE 20 MG/1
60 TABLET ORAL 2 TIMES DAILY
Status: DISCONTINUED | OUTPATIENT
Start: 2019-01-24 | End: 2019-01-27

## 2019-01-24 RX ORDER — SENNOSIDES 8.6 MG
1 TABLET ORAL
Status: DISCONTINUED | OUTPATIENT
Start: 2019-01-24 | End: 2019-02-02 | Stop reason: HOSPADM

## 2019-01-24 RX ADMIN — AMLODIPINE BESYLATE 10 MG: 10 TABLET ORAL at 09:19

## 2019-01-24 RX ADMIN — PRAZOSIN HYDROCHLORIDE 2 MG: 2 CAPSULE ORAL at 09:19

## 2019-01-24 RX ADMIN — POTASSIUM CHLORIDE 20 MEQ: 1500 TABLET, EXTENDED RELEASE ORAL at 17:58

## 2019-01-24 RX ADMIN — CARVEDILOL 3.12 MG: 3.12 TABLET, FILM COATED ORAL at 17:59

## 2019-01-24 RX ADMIN — Medication 1 TABLET: at 09:19

## 2019-01-24 RX ADMIN — DICLOFENAC 2 G: 10 GEL TOPICAL at 09:20

## 2019-01-24 RX ADMIN — POTASSIUM CHLORIDE 20 MEQ: 1500 TABLET, EXTENDED RELEASE ORAL at 09:19

## 2019-01-24 RX ADMIN — ACETAMINOPHEN 975 MG: 325 TABLET ORAL at 22:26

## 2019-01-24 RX ADMIN — TORSEMIDE 60 MG: 20 TABLET ORAL at 17:58

## 2019-01-24 RX ADMIN — TORSEMIDE 60 MG: 20 TABLET ORAL at 10:23

## 2019-01-24 RX ADMIN — DICLOFENAC 2 G: 10 GEL TOPICAL at 22:27

## 2019-01-24 RX ADMIN — CARVEDILOL 3.12 MG: 3.12 TABLET, FILM COATED ORAL at 09:19

## 2019-01-24 RX ADMIN — DICLOFENAC 2 G: 10 GEL TOPICAL at 17:58

## 2019-01-24 RX ADMIN — ACETAMINOPHEN 975 MG: 325 TABLET ORAL at 05:36

## 2019-01-24 RX ADMIN — STANDARDIZED SENNA CONCENTRATE 8.6 MG: 8.6 TABLET ORAL at 22:26

## 2019-01-24 RX ADMIN — PRAZOSIN HYDROCHLORIDE 5 MG: 2 CAPSULE ORAL at 22:27

## 2019-01-24 RX ADMIN — LIDOCAINE 1 PATCH: 50 PATCH CUTANEOUS at 09:20

## 2019-01-24 RX ADMIN — ACETAMINOPHEN 975 MG: 325 TABLET ORAL at 13:07

## 2019-01-24 RX ADMIN — AMIODARONE HYDROCHLORIDE 200 MG: 200 TABLET ORAL at 09:19

## 2019-01-24 RX ADMIN — ENOXAPARIN SODIUM 30 MG: 30 INJECTION SUBCUTANEOUS at 10:23

## 2019-01-24 RX ADMIN — PRAVASTATIN SODIUM 40 MG: 40 TABLET ORAL at 09:19

## 2019-01-24 RX ADMIN — FLUTICASONE PROPIONATE 1 SPRAY: 50 SPRAY, METERED NASAL at 18:00

## 2019-01-24 RX ADMIN — ISOSORBIDE MONONITRATE 60 MG: 60 TABLET, EXTENDED RELEASE ORAL at 09:19

## 2019-01-24 RX ADMIN — DICLOFENAC 2 G: 10 GEL TOPICAL at 11:12

## 2019-01-24 NOTE — OCCUPATIONAL THERAPY NOTE
[address]  Evaluation     Patient Name: Henny MORTON'S Date: 1/24/2019  Problem List  Patient Active Problem List   Diagnosis    Paroxysmal atrial fibrillation (HCC)    Essential hypertension    GERD (gastroesophageal reflux disease)    Other hyperlipidemia    Aortic valve regurgitation, nonrheumatic    Ascending aortic aneurysm (HCC)    Other chest pain    Lymphedema of both lower extremities    Chronic diastolic congestive heart failure (HCC)    Hematuria    Gait difficulty    Multiple rib fractures    Closed left scapular fracture     Past Medical History  Past Medical History:   Diagnosis Date    Acid reflux disease     Anemia     Last assessed: 10/26/16    Aortic valve regurgitation, nonrheumatic     Ascending aortic aneurysm ([facility] [address] )     Last assessed: 6/5/13    Atrial fibrillation ([facility] [address] )     Hyperlipidemia     Hypertension 2/17/2016    Paroxysmal atrial fibrillation ([facility] [address] ) 2/17/2016     Past Surgical History  Past Surgical History:   Procedure Laterality Date    CARDIOVERSION      CHOLECYSTECTOMY      LAPAROSCOPIC SALPINGOOPHERECTOMY      TONSILLECTOMY             01/24/19 0985   Note Type   Note type Eval only   Restrictions/Precautions   Weight Bearing Precautions Per Order Yes   LUE Weight Bearing Per Order NWB   Braces or Orthoses Sling   Other Precautions Cognitive; Chair Alarm;WBS; Telemetry; Fall Risk;Pain;O2   Pain Assessment   Pain Assessment 0-10   Pain Score Worst Possible Pain   Pain Type Acute pain;Surgical pain   Pain Location Arm   Pain Orientation Left   Pain Descriptors Aching;Discomfort   Patient's Stated Pain Goal No pain   Hospital Pain Intervention(s) Repositioned; Emotional support   Response to Interventions tolerated   Home Living   Type of Home Other (Comment)  ([address])   Home Layout One level;Elevator   Bathroom Shower/Tub Walk-in shower   Bathroom Toilet Standard   Bathroom Equipment Grab bars in shower;Grab bars around toilet Home Equipment Walker   Additional Comments Pt reports living at [facility]; 1 floor apt w/ elevator access   Prior Function   Level of [place] Independent with ADLs and functional mobility; Needs assistance with IADLs   Lives With Facility staff   Receives Help From Family; Other (Comment)  (facility staff)   ADL Assistance Independent   IADLs Needs assistance   Falls in the last 6 months 1 to 4   Vocational Retired   Comments Pt reports being I w/ ADLS, has assist for IADLS( meals provided, dght does laundry), and Mod I w/ transfers/functional mobility PTA   Lifestyle   Autonomy I ADLS, assist IADLS, Mod I transfers/functional mobility PTA   Reciprocal Relationships Pt lives w/ facility staff   Service to Others Pt is retired   Intrinsic Gratification pt enjoys reading and TV   Psychosocial   Psychosocial (WDL) WDL   ADL   Eating Assistance 7  Independent   Grooming Assistance 4  Minimal Assistance   UB [address] 3  Moderate Assistance   LB [address] 2  Maximal [address] 3  Moderate Assistance    [address] 2  Maximal Assistance   LB Dressing Deficit Don/doff R sock; Don/doff L sock   Toileting Assistance  3  Moderate Assistance   Functional Assistance 2  Maximal Assistance   Functional Deficit Steadying;Supervision/safety; Increased time to complete  (Ax2)   Bed Mobility   Supine to Sit 3  Moderate assistance   Additional items Assist x 2;HOB elevated; Increased time required;Verbal cues;LE management   Sit to Supine Unable to assess   Additional Comments Pt went from supine to sit w/ Mod A x2, HOB elevated for assist, assist for LE management and UB support  Pt sat EOB w/ CTG for safety/balance  Transfers   Sit to Stand 3  Moderate assistance   Additional items Assist x 2; Increased time required;Verbal cues   Stand to Sit 3  Moderate assistance   Additional items Assist x 2; Increased time required;Verbal cues   Stand pivot 2  Maximal assistance Additional items Assist x 2; Increased time required;Verbal cues   Additional Comments Pt performed sit-stand from EOB w/ Mod A x2, VC for safety and proper technique, moderate force production into standing  Pt performed SPT from EOB to drop arm recliner w/ max A x2, HHA used (support for L UE in sling and VC to maintain WBS); pt required 2 seated rest breaks in between SPT; pt w/ difficulty advancing bilateral LE's   Functional Mobility   Functional Mobility 2  Maximal assistance   Additional Comments Pt required Max A x2, for few small steps for SPT from EOB to chair, HHA used, VC for safety   Additional items Hand hold assistance   Balance   Static Sitting Fair -   Static Standing Poor   Ambulatory Poor -   Activity Tolerance   Activity Tolerance Patient limited by fatigue;Patient limited by pain   Medical Staff Made Aware Charla GANN   Nurse Made Aware yes   RUE Assessment   RUE Assessment WFL   LUE Assessment   LUE Assessment X  (NWB in sling)   Hand Function   Gross Motor Coordination Functional   Fine Motor Coordination Functional   Cognition   Overall Cognitive Status WFL   Arousal/Participation Responsive; Cooperative   Attention Within functional limits   Orientation Level Oriented X4   Memory Decreased recall of precautions   Following Commands Follows one step commands without difficulty   Comments Pt is pleasant and cooperative; requires VC for safety and to maintain WBS in L UE; pt w/ increased anxiety during functional activities w/ reported fear of falling   Assessment   Limitation Decreased ADL status; Decreased Safe judgement during ADL;Decreased cognition;Decreased endurance;Decreased self-care trans;Decreased high-level ADLs   Prognosis Fair   Assessment Pt is a 79 y/o female seen for OT eval s/p adm to [facility] w/ L chest wall pain and SOB after a mechanical slip and fall in her kitchen  CT showed L sided rib fractures and L scapular fracture  Pt is NWB in L UE in sling   Pt is dx'd w/ multiple rib fractures  Comorbidities include a h/o acid reflux,anemia, aortic valve regurgitation, AAA, afib, HLD, HTN  Pt with active OT orders and up in chair orders  Pt lives with facility staff at [facility], 1 floor apt w/ elevator access  Pt was I w/  ADLS, has assist w/ IADLS (meals provided, dght does laundry), does not drive- dght transports, & required use of DME PTA including RW, and grab bars in shower  Pt is currently demonstrating the following occupational deficits: Mod A UB ADLS, Max A LB ADLS, Mod A x2 bed mobility and Max A x2 transfers, HHA, +VC for safety and to maintain WBS  These deficits that are impacting pt's baseline areas of occupation are a result of the following impairments: pain, endurance, activity tolerance, functional mobility, forward functional reach, balance, functional standing tolerance, unsupportive home environment, decreased I w/ ADLS/IADLS, strength, decreased safety awareness and decreased insight into deficits  The following Occupational Performance Areas to address include: eating, grooming, bathing/shower, toilet hygiene, dressing, socialization, health maintenance, functional mobility, clothing management and meal prep  Pt scored overall 35/100 on the Barthel Index  Based on the aforementioned OT evaluation, functional performance deficits, and assessments, pt has been identified as a moderate complexity evaluation  Recommend STR upon D/C when medically stable  Pt to continue to benefit from acute immediate OT services to address the following goals 3-5x/week to  w/in 7-10 days:    Goals   Patient Goals to be back in bed safely   LTG Time Frame 7-10   Long Term Goal #1 see below listed goals   Plan   Treatment Interventions ADL retraining;Functional transfer training;UE strengthening/ROM; Endurance training;Cognitive reorientation;Patient/family training;Equipment evaluation/education; Compensatory technique education;Continued evaluation; Energy conservation; Activityengagement   Goal Expiration Date 02/03/19   OT Frequency 3-5x/wk   Recommendation   OT Discharge Recommendation Short Term Rehab   Equipment Recommended Bedside commode   OT - OK to Discharge Yes  (when medically stable)   Barthel Index   Feeding 5   Bathing 0   Grooming Score 0   Dressing Score 5   Bladder Score 5   Bowels Score 10   Toilet Use Score 5   Transfers (Bed/Chair) Score 5   Mobility (Level Surface) Score 0   Stairs Score 0   Barthel Index Score 35   Modified [place] Scale   Modified [place] Scale 4        GOALS    1) Pt will improve activity tolerance to G for min 30 min txment sessions for increase engagement in functional tasks    2) Pt will complete UB/LB dressing/self care w/ S using adaptive device and DME as needed    3) Pt will complete bathing w/ S w/ use of AE and DME as needed    4) Pt will complete toileting w/ S w/ G hygiene/thoroughness using DME as needed    5) Pt will improve functional transfers to Min A  on/off all surfaces using DME as needed w/ G balance/safety     6) Pt will improve functional mobility during ADL/IADL/leisure tasks to Min A using DME as needed w/ G balance/safety     7) Pt will be attentive 100% of the time during ongoing cognitive assessment w/ G participation to assist w/ safe d/c planning/recommendations    8) Pt will demonstrate G carryover of pt/caregiver education/training, including maintaining WBS, as appropriate w/o cues w/ good tolerance to increase safety during functional tasks    9) Pt will demonstrate 100% carryover of energy conservation techniques t/o functional I/ADL/leisure tasks w/o cues s/p skilled education to increase endurance during functional tasks     Jason Will MS, OTR/L

## 2019-01-24 NOTE — DISCHARGE INSTRUCTIONS
Discharge Instructions - Orthopedics  Ale Esqueda 80 y o  female MRN: [id number]  Unit/Bed#: [facility] 604-01    Weight Bearing Status:                                           Non weight bearing left upper extremity in sling    Pain:  Continue analgesics as directed    Dressing Instructions:   Keep sling on at all times    PT/OT:  Continue PT/OT on outpatient basis as directed    Appt Instructions: If you do not have your appointment, please call the clinic at [phone] to f/u with Dr Geovanni Grover in 3 weeks    Contact the office sooner if you experience any increased numbness/tingling in the extremities        Miscellaneous:  none

## 2019-01-24 NOTE — PROGRESS NOTES
Subjective: No acute events overnight  No acute distress  Complaining of left shoulder and left chest wall pain  Objective:  Lab Results   Component Value Date/Time    WBC 7 50 01/23/2019 01:54 PM    WBC 6 23 09/21/2015 12:12 PM    HGB 9 5 (L) 01/23/2019 01:54 PM    HGB 12 5 09/21/2015 12:12 PM       Vitals:    01/23/19 2213   BP: 170/50   Pulse: 61   Resp: 18   Temp: 98 2 °F (36 8 °C)   SpO2:      LUE extremity  Tenderness to palpation over posterior scapula  Sensation intact to light touch in axillary, median, radial, ulnar nerve distributions  Motor intact to ain, pin, ulnar nerve  Fingers warm well perfused  Sling in place    Assessment:  80year-old status post fall with left scapular fracture    Plan: Nonweightbearing left upper extremity in sling  Pain control  PT  Follow-up as an outpatient in 2 weeks  will sign off    Please call with questions

## 2019-01-24 NOTE — PROGRESS NOTES
Progress Note - Marcus Momin 9/29/1930, 80 y o  female MRN: [id number]    Unit/Bed#: [facility] 604-01 Encounter: [id number]    Primary Care Provider: Sonny Paulino MD   Date and time admitted to hospital: 1/23/2019 11:46 AM        Closed left scapular fracture   Assessment & Plan    Ortho consulted  Nonoperative at this time  Pain control  Nonweightbearing left upper extremity in sling  PT OT eval     Chronic diastolic congestive heart failure ([address] )   Assessment & Plan    Restart home medications     Essential hypertension   Assessment & Plan    Restart home medications     Paroxysmal atrial fibrillation (HCC)   Assessment & Plan    Heart rate monitoring  Restart home medications     * Multiple rib fractures   Assessment & Plan    T3, 4, 5, 9, 10 rib fracture  Rib fracture protocol  Pain control  Pulmonary toilet                 Subjective/Objective   Chief Complaint:  Left shoulder pain    Subjective:  Patient states that she is having extreme left shoulder and rib pain  I informed the patient of the plan was to talk to the acute pain service to adjust her medications  We discussed continued use of her incentive spirometer  Objective:     Meds/Allergies   Prescriptions Prior to Admission   Medication Sig Dispense Refill Last Dose    amiodarone 200 mg tablet Take 1 tablet (200 mg total) by mouth daily 90 tablet 3 Taking    amLODIPine (NORVASC) 10 mg tablet Take 10 mg by mouth daily       apixaban (ELIQUIS) 5 mg Take 1 tablet (5 mg total) by mouth 2 (two) times a day 180 tablet 1 1/23/2019 at Unknown time    Calcium Carbonate (CALTRATE 600 PO) Take 600 mg by mouth daily     Taking    carvedilol (COREG) 3 125 mg tablet Take 1 tablet (3 125 mg total) by mouth 2 (two) times a day with meals 180 tablet 3 Taking    diclofenac sodium (VOLTAREN) 1 % Apply 2 g topically 4 (four) times a day 100 g 1 Taking    econazole nitrate 1 % cream Apply topically 2 (two) times a day 85 g 1 Taking    fluticasone (FLONASE) 50 mcg/act nasal spray 1 spray into each nostril 2 (two) times a day   Taking    isosorbide mononitrate (IMDUR) 60 mg 24 hr tablet Take 1 tablet (60 mg total) by mouth daily 90 tablet 3 Taking    KLOR-CON M20 20 MEQ tablet Take 1 tablet (20 mEq total) by mouth 2 (two) times a day 180 tablet 0     metolazone (ZAROXOLYN) 2 5 mg tablet Take 2 tablets (5 mg total) by mouth daily Take 2 for the next 3 days and as directed take as needed for increased leg swelling (148lbs) 180 tablet 1     nitroglycerin (NITROSTAT) 0 4 mg SL tablet Place 1 tablet (0 4 mg total) under the tongue every 5 (five) minutes as needed for chest pain 30 tablet 0 Taking    pravastatin (PRAVACHOL) 40 mg tablet Take 1 tablet (40 mg total) by mouth daily 90 tablet 3 Taking    prazosin (MINIPRESS) 2 mg capsule Take 1 capsule (2 mg total) by mouth every morning 90 capsule 3 Taking    prazosin (MINIPRESS) 5 mg capsule Take 1 capsule (5 mg total) by mouth daily at bedtime 90 capsule 3 Taking    ramipril (ALTACE) 10 MG capsule Take 1 capsule (10 mg total) by mouth 2 (two) times a day 180 capsule 2     torsemide (DEMADEX) 20 mg tablet Take 3 tablets (60 mg total) by mouth 2 (two) times a day 540 tablet 1     acetaminophen (TYLENOL) 325 mg tablet Take 650 mg by mouth every 6 (six) hours as needed for mild pain  Taking       Vitals: Blood pressure (!) 180/50, pulse 61, temperature 98 2 °F (36 8 °C), resp  rate 18, weight 63 2 kg (139 lb 5 3 oz), SpO2 95 %  Body mass index is 27 21 kg/m²   SpO2: SpO2: 95 %    ABG: No results found for: PHART, OUF3IHS, PO2ART, UVT8BNL, K4JNOAUB, BEART, SOURCE      Intake/Output Summary (Last 24 hours) at 01/24/19 0837  Last data filed at 01/24/19 9246   Gross per 24 hour   Intake              100 ml   Output              550 ml   Net             -450 ml       Invasive Devices     Peripheral Intravenous Line            Peripheral IV 01/23/19 Right Antecubital less than 1 day          Drain            External Urinary Catheter less than 1 day                Nutrition/GI Proph/Bowel Reg:  Regular house diet, senna    Physical Exam:   GENERAL APPEARANCE:  Well-developed well-nourished elderly female sitting upright in bed in no acute distress  HEENT:  Normocephalic atraumatic pupils equal round reactive to light  Extraocular muscles intact  Neck is supple with full range of motion no JVD or tracheal deviation noted  CV:  Regular rate  Irregularly irregular rhythm  S1 and S2 appropriate  Systolic murmur noted  LUNGS:  Breath sounds equal bilaterally  Severely diminished in all lung fields with rhonchi noted  Normal inspiratory effort with no acute distress  ABD:  Soft, nontender nondistended  EXT:  Left upper extremity in a sling  Appropriate pulses and sensation in all 4 extremities  NEURO:  GCS 15  Alert and oriented x3  No focal deficits on exam  SKIN:  Warm and dry without evidence of erythema or rash  Significant ecchymosis over shoulder and left ribs      Lab Results:   BMP/CMP:   Lab Results   Component Value Date    SODIUM 142 01/24/2019    K 4 5 01/24/2019     01/24/2019    CO2 32 01/24/2019    CO2 35 (H) 01/23/2019    BUN 35 (H) 01/24/2019    CREATININE 1 38 (H) 01/24/2019    GLUCOSE 112 01/23/2019    CALCIUM 8 6 01/24/2019    EGFR 34 01/24/2019    and CBC:   Lab Results   Component Value Date    WBC 6 17 01/24/2019    HGB 8 3 (L) 01/24/2019    HCT 29 4 (L) 01/24/2019    MCV 89 01/24/2019     01/24/2019    MCH 25 1 (L) 01/24/2019    MCHC 28 2 (L) 01/24/2019    RDW 18 8 (H) 01/24/2019    MPV 11 7 01/24/2019    NRBC 0 01/23/2019     Imaging/EKG Studies: Results: I have personally reviewed pertinent films in PACS  Other Studies:  None  VTE Prophylaxis:  Lovenox and SCDs    Nursing rounds completed with nurse [facility]

## 2019-01-24 NOTE — ASSESSMENT & PLAN NOTE
Ortho consulted  Nonoperative at this time  Pain control  Nonweightbearing left upper extremity in sling  PT OT eval

## 2019-01-24 NOTE — CONSULTS
Consultation - Acute Pain Service   Jessica Perdue 80 y o  female MRN: [id number]  Unit/Bed#: [facility] 604-01 Encounter: [id number]               Assessment/Plan     Assessment:   80 yr old F s/p mechanical fall, sustained left scapular fracture and rib fractures T3-5, T9-10  Pt is opioid naiive patient  She had an reaction to IV narcotics in the past, with a prior surgery  She felt like "an elephant is sitting on my chest" after the surgery  She has been reserved to trial narcotics since  Prior anesthetic records show that she has tolerated tylenol, motrin, and fentanyl IV in the past      She pulls 250 cc off of incentive spirometry  Her left arm is in a sling  She is eager to get out of bed and ambulate  I discussed extensively with her and her daughter the option of a thoracic epidural for pain control  However, pt was noncommittal, asks me to refer to her PCP and cardiologist for regarding epidural placement  She is agreeable to trial a PO pain regimen  She also received lovenox today at 1023 am, prophylactic therapy and would not be a candidate for an epidural today  She normally takes tylenol for pain  She has voltaren gel for her knees, and is agreeable to placing it on her back and shoulders as well for pain relief  She is also ordered lidoderm patch  She is also ordered fentanyl IV PRN but has not received it  Discussed with her the option to trial oxycodone 2 5 mg PO for pain, with goals to get her out of bed and ambulate, and to provide better relief for pain that she can tolerate incentive spirometry better  She is also agreeable to ibuprofen if trauma service allows  Plan to trial PO pain regimen, and should patient's goals for Incentive spirometry not improving or pain not relieved, to discuss with patient again option for thoracic epdiural tomorrow       Plan:   - Continue scheduled tylenol   - continue voltaren gel and lidoderm patch  - order oxycodone 2 5 -5 mg PO Q 4 hrs PRN mod-severe pain  - order ibuprofen 200 mg PO Q 6 hrs PRN     recs communicated to patient, daughter, nursing staff, and trauma service (SUSAN Colmenares)    APS will continue to follow; please call  / [phone] ( [facility] [phone]) with any questions    History of Present Illness    Admit Date:  1/23/2019  Hospital Day:  1 day  Primary Service:  Trauma  Attending Provider:  Araceli Ryan MD  Reason for Consult / Principal Problem: acute rib fracture and shoulder pain, opioid naiive patient  Hx and PE limited by: none  HPI: Katie Champion is a 80y o  year old female who presents with (see above assessment section)    Inpatient consult to Acute Pain Service  Consult performed by: DRU ROMERO  Consult ordered by: Amarilys Leonard          Review of Systems    Historical Information   Past Medical History:   Diagnosis Date    Acid reflux disease     Anemia     Last assessed: 10/26/16    Aortic valve regurgitation, nonrheumatic     Ascending aortic aneurysm ([facility] [address] )     Last assessed: 6/5/13    Atrial fibrillation ([facility] [address] [facility]     Hyperlipidemia     Hypertension 2/17/2016    Paroxysmal atrial fibrillation ([facility] [address] ) 2/17/2016     Past Surgical History:   Procedure Laterality Date    CARDIOVERSION      CHOLECYSTECTOMY      LAPAROSCOPIC SALPINGOOPHERECTOMY      TONSILLECTOMY       Social History   History   Alcohol Use No     History   Drug Use No     History   Smoking Status    Never Smoker   Smokeless Tobacco    Never Used     Family History: non-contributory    Meds/Allergies   all current active meds have been reviewed, current meds:   Current Facility-Administered Medications   Medication Dose Route Frequency    acetaminophen (TYLENOL) tablet 975 mg  975 mg Oral Q8H [address]    amiodarone tablet 200 mg  200 mg Oral Daily    amLODIPine (NORVASC) tablet 10 mg  10 mg Oral Daily    calcium carbonate (OYSTER SHELL,OSCAL) 500 mg tablet 1 tablet  1 tablet Oral Daily With Breakfast    carvedilol (COREG) tablet 3 125 mg  3 125 mg Oral BID With Meals    diclofenac sodium (VOLTAREN) 1 % topical gel 2 g  2 g Topical 4x Daily    econazole nitrate 1 % cream   Topical BID    enoxaparin (LOVENOX) subcutaneous injection 30 mg  30 mg Subcutaneous Q24H [facility]    fentanyl citrate (PF) 100 MCG/2ML 25 mcg  25 mcg Intravenous Q2H PRN    fluticasone (FLONASE) 50 mcg/act nasal spray 1 spray  1 spray Nasal BID    isosorbide mononitrate (IMDUR) 24 hr tablet 60 mg  60 mg Oral Daily    lidocaine (LIDODERM) 5 % patch 1 patch  1 patch Topical Daily    nitroglycerin (NITROSTAT) SL tablet 0 4 mg  0 4 mg Sublingual Q5 Min PRN    potassium chloride (K-DUR,KLOR-CON) CR tablet 20 mEq  20 mEq Oral BID    pravastatin (PRAVACHOL) tablet 40 mg  40 mg Oral Daily    prazosin (MINIPRESS) capsule 2 mg  2 mg Oral QAM    prazosin (MINIPRESS) capsule 5 mg  5 mg Oral HS    senna (SENOKOT) tablet 8 6 mg  1 tablet Oral HS    torsemide (DEMADEX) tablet 60 mg  60 mg Oral BID    and PTA meds:   Prior to Admission Medications   Prescriptions Last Dose Informant Patient Reported? Taking? Calcium Carbonate (CALTRATE 600 PO)  Self Yes Yes   Sig: Take 600 mg by mouth daily  KLOR-CON M20 20 MEQ tablet   No Yes   Sig: Take 1 tablet (20 mEq total) by mouth 2 (two) times a day   acetaminophen (TYLENOL) 325 mg tablet  Self Yes No   Sig: Take 650 mg by mouth every 6 (six) hours as needed for mild pain     amLODIPine (NORVASC) 10 mg tablet   Yes Yes   Sig: Take 10 mg by mouth daily   amiodarone 200 mg tablet  Self No Yes   Sig: Take 1 tablet (200 mg total) by mouth daily   apixaban (ELIQUIS) 5 mg 1/23/2019 at Unknown time Self No Yes   Sig: Take 1 tablet (5 mg total) by mouth 2 (two) times a day   carvedilol (COREG) 3 125 mg tablet  Self No Yes   Sig: Take 1 tablet (3 125 mg total) by mouth 2 (two) times a day with meals   diclofenac sodium (VOLTAREN) 1 %  Self No Yes   Sig: Apply 2 g topically 4 (four) times a day   econazole nitrate 1 % cream  Self No Yes   Sig: Apply topically 2 (two) times a day   fluticasone (FLONASE) 50 mcg/act nasal spray  Self Yes Yes   Si spray into each nostril 2 (two) times a day   isosorbide mononitrate (IMDUR) 60 mg 24 hr tablet  Self No Yes   Sig: Take 1 tablet (60 mg total) by mouth daily   metolazone (ZAROXOLYN) 2 5 mg tablet   No Yes   Sig: Take 2 tablets (5 mg total) by mouth daily Take 2 for the next 3 days and as directed take as needed for increased leg swelling (148lbs)   nitroglycerin (NITROSTAT) 0 4 mg SL tablet  Self No Yes   Sig: Place 1 tablet (0 4 mg total) under the tongue every 5 (five) minutes as needed for chest pain   pravastatin (PRAVACHOL) 40 mg tablet  Self No Yes   Sig: Take 1 tablet (40 mg total) by mouth daily   prazosin (MINIPRESS) 2 mg capsule  Self No Yes   Sig: Take 1 capsule (2 mg total) by mouth every morning   prazosin (MINIPRESS) 5 mg capsule  Self No Yes   Sig: Take 1 capsule (5 mg total) by mouth daily at bedtime   ramipril (ALTACE) 10 MG capsule   No Yes   Sig: Take 1 capsule (10 mg total) by mouth 2 (two) times a day   torsemide (DEMADEX) 20 mg tablet   No Yes   Sig: Take 3 tablets (60 mg total) by mouth 2 (two) times a day      Facility-Administered Medications: None       Allergies   Allergen Reactions    Aspirin     Percocet [Oxycodone-Acetaminophen]        Objective   Temp:  [98 2 °F (36 8 °C)-99 °F (37 2 °C)] 98 2 °F (36 8 °C)  HR:  [58-67] 61  Resp:  [18-20] 18  BP: (129-197)/() 180/50    Intake/Output Summary (Last 24 hours) at 19 1308  Last data filed at 19 1300   Gross per 24 hour   Intake              520 ml   Output             1300 ml   Net             -780 ml       Physical Exam    Lab Results:   I have personally reviewed pertinent labs  , CBC:   Lab Results   Component Value Date    WBC 6 17 2019    HGB 8 3 (L) 2019    HCT 29 4 (L) 2019    MCV 89 2019     2019    MCH 25 1 (L) 2019    [facility] 28 2 (L) 2019    RDW 18 8 (H) 2019    MPV 11 7 01/24/2019    NRBC 0 01/23/2019   , CMP:   Lab Results   Component Value Date    SODIUM 142 01/24/2019    K 4 5 01/24/2019     01/24/2019    CO2 32 01/24/2019    CO2 35 (H) 01/23/2019    BUN 35 (H) 01/24/2019    CREATININE 1 38 (H) 01/24/2019    GLUCOSE 112 01/23/2019    CALCIUM 8 6 01/24/2019    EGFR 34 01/24/2019     Imaging Studies: I have personally reviewed pertinent reports  EKG, Pathology, and Other Studies: I have personally reviewed pertinent reports  Counseling / Coordination of Care  Total floor / unit time spent today Level 1 = 20 minutes  Greater than 50% of total time was spent with the patient and / or family counseling and / or coordination of care   A description of the counseling / coordination of care:

## 2019-01-24 NOTE — PHYSICAL THERAPY NOTE
Physical Therapy Evaluation     Patient's Name: Marcus Momin    Admitting Diagnosis  Closed fracture of left scapula, unspecified part of scapula, initial encounter [S42 102A]  Multiple fractures of ribs, left side, initial encounter for closed fracture [S22 42XA]  Unspecified multiple injuries, initial encounter [T07  XXXA]    Problem List  Patient Active Problem List   Diagnosis    Paroxysmal atrial fibrillation (HCC)    Essential hypertension    GERD (gastroesophageal reflux disease)    Other hyperlipidemia    Aortic valve regurgitation, nonrheumatic    Ascending aortic aneurysm (HCC)    Other chest pain    Lymphedema of both lower extremities    Chronic diastolic congestive heart failure (HCC)    Hematuria    Gait difficulty    Multiple rib fractures    Closed left scapular fracture       Past Medical History  Past Medical History:   Diagnosis Date    Acid reflux disease     Anemia     Last assessed: 10/26/16    Aortic valve regurgitation, nonrheumatic     Ascending aortic aneurysm ([facility] [address] )     Last assessed: 6/5/13    Atrial fibrillation ([facility] [address] )     Hyperlipidemia     Hypertension 2/17/2016    Paroxysmal atrial fibrillation ([facility] [address] ) 2/17/2016       Past Surgical History  Past Surgical History:   Procedure Laterality Date    CARDIOVERSION      CHOLECYSTECTOMY      LAPAROSCOPIC SALPINGOOPHERECTOMY      TONSILLECTOMY        01/24/19 2384   Note Type   Note type Eval only   Pain Assessment   Pain Assessment 0-10   Pain Score Worst Possible Pain   Pain Type Acute pain   Pain Location Shoulder   Pain Orientation Left   Effect of Pain on Daily Activities Fearful of mobility   Patient's Stated Pain Goal No pain   Hospital Pain Intervention(s) Repositioned; Ambulation/increased activity   Home Living   Type of Home Assisted living  ([address] [address])   Home Layout One level; Access   Home Equipment Walker   Prior Function   Level of [place] Independent with ADLs and functional mobility   Receives Help From Family   ADL Assistance Independent   IADLs Needs assistance   Falls in the last 6 months 1 to 4  (1 PTA)   Vocational Retired   Restrictions/Precautions   Wells [place] Bearing Precautions Per Order Yes  (Simultaneous filing  User may not have seen previous data )   LUE Weight Bearing Per Order NWB  (in sling)   Braces or Orthoses Sling  (LUE)   Other Precautions Cognitive; Chair Alarm;WBS; Telemetry; Fall Risk;Pain   General   Family/Caregiver Present No   Cognition   Overall Cognitive Status Impaired   Arousal/Participation Alert   Orientation Level Oriented X4   Following Commands Follows one step commands with increased time or repetition   Comments Pt is oriented but perseverates on fear of falling throughout session  Pt requires frequent cues for WB status of LUE  RLE Assessment   RLE Assessment (Minimally 3/5)   LLE Assessment   LLE Assessment (Minimally 3/5)   Bed Mobility   Supine to Sit 3  Moderate assistance   Additional items Assist x 2; Increased time required;Verbal cues;LE management   Transfers   Sit to Stand 3  Moderate assistance   Additional items Assist x 2; Increased time required;Verbal cues   Stand to Sit 3  Moderate assistance   Additional items Assist x 2; Increased time required;Verbal cues   Stand pivot 3  Moderate assistance   Additional items Assist x 2; Increased time required;Verbal cues   Balance   Static Sitting Fair   Dynamic Sitting Fair   Static Standing Poor +   Dynamic Standing Poor   Endurance Deficit   Endurance Deficit Yes   Endurance Deficit Description Fatigue, pain, fearful   Activity Tolerance   Activity Tolerance Patient limited by fatigue;Patient limited by pain   Medical Staff Made Aware OT   Nurse Made Aware Yes   Assessment   Prognosis Good   Problem List Decreased strength;Decreased endurance; Impaired balance;Decreased mobility; Decreased range of motion;Decreased coordination;Decreased cognition;Decreased safety awareness;Pain;Orthopedic restrictions   Assessment Pt is an 79 yo female presenting to B on 1/23/19 s/p fall  Pt found to have left scapula fracture and left sided rib fx  PMH is significant for: acid reflux, anema, aortic valve regurgitation, afib, HLD, HTN  Pt is NWB LUE in sling at all times  Pt is supine at start of session and agreeable to therapy  Pt presents with fear of falling throughout session, requiring frequent reassurance of safety from PT  Pt transferred supine <> sit with modA x2  Pt transferred sit <> stand with modA x2 and hand held assist  Upon achieving full stand, pt demonstrates difficulty advancing B/L LE despite no injury to LE  Pt transferred to bedside chair with stand pivot and modA x2, requiring x1 seated rest break  Pt remained seated in bedside chair with chair alarm in place and all needs within reach  RN and PCA aware of transfer technique  PT to recommend inpt rehab to maximize safety and independence with functional mobility and decrease risk for future falls  Barriers to Discharge Decreased caregiver support   Goals   Patient Goals To not fall   STG Expiration Date 02/03/19   Short Term Goal #1 In 10 sessions pt will: 1  Transfer supine <> sit with Elly  2  Transfer sit <> stand with Elly and LRAD  3  Perform LE ther-ex program  4  Ambulate >10 ft with Elly and LRAD  Treatment Day 0   Plan   Treatment/Interventions Functional transfer training;LE strengthening/ROM; Therapeutic exercise; Endurance training;Bed mobility;Gait training;Spoke to nursing;OT   PT Frequency (4-6x/wk)   Recommendation   Recommendation Post acute IP rehab   PT - OK to Discharge Yes  (To rehab when medically stable)   Modified Jude Scale   Modified [place] Scale 4   Barthel Index   Feeding 5   Bathing 0   Grooming Score 0   Dressing Score 5   Bladder Score 0   Bowels Score 10   Toilet Use Score 5   Transfers (Bed/Chair) Score 5   Mobility (Level Surface) Score 0   Stairs Score 0   Barthel Index Score 30       Iker Ravi, PT, DPT

## 2019-01-24 NOTE — PLAN OF CARE
Problem: OCCUPATIONAL THERAPY ADULT  Goal: Performs self-care activities at highest level of function for planned discharge setting  See evaluation for individualized goals  Treatment Interventions: ADL retraining, Functional transfer training, UE strengthening/ROM, Endurance training, Cognitive reorientation, Patient/family training, Equipment evaluation/education, Compensatory technique education, Continued evaluation, Energy conservation, Activityengagement  Equipment Recommended: Bedside commode       See flowsheet documentation for full assessment, interventions and recommendations  Limitation: Decreased ADL status, Decreased Safe judgement during ADL, Decreased cognition, Decreased endurance, Decreased self-care trans, Decreased high-level ADLs  Prognosis: Fair  Assessment: Pt is a 79 y/o female seen for OT eval s/p adm to [facility] w/ L chest wall pain and SOB after a mechanical slip and fall in her kitchen  CT showed L sided rib fractures and L scapular fracture  Pt is NWB in L UE in sling  Pt is dx'd w/ multiple rib fractures  Comorbidities include a h/o acid reflux,anemia, aortic valve regurgitation, AAA, afib, HLD, HTN  Pt with active OT orders and up in chair orders  Pt lives with facility staff at [facility], 1 floor apt w/ elevator access  Pt was I w/  ADLS, has assist w/ IADLS (meals provided, dght does laundry), does not drive- dght transports, & required use of DME PTA including RW, and grab bars in shower  Pt is currently demonstrating the following occupational deficits: Mod A UB ADLS, Max A LB ADLS, Mod A x2 bed mobility and Max A x2 transfers, HHA, +VC for safety and to maintain WBS   These deficits that are impacting pt's baseline areas of occupation are a result of the following impairments: pain, endurance, activity tolerance, functional mobility, forward functional reach, balance, functional standing tolerance, unsupportive home environment, decreased I w/ ADLS/IADLS, strength, decreased safety awareness and decreased insight into deficits  The following Occupational Performance Areas to address include: eating, grooming, bathing/shower, toilet hygiene, dressing, socialization, health maintenance, functional mobility, clothing management and meal prep  Pt scored overall 35/100 on the Barthel Index  Based on the aforementioned OT evaluation, functional performance deficits, and assessments, pt has been identified as a moderate complexity evaluation  Recommend STR upon D/C when medically stable   Pt to continue to benefit from acute immediate OT services to address the following goals 3-5x/week to  w/in 7-10 days:      OT Discharge Recommendation: Short Term Rehab  OT - OK to Discharge: Yes (when medically stable)      Comments: Rosario Berrios MS, OTR/L

## 2019-01-24 NOTE — UTILIZATION REVIEW
Initial Clinical Review    Admission: Date/Time/Statement: 1/23/19 @ 1757     Orders Placed This Encounter   Procedures    Inpatient Admission     Standing Status:   Standing     Number of Occurrences:   1     Order Specific Question:   Admitting Physician     Answer:   Tip Alonzo     Order Specific Question:   Level of Care     Answer:   Med Surg [16]     Order Specific Question:   Estimated length of stay     Answer:   More than 2 Midnights     Order Specific Question:   Certification     Answer:   I certify that inpatient services are medically necessary for this patient for a duration of greater than two midnights  See H&P and MD Progress Notes for additional information about the patient's course of treatment  ED: Date/Time/Mode of Arrival:   ED Arrival Information     Expected Arrival Acuity Means of Arrival Escorted By Service Admission Type    1/23/2019 1/23/2019 11:46 Urgent Ambulance [place] [facility] [facility] Tra[facility] Emergency    Arrival Complaint    fall        Chief Complaint:   Chief Complaint   Patient presents with    Fall     fall this morning in the kitchen, c/o left shoulder pain and left "side" pain, pt is on eliquis but denies hitting head     History of Illness: 54-year-old female pmhx CHF, AVR, ascending aortic aneurysm, Afib, HLD, HTN presents for evaluation s/p fall  Patient is coming from [facility] where she says she slipped in her kitchen and landed on the left side of her body  Patient was unable to get up on her own       ED Vital Signs:   ED Triage Vitals [01/23/19 1158]   Temperature Pulse Respirations Blood Pressure SpO2   97 7 °F (36 5 °C) 66 22 (!) 211/86 94 %      Temp src Heart Rate Source Patient Position - Orthostatic VS BP Location FiO2 (%)   -- Monitor Sitting Right arm --      Pain Score       Worst Possible Pain        Wt Readings from Last 1 Encounters:   01/23/19 63 2 kg (139 lb 5 3 oz)     O2 2L N/C 95%    Vital Signs (abnormal):   01/23/19 18:14:59  --  60  18   193/77  97 %  --  Lying   01/23/19 1730  --  66  20   188/76  98 %  --  --   01/23/19 1700  --  61  20   194/77  97 %  None (Room air)  Lying   01/23/19 1600  --  62  20   180/106           Pertinent Labs/Diagnostic Test Results:   Xray Left Shoulder - Slightly displaced fracture of the left scapular body  Xray Left Ribs and CXR - Left-sided rib fractures, involving the 3rd 4th and 5th ribs and potentially nondisplaced fractures of the 9th and 10th ribs as well  Enlargement of cardiac silhouette  CT Chest - Cardiomegaly with moderate water density pericardial effusion   Ascending thoracic aortic aneurysm, which has increased in size since 2015 (5 7 cm versus 5 0 cm)    Bun/Cr = 38/ 1 51  H/H = 8 3/29 4    ED Treatment:   Medication Administration from 01/23/2019 1146 to 01/23/2019 1833       Date/Time Order Dose Route Action     01/23/2019 1322 acetaminophen (TYLENOL) tablet 975 mg 975 mg Oral Given        Past Medical/Surgical History:    Active Ambulatory Problems     Diagnosis Date Noted    Paroxysmal atrial fibrillation ([facility] [address] [facility] 02/17/2016    Essential hypertension 02/17/2016    GERD (gastroesophageal reflux disease) 03/31/2016    Other hyperlipidemia 03/31/2016    Aortic valve regurgitation, nonrheumatic     Ascending aortic aneurysm (HCC)     Other chest pain 03/26/2018    Lymphedema of both lower extremities 03/26/2018    Chronic diastolic congestive heart failure ([address] ) 03/30/2018    Hematuria 07/25/2018    Gait difficulty 11/27/2018     Resolved Ambulatory Problems     Diagnosis Date Noted    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 ([facility] [address] ) 03/31/2016    Hypokalemia 03/31/2016     Past Medical History:   Diagnosis Date    Acid reflux disease     Anemia     Aortic valve regurgitation, nonrheumatic     Ascending aortic aneurysm (HCC)     Atrial fibrillation (HCC)     Hyperlipidemia     Hypertension 2/17/2016    Paroxysmal atrial fibrillation ([address] 2/17/2016     Admitting Diagnosis: Closed fracture of left scapula, unspecified part of scapula, initial encounter [S42 102A]  Multiple fractures of ribs, left side, initial encounter for closed fracture [S22 42XA]  Unspecified multiple injuries, initial encounter [T07  XXXA]     Age/Sex: 80 y o  female     Assessment/Plan:   80 y o  female who presents with left chest wall pain and SOB after a mechanical slip and fall in her kitchen  She presented to the emergency department for worsening chest pain and SOB  She had a CT which demonstrated acute left sided rib fractures and left scapular fracture       Trauma Active Problems:   Fall  Multiple left rib fractures  Left scapular fracture   Hypoxia     Trauma Plan:   Respiratory protocol  Pain control  PT/OT      Admission Orders:  Orthopedic Surgery cons  Acute Pain Service cons  PT/OT eval and treat    Scheduled Meds:   Current Facility-Administered Medications:  acetaminophen 975 mg Oral Q8H [facility]   amiodarone 200 mg Oral Daily   amLODIPine 10 mg Oral Daily   calcium carbonate 1 tablet Oral Daily With Breakfast   carvedilol 3 125 mg Oral BID With Meals   diclofenac sodium 2 g Topical 4x Daily   econazole nitrate  Topical BID   enoxaparin 30 mg Subcutaneous Q24H [facility]   fluticasone 1 spray Nasal BID   isosorbide mononitrate 60 mg Oral Daily   lidocaine 1 patch Topical Daily   potassium chloride 20 mEq Oral BID   pravastatin 40 mg Oral Daily   prazosin 2 mg Oral QAM   prazosin 5 mg Oral HS   senna 1 tablet Oral HS   torsemide 60 mg Oral BID     Continuous Infusions:    PRN Meds: fentanyl citrate (PF)    nitroglycerin

## 2019-01-24 NOTE — SOCIAL WORK
CM met with pt to discuss the role of CM  Pt lives in the [address] at [facility]  Pt doesn't drive but considers herself fully independent PTA  Pt owns a walker and grab bar  Pt's medications are through [facility]  Pt has a living will  Pt reports no hx of mental health or substance abuse  Pt was at [facility] for SNF in the past and would like to go there if she were to require rehab  Therapy will assess patient and CM will follow up  CM reviewed d/c planning process including the following: identifying help at home, patient preference for d/c planning needs, Discharge Lounge, [facility] Meds to Bed program, availability of treatment team to discuss questions or concerns patient and/or family may have regarding understanding medications and recognizing signs and symptoms once discharged  CM also encouraged patient to follow up with all recommended appointments after discharge  Patient advised of importance for patient and family to participate in managing patients medical well being

## 2019-01-25 ENCOUNTER — APPOINTMENT (INPATIENT)
Dept: RADIOLOGY | Facility: HOSPITAL | Age: 84
DRG: 564 | End: 2019-01-25
Payer: MEDICARE

## 2019-01-25 LAB
ANION GAP SERPL CALCULATED.3IONS-SCNC: 1 MMOL/L (ref 4–13)
BASOPHILS # BLD AUTO: 0.03 THOUSANDS/ΜL (ref 0–0.1)
BASOPHILS NFR BLD AUTO: 1 % (ref 0–1)
BUN SERPL-MCNC: 49 MG/DL (ref 5–25)
CALCIUM SERPL-MCNC: 8.3 MG/DL (ref 8.3–10.1)
CHLORIDE SERPL-SCNC: 104 MMOL/L (ref 100–108)
CO2 SERPL-SCNC: 33 MMOL/L (ref 21–32)
CREAT SERPL-MCNC: 1.78 MG/DL (ref 0.6–1.3)
EOSINOPHIL # BLD AUTO: 0.17 THOUSAND/ΜL (ref 0–0.61)
EOSINOPHIL NFR BLD AUTO: 3 % (ref 0–6)
ERYTHROCYTE [DISTWIDTH] IN BLOOD BY AUTOMATED COUNT: 18.8 % (ref 11.6–15.1)
GFR SERPL CREATININE-BSD FRML MDRD: 25 ML/MIN/1.73SQ M
GLUCOSE SERPL-MCNC: 162 MG/DL (ref 65–140)
HCT VFR BLD AUTO: 30.3 % (ref 34.8–46.1)
HGB BLD-MCNC: 8.4 G/DL (ref 11.5–15.4)
IMM GRANULOCYTES # BLD AUTO: 0.02 THOUSAND/UL (ref 0–0.2)
IMM GRANULOCYTES NFR BLD AUTO: 0 % (ref 0–2)
LYMPHOCYTES # BLD AUTO: 0.39 THOUSANDS/ΜL (ref 0.6–4.47)
LYMPHOCYTES NFR BLD AUTO: 7 % (ref 14–44)
MCH RBC QN AUTO: 24.9 PG (ref 26.8–34.3)
MCHC RBC AUTO-ENTMCNC: 27.7 G/DL (ref 31.4–37.4)
MCV RBC AUTO: 90 FL (ref 82–98)
MONOCYTES # BLD AUTO: 0.42 THOUSAND/ΜL (ref 0.17–1.22)
MONOCYTES NFR BLD AUTO: 8 % (ref 4–12)
NEUTROPHILS # BLD AUTO: 4.48 THOUSANDS/ΜL (ref 1.85–7.62)
NEUTS SEG NFR BLD AUTO: 81 % (ref 43–75)
NRBC BLD AUTO-RTO: 0 /100 WBCS
PLATELET # BLD AUTO: 149 THOUSANDS/UL (ref 149–390)
PMV BLD AUTO: 10.7 FL (ref 8.9–12.7)
POTASSIUM SERPL-SCNC: 5 MMOL/L (ref 3.5–5.3)
RBC # BLD AUTO: 3.37 MILLION/UL (ref 3.81–5.12)
SODIUM SERPL-SCNC: 138 MMOL/L (ref 136–145)
WBC # BLD AUTO: 5.51 THOUSAND/UL (ref 4.31–10.16)

## 2019-01-25 PROCEDURE — 97535 SELF CARE MNGMENT TRAINING: CPT

## 2019-01-25 PROCEDURE — 94760 N-INVAS EAR/PLS OXIMETRY 1: CPT

## 2019-01-25 PROCEDURE — 99232 SBSQ HOSP IP/OBS MODERATE 35: CPT | Performed by: PHYSICIAN ASSISTANT

## 2019-01-25 PROCEDURE — 85025 COMPLETE CBC W/AUTO DIFF WBC: CPT | Performed by: PHYSICIAN ASSISTANT

## 2019-01-25 PROCEDURE — 99223 1ST HOSP IP/OBS HIGH 75: CPT | Performed by: NURSE PRACTITIONER

## 2019-01-25 PROCEDURE — 80048 BASIC METABOLIC PNL TOTAL CA: CPT | Performed by: PHYSICIAN ASSISTANT

## 2019-01-25 PROCEDURE — 71045 X-RAY EXAM CHEST 1 VIEW: CPT

## 2019-01-25 RX ORDER — LISINOPRIL 20 MG/1
40 TABLET ORAL DAILY
Status: DISCONTINUED | OUTPATIENT
Start: 2019-01-25 | End: 2019-01-28

## 2019-01-25 RX ORDER — LIDOCAINE 50 MG/G
1 PATCH TOPICAL DAILY
Status: DISCONTINUED | OUTPATIENT
Start: 2019-01-25 | End: 2019-02-02 | Stop reason: HOSPADM

## 2019-01-25 RX ORDER — HYDROMORPHONE HCL/PF 1 MG/ML
0.2 SYRINGE (ML) INJECTION EVERY 4 HOURS PRN
Status: DISCONTINUED | OUTPATIENT
Start: 2019-01-25 | End: 2019-02-02 | Stop reason: HOSPADM

## 2019-01-25 RX ORDER — OXYCODONE HYDROCHLORIDE 5 MG/1
2.5 TABLET ORAL EVERY 4 HOURS PRN
Status: DISCONTINUED | OUTPATIENT
Start: 2019-01-25 | End: 2019-02-02 | Stop reason: HOSPADM

## 2019-01-25 RX ADMIN — ECONAZOLE NITRATE: 10 CREAM TOPICAL at 21:31

## 2019-01-25 RX ADMIN — AMIODARONE HYDROCHLORIDE 200 MG: 200 TABLET ORAL at 08:57

## 2019-01-25 RX ADMIN — PRAZOSIN HYDROCHLORIDE 5 MG: 2 CAPSULE ORAL at 21:21

## 2019-01-25 RX ADMIN — LIDOCAINE 1 PATCH: 50 PATCH CUTANEOUS at 10:55

## 2019-01-25 RX ADMIN — LISINOPRIL 40 MG: 20 TABLET ORAL at 12:33

## 2019-01-25 RX ADMIN — ENOXAPARIN SODIUM 30 MG: 30 INJECTION SUBCUTANEOUS at 08:59

## 2019-01-25 RX ADMIN — TORSEMIDE 60 MG: 20 TABLET ORAL at 08:57

## 2019-01-25 RX ADMIN — DICLOFENAC 2 G: 10 GEL TOPICAL at 13:55

## 2019-01-25 RX ADMIN — DICLOFENAC 2 G: 10 GEL TOPICAL at 21:34

## 2019-01-25 RX ADMIN — POTASSIUM CHLORIDE 20 MEQ: 1500 TABLET, EXTENDED RELEASE ORAL at 19:17

## 2019-01-25 RX ADMIN — ACETAMINOPHEN 975 MG: 325 TABLET ORAL at 21:30

## 2019-01-25 RX ADMIN — PRAVASTATIN SODIUM 40 MG: 40 TABLET ORAL at 09:00

## 2019-01-25 RX ADMIN — AMLODIPINE BESYLATE 10 MG: 10 TABLET ORAL at 08:58

## 2019-01-25 RX ADMIN — POTASSIUM CHLORIDE 20 MEQ: 1500 TABLET, EXTENDED RELEASE ORAL at 08:58

## 2019-01-25 RX ADMIN — STANDARDIZED SENNA CONCENTRATE 8.6 MG: 8.6 TABLET ORAL at 21:30

## 2019-01-25 RX ADMIN — Medication 1 TABLET: at 08:59

## 2019-01-25 RX ADMIN — PRAZOSIN HYDROCHLORIDE 2 MG: 2 CAPSULE ORAL at 09:10

## 2019-01-25 RX ADMIN — FLUTICASONE PROPIONATE 1 SPRAY: 50 SPRAY, METERED NASAL at 19:17

## 2019-01-25 RX ADMIN — DICLOFENAC 2 G: 10 GEL TOPICAL at 09:00

## 2019-01-25 RX ADMIN — ISOSORBIDE MONONITRATE 60 MG: 60 TABLET, EXTENDED RELEASE ORAL at 08:56

## 2019-01-25 RX ADMIN — DICLOFENAC 2 G: 10 GEL TOPICAL at 19:16

## 2019-01-25 RX ADMIN — OXYCODONE HYDROCHLORIDE 2.5 MG: 5 TABLET ORAL at 10:55

## 2019-01-25 RX ADMIN — ACETAMINOPHEN 975 MG: 325 TABLET ORAL at 06:13

## 2019-01-25 RX ADMIN — ACETAMINOPHEN 975 MG: 325 TABLET ORAL at 13:55

## 2019-01-25 RX ADMIN — TORSEMIDE 60 MG: 20 TABLET ORAL at 19:17

## 2019-01-25 NOTE — SOCIAL WORK
Updated notes sent to [facility] for determination  No d/c today but potentially a weekend d/c

## 2019-01-25 NOTE — PLAN OF CARE
Problem: OCCUPATIONAL THERAPY ADULT  Goal: Performs self-care activities at highest level of function for planned discharge setting  See evaluation for individualized goals  Treatment Interventions: ADL retraining, Functional transfer training, UE strengthening/ROM, Endurance training, Cognitive reorientation, Patient/family training, Equipment evaluation/education, Compensatory technique education, Continued evaluation, Energy conservation, Activityengagement  Equipment Recommended: Bedside commode       See flowsheet documentation for full assessment, interventions and recommendations  Outcome: Progressing  Limitation: Decreased ADL status, Decreased Safe judgement during ADL, Decreased cognition, Decreased endurance, Decreased self-care trans, Decreased high-level ADLs  Prognosis: Fair  Assessment: pt participated in pm ot session and was seen focusing on bathing, bedm obility, spt bed to chair and chair tofrom commode, standing balance  pt demsotnrates fear of falling during all transfers  pt did not offer many c/o pain in l shoulder / rib area with mobility  pt was able to use commode with asst with encouragement with asst for ai care  pt reports urgency of urine  pt requires max asst for lb sock donning and needed max asst to manage and readjust sling l ue  pt was premedicated prior to ot session  pt with improvement noted with transfer and mobility status        OT Discharge Recommendation: Short Term Rehab  OT - OK to Discharge: Yes (when medically stable)  La Strickland

## 2019-01-25 NOTE — PROGRESS NOTES
Progress Note - Inpatient Pain Management    Jurline Duty 80 y o  female MRN: [id number]  Unit/Bed#: [facility] 604-01 Encounter: [id number]      Assessment:   Principal Problem:    Multiple rib fractures  Active Problems:    Paroxysmal atrial fibrillation (HCC)    Essential hypertension    Chronic diastolic congestive heart failure (HCC)    Closed left scapular fracture      Plan/Recommendations:   Acute traumatic pain/rib fractures  · Tylenol 975mg Q8 hours scheduled  · Voltaren topical gel, 4 times a day to B/L knees, patient takes at home for arthritis  · Lidocaine patch to chest wall daily, on for 12 hours and off for 12 hours  · Discontinue IV Fentanyl   · Discontinue Oxycodone 5mg tablet for now  · Oxycodone 2 5mg Q4 hours PRN severe pain  · If patient is tolerating Oxycodone low dose without adverse effects and the patient is still reporting in pain, ok to increase to Oxycodone 5mg  · Dilaudid 0 2mg IV Q4 hours PRN breakthrough pain   · Hold off on the addition of gabapentin today; creatinine is 1 38  · If pain is persistent and renal function is stable, ok to add Gabapentin 100mg QHS this weekend    Bowel Management   · Senna at HS    IS <250ml, SPO2 100% on Oxygen via NC; unable to deep breathe  Unable to place epidural today, received Lovenox at 0859  Patient is agreeable to epidural placement  Hold tomorrow mornings dose of Lovenox/anticoagulation for possible epidural placement  If epidural is placed, Heparin is recommended for anticoagulation  Reviewed the above recommendations and treatment plan with primary care service  Pain History  Current pain location(s): left chest wall  Pain Scale:   0-10  Severity:  Severe at times  Quality: aching  24 hour history: Patients resting in bed, reporting left chest wall pain worse, with movement or deep breaths  States she is comfortable at rest  IS <250ml today  Unable to produce a cough or take a deep breath       Current Analgesic regimen:  Fentanyl IV zero    Meds/Allergies   all current active meds have been reviewed and current meds:   Current Facility-Administered Medications   Medication Dose Route Frequency    acetaminophen (TYLENOL) tablet 975 mg  975 mg Oral Q8H [facility] longterm    amiodarone tablet 200 mg  200 mg Oral Daily    amLODIPine (NORVASC) tablet 10 mg  10 mg Oral Daily    calcium carbonate (OYSTER SHELL,OSCAL) 500 mg tablet 1 tablet  1 tablet Oral Daily With Breakfast    carvedilol (COREG) tablet 3 125 mg  3 125 mg Oral BID With Meals    diclofenac sodium (VOLTAREN) 1 % topical gel 2 g  2 g Topical 4x Daily    econazole nitrate 1 % cream   Topical BID    fluticasone (FLONASE) 50 mcg/act nasal spray 1 spray  1 spray Nasal BID    HYDROmorphone (DILAUDID) injection 0 2 mg  0 2 mg Intravenous Q4H PRN    isosorbide mononitrate (IMDUR) 24 hr tablet 60 mg  60 mg Oral Daily    lidocaine (LIDODERM) 5 % patch 1 patch  1 patch Topical Daily    nitroglycerin (NITROSTAT) SL tablet 0 4 mg  0 4 mg Sublingual Q5 Min PRN    oxyCODONE (ROXICODONE) IR tablet 2 5 mg  2 5 mg Oral Q4H PRN    potassium chloride (K-DUR,KLOR-CON) CR tablet 20 mEq  20 mEq Oral BID    pravastatin (PRAVACHOL) tablet 40 mg  40 mg Oral Daily    prazosin (MINIPRESS) capsule 2 mg  2 mg Oral QAM    prazosin (MINIPRESS) capsule 5 mg  5 mg Oral HS    senna (SENOKOT) tablet 8 6 mg  1 tablet Oral HS    torsemide (DEMADEX) tablet 60 mg  60 mg Oral BID       Allergies   Allergen Reactions    Aspirin     Percocet [Oxycodone-Acetaminophen]        Objective     Temp:  [97 5 °F (36 4 °C)-97 9 °F (36 6 °C)] 97 5 °F (36 4 °C)  HR:  [44-56] 56  Resp:  [15-16] 16  BP: (152-185)/(42-58) 168/58      Intake/Output Summary (Last 24 hours) at 01/25/19 1035  Last data filed at 01/25/19 0612   Gross per 24 hour   Intake              230 ml   Output              550 ml   Net             -320 ml               Physical Exam: /58 (BP Location: Right arm)   Pulse 56   Temp 97 5 °F (36 4 °C)   Resp 16 Ht 5' 1" (1 549 m)   Wt 64 8 kg (142 lb 13 7 oz)   SpO2 100%   BMI 26 99 kg/m²     General Appearance:    Alert, cooperative, no distress   Neurological:   Oriented to person, place, and time   Head:    Normocephalic, without obvious abnormality, atraumatic   Eyes:    EOM's intact   Lungs:     Poor effort, decreased, respirations unlabored   Chest Wall:    No deformity   Abdomen:        Round    Heart:    Regular rate    Extremities:   Extremities intact sensation to light touch   Skin:   Skin warm and dry     Lab Results:   Results from last 7 days  Lab Units 01/24/19  0444   WBC Thousand/uL 6 17   HEMOGLOBIN g/dL 8 3*   HEMATOCRIT % 29 4*   PLATELETS Thousands/uL 156      Results from last 7 days  Lab Units 01/24/19  0444 01/23/19  1405   POTASSIUM mmol/L 4 5  --    CHLORIDE mmol/L 104  --    CO2 mmol/L 32  --    CO2, I-STAT mmol/L  --  35*   BUN mg/dL 35*  --    CREATININE mg/dL 1 38*  --    CALCIUM mg/dL 8 6  --    GLUCOSE, ISTAT mg/dl  --  112       Imaging Studies: I have personally reviewed pertinent reports  Counseling / Coordination of Care  Total floor / unit time spent today 15 minutes  Greater than 50% of total time was spent with the patient and / or family counseling and / or coordination of care   A description of the counseling / coordination of care: Reviewed plan of care and medications with patient, RN staff and primary care team     DAWN Mclaughlin  Acute Pain Service

## 2019-01-25 NOTE — ASSESSMENT & PLAN NOTE
-back on home meds-Imdur and amlodipine    Will resume home ramipril today, as Cr is at baseline    -follow-up with PCP

## 2019-01-25 NOTE — ASSESSMENT & PLAN NOTE
- Left 3, 4, 5, 9, 10 rib fractures  - Rib fracture protocol  - Pain control: appreciate APS input  Schedule tylenol and lidoderm patches, PO oxycodone 2 5 mg q4h prn with IV dilaudid 0 25 mg q4h prn for breakthrough  Will hold AM lovenox tomorrow and EDC will be placed then     - Pulmonary toilet/IS

## 2019-01-25 NOTE — ASSESSMENT & PLAN NOTE
-non operative management, nonweightbearing left upper extremity in sling  -follow-up with orthopedics as an outpatient, appreciate recommendations

## 2019-01-25 NOTE — INCIDENTAL FINDINGS
The following findings require follow up:  Radiographic finding   Finding: Visualized prevertebral and paraspinal soft tissue are unremarkable  Thyroid nodules noted, the largest in the right measures up to 1 1 cm  Incidental discovery of one or more thyroid nodule(s) measuring less than 1 5 cm and without suspicious features is noted in this patient who is above 28years old; according to guidelines published in the February 2015 white   paper on incidental thyroid nodules in the Journal of the [employer] of Radiology Wilber Pressley), no further evaluation is recommended  Approximately 1 3 cm dural base appearing ovoid soft tissue density along the right posterior inferior falx with associated peripheral calcification, and minimal mass effect upon adjacent right occipital gyrus  This most likely represent a   meningioma and can be further assessed with MRI if clinically indicated  Cardiomegaly with moderate water density pericardial effusion    Moderate-sized pericardial effusion    Ascending thoracic aortic aneurysm, which has increased in size since 2015 (5 7 cm versus 5 0 cm)     Follow up required: Family doctor   Follow up should be done within 2  week(s)

## 2019-01-25 NOTE — CONSULTS
Consultation - Geriatrics   Lloyd Chaidez 80 y o  female MRN: [id number]  [facility] Encounter: [id number]      Assessment/Plan  1  Ambulatory dysfunction/fall  Patient ambulates with a roller walker at baseline  PT/OT  Recommend inpatient rehab  Recommend vitamin D3 1000 International Units daily    2  Pain secondary to injury  Recommend geriatric pain protocol  Acetaminophen 975 mg p o  Q 8 hours scheduled  Oxycodone p r n  2 5 mg p o  Q 4 hours for moderate pain and 5 mg p o  P r n  Q 4 hours for severe pain  Monitor for constipation    3  Constipation secondary to pain meds  Agree with senna q h s  Will add MiraLax p r n     4  Urinary frequency  The patient states that secondary to Demadex  Recommend scheduled toileting q 2 hours  Recommend last dose of Demadex no later than 4 p m     5  Delirium precautions  Patient at risk to delirium secondary to age, hospitalization, pain, trauma, anemia  Provide frequent redirection, reorientation, distraction techniques  Avoid deliriogenic medications such as tramadol, benzodiazepines, anticholinergics,  Benadryl  Treat pain, See geriatric pain protocol  Monitor for constipation and urinary retention  Encourage early and frequent moblization, OOB  Encourage Hydration/ Nutrition  Implement sleep hygiene, limit night time interuptions, group activities      6  Cognitive screening  Alert orient x4  Mini cog 4/5  CT scan demonstrates mild microangiopathic changes  Recommend check vitamin B12, vitamin D, folate, TSH  Recommend follow-up at [facility] for formal comprehensive assessment and supportive resources    7   Fractures, multiple rib, left scapula  Trauma following  Continue incentive spirometry  Continue sling              History of Present Illness   Physician Requesting Consult: Alia Yates MD  Reason for Consult / Principal Problem: fall  Hx and PE limited by: n/a  HPI: Lloyd Chaidez is a 80y o  year old female who presents with fall  She has a past medical history of hypertension, proximal AFib, reflux  Patient lives at [facility] independent Living  She does not drive  She states at baseline she ambulates with a roller walker  However because she was in her kitchen she did not have her roller walker next to her  That she slipped and she fell  She denies any history of previous falls  She wears glasses, she is hard of hearing, and she wears upper dentures  She is alert and orient x4  Her mini cog eval was 4/5  She denies issues with insomnia, constipation  She complains of urinary frequency during the day secondary to medication  She plans on going to rehab after hospitalization with hopes of returning back to independent living  Consults    Review of Systems   Constitutional: Negative for fever and unexpected weight change  HENT: Positive for hearing loss  Negative for dental problem  Eyes: Positive for visual disturbance  Patient wears glasses   Respiratory: Negative for shortness of breath  Cardiovascular: Negative for chest pain  Gastrointestinal: Negative for constipation and diarrhea  Endocrine: Negative for polyuria  Genitourinary: Negative for urgency  Musculoskeletal: Positive for gait problem  Ambulates with a walker at baseline   Skin: Negative for rash  Neurological: Negative for light-headedness  Psychiatric/Behavioral: Negative for sleep disturbance         Historical Information   Past Medical History:   Diagnosis Date    Acid reflux disease     Anemia     Last assessed: 10/26/16    Aortic valve regurgitation, nonrheumatic     Ascending aortic aneurysm (HCC)     Last assessed: 6/5/13    Atrial fibrillation ([facility] [address] )     Hyperlipidemia     Hypertension 2/17/2016    Paroxysmal atrial fibrillation ([facility] [address] ) 2/17/2016     Past Surgical History:   Procedure Laterality Date    CARDIOVERSION      CHOLECYSTECTOMY      LAPAROSCOPIC SALPINGOOPHERECTOMY      TONSILLECTOMY       Social History   History   Alcohol Use No     History   Drug Use No     History   Smoking Status    Never Smoker   Smokeless Tobacco    Never Used         Family History: non-contributory    Meds/Allergies   Current meds:   Current Facility-Administered Medications   Medication Dose Route Frequency    acetaminophen (TYLENOL) tablet 975 mg  975 mg Oral Q8H [facility]    amiodarone tablet 200 mg  200 mg Oral Daily    amLODIPine (NORVASC) tablet 10 mg  10 mg Oral Daily    calcium carbonate (OYSTER SHELL,OSCAL) 500 mg tablet 1 tablet  1 tablet Oral Daily With Breakfast    carvedilol (COREG) tablet 3 125 mg  3 125 mg Oral BID With Meals    diclofenac sodium (VOLTAREN) 1 % topical gel 2 g  2 g Topical 4x Daily    econazole nitrate 1 % cream   Topical BID    fluticasone (FLONASE) 50 mcg/act nasal spray 1 spray  1 spray Nasal BID    HYDROmorphone (DILAUDID) injection 0 2 mg  0 2 mg Intravenous Q4H PRN    isosorbide mononitrate (IMDUR) 24 hr tablet 60 mg  60 mg Oral Daily    lidocaine (LIDODERM) 5 % patch 1 patch  1 patch Topical Daily    lisinopril (ZESTRIL) tablet 40 mg  40 mg Oral Daily    nitroglycerin (NITROSTAT) SL tablet 0 4 mg  0 4 mg Sublingual Q5 Min PRN    oxyCODONE (ROXICODONE) IR tablet 2 5 mg  2 5 mg Oral Q4H PRN    potassium chloride (K-DUR,KLOR-CON) CR tablet 20 mEq  20 mEq Oral BID    pravastatin (PRAVACHOL) tablet 40 mg  40 mg Oral Daily    prazosin (MINIPRESS) capsule 2 mg  2 mg Oral QAM    prazosin (MINIPRESS) capsule 5 mg  5 mg Oral HS    senna (SENOKOT) tablet 8 6 mg  1 tablet Oral HS    torsemide (DEMADEX) tablet 60 mg  60 mg Oral BID      Current PTA meds:  Prescriptions Prior to Admission   Medication    amiodarone 200 mg tablet    amLODIPine (NORVASC) 10 mg tablet    apixaban (ELIQUIS) 5 mg    Calcium Carbonate (CALTRATE 600 PO)    carvedilol (COREG) 3 125 mg tablet    diclofenac sodium (VOLTAREN) 1 %    econazole nitrate 1 % cream    fluticasone (FLONASE) 50 mcg/act nasal spray    isosorbide mononitrate (IMDUR) 60 mg 24 hr tablet    KLOR-CON M20 20 MEQ tablet    metolazone (ZAROXOLYN) 2 5 mg tablet    nitroglycerin (NITROSTAT) 0 4 mg SL tablet    pravastatin (PRAVACHOL) 40 mg tablet    prazosin (MINIPRESS) 2 mg capsule    prazosin (MINIPRESS) 5 mg capsule    ramipril (ALTACE) 10 MG capsule    torsemide (DEMADEX) 20 mg tablet    acetaminophen (TYLENOL) 325 mg tablet        Allergies   Allergen Reactions    Aspirin     Percocet [Oxycodone-Acetaminophen]        Objective   Vitals: Blood pressure 128/52, pulse 56, temperature 97 5 °F (36 4 °C), resp  rate 16, height 5' 1" (1 549 m), weight 64 8 kg (142 lb 13 7 oz), SpO2 100 %  ,Body mass index is 26 99 kg/m²  Physical Exam   Constitutional: She is oriented to person, place, and time  She appears well-developed and well-nourished  No distress  HENT:   Head: Normocephalic  Eyes: Right eye exhibits no discharge  Left eye exhibits no discharge  Neck: Normal range of motion  Cardiovascular: Normal rate, regular rhythm and normal heart sounds  Exam reveals no gallop and no friction rub  No murmur heard  Pulmonary/Chest: Effort normal and breath sounds normal  No respiratory distress  She has no wheezes  She has no rales  Abdominal: Soft  Bowel sounds are normal  She exhibits no distension  Musculoskeletal: Normal range of motion  Neurological: She is alert and oriented to person, place, and time  Skin: Skin is warm and dry  She is not diaphoretic  Psychiatric: She has a normal mood and affect  Nursing note and vitals reviewed        Lab Results:   Results from last 7 days  Lab Units 01/25/19  1153   WBC Thousand/uL 5 51   HEMOGLOBIN g/dL 8 4*   HEMATOCRIT % 30 3*   PLATELETS Thousands/uL 149        Results from last 7 days  Lab Units 01/25/19  1000  01/23/19  1405   POTASSIUM mmol/L 5 0  < >  --    CHLORIDE mmol/L 104  < >  --    CO2 mmol/L 33*  < >  --    CO2, I-STAT mmol/L  --   -- 35*   BUN mg/dL 49*  < >  --    CREATININE mg/dL 1 78*  < >  --    CALCIUM mg/dL 8 3  < >  --    GLUCOSE, ISTAT mg/dl  --   --  112   < > = values in this interval not displayed  Imaging Studies: I have personally reviewed pertinent reports  EKG, Pathology, and Other Studies: I have personally reviewed pertinent reports      VTE Prophylaxis: Sequential compression device (Venodyne)     Code Status: Level 1 - Full Code

## 2019-01-25 NOTE — OCCUPATIONAL THERAPY NOTE
Occupational Therapy Treatment Note:       01/25/19 1600   Restrictions/Precautions   LUE Weight Bearing Per Order NWB   Braces or Orthoses Sling   Pain Assessment   Pain Assessment 0-10   Pain Score 4   Pain Type Acute pain   Pain Location Shoulder   Pain Orientation Left   ADL   Grooming Assistance 5  Supervision/Setup   Grooming Comments face washing after set up   LB Bathing Assistance 2  Maximal Assistance   LB Bathing Deficit Perineal area; Buttocks   Toileting Assistance  2  Maximal Assistance   Toileting Deficit Clothing management down;Clothing management up;Perineal hygiene; Bedside commode   Transfers   Sit to Stand 3  Moderate assistance   Additional items Assist x 2   Stand to Sit 4  Minimal assistance   Additional items Assist x 2   Stand pivot 4  Minimal assistance   Additional items Assist x 2   Additional Comments pt was able to take steps to from bed commode and arm chair   Functional Mobility   Functional Mobility 4  Minimal assistance   Additional Comments asst x 2   Additional items Hand hold assistance   Cognition   Arousal/Participation Cooperative   Attention Within functional limits   Memory Decreased recall of precautions   Following Commands Follows one step commands with increased time or repetition   Activity Tolerance   Activity Tolerance Patient tolerated treatment well   Assessment   Assessment pt participated in pm ot session and was seen focusing on bathing, bedm obility, spt bed to chair and chair tofrom commode, standing balance  pt demsotnrates fear of falling during all transfers  pt did not offer many c/o pain in l shoulder / rib area with mobility  pt was able to use commode with asst with encouragement with asst for ai care  pt reports urgency of urine  pt requires max asst for lb sock donning and needed max asst to manage and readjust sling l ue  pt was premedicated prior to ot session  pt with improvement noted with transfer and mobility status      Plan   Treatment Interventions ADL retraining;Functional transfer training; Endurance training;Cognitive reorientation;Patient/family training;Equipment evaluation/education; Activityengagement   Goal Expiration Date 02/03/19   Treatment Day 1   OT Frequency 3-5x/wk   Recommendation   OT Discharge Recommendation Short Term Rehab   Barthel Index   Feeding 5   Bathing 0   Grooming Score 0   Dressing Score 5   Bladder Score 5   Bowels Score 10   Toilet Use Score 5   Transfers (Bed/Chair) Score 5   Mobility (Level Surface) Score 0   Stairs Score 0   Barthel Index Score 35   Modified Ogle Scale   Modified Ogle Scale 4   April A YOLA Crain

## 2019-01-25 NOTE — PHYSICIAN ADVISOR
Current patient class: Inpatient  The patient is currently on Hospital Day: 2 at [address] [place] [address]      The patient was admitted to the hospital at [phone] on 1/23/19 for the following diagnosis:  Closed fracture of left scapula, unspecified part of scapula, initial encounter [S42 102A]  Multiple fractures of ribs, left side, initial encounter for closed fracture [S22 42XA]  Unspecified multiple injuries, initial encounter [T07  XXXA]       There is documentation in the medical record of an expected length of stay of at least 2 midnights  The patient is therefore expected to satisfy the 2 midnight benchmark and given the 2 midnight presumption is appropriate for INPATIENT ADMISSION  Given this expectation of a satisfying stay, CMS instructs us that the patient is most often appropriate for inpatient admission under part A provided medical necessity is documented in the chart  After review of the relevant documentation, labs, vital signs and test results, the patient is appropriate for INPATIENT ADMISSION  Admission to the hospital as an inpatient is a complex decision making process which requires the practitioner to consider the patients presenting complaint, history and physical examination and all relevant testing  With this in mind, in this case, the patient was deemed appropriate for INPATIENT ADMISSION  After review of the documentation and testing available at the time of the admission I concur with this clinical determination of medical necessity  Rationale is as follows:     The patient is a 80 yrs old Female who presented to the ED at 1/23/2019 11:46 AM with a chief complaint of Fall (fall this morning in the kitchen, c/o left shoulder pain and left "side" pain, pt is on eliquis but denies hitting head)     Given the need for further hospitalization, and along with the documentation of medical necessity present in the chart, the patient is appropriate for inpatient admission  The patient is expected to satisfy the 2 midnight benchmark, and will require further acute medical care  The patient does have comorbid conditions which increases the risk for significant adverse outcome  Given this the patient is appropriate for inpatient admission  The patients vitals on arrival were ED Triage Vitals [01/23/19 1158]   Temperature Pulse Respirations Blood Pressure SpO2   97 7 °F (36 5 °C) 66 22 (!) 211/86 94 %      Temp src Heart Rate Source Patient Position - Orthostatic VS BP Location FiO2 (%)   -- Monitor Sitting Right arm --      Pain Score       Worst Possible Pain           Past Medical History:   Diagnosis Date    Acid reflux disease     Anemia     Last assessed: 10/26/16    Aortic valve regurgitation, nonrheumatic     Ascending aortic aneurysm (HCC)     Last assessed: 6/5/13    Atrial fibrillation ([facility] [address] )     Hyperlipidemia     Hypertension 2/17/2016    Paroxysmal atrial fibrillation ([facility] ) 2/17/2016     Past Surgical History:   Procedure Laterality Date    CARDIOVERSION      CHOLECYSTECTOMY      LAPAROSCOPIC SALPINGOOPHERECTOMY      TONSILLECTOMY             Consults have been placed to:   IP CONSULT TO TRAUMA SURGERY  IP CONSULT TO ORTHOPEDIC SURGERY  IP CONSULT TO CASE MANAGEMENT  IP CONSULT TO ACUTE PAIN SERVICE    Vitals:    01/24/19 2010 01/24/19 2022 01/24/19 2226 01/24/19 2231   BP:   (!) 185/51 (!) 185/51   BP Location:    Right arm   Pulse: (!) 50   (!) 54   Resp:    15   Temp:    97 7 °F (36 5 °C)   SpO2: 98% 99%  96%   Weight:           Most recent labs:    Recent Labs      01/23/19   1354   01/24/19   0444   WBC  7 50   --   6 17   HGB  9 5*   < >  8 3*   HCT  33 3*   < >  29 4*   PLT  164   --   156   K  4 3   --   4 5   CALCIUM  8 9   --   8 6   BUN  38*   --   35*   CREATININE  1 51*   --   1 38*   TROPONINI  <0 02   --    --     < > = values in this interval not displayed         Scheduled Meds:  Current Facility-Administered Medications:  acetaminophen 975 mg Oral Q8H [facility] Kiara Drake, MD   amiodarone 200 mg Oral Daily Kiara Noelle, MD   amLODIPine 10 mg Oral Daily Kiara Noelle, MD   calcium carbonate 1 tablet Oral Daily With Breakfast Kiara Noelle, MD   carvedilol 3 125 mg Oral BID With Meals Kiara Noelle, MD   diclofenac sodium 2 g Topical 4x Daily Kiara Noelle, MD   econazole nitrate  Topical BID Kiara Noelle, MD   enoxaparin 30 mg Subcutaneous Q24H [facility] Alden Palmer PA-C   fentanyl citrate (PF) 25 mcg Intravenous Q2H PRN Kiara Noelle, MD   fluticasone 1 spray Nasal BID Kiara Noelle, MD   isosorbide mononitrate 60 mg Oral Daily Kiara Noelle, MD   nitroglycerin 0 4 mg Sublingual Q5 Min PRN Kiara Drake, MD   potassium chloride 20 mEq Oral BID Kiara Noelle, MD   pravastatin 40 mg Oral Daily Kiara Noelle, MD   prazosin 2 mg Oral QAM Kiara Noelle, MD   prazosin 5 mg Oral HS Kiara Noelle, MD   senna 1 tablet Oral HS Neelima Blue PA-C   torsemide 60 mg Oral BID Alden Palmer PA-C     Continuous Infusions:   PRN Meds: fentanyl citrate (PF)    nitroglycerin    Surgical procedures (if appropriate):

## 2019-01-25 NOTE — PROGRESS NOTES
[facility]   [facility] Leader Aware/Contacted Leader contacted by phone   Psychosocial   Patient Behaviors/Mood Appropriate for situation; Sad   Ability to Express Feelings Able to express   Ability to Express Needs Able to express   Stress Factors   Patient Stress Factors Exhausted; Health changes   Family Stress Factors Health changes   Plan of Care   Ehsan Hardin will Follow (Contacted Aretha Tijerina who will visit)   Comments Reviewed information from treatment team, explored emotional needs and resources, facilitated story telling, listened empathetically, cultivated relationship of care and support, arranged for community clergy support, patient progressed toward acceptance   Assessment Completed by: Unit visit

## 2019-01-25 NOTE — ASSESSMENT & PLAN NOTE
-rate is well controlled, continue home Coreg and amiodarone  -home Eliquis is on hold due to need for epidural catheter placement tomorrow

## 2019-01-25 NOTE — PROGRESS NOTES
Progress Note - Donnette Holstein 9/29/1930, 80 y o  female MRN: [id number]    Unit/Bed#: [facility] 604-01 Encounter: [id number]    Primary Care Provider: Corinne Ferries, MD   Date and time admitted to hospital: 1/23/2019 11:46 AM        * Multiple rib fractures   Assessment & Plan    - Left 3, 4, 5, 9, 10 rib fractures  - Rib fracture protocol  - Pain control: appreciate APS input  Schedule tylenol and lidoderm patches, PO oxycodone 2 5 mg q4h prn with IV dilaudid 0 25 mg q4h prn for breakthrough  Will hold AM lovenox tomorrow and EDC will be placed then  - Pulmonary toilet/IS       Closed left scapular fracture   Assessment & Plan    -non operative management, nonweightbearing left upper extremity in sling  -follow-up with orthopedics as an outpatient, appreciate recommendations     Paroxysmal atrial fibrillation (HCC)   Assessment & Plan    -rate is well controlled, continue home Coreg and amiodarone  -home Eliquis is on hold due to need for epidural catheter placement tomorrow     Essential hypertension   Assessment & Plan    -back on home meds-Imdur and amlodipine  Will resume home ramipril today, as Cr is at baseline    -follow-up with PCP     Chronic diastolic congestive heart failure ([facility] [address] )   Assessment & Plan    - back on home coreg and torsemide       Prophylaxis: SCDs, Lovenox- given this morning but held for tomorrow due to [facility] placement (will place on SQH following EDC placement)  Disposition: Pt will d/c to SNF at [facility] likely early next week    Bedside nurse rounds completed with nurse Rashad Meredith  Patient aware of plan of care for the day  Her daughter was at bedside and updated as well  Chief Complaint:  I just can not move my left arm    Subjective:  Patient states she is having difficulty moving the left arm due to pain in her left chest wall from her rib fractures  She is more agreeable to having an epidural catheter placed    She feels that it is very difficult to take a deep breath due to pain     Objective:     Meds/Allergies     Current Facility-Administered Medications:     acetaminophen (TYLENOL) tablet 975 mg, 975 mg, Oral, Q8H [address], Capo Robertson MD, 975 mg at 01/25/19 3876    amiodarone tablet 200 mg, 200 mg, Oral, Daily, Capo Robertson MD, 200 mg at 01/25/19 0857    amLODIPine (NORVASC) tablet 10 mg, 10 mg, Oral, Daily, Capo Robertson MD, 10 mg at 01/25/19 0858    calcium carbonate (OYSTER SHELL,OSCAL) 500 mg tablet 1 tablet, 1 tablet, Oral, Daily With Breakfast, Capo Robertson MD, 1 tablet at 01/25/19 0859    carvedilol (COREG) tablet 3 125 mg, 3 125 mg, Oral, BID With Meals, Capo Robertson MD, 3 125 mg at 01/24/19 1759    diclofenac sodium (VOLTAREN) 1 % topical gel 2 g, 2 g, Topical, 4x Daily, Kathy Wang PA-C, 2 g at 01/25/19 0900    econazole nitrate 1 % cream, , Topical, BID, Capo Robertson MD, Stopped at 01/23/19 2108    fluticasone (FLONASE) 50 mcg/act nasal spray 1 spray, 1 spray, Nasal, BID, Capo Robertson MD, 1 spray at 01/24/19 1800    HYDROmorphone (DILAUDID) injection 0 2 mg, 0 2 mg, Intravenous, Q4H PRN, Kathy Wang PA-C    isosorbide mononitrate (IMDUR) 24 hr tablet 60 mg, 60 mg, Oral, Daily, Capo Robertson MD, 60 mg at 01/25/19 0856    lidocaine (LIDODERM) 5 % patch 1 patch, 1 patch, Topical, Daily, Kathy Wang PA-C    nitroglycerin (NITROSTAT) SL tablet 0 4 mg, 0 4 mg, Sublingual, Q5 Min PRN, Capo Robertson MD    oxyCODONE (ROXICODONE) IR tablet 2 5 mg, 2 5 mg, Oral, Q4H PRN, Kathy Wang PA-C    potassium chloride (K-DUR,KLOR-CON) CR tablet 20 mEq, 20 mEq, Oral, BID, Capo Robertson MD, 20 mEq at 01/25/19 7026    pravastatin (PRAVACHOL) tablet 40 mg, 40 mg, Oral, Daily, Capo Robertson MD, 40 mg at 01/25/19 0900    prazosin (MINIPRESS) capsule 2 mg, 2 mg, Oral, QAM, Capo Robertson MD, 2 mg at 01/25/19 0910    prazosin (MINIPRESS) capsule 5 mg, 5 mg, Oral, HS, Capo Robertson MD, 5 mg at 01/24/19 2227    senna (SENOKOT) tablet 8 6 mg, 1 tablet, Oral, HS, Wicho GAMBOA Mario Harris PA-C, 8 6 mg at 19 2226    torsemide (DEMADEX) tablet 60 mg, 60 mg, Oral, BID, Valente MOLLY Cortes, 60 mg at 19 4545    Vitals: Blood pressure 168/58, pulse 56, temperature 97 5 °F (36 4 °C), resp  rate 16, height 5' 1" (1 549 m), weight 64 8 kg (142 lb 13 7 oz), SpO2 100 %  Body mass index is 26 99 kg/m²  SpO2: SpO2: 100 %, SpO2 Device: O2 Device: Nasal cannula    ABG: No results found for: PHART, HZM3CKF, PO2ART, BSI9CHI, C7TBFMGT, BEART, SOURCE      Intake/Output Summary (Last 24 hours) at 19 1059  Last data filed at 19 0133   Gross per 24 hour   Intake              230 ml   Output              550 ml   Net             -320 ml       Invasive Devices     Peripheral Intravenous Line            Peripheral IV 19 Right Antecubital 1 day          Drain            External Urinary Catheter 1 day                          Nutrition/GI Proph/Bowel Re g sodium diet    Physical Exam:   GENERAL APPEARANCE:  No acute distress  HEENT:  Normocephalic, atraumatic  CV:  Regular rate and rhythm, no murmurs gallops or rubs  LUNGS:  Clear to auscultation bilaterally; +pulling 200 mL on incentive spirometer  ABD:  Soft, nontender, nondistended  EXT:  +left upper extremity in sling, neurovascularly intact  NEURO:  GCS 15, nonfocal exam  SKIN:  Pink, warm, dry      Lab Results:   BMP/CMP: No results found for: SODIUM, K, CL, CO2, ANIONGAP, BUN, CREATININE, GLUCOSE, CALCIUM, AST, ALT, ALKPHOS, PROT, BILITOT, EGFR and CBC:   Lab Results   Component Value Date    WBC 5 51 2019    HGB 8 4 (L) 2019    HCT 30 3 (L) 2019    MCV 90 2019     2019    MCH 24 9 (L) 2019    MCHC 27 7 (L) 2019    RDW 18 8 (H) 2019    MPV 10 7 2019    NRBC 0 2019     Imaging/EKG Studies:  CXR: pending   Results: I have personally reviewed pertinent reports      Other Studies: no new  VTE Prophylaxis: SCDs, Lovenox

## 2019-01-26 ENCOUNTER — ANESTHESIA EVENT (INPATIENT)
Dept: OBGYN CLINIC | Facility: HOSPITAL | Age: 84
DRG: 564 | End: 2019-01-26
Payer: MEDICARE

## 2019-01-26 ENCOUNTER — ANESTHESIA (INPATIENT)
Dept: OBGYN CLINIC | Facility: HOSPITAL | Age: 84
DRG: 564 | End: 2019-01-26
Payer: MEDICARE

## 2019-01-26 PROCEDURE — 94668 MNPJ CHEST WALL SBSQ: CPT

## 2019-01-26 PROCEDURE — 99232 SBSQ HOSP IP/OBS MODERATE 35: CPT | Performed by: SURGERY

## 2019-01-26 PROCEDURE — 94760 N-INVAS EAR/PLS OXIMETRY 1: CPT

## 2019-01-26 RX ORDER — DIPHENHYDRAMINE HYDROCHLORIDE 50 MG/ML
25 INJECTION INTRAMUSCULAR; INTRAVENOUS EVERY 6 HOURS PRN
Status: DISCONTINUED | OUTPATIENT
Start: 2019-01-26 | End: 2019-01-28

## 2019-01-26 RX ORDER — EPHEDRINE SULFATE 50 MG/ML
INJECTION, SOLUTION INTRAVENOUS AS NEEDED
Status: DISCONTINUED | OUTPATIENT
Start: 2019-01-26 | End: 2019-01-26 | Stop reason: HOSPADM

## 2019-01-26 RX ORDER — ONDANSETRON 2 MG/ML
4 INJECTION INTRAMUSCULAR; INTRAVENOUS EVERY 6 HOURS PRN
Status: DISCONTINUED | OUTPATIENT
Start: 2019-01-26 | End: 2019-02-02 | Stop reason: HOSPADM

## 2019-01-26 RX ORDER — HYDRALAZINE HYDROCHLORIDE 20 MG/ML
5 INJECTION INTRAMUSCULAR; INTRAVENOUS ONCE
Status: COMPLETED | OUTPATIENT
Start: 2019-01-26 | End: 2019-01-26

## 2019-01-26 RX ORDER — SODIUM CHLORIDE, SODIUM GLUCONATE, SODIUM ACETATE, POTASSIUM CHLORIDE, MAGNESIUM CHLORIDE, SODIUM PHOSPHATE, DIBASIC, AND POTASSIUM PHOSPHATE .53; .5; .37; .037; .03; .012; .00082 G/100ML; G/100ML; G/100ML; G/100ML; G/100ML; G/100ML; G/100ML
500 INJECTION, SOLUTION INTRAVENOUS ONCE
Status: COMPLETED | OUTPATIENT
Start: 2019-01-26 | End: 2019-01-26

## 2019-01-26 RX ORDER — HEPARIN SODIUM 5000 [USP'U]/ML
5000 INJECTION, SOLUTION INTRAVENOUS; SUBCUTANEOUS EVERY 8 HOURS SCHEDULED
Status: DISCONTINUED | OUTPATIENT
Start: 2019-01-26 | End: 2019-02-01

## 2019-01-26 RX ADMIN — FLUTICASONE PROPIONATE 1 SPRAY: 50 SPRAY, METERED NASAL at 18:38

## 2019-01-26 RX ADMIN — DICLOFENAC 2 G: 10 GEL TOPICAL at 18:38

## 2019-01-26 RX ADMIN — ISOSORBIDE MONONITRATE 60 MG: 60 TABLET, EXTENDED RELEASE ORAL at 08:22

## 2019-01-26 RX ADMIN — ONDANSETRON 4 MG: 2 INJECTION INTRAMUSCULAR; INTRAVENOUS at 13:23

## 2019-01-26 RX ADMIN — CARVEDILOL 3.12 MG: 3.12 TABLET, FILM COATED ORAL at 08:23

## 2019-01-26 RX ADMIN — PRAZOSIN HYDROCHLORIDE 5 MG: 2 CAPSULE ORAL at 22:09

## 2019-01-26 RX ADMIN — ECONAZOLE NITRATE 1 APPLICATION: 10 CREAM TOPICAL at 08:47

## 2019-01-26 RX ADMIN — ACETAMINOPHEN 975 MG: 325 TABLET ORAL at 05:20

## 2019-01-26 RX ADMIN — POTASSIUM CHLORIDE 20 MEQ: 1500 TABLET, EXTENDED RELEASE ORAL at 18:37

## 2019-01-26 RX ADMIN — LISINOPRIL 40 MG: 20 TABLET ORAL at 08:22

## 2019-01-26 RX ADMIN — HEPARIN SODIUM 5000 UNITS: 5000 INJECTION INTRAVENOUS; SUBCUTANEOUS at 22:00

## 2019-01-26 RX ADMIN — FLUTICASONE PROPIONATE 1 SPRAY: 50 SPRAY, METERED NASAL at 08:24

## 2019-01-26 RX ADMIN — STANDARDIZED SENNA CONCENTRATE 8.6 MG: 8.6 TABLET ORAL at 21:59

## 2019-01-26 RX ADMIN — TORSEMIDE 60 MG: 20 TABLET ORAL at 08:23

## 2019-01-26 RX ADMIN — Medication 1 TABLET: at 08:22

## 2019-01-26 RX ADMIN — TORSEMIDE 60 MG: 20 TABLET ORAL at 18:37

## 2019-01-26 RX ADMIN — ACETAMINOPHEN 975 MG: 325 TABLET ORAL at 21:59

## 2019-01-26 RX ADMIN — AMIODARONE HYDROCHLORIDE 200 MG: 200 TABLET ORAL at 08:23

## 2019-01-26 RX ADMIN — HYDRALAZINE HYDROCHLORIDE 5 MG: 20 INJECTION INTRAMUSCULAR; INTRAVENOUS at 03:29

## 2019-01-26 RX ADMIN — PRAZOSIN HYDROCHLORIDE 2 MG: 2 CAPSULE ORAL at 08:44

## 2019-01-26 RX ADMIN — ECONAZOLE NITRATE 1 APPLICATION: 10 CREAM TOPICAL at 18:38

## 2019-01-26 RX ADMIN — POTASSIUM CHLORIDE 20 MEQ: 1500 TABLET, EXTENDED RELEASE ORAL at 08:23

## 2019-01-26 RX ADMIN — SODIUM CHLORIDE, SODIUM GLUCONATE, SODIUM ACETATE, POTASSIUM CHLORIDE, MAGNESIUM CHLORIDE, SODIUM PHOSPHATE, DIBASIC, AND POTASSIUM PHOSPHATE 500 ML: .53; .5; .37; .037; .03; .012; .00082 INJECTION, SOLUTION INTRAVENOUS at 16:41

## 2019-01-26 RX ADMIN — ROPIVACAINE HYDROCHLORIDE: 2 INJECTION, SOLUTION EPIDURAL; INFILTRATION at 13:15

## 2019-01-26 RX ADMIN — PRAVASTATIN SODIUM 40 MG: 40 TABLET ORAL at 08:21

## 2019-01-26 RX ADMIN — LIDOCAINE 1 PATCH: 50 PATCH CUTANEOUS at 08:23

## 2019-01-26 RX ADMIN — EPHEDRINE SULFATE 10 MG: 50 INJECTION, SOLUTION INTRAMUSCULAR; INTRAVENOUS; SUBCUTANEOUS at 11:11

## 2019-01-26 RX ADMIN — DICLOFENAC 2 G: 10 GEL TOPICAL at 08:24

## 2019-01-26 RX ADMIN — OXYCODONE HYDROCHLORIDE 2.5 MG: 5 TABLET ORAL at 08:54

## 2019-01-26 RX ADMIN — ACETAMINOPHEN 975 MG: 325 TABLET ORAL at 13:24

## 2019-01-26 RX ADMIN — AMLODIPINE BESYLATE 10 MG: 10 TABLET ORAL at 08:21

## 2019-01-26 NOTE — ASSESSMENT & PLAN NOTE
- Left 3, 4, 5, 9, 10 rib fractures  - Rib fracture protocol  - Pain control: EDC placed today, patient's pain slightly improved  Appreciate APS input  Continue scheduled tylenol and lidoderm patches, PO oxycodone 2 5 mg q4h prn with IV dilaudid 0 25 mg q4h prn for breakthrough     - Pulmonary toilet/IS

## 2019-01-26 NOTE — ANESTHESIA PROCEDURE NOTES
Epidural Block    Patient location during procedure: holding area  Start time: 1/26/2019 10:50 AM  Reason for block: procedure for pain and at surgeon's request  Staffing  Anesthesiologist: Zenaida Infante  Performed: anesthesiologist   Preanesthetic Checklist  Completed: patient identified, site marked, surgical consent, pre-op evaluation, timeout performed, IV checked, risks and benefits discussed and monitors and equipment checked  Epidural  Patient position: sitting  Prep: ChloraPrep  Patient monitoring: cardiac monitor, frequent blood pressure checks and continuous pulse ox  Approach: midline  Location: thoracic (1-12)  Injection technique: ANALILIA air  Needle  Needle type: Tuohy   Needle gauge: 18 G  Catheter type: side hole  Catheter size: 20 G  Catheter at skin depth: 10 cm  Test dose: negative and lidocaine 1 5% with epinephrine 1-to-200,000negative aspiration for CSF, negative aspiration for heme and no paresthesia on injection  patient tolerated the procedure well with no immediate complications

## 2019-01-26 NOTE — PROGRESS NOTES
Progress Note - Jessica Perdue 9/29/1930, 80 y o  female MRN: [id number]    Unit/Bed#: [facility] 604-01 Encounter: [id number]    Primary Care Provider: Merlinda Abraham, MD   Date and time admitted to hospital: 1/23/2019 11:46 AM        * Multiple rib fractures   Assessment & Plan    - Left 3, 4, 5, 9, 10 rib fractures  - Rib fracture protocol  - Pain control: EDC placed today, patient's pain slightly improved  Appreciate APS input  Continue scheduled tylenol and lidoderm patches, PO oxycodone 2 5 mg q4h prn with IV dilaudid 0 25 mg q4h prn for breakthrough  - Pulmonary toilet/IS       Closed left scapular fracture   Assessment & Plan    -non operative management, nonweightbearing left upper extremity in sling  -follow-up with orthopedics as an outpatient, appreciate recommendations     Paroxysmal atrial fibrillation (HCC)   Assessment & Plan    -rate is well controlled, continue home Coreg and amiodarone  -home Eliquis is on hold while EDC in place     Essential hypertension   Assessment & Plan    -back on home meds-Imdur, amlodipine and ramipril  -follow-up with PCP     Chronic diastolic congestive heart failure ([facility] [address] )   Assessment & Plan    - back on home coreg and torsemide       Prophylaxis:  SCDs, start subcu heparin if okay with anesthesia-awaiting call back to confirm  Disposition:  PT/OT recommending inpatient rehab when medically stable, likely early to mid week    Bedside nurse rounds completed with nurse Julius Montenegro  Patient aware plan of care for the day      Chief Complaint:  I having pain in my chest wall    Subjective:  Patient went down for epidural catheter placement this morning  She states that she does not notice much of a difference in her rib pain since the epidural space, however she objectively appears much less tachypneic in her oxygen has been weaned to 1 L with her sats now 99%  She does appear more comfortable      Objective:     Meds/Allergies     Current Facility-Administered Medications:    acetaminophen (TYLENOL) tablet 975 mg, 975 mg, Oral, Q8H [address], Henry Troy MD, 975 mg at 01/26/19 1324    amiodarone tablet 200 mg, 200 mg, Oral, Daily, Henry Troy MD, 200 mg at 01/26/19 9184    amLODIPine (NORVASC) tablet 10 mg, 10 mg, Oral, Daily, Henry Troy MD, 10 mg at 01/26/19 4335    calcium carbonate (OYSTER SHELL,OSCAL) 500 mg tablet 1 tablet, 1 tablet, Oral, Daily With Breakfast, Henry Troy MD, 1 tablet at 01/26/19 0822    carvedilol (COREG) tablet 3 125 mg, 3 125 mg, Oral, BID With Meals, Henry Troy MD, 3 125 mg at 01/26/19 7656    diclofenac sodium (VOLTAREN) 1 % topical gel 2 g, 2 g, Topical, 4x Daily, Anibal Arboleda PA-C, 2 g at 01/26/19 0824    diphenhydrAMINE (BENADRYL) injection 25 mg, 25 mg, Intravenous, Q6H PRN, William Leija MD    econazole nitrate 1 % cream, , Topical, BID, Henry Troy MD, 1 application at 93/42/80 0847    fluticasone (FLONASE) 50 mcg/act nasal spray 1 spray, 1 spray, Nasal, BID, Henry Troy MD, 1 spray at 01/26/19 0824    HYDROmorphone (DILAUDID) injection 0 2 mg, 0 2 mg, Intravenous, Q4H PRN, Anibal Arboleda PA-C    isosorbide mononitrate (IMDUR) 24 hr tablet 60 mg, 60 mg, Oral, Daily, Henry Troy MD, 60 mg at 01/26/19 2098    lidocaine (LIDODERM) 5 % patch 1 patch, 1 patch, Topical, Daily, Anibal Arboleda PA-C, 1 patch at 01/26/19 0823    lisinopril (ZESTRIL) tablet 40 mg, 40 mg, Oral, Daily, Anibal Arboleda PA-C, 40 mg at 01/26/19 6545    nitroglycerin (NITROSTAT) SL tablet 0 4 mg, 0 4 mg, Sublingual, Q5 Min PRN, Henry Troy MD    ondansetron [facility]) injection 4 mg, 4 mg, Intravenous, Q6H PRN, William Leija MD, 4 mg at 01/26/19 1323    oxyCODONE (ROXICODONE) IR tablet 2 5 mg, 2 5 mg, Oral, Q4H PRN, Anibal Arboleda PA-C, 2 5 mg at 01/26/19 0854    potassium chloride (K-DUR,KLOR-CON) CR tablet 20 mEq, 20 mEq, Oral, BID, Henry Troy MD, 20 mEq at 01/26/19 6546    pravastatin (PRAVACHOL) tablet 40 mg, 40 mg, Oral, Daily, Henry Troy, MD, 40 mg at 01/26/19 3945    prazosin (MINIPRESS) capsule 2 mg, 2 mg, Oral, QAM, Maddison Vo MD, 2 mg at 01/26/19 0844    prazosin (MINIPRESS) capsule 5 mg, 5 mg, Oral, HS, Maddison Vo MD, 5 mg at 01/25/19 2121    ropivacaine 0 2% PCEA, , Epidural, Continuous, Shari Gilford, MD    [facility]) tablet 8 6 mg, 1 tablet, Oral, HS, Seven Ma PA-C, 8 6 mg at 01/25/19 2130    torsemide (DEMADEX) tablet 60 mg, 60 mg, Oral, BID, Kalani Palmer PA-C, 60 mg at 01/26/19 5017    Vitals: Blood pressure (!) 116/48, pulse 58, temperature 97 6 °F (36 4 °C), temperature source Oral, resp  rate 16, height 5' 1" (1 549 m), weight 64 8 kg (142 lb 13 7 oz), SpO2 96 %  Body mass index is 26 99 kg/m²   SpO2: SpO2: 96 %, SpO2 Device: O2 Device: Nasal cannula    ABG: No results found for: PHART, BZO2NGS, PO2ART, RJE2ZPB, Y2DQNTAC, BEART, SOURCE      Intake/Output Summary (Last 24 hours) at 01/26/19 1451  Last data filed at 01/26/19 1414   Gross per 24 hour   Intake              830 ml   Output             1727 ml   Net             -897 ml       Invasive Devices     Peripheral Intravenous Line            Peripheral IV 01/23/19 Right Antecubital 3 days          Epidural Line            Epidural Catheter 01/26/19 less than 1 day          Drain            External Urinary Catheter 2 days                          Nutrition/GI Proph/Bowel Reg:  Regular    Physical Exam:   GENERAL APPEARANCE:  No acute distress  HEENT:  Normocephalic, atraumatic  CV:  Regular rate and rhythm, no murmurs gallops or rubs  LUNGS:  Clear to auscultation bilaterally  ABD:  Soft, nontender, nondistended, atraumatic  EXT:  Moving all extremities equally with full strength  NEURO:  GCS 15, nonfocal exam  SKIN:  Pink, warm, dry      Lab Results: BMP/CMP: No results found for: SODIUM, K, CL, CO2, ANIONGAP, BUN, CREATININE, GLUCOSE, CALCIUM, AST, ALT, ALKPHOS, PROT, BILITOT, EGFR and CBC: No results found for: WBC, HGB, HCT, MCV, PLT, ADJUSTEDWBC, MCH, MCHC, RDW, MPV, NRBC  Imaging/EKG Studies: Results: I have personally reviewed pertinent reports      Other Studies:  No new  VTE Prophylaxis:  SCDs

## 2019-01-26 NOTE — ASSESSMENT & PLAN NOTE
-rate is well controlled, continue home Coreg and amiodarone  -home Eliquis is on hold while EDC in place

## 2019-01-26 NOTE — PROGRESS NOTES
After placement of the epidural and giving the test dose her BP fell into the high 70s  She was given 15mg of ephedrine and a 500cc fluid bolus and her blood pressure improved into the high 90s where it remained for 15+ minutes  Patient sent back to the floor

## 2019-01-26 NOTE — PROGRESS NOTES
Progress Note - Anesthesia Acute Pain Management    Ale Esqueda 80 y o  female MRN: [id number]  Unit/Bed#: [facility] 604-01 Encounter: [id number]        Assessment:   Principal Problem:    Multiple rib fractures  Active Problems:    Paroxysmal atrial fibrillation (HCC)    Essential hypertension    Chronic diastolic congestive heart failure (HCC)    Closed left scapular fracture      Plan:  Epidural place for rib fx pain  Her rib pain is better now and she is better able to take deep breathes  Continue other pain meds for other injuries        Pain History  Current pain location(s): left chest, left arm  Pain Scale:   Unable to tell  Current Analgesic regimen:  See below      Meds/Allergies   current meds:   Current Facility-Administered Medications   Medication Dose Route Frequency    acetaminophen (TYLENOL) tablet 975 mg  975 mg Oral Q8H [address]    amiodarone tablet 200 mg  200 mg Oral Daily    amLODIPine (NORVASC) tablet 10 mg  10 mg Oral Daily    calcium carbonate (OYSTER SHELL,OSCAL) 500 mg tablet 1 tablet  1 tablet Oral Daily With Breakfast    carvedilol (COREG) tablet 3 125 mg  3 125 mg Oral BID With Meals    diclofenac sodium (VOLTAREN) 1 % topical gel 2 g  2 g Topical 4x Daily    diphenhydrAMINE (BENADRYL) injection 25 mg  25 mg Intravenous Q6H PRN    econazole nitrate 1 % cream   Topical BID    fluticasone (FLONASE) 50 mcg/act nasal spray 1 spray  1 spray Nasal BID    HYDROmorphone (DILAUDID) injection 0 2 mg  0 2 mg Intravenous Q4H PRN    isosorbide mononitrate (IMDUR) 24 hr tablet 60 mg  60 mg Oral Daily    lidocaine (LIDODERM) 5 % patch 1 patch  1 patch Topical Daily    lisinopril (ZESTRIL) tablet 40 mg  40 mg Oral Daily    nitroglycerin (NITROSTAT) SL tablet 0 4 mg  0 4 mg Sublingual Q5 Min PRN    ondansetron (ZOFRAN) injection 4 mg  4 mg Intravenous Q6H PRN    oxyCODONE (ROXICODONE) IR tablet 2 5 mg  2 5 mg Oral Q4H PRN    potassium chloride (K-DUR,KLOR-CON) CR tablet 20 mEq  20 mEq Oral BID  pravastatin (PRAVACHOL) tablet 40 mg  40 mg Oral Daily    prazosin (MINIPRESS) capsule 2 mg  2 mg Oral QAM    prazosin (MINIPRESS) capsule 5 mg  5 mg Oral HS    ropivacaine 0 2% PCEA   Epidural Continuous    senna (SENOKOT) tablet 8 6 mg  1 tablet Oral HS    torsemide (DEMADEX) tablet 60 mg  60 mg Oral BID     Facility-Administered Medications Ordered in Other Encounters   Medication Dose Route Frequency    ePHEDrine injection    PRN         Allergies   Allergen Reactions    Aspirin     Percocet [Oxycodone-Acetaminophen]        Objective     Temp:  [97 7 °F (36 5 °C)] 97 7 °F (36 5 °C)  HR:  [48-62] 62  Resp:  [16-18] 16  BP: (128-201)/(36-60) 148/50      Intake/Output Summary (Last 24 hours) at 01/26/19 1121  Last data filed at 01/26/19 0936   Gross per 24 hour   Intake              710 ml   Output             1652 ml   Net             -942 ml         PE  CV - Bradycardic  Pulm - shallow inspirations, decreased BS throughout  Pain mostly in lower left chest and leftarm      Counseling / Coordination of Care  Total floor / unit time spent today 15 minutes   Greater than 50% of total time was spent with the patient and / or family counseling and / or coordination of care      SIGNATURE: Ольга Jerez MD  DATE: January 26, 2019  TIME: 11:21 AM

## 2019-01-27 ENCOUNTER — APPOINTMENT (INPATIENT)
Dept: RADIOLOGY | Facility: HOSPITAL | Age: 84
DRG: 564 | End: 2019-01-27
Payer: MEDICARE

## 2019-01-27 PROBLEM — N17.9 ACUTE KIDNEY INJURY (HCC): Status: ACTIVE | Noted: 2019-01-27

## 2019-01-27 PROBLEM — G89.11 ACUTE PAIN DUE TO TRAUMA: Status: ACTIVE | Noted: 2019-01-27

## 2019-01-27 PROBLEM — R06.89 ACUTE RESPIRATORY INSUFFICIENCY: Status: ACTIVE | Noted: 2019-01-27

## 2019-01-27 LAB
ANION GAP SERPL CALCULATED.3IONS-SCNC: 0 MMOL/L (ref 4–13)
ANION GAP SERPL CALCULATED.3IONS-SCNC: 1 MMOL/L (ref 4–13)
ATRIAL RATE: 57 BPM
ATRIAL RATE: 58 BPM
BASOPHILS # BLD AUTO: 0.01 THOUSANDS/ΜL (ref 0–0.1)
BASOPHILS NFR BLD AUTO: 0 % (ref 0–1)
BUN SERPL-MCNC: 65 MG/DL (ref 5–25)
BUN SERPL-MCNC: 70 MG/DL (ref 5–25)
CALCIUM SERPL-MCNC: 8.5 MG/DL (ref 8.3–10.1)
CALCIUM SERPL-MCNC: 9 MG/DL (ref 8.3–10.1)
CHLORIDE SERPL-SCNC: 103 MMOL/L (ref 100–108)
CHLORIDE SERPL-SCNC: 103 MMOL/L (ref 100–108)
CO2 SERPL-SCNC: 37 MMOL/L (ref 21–32)
CO2 SERPL-SCNC: 38 MMOL/L (ref 21–32)
CREAT SERPL-MCNC: 1.67 MG/DL (ref 0.6–1.3)
CREAT SERPL-MCNC: 1.75 MG/DL (ref 0.6–1.3)
EOSINOPHIL # BLD AUTO: 0.13 THOUSAND/ΜL (ref 0–0.61)
EOSINOPHIL NFR BLD AUTO: 2 % (ref 0–6)
ERYTHROCYTE [DISTWIDTH] IN BLOOD BY AUTOMATED COUNT: 19.2 % (ref 11.6–15.1)
GFR SERPL CREATININE-BSD FRML MDRD: 26 ML/MIN/1.73SQ M
GFR SERPL CREATININE-BSD FRML MDRD: 27 ML/MIN/1.73SQ M
GLUCOSE SERPL-MCNC: 131 MG/DL (ref 65–140)
GLUCOSE SERPL-MCNC: 86 MG/DL (ref 65–140)
HCT VFR BLD AUTO: 33.1 % (ref 34.8–46.1)
HGB BLD-MCNC: 9 G/DL (ref 11.5–15.4)
IMM GRANULOCYTES # BLD AUTO: 0.02 THOUSAND/UL (ref 0–0.2)
IMM GRANULOCYTES NFR BLD AUTO: 0 % (ref 0–2)
LYMPHOCYTES # BLD AUTO: 0.25 THOUSANDS/ΜL (ref 0.6–4.47)
LYMPHOCYTES NFR BLD AUTO: 4 % (ref 14–44)
MCH RBC QN AUTO: 25.2 PG (ref 26.8–34.3)
MCHC RBC AUTO-ENTMCNC: 27.2 G/DL (ref 31.4–37.4)
MCV RBC AUTO: 93 FL (ref 82–98)
MONOCYTES # BLD AUTO: 0.41 THOUSAND/ΜL (ref 0.17–1.22)
MONOCYTES NFR BLD AUTO: 7 % (ref 4–12)
NEUTROPHILS # BLD AUTO: 5.46 THOUSANDS/ΜL (ref 1.85–7.62)
NEUTS SEG NFR BLD AUTO: 87 % (ref 43–75)
NRBC BLD AUTO-RTO: 0 /100 WBCS
P AXIS: 59 DEGREES
P AXIS: 68 DEGREES
PLATELET # BLD AUTO: 156 THOUSANDS/UL (ref 149–390)
PMV BLD AUTO: 11.6 FL (ref 8.9–12.7)
POTASSIUM SERPL-SCNC: 6.2 MMOL/L (ref 3.5–5.3)
POTASSIUM SERPL-SCNC: 6.6 MMOL/L (ref 3.5–5.3)
PR INTERVAL: 222 MS
PR INTERVAL: 226 MS
QRS AXIS: 126 DEGREES
QRS AXIS: 129 DEGREES
QRSD INTERVAL: 116 MS
QRSD INTERVAL: 116 MS
QT INTERVAL: 456 MS
QT INTERVAL: 468 MS
QTC INTERVAL: 443 MS
QTC INTERVAL: 459 MS
RBC # BLD AUTO: 3.57 MILLION/UL (ref 3.81–5.12)
SODIUM SERPL-SCNC: 140 MMOL/L (ref 136–145)
SODIUM SERPL-SCNC: 142 MMOL/L (ref 136–145)
T WAVE AXIS: 104 DEGREES
T WAVE AXIS: 109 DEGREES
VENTRICULAR RATE: 57 BPM
VENTRICULAR RATE: 58 BPM
WBC # BLD AUTO: 6.28 THOUSAND/UL (ref 4.31–10.16)

## 2019-01-27 PROCEDURE — 80048 BASIC METABOLIC PNL TOTAL CA: CPT | Performed by: PHYSICIAN ASSISTANT

## 2019-01-27 PROCEDURE — 93005 ELECTROCARDIOGRAM TRACING: CPT

## 2019-01-27 PROCEDURE — 93010 ELECTROCARDIOGRAM REPORT: CPT | Performed by: INTERNAL MEDICINE

## 2019-01-27 PROCEDURE — 94760 N-INVAS EAR/PLS OXIMETRY 1: CPT

## 2019-01-27 PROCEDURE — 71045 X-RAY EXAM CHEST 1 VIEW: CPT

## 2019-01-27 PROCEDURE — 99233 SBSQ HOSP IP/OBS HIGH 50: CPT | Performed by: SURGERY

## 2019-01-27 PROCEDURE — 85025 COMPLETE CBC W/AUTO DIFF WBC: CPT | Performed by: PHYSICIAN ASSISTANT

## 2019-01-27 PROCEDURE — 82550 ASSAY OF CK (CPK): CPT | Performed by: EMERGENCY MEDICINE

## 2019-01-27 PROCEDURE — 80048 BASIC METABOLIC PNL TOTAL CA: CPT | Performed by: EMERGENCY MEDICINE

## 2019-01-27 PROCEDURE — 94668 MNPJ CHEST WALL SBSQ: CPT

## 2019-01-27 RX ORDER — DEXTROSE MONOHYDRATE 25 G/50ML
50 INJECTION, SOLUTION INTRAVENOUS ONCE
Status: COMPLETED | OUTPATIENT
Start: 2019-01-28 | End: 2019-01-28

## 2019-01-27 RX ORDER — DEXTROSE MONOHYDRATE 25 G/50ML
50 INJECTION, SOLUTION INTRAVENOUS ONCE
Status: COMPLETED | OUTPATIENT
Start: 2019-01-27 | End: 2019-01-27

## 2019-01-27 RX ORDER — SODIUM POLYSTYRENE SULFONATE 4.1 MEQ/G
15 POWDER, FOR SUSPENSION ORAL; RECTAL ONCE
Status: COMPLETED | OUTPATIENT
Start: 2019-01-28 | End: 2019-01-28

## 2019-01-27 RX ADMIN — HEPARIN SODIUM 5000 UNITS: 5000 INJECTION INTRAVENOUS; SUBCUTANEOUS at 05:27

## 2019-01-27 RX ADMIN — ROPIVACAINE HYDROCHLORIDE: 2 INJECTION, SOLUTION EPIDURAL; INFILTRATION at 10:33

## 2019-01-27 RX ADMIN — POTASSIUM CHLORIDE 20 MEQ: 1500 TABLET, EXTENDED RELEASE ORAL at 09:00

## 2019-01-27 RX ADMIN — LISINOPRIL 40 MG: 20 TABLET ORAL at 10:37

## 2019-01-27 RX ADMIN — ACETAMINOPHEN 975 MG: 325 TABLET ORAL at 05:27

## 2019-01-27 RX ADMIN — HEPARIN SODIUM 5000 UNITS: 5000 INJECTION INTRAVENOUS; SUBCUTANEOUS at 14:56

## 2019-01-27 RX ADMIN — AMIODARONE HYDROCHLORIDE 200 MG: 200 TABLET ORAL at 09:01

## 2019-01-27 RX ADMIN — AMLODIPINE BESYLATE 10 MG: 10 TABLET ORAL at 10:36

## 2019-01-27 RX ADMIN — FLUTICASONE PROPIONATE 1 SPRAY: 50 SPRAY, METERED NASAL at 19:11

## 2019-01-27 RX ADMIN — ACETAMINOPHEN 975 MG: 325 TABLET ORAL at 21:09

## 2019-01-27 RX ADMIN — INSULIN HUMAN 10 UNITS: 100 INJECTION, SOLUTION PARENTERAL at 19:11

## 2019-01-27 RX ADMIN — CARVEDILOL 3.12 MG: 3.12 TABLET, FILM COATED ORAL at 09:00

## 2019-01-27 RX ADMIN — DICLOFENAC 2 G: 10 GEL TOPICAL at 19:10

## 2019-01-27 RX ADMIN — PRAZOSIN HYDROCHLORIDE 2 MG: 2 CAPSULE ORAL at 08:55

## 2019-01-27 RX ADMIN — DICLOFENAC 2 G: 10 GEL TOPICAL at 08:54

## 2019-01-27 RX ADMIN — STANDARDIZED SENNA CONCENTRATE 8.6 MG: 8.6 TABLET ORAL at 21:12

## 2019-01-27 RX ADMIN — ECONAZOLE NITRATE: 10 CREAM TOPICAL at 08:55

## 2019-01-27 RX ADMIN — CARVEDILOL 3.12 MG: 3.12 TABLET, FILM COATED ORAL at 19:15

## 2019-01-27 RX ADMIN — PRAVASTATIN SODIUM 40 MG: 40 TABLET ORAL at 10:37

## 2019-01-27 RX ADMIN — LIDOCAINE 1 PATCH: 50 PATCH CUTANEOUS at 10:35

## 2019-01-27 RX ADMIN — ISOSORBIDE MONONITRATE 60 MG: 60 TABLET, EXTENDED RELEASE ORAL at 10:37

## 2019-01-27 RX ADMIN — HEPARIN SODIUM 5000 UNITS: 5000 INJECTION INTRAVENOUS; SUBCUTANEOUS at 21:10

## 2019-01-27 RX ADMIN — ACETAMINOPHEN 975 MG: 325 TABLET ORAL at 14:55

## 2019-01-27 RX ADMIN — PRAZOSIN HYDROCHLORIDE 5 MG: 2 CAPSULE ORAL at 21:11

## 2019-01-27 RX ADMIN — DEXTROSE MONOHYDRATE 50 ML: 25 INJECTION, SOLUTION INTRAVENOUS at 19:12

## 2019-01-27 RX ADMIN — FLUTICASONE PROPIONATE 1 SPRAY: 50 SPRAY, METERED NASAL at 08:54

## 2019-01-27 RX ADMIN — ECONAZOLE NITRATE 1 APPLICATION: 10 CREAM TOPICAL at 19:10

## 2019-01-27 RX ADMIN — TORSEMIDE 60 MG: 20 TABLET ORAL at 09:00

## 2019-01-27 RX ADMIN — Medication 1 TABLET: at 10:37

## 2019-01-27 NOTE — PROGRESS NOTES
Anesthesia Progress Note - Epidural Pain Management    Taj Saini MRN: [id number]  Unit/Bed#: [facility] 464-72 Encounter: [id number]    SURGERY DATE:   * No surgery found *    Assessment:   80 y o  female STATUS POST     Epidural day #1    Plan:   - Pt reports excellent pain control; she is still not pulling great volumes on IS, however, she states that her difficulty is not related to pain  She just feels weak  She can take a deep breath without pain now  - Continue epidural at current settings  APS will continue to follow  Please call  ( [facility] [phone]) with any questions      Subjective:  Current pain location(s): L chest  Patient states: 2/10    Meds/Allergies   all current active meds have been reviewed    Allergies   Allergen Reactions    Aspirin     Percocet [Oxycodone-Acetaminophen]        Objective     Temp:  [97 5 °F (36 4 °C)-98 5 °F (36 9 °C)] 97 9 °F (36 6 °C)  HR:  [51-62] 57  Resp:  [13-18] 13  BP: ()/(32-67) 139/36    Physical Exam:  Gen: NAD, OOBTC  CV: REg rate  Pulm: Nonlabored  Neuro: A&O appropriately, CNs II-XII grossly intact (XI deferred and pt [place]), moves all extremities  Epidural Site: C/D/I    SIGNATURE: Gerardo New MD  DATE: January 27, 2019  TIME: 12:05 PM

## 2019-01-27 NOTE — RESPIRATORY THERAPY NOTE
RT Protocol Note  Ale Esqueda 80 y o  female MRN: [id number]  [facility] Encounter: [id number]    Assessment    Principal Problem:    Multiple rib fractures  Active Problems:    Paroxysmal atrial fibrillation (HCC)    Essential hypertension    Chronic diastolic congestive heart failure (HCC)    Closed left scapular fracture      Home Pulmonary Medications:    Home Devices/Therapy:  (None per patient)    Past Medical History:   Diagnosis Date    Acid reflux disease     Anemia     Last assessed: 10/26/16    Aortic valve regurgitation, nonrheumatic     Ascending aortic aneurysm (HCC)     Last assessed: 6/5/13    Atrial fibrillation ([address] )     Hyperlipidemia     Hypertension 2/17/2016    Paroxysmal atrial fibrillation ([address] ) 2/17/2016     Social History     Social History    Marital status:      Spouse name: N/A    Number of children: N/A    Years of education: N/A     Social History Main Topics    Smoking status: Never Smoker    Smokeless tobacco: Never Used    Alcohol use No    Drug use: No    Sexual activity: No     Other Topics Concern    None     Social History Narrative    Assistive devices: walker       Subjective         Objective    Physical Exam:   Assessment Type: (P) Assess only  General Appearance: (P) Drowsy  Respiratory Pattern: (P) Shallow  Chest Assessment: (P) Chest expansion symmetrical  Bilateral Breath Sounds: (P) Diminished    Vitals:  Blood pressure (!) 96/38, pulse 60, temperature 98 5 °F (36 9 °C), temperature source Oral, resp  rate 18, height 5' 1" (1 549 m), weight 64 8 kg (142 lb 13 7 oz), SpO2 98 %  Imaging and other studies: I have personally reviewed pertinent reports  O2 Device: 2L NC     Plan    Respiratory Plan: No distress/Pulmonary history  Airway Clearance Plan: (P) EzPap     Resp Comments: (P) Due to pts poor respiratory effort dispite adition of PCA, I have started her on EZ-Pap therapy for lung expantion

## 2019-01-27 NOTE — ASSESSMENT & PLAN NOTE
- Left 3, 4, 5, 9, 10 rib fractures  - Rib fracture protocol  - Pain control: EDC placed yesterday, patient's pain slightly improved  Appreciate APS input  Continue scheduled tylenol and lidoderm patches, PO oxycodone 2 5 mg q4h prn with IV dilaudid 0 25 mg q4h prn for breakthrough     - Pulmonary toilet/IS-continues to get 250 mL on her ISS

## 2019-01-27 NOTE — ASSESSMENT & PLAN NOTE
-secondary to rib fractures/chest wall trauma  -continue epidural catheter in multimodal pain regimen as stated above  -check chest x-ray today to evaluate for worsening atelectasis/hemothorax

## 2019-01-27 NOTE — PROGRESS NOTES
Progress Note - Bailey Felipe 9/29/1930, 80 y o  female MRN: [id number]    Unit/Bed#: [facility] 604-01 Encounter: [id number]    Primary Care Provider: Kinsey Krishnamurthy MD   Date and time admitted to hospital: 1/23/2019 646 AM    6year-old female status post mechanical fall    * Multiple rib fractures   Assessment & Plan    - Left 3, 4, 5, 9, 10 rib fractures  - Rib fracture protocol  - Pain control: EDC placed yesterday, patient's pain slightly improved  Appreciate APS input  Continue scheduled tylenol and lidoderm patches, PO oxycodone 2 5 mg q4h prn with IV dilaudid 0 25 mg q4h prn for breakthrough  - Pulmonary toilet/IS-continues to get 250 mL on her ISS       Closed left scapular fracture   Assessment & Plan    -non operative management, nonweightbearing left upper extremity in sling  -follow-up with orthopedics as an outpatient, appreciate recommendations     Paroxysmal atrial fibrillation (HCC)   Assessment & Plan    -rate is well controlled, continue home Coreg and amiodarone  -home Eliquis is on hold while EDC in place     Essential hypertension   Assessment & Plan    -back on home meds-Imdur, amlodipine and ramipril  -follow-up with PCP     Chronic diastolic congestive heart failure ([facility] [place] [address] )   Assessment & Plan    - back on home coreg and torsemide     Acute kidney injury [facility])   Assessment & Plan    -BUN/creatinine had been elevated back on 01/25 with a creatinine of 1 7 up from her baseline of 1 0  Will recheck labs today and start IV fluids for hydration if needed  Acute pain due to trauma   Assessment & Plan    -epidural catheter placed yesterday  -patient has oxycodone 2 5 mg, however is only receiving 1 dose every 24 hr   Encourage use of oxycodone and will up titrate dose if needed    Patient has not received any IV Dilaudid  -continue scheduled Tylenol and Lidoderm patches  -continue bowel regimen     Acute respiratory insufficiency   Assessment & Plan    -secondary to rib fractures/chest wall trauma  -continue epidural catheter in multimodal pain regimen as stated above  -check chest x-ray today to evaluate for worsening atelectasis/hemothorax       Prophylaxis:  SCDs, and a subcu heparin  Disposition:  Inpatient rehab when medically stable    Bedside nurse rounds completed with nurse BARBOZA  Patient aware plan care for the day  Chief Complaint:  I just feel terrible    Subjective:  Patient notes persistent pain in the left chest wall she states that it is not any better, however she does appear to be stronger with using her ISS and her flutter valve  She is less tachypneic and satting high 90s on 1-2 L nasal cannula      Objective:       Meds/Allergies     Current Facility-Administered Medications:     acetaminophen (TYLENOL) tablet 975 mg, 975 mg, Oral, Q8H [facility], Yobani Berger MD, 975 mg at 01/27/19 1418    amiodarone tablet 200 mg, 200 mg, Oral, Daily, Yobani Berger MD, 200 mg at 01/27/19 0901    amLODIPine (NORVASC) tablet 10 mg, 10 mg, Oral, Daily, Yobani Berger MD, 10 mg at 01/27/19 1036    calcium carbonate (OYSTER SHELL,OSCAL) 500 mg tablet 1 tablet, 1 tablet, Oral, Daily With Breakfast, Yobani Berger MD, 1 tablet at 01/27/19 1037    carvedilol (COREG) tablet 3 125 mg, 3 125 mg, Oral, BID With Meals, Yobani Berger MD, 3 125 mg at 01/27/19 0900    diclofenac sodium (VOLTAREN) 1 % topical gel 2 g, 2 g, Topical, 4x Daily, Esthela Landeros PA-C, 2 g at 01/27/19 0854    diphenhydrAMINE (BENADRYL) injection 25 mg, 25 mg, Intravenous, Q6H PRN, Canelo Jimenez MD    econazole nitrate 1 % cream, , Topical, BID, Yobani Berger MD    fluticasone [facility]) 50 mcg/act nasal spray 1 spray, 1 spray, Nasal, BID, Yobani Berger MD, 1 spray at 01/27/19 0854    heparin (porcine) subcutaneous injection 5,000 Units, 5,000 Units, Subcutaneous, Q8H [facility], Esthela Landeros PA-C, 5,000 Units at 01/27/19 0527    HYDROmorphone (DILAUDID) injection 0 2 mg, 0 2 mg, Intravenous, Q4H PRN, Esthela Landeros PA-C   isosorbide mononitrate (IMDUR) 24 hr tablet 60 mg, 60 mg, Oral, Daily, Cayla Hong MD, 60 mg at 01/27/19 1037    lidocaine (LIDODERM) 5 % patch 1 patch, 1 patch, Topical, Daily, Trace Markham PA-C, 1 patch at 01/27/19 1035    lisinopril (ZESTRIL) tablet 40 mg, 40 mg, Oral, Daily, Trace Markham PA-C, 40 mg at 01/27/19 1037    nitroglycerin (NITROSTAT) SL tablet 0 4 mg, 0 4 mg, Sublingual, Q5 Min PRN, Cayla Hong MD    ondansetron [facility]) injection 4 mg, 4 mg, Intravenous, Q6H PRN, Roger Garcia MD, 4 mg at 01/26/19 1323    oxyCODONE (ROXICODONE) IR tablet 2 5 mg, 2 5 mg, Oral, Q4H PRN, Trace Markham PA-C, 2 5 mg at 01/26/19 0854    potassium chloride (K-DUR,KLOR-CON) CR tablet 20 mEq, 20 mEq, Oral, BID, Cayla Hong MD, 20 mEq at 01/27/19 0900    pravastatin (PRAVACHOL) tablet 40 mg, 40 mg, Oral, Daily, Cayla Hong MD, 40 mg at 01/27/19 1037    prazosin (MINIPRESS) capsule 2 mg, 2 mg, Oral, QAM, Cayla Hong MD, 2 mg at 01/27/19 0855    prazosin (MINIPRESS) capsule 5 mg, 5 mg, Oral, HS, Cayla Hong MD, 5 mg at 01/26/19 2209    ropivacaine 0 2% PCEA, , Epidural, Continuous, Roger Garcia MD    [facility]) tablet 8 6 mg, 1 tablet, Oral, HS, Vish Szymanski PA-C, 8 6 mg at 01/26/19 2159    torsemide (DEMADEX) tablet 60 mg, 60 mg, Oral, BID, Luz Marina Palmer PA-C, 60 mg at 01/27/19 0900    Vitals: Blood pressure (!) 139/36, pulse 57, temperature 97 9 °F (36 6 °C), resp  rate 13, height 5' 1" (1 549 m), weight 64 8 kg (142 lb 13 7 oz), SpO2 97 %  Body mass index is 26 99 kg/m²   SpO2: SpO2: 97 %, SpO2 Device: O2 Device: EtCO2 nasal cannula    ABG: No results found for: PHART, VCD8EWH, PO2ART, JPV1GGN, G0JWVORR, BEART, SOURCE      Intake/Output Summary (Last 24 hours) at 01/27/19 1206  Last data filed at 01/27/19 1101   Gross per 24 hour   Intake             9263 ml   Output              715 ml   Net             8548 ml       Invasive Devices     Peripheral Intravenous Line Peripheral IV 19 Right Antecubital 3 days          Epidural Line            Epidural Catheter 19 1 day          Drain            External Urinary Catheter 3 days                          Nutrition/GI Proph/Bowel Re g sodium diet    Physical Exam:   GENERAL APPEARANCE:  No acute distress  HEENT:  Normocephalic, atraumatic  CV:  Regular rate and rhythm, no murmurs gallops or rubs  LUNGS:  Clear to auscultation bilaterally, decreased on the left; 250 mL on her incentive spirometer  ABD:  Soft, nontender, nondistended  EXT:  Moving all extremities equally with the exception of the left upper extremity which is in a sling; neurovascularly intact throughout  NEURO:  GCS 15, nonfocal exam  SKIN:  Pink, warm, dry      Lab Results: BMP/CMP: No results found for: SODIUM, K, CL, CO2, ANIONGAP, BUN, CREATININE, GLUCOSE, CALCIUM, AST, ALT, ALKPHOS, PROT, BILITOT, EGFR and CBC: No results found for: WBC, HGB, HCT, MCV, PLT, ADJUSTEDWBC, MCH, MCHC, RDW, MPV, NRBC  Imaging/EKG Studies: Results: I have personally reviewed pertinent reports      Other Studies:  No new  VTE Prophylaxis:  SCDs, subcutaneous heparin

## 2019-01-27 NOTE — ASSESSMENT & PLAN NOTE
-epidural catheter placed yesterday  -patient has oxycodone 2 5 mg, however is only receiving 1 dose every 24 hr   Encourage use of oxycodone and will up titrate dose if needed    Patient has not received any IV Dilaudid  -continue scheduled Tylenol and Lidoderm patches  -continue bowel regimen

## 2019-01-28 ENCOUNTER — ANESTHESIA (OUTPATIENT)
Dept: INTERNAL MEDICINE CLINIC | Facility: SKILLED NURSING FACILITY | Age: 84
End: 2019-01-28

## 2019-01-28 PROBLEM — N18.30 CHRONIC KIDNEY DISEASE, STAGE III (MODERATE) (HCC): Status: ACTIVE | Noted: 2019-01-28

## 2019-01-28 PROBLEM — E87.2 CHRONIC RESPIRATORY ACIDOSIS: Status: ACTIVE | Noted: 2019-01-28

## 2019-01-28 PROBLEM — E87.5 HYPERKALEMIA: Status: ACTIVE | Noted: 2019-01-28

## 2019-01-28 LAB
ANION GAP SERPL CALCULATED.3IONS-SCNC: 1 MMOL/L (ref 4–13)
ANION GAP SERPL CALCULATED.3IONS-SCNC: 2 MMOL/L (ref 4–13)
ANION GAP SERPL CALCULATED.3IONS-SCNC: 2 MMOL/L (ref 4–13)
ARTERIAL PATENCY WRIST A: YES
ATRIAL RATE: 54 BPM
ATRIAL RATE: 55 BPM
BACTERIA UR QL AUTO: ABNORMAL /HPF
BASE EXCESS BLDA CALC-SCNC: 12.6 MMOL/L
BASE EXCESS BLDA CALC-SCNC: 13.4 MMOL/L
BASE EXCESS BLDA CALC-SCNC: 9.8 MMOL/L
BASOPHILS # BLD AUTO: 0.02 THOUSANDS/ΜL (ref 0–0.1)
BASOPHILS NFR BLD AUTO: 0 % (ref 0–1)
BILIRUB UR QL STRIP: NEGATIVE
BUN SERPL-MCNC: 62 MG/DL (ref 5–25)
BUN SERPL-MCNC: 70 MG/DL (ref 5–25)
BUN SERPL-MCNC: 70 MG/DL (ref 5–25)
CA-I BLD-SCNC: 1.15 MMOL/L (ref 1.12–1.32)
CALCIUM SERPL-MCNC: 8.2 MG/DL (ref 8.3–10.1)
CALCIUM SERPL-MCNC: 8.6 MG/DL (ref 8.3–10.1)
CALCIUM SERPL-MCNC: 8.9 MG/DL (ref 8.3–10.1)
CHLORIDE SERPL-SCNC: 104 MMOL/L (ref 100–108)
CHLORIDE SERPL-SCNC: 104 MMOL/L (ref 100–108)
CHLORIDE SERPL-SCNC: 106 MMOL/L (ref 100–108)
CK SERPL-CCNC: 20 U/L (ref 26–192)
CLARITY UR: CLEAR
CO2 SERPL-SCNC: 36 MMOL/L (ref 21–32)
COLOR UR: YELLOW
CREAT SERPL-MCNC: 1.45 MG/DL (ref 0.6–1.3)
CREAT SERPL-MCNC: 1.63 MG/DL (ref 0.6–1.3)
CREAT SERPL-MCNC: 1.67 MG/DL (ref 0.6–1.3)
EOSINOPHIL # BLD AUTO: 0.13 THOUSAND/ΜL (ref 0–0.61)
EOSINOPHIL NFR BLD AUTO: 2 % (ref 0–6)
ERYTHROCYTE [DISTWIDTH] IN BLOOD BY AUTOMATED COUNT: 19.4 % (ref 11.6–15.1)
GFR SERPL CREATININE-BSD FRML MDRD: 27 ML/MIN/1.73SQ M
GFR SERPL CREATININE-BSD FRML MDRD: 28 ML/MIN/1.73SQ M
GFR SERPL CREATININE-BSD FRML MDRD: 32 ML/MIN/1.73SQ M
GLUCOSE SERPL-MCNC: 108 MG/DL (ref 65–140)
GLUCOSE SERPL-MCNC: 145 MG/DL (ref 65–140)
GLUCOSE SERPL-MCNC: 94 MG/DL (ref 65–140)
GLUCOSE UR STRIP-MCNC: NEGATIVE MG/DL
HCO3 BLDA-SCNC: 37.4 MMOL/L (ref 22–28)
HCO3 BLDA-SCNC: 39 MMOL/L (ref 22–28)
HCO3 BLDA-SCNC: 41.1 MMOL/L (ref 22–28)
HCT VFR BLD AUTO: 29.5 % (ref 34.8–46.1)
HGB BLD-MCNC: 8.2 G/DL (ref 11.5–15.4)
HGB UR QL STRIP.AUTO: NEGATIVE
HYALINE CASTS #/AREA URNS LPF: ABNORMAL /LPF
IMM GRANULOCYTES # BLD AUTO: 0.02 THOUSAND/UL (ref 0–0.2)
IMM GRANULOCYTES NFR BLD AUTO: 0 % (ref 0–2)
IPAP: 10
KETONES UR STRIP-MCNC: NEGATIVE MG/DL
LEUKOCYTE ESTERASE UR QL STRIP: NEGATIVE
LYMPHOCYTES # BLD AUTO: 0.32 THOUSANDS/ΜL (ref 0.6–4.47)
LYMPHOCYTES NFR BLD AUTO: 4 % (ref 14–44)
MAGNESIUM SERPL-MCNC: 2.6 MG/DL (ref 1.6–2.6)
MCH RBC QN AUTO: 25.7 PG (ref 26.8–34.3)
MCHC RBC AUTO-ENTMCNC: 27.8 G/DL (ref 31.4–37.4)
MCV RBC AUTO: 93 FL (ref 82–98)
MONOCYTES # BLD AUTO: 0.6 THOUSAND/ΜL (ref 0.17–1.22)
MONOCYTES NFR BLD AUTO: 8 % (ref 4–12)
NASAL CANNULA: 1
NASAL CANNULA: ABNORMAL
NEUTROPHILS # BLD AUTO: 6.43 THOUSANDS/ΜL (ref 1.85–7.62)
NEUTS SEG NFR BLD AUTO: 86 % (ref 43–75)
NITRITE UR QL STRIP: NEGATIVE
NON VENT- BIPAP: ABNORMAL
NON-SQ EPI CELLS URNS QL MICRO: ABNORMAL /HPF
NRBC BLD AUTO-RTO: 0 /100 WBCS
O2 CT BLDA-SCNC: 11.7 ML/DL (ref 16–23)
O2 CT BLDA-SCNC: 12 ML/DL (ref 16–23)
O2 CT BLDA-SCNC: 12.1 ML/DL (ref 16–23)
OXYHGB MFR BLDA: 86.7 % (ref 94–97)
OXYHGB MFR BLDA: 92.5 % (ref 94–97)
OXYHGB MFR BLDA: 97.1 % (ref 94–97)
P AXIS: 63 DEGREES
P AXIS: 76 DEGREES
PCO2 BLDA: 61.5 MM HG (ref 36–44)
PCO2 BLDA: 73.1 MM HG (ref 36–44)
PCO2 BLDA: 75.6 MM HG (ref 36–44)
PEEP MAX SETTING VENT: 5 CM[H2O]
PH BLDA: 7.33 [PH] (ref 7.35–7.45)
PH BLDA: 7.35 [PH] (ref 7.35–7.45)
PH BLDA: 7.42 [PH] (ref 7.35–7.45)
PH UR STRIP.AUTO: 6 [PH] (ref 4.5–8)
PHOSPHATE SERPL-MCNC: 3.8 MG/DL (ref 2.3–4.1)
PLATELET # BLD AUTO: 138 THOUSANDS/UL (ref 149–390)
PMV BLD AUTO: 11.4 FL (ref 8.9–12.7)
PO2 BLDA: 128.4 MM HG (ref 75–129)
PO2 BLDA: 51 MM HG (ref 75–129)
PO2 BLDA: 70.8 MM HG (ref 75–129)
POTASSIUM SERPL-SCNC: 4.9 MMOL/L (ref 3.5–5.3)
POTASSIUM SERPL-SCNC: 5.4 MMOL/L (ref 3.5–5.3)
POTASSIUM SERPL-SCNC: 6.2 MMOL/L (ref 3.5–5.3)
PR INTERVAL: 236 MS
PR INTERVAL: 244 MS
PROT UR STRIP-MCNC: NEGATIVE MG/DL
QRS AXIS: 140 DEGREES
QRS AXIS: 142 DEGREES
QRSD INTERVAL: 112 MS
QRSD INTERVAL: 114 MS
QT INTERVAL: 460 MS
QT INTERVAL: 474 MS
QTC INTERVAL: 436 MS
QTC INTERVAL: 453 MS
RBC # BLD AUTO: 3.19 MILLION/UL (ref 3.81–5.12)
RBC #/AREA URNS AUTO: ABNORMAL /HPF
SODIUM SERPL-SCNC: 142 MMOL/L (ref 136–145)
SODIUM SERPL-SCNC: 142 MMOL/L (ref 136–145)
SODIUM SERPL-SCNC: 143 MMOL/L (ref 136–145)
SP GR UR STRIP.AUTO: 1.01 (ref 1–1.03)
SPECIMEN SOURCE: ABNORMAL
T WAVE AXIS: 94 DEGREES
T WAVE AXIS: 95 DEGREES
UROBILINOGEN UR QL STRIP.AUTO: 0.2 E.U./DL
VENT BIPAP FIO2: 40 %
VENTRICULAR RATE: 54 BPM
VENTRICULAR RATE: 55 BPM
WBC # BLD AUTO: 7.52 THOUSAND/UL (ref 4.31–10.16)
WBC #/AREA URNS AUTO: ABNORMAL /HPF

## 2019-01-28 PROCEDURE — 99223 1ST HOSP IP/OBS HIGH 75: CPT | Performed by: INTERNAL MEDICINE

## 2019-01-28 PROCEDURE — 94668 MNPJ CHEST WALL SBSQ: CPT

## 2019-01-28 PROCEDURE — 80048 BASIC METABOLIC PNL TOTAL CA: CPT | Performed by: EMERGENCY MEDICINE

## 2019-01-28 PROCEDURE — 93010 ELECTROCARDIOGRAM REPORT: CPT | Performed by: INTERNAL MEDICINE

## 2019-01-28 PROCEDURE — 94640 AIRWAY INHALATION TREATMENT: CPT

## 2019-01-28 PROCEDURE — 94660 CPAP INITIATION&MGMT: CPT

## 2019-01-28 PROCEDURE — 82805 BLOOD GASES W/O2 SATURATION: CPT | Performed by: PHYSICIAN ASSISTANT

## 2019-01-28 PROCEDURE — 94760 N-INVAS EAR/PLS OXIMETRY 1: CPT

## 2019-01-28 PROCEDURE — 80048 BASIC METABOLIC PNL TOTAL CA: CPT | Performed by: PHYSICIAN ASSISTANT

## 2019-01-28 PROCEDURE — 36600 WITHDRAWAL OF ARTERIAL BLOOD: CPT

## 2019-01-28 PROCEDURE — 85025 COMPLETE CBC W/AUTO DIFF WBC: CPT | Performed by: SURGERY

## 2019-01-28 PROCEDURE — 83735 ASSAY OF MAGNESIUM: CPT | Performed by: SURGERY

## 2019-01-28 PROCEDURE — 80048 BASIC METABOLIC PNL TOTAL CA: CPT | Performed by: SURGERY

## 2019-01-28 PROCEDURE — 99233 SBSQ HOSP IP/OBS HIGH 50: CPT | Performed by: SURGERY

## 2019-01-28 PROCEDURE — 82330 ASSAY OF CALCIUM: CPT | Performed by: SURGERY

## 2019-01-28 PROCEDURE — 81001 URINALYSIS AUTO W/SCOPE: CPT | Performed by: INTERNAL MEDICINE

## 2019-01-28 PROCEDURE — 84100 ASSAY OF PHOSPHORUS: CPT | Performed by: SURGERY

## 2019-01-28 PROCEDURE — 82805 BLOOD GASES W/O2 SATURATION: CPT | Performed by: SURGERY

## 2019-01-28 PROCEDURE — 82805 BLOOD GASES W/O2 SATURATION: CPT | Performed by: EMERGENCY MEDICINE

## 2019-01-28 PROCEDURE — 99221 1ST HOSP IP/OBS SF/LOW 40: CPT | Performed by: INTERNAL MEDICINE

## 2019-01-28 PROCEDURE — 99232 SBSQ HOSP IP/OBS MODERATE 35: CPT | Performed by: PHYSICIAN ASSISTANT

## 2019-01-28 RX ORDER — ISOSORBIDE MONONITRATE 60 MG/1
60 TABLET, EXTENDED RELEASE ORAL DAILY
Status: DISCONTINUED | OUTPATIENT
Start: 2019-01-28 | End: 2019-02-02 | Stop reason: HOSPADM

## 2019-01-28 RX ORDER — SODIUM CHLORIDE 450 MG/100ML
45 INJECTION, SOLUTION INTRAVENOUS CONTINUOUS
Status: DISCONTINUED | OUTPATIENT
Start: 2019-01-28 | End: 2019-01-29

## 2019-01-28 RX ORDER — ALBUTEROL SULFATE 2.5 MG/3ML
10 SOLUTION RESPIRATORY (INHALATION) ONCE
Status: COMPLETED | OUTPATIENT
Start: 2019-01-28 | End: 2019-01-28

## 2019-01-28 RX ORDER — SODIUM CHLORIDE, SODIUM GLUCONATE, SODIUM ACETATE, POTASSIUM CHLORIDE, MAGNESIUM CHLORIDE, SODIUM PHOSPHATE, DIBASIC, AND POTASSIUM PHOSPHATE .53; .5; .37; .037; .03; .012; .00082 G/100ML; G/100ML; G/100ML; G/100ML; G/100ML; G/100ML; G/100ML
50 INJECTION, SOLUTION INTRAVENOUS CONTINUOUS
Status: DISCONTINUED | OUTPATIENT
Start: 2019-01-28 | End: 2019-01-28

## 2019-01-28 RX ORDER — SODIUM CHLORIDE 9 MG/ML
50 INJECTION, SOLUTION INTRAVENOUS CONTINUOUS
Status: DISCONTINUED | OUTPATIENT
Start: 2019-01-28 | End: 2019-01-28

## 2019-01-28 RX ORDER — CARVEDILOL 3.12 MG/1
3.12 TABLET ORAL 2 TIMES DAILY WITH MEALS
Status: DISCONTINUED | OUTPATIENT
Start: 2019-01-28 | End: 2019-02-02 | Stop reason: HOSPADM

## 2019-01-28 RX ORDER — AMLODIPINE BESYLATE 10 MG/1
10 TABLET ORAL DAILY
Status: DISCONTINUED | OUTPATIENT
Start: 2019-01-28 | End: 2019-01-28

## 2019-01-28 RX ORDER — PRAZOSIN HYDROCHLORIDE 2 MG/1
2 CAPSULE ORAL EVERY MORNING
Status: DISCONTINUED | OUTPATIENT
Start: 2019-01-28 | End: 2019-01-28

## 2019-01-28 RX ORDER — DOCUSATE SODIUM 100 MG/1
100 CAPSULE, LIQUID FILLED ORAL 2 TIMES DAILY
Status: DISCONTINUED | OUTPATIENT
Start: 2019-01-28 | End: 2019-02-02 | Stop reason: HOSPADM

## 2019-01-28 RX ORDER — AMLODIPINE BESYLATE 5 MG/1
5 TABLET ORAL EVERY 12 HOURS SCHEDULED
Status: DISCONTINUED | OUTPATIENT
Start: 2019-01-28 | End: 2019-02-02 | Stop reason: HOSPADM

## 2019-01-28 RX ORDER — PRAZOSIN HYDROCHLORIDE 2 MG/1
2 CAPSULE ORAL EVERY MORNING
Status: DISCONTINUED | OUTPATIENT
Start: 2019-01-29 | End: 2019-02-02 | Stop reason: HOSPADM

## 2019-01-28 RX ORDER — PRAZOSIN HYDROCHLORIDE 2 MG/1
2 CAPSULE ORAL
Status: DISCONTINUED | OUTPATIENT
Start: 2019-01-28 | End: 2019-02-02 | Stop reason: HOSPADM

## 2019-01-28 RX ORDER — DEXTROSE MONOHYDRATE 25 G/50ML
25 INJECTION, SOLUTION INTRAVENOUS ONCE
Status: COMPLETED | OUTPATIENT
Start: 2019-01-28 | End: 2019-01-28

## 2019-01-28 RX ORDER — FUROSEMIDE 10 MG/ML
40 INJECTION INTRAMUSCULAR; INTRAVENOUS ONCE
Status: COMPLETED | OUTPATIENT
Start: 2019-01-28 | End: 2019-01-28

## 2019-01-28 RX ORDER — POLYETHYLENE GLYCOL 3350 17 G/17G
17 POWDER, FOR SOLUTION ORAL DAILY
Status: DISCONTINUED | OUTPATIENT
Start: 2019-01-28 | End: 2019-02-02 | Stop reason: HOSPADM

## 2019-01-28 RX ADMIN — SODIUM CHLORIDE 50 ML/HR: 0.9 INJECTION, SOLUTION INTRAVENOUS at 02:35

## 2019-01-28 RX ADMIN — HEPARIN SODIUM 5000 UNITS: 5000 INJECTION INTRAVENOUS; SUBCUTANEOUS at 22:37

## 2019-01-28 RX ADMIN — ACETAMINOPHEN 975 MG: 325 TABLET ORAL at 22:37

## 2019-01-28 RX ADMIN — Medication 1 TABLET: at 09:17

## 2019-01-28 RX ADMIN — POLYETHYLENE GLYCOL 3350 17 G: 17 POWDER, FOR SOLUTION ORAL at 14:30

## 2019-01-28 RX ADMIN — ECONAZOLE NITRATE: 10 CREAM TOPICAL at 18:32

## 2019-01-28 RX ADMIN — DICLOFENAC 2 G: 10 GEL TOPICAL at 09:18

## 2019-01-28 RX ADMIN — SODIUM CHLORIDE 45 ML/HR: 0.45 INJECTION, SOLUTION INTRAVENOUS at 14:30

## 2019-01-28 RX ADMIN — SODIUM POLYSTYRENE SULFONATE 15 G: 1 POWDER ORAL; RECTAL at 00:29

## 2019-01-28 RX ADMIN — DEXTROSE MONOHYDRATE 50 ML: 25 INJECTION, SOLUTION INTRAVENOUS at 00:30

## 2019-01-28 RX ADMIN — AMLODIPINE BESYLATE 5 MG: 5 TABLET ORAL at 22:37

## 2019-01-28 RX ADMIN — ACETAMINOPHEN 975 MG: 325 TABLET ORAL at 14:24

## 2019-01-28 RX ADMIN — CARVEDILOL 3.12 MG: 3.12 TABLET, FILM COATED ORAL at 18:31

## 2019-01-28 RX ADMIN — SODIUM CHLORIDE, SODIUM GLUCONATE, SODIUM ACETATE, POTASSIUM CHLORIDE, MAGNESIUM CHLORIDE, SODIUM PHOSPHATE, DIBASIC, AND POTASSIUM PHOSPHATE 50 ML/HR: .53; .5; .37; .037; .03; .012; .00082 INJECTION, SOLUTION INTRAVENOUS at 01:45

## 2019-01-28 RX ADMIN — HEPARIN SODIUM 5000 UNITS: 5000 INJECTION INTRAVENOUS; SUBCUTANEOUS at 05:14

## 2019-01-28 RX ADMIN — HEPARIN SODIUM 5000 UNITS: 5000 INJECTION INTRAVENOUS; SUBCUTANEOUS at 14:24

## 2019-01-28 RX ADMIN — INSULIN HUMAN 10 UNITS: 100 INJECTION, SOLUTION PARENTERAL at 00:30

## 2019-01-28 RX ADMIN — DOCUSATE SODIUM 100 MG: 100 CAPSULE, LIQUID FILLED ORAL at 18:31

## 2019-01-28 RX ADMIN — PRAZOSIN HYDROCHLORIDE 2 MG: 2 CAPSULE ORAL at 09:19

## 2019-01-28 RX ADMIN — SODIUM CHLORIDE 1000 ML: 0.9 INJECTION, SOLUTION INTRAVENOUS at 00:31

## 2019-01-28 RX ADMIN — AMLODIPINE BESYLATE 10 MG: 10 TABLET ORAL at 09:18

## 2019-01-28 RX ADMIN — INSULIN HUMAN 10 UNITS: 100 INJECTION, SOLUTION PARENTERAL at 03:42

## 2019-01-28 RX ADMIN — ISOSORBIDE MONONITRATE 60 MG: 60 TABLET, EXTENDED RELEASE ORAL at 09:19

## 2019-01-28 RX ADMIN — DEXTROSE MONOHYDRATE 25 ML: 25 INJECTION, SOLUTION INTRAVENOUS at 03:41

## 2019-01-28 RX ADMIN — DOCUSATE SODIUM 100 MG: 100 CAPSULE, LIQUID FILLED ORAL at 10:02

## 2019-01-28 RX ADMIN — FUROSEMIDE 40 MG: 10 INJECTION, SOLUTION INTRAMUSCULAR; INTRAVENOUS at 03:41

## 2019-01-28 RX ADMIN — ALBUTEROL SULFATE 10 MG: 2.5 SOLUTION RESPIRATORY (INHALATION) at 03:52

## 2019-01-28 RX ADMIN — ECONAZOLE NITRATE: 10 CREAM TOPICAL at 09:19

## 2019-01-28 RX ADMIN — CALCIUM GLUCONATE 1 G: 98 INJECTION, SOLUTION INTRAVENOUS at 02:33

## 2019-01-28 RX ADMIN — AMIODARONE HYDROCHLORIDE 200 MG: 200 TABLET ORAL at 09:18

## 2019-01-28 RX ADMIN — ROPIVACAINE HYDROCHLORIDE: 2 INJECTION, SOLUTION EPIDURAL; INFILTRATION at 09:52

## 2019-01-28 RX ADMIN — STANDARDIZED SENNA CONCENTRATE 8.6 MG: 8.6 TABLET ORAL at 22:37

## 2019-01-28 RX ADMIN — ALBUTEROL SULFATE 10 MG: 2.5 SOLUTION RESPIRATORY (INHALATION) at 00:35

## 2019-01-28 RX ADMIN — FLUTICASONE PROPIONATE 1 SPRAY: 50 SPRAY, METERED NASAL at 09:19

## 2019-01-28 RX ADMIN — FLUTICASONE PROPIONATE 1 SPRAY: 50 SPRAY, METERED NASAL at 18:31

## 2019-01-28 RX ADMIN — PRAVASTATIN SODIUM 40 MG: 40 TABLET ORAL at 09:19

## 2019-01-28 RX ADMIN — LIDOCAINE 1 PATCH: 50 PATCH CUTANEOUS at 09:18

## 2019-01-28 NOTE — PROGRESS NOTES
Progress Note - Acute Pain Service Regional Follow Up  Zulema Singletary 80 y o  female MRN: [id number]  Unit/Bed#: ICU 02 Encounter: [id number]        Assessment:   Principal Problem:    Multiple rib fractures  Active Problems:    Paroxysmal atrial fibrillation (HCC)    Essential hypertension    Chronic diastolic congestive heart failure (HCC)    Closed left scapular fracture    Acute kidney injury ([address] )    Acute respiratory insufficiency    Acute pain due to trauma    Acute rib fracture pain well controlled with epidural   Epidural Day# 2    Plan:   Continue epidural today without change to settings    APS will continue to follow; please call Brad Boxer / Percy ( [facility] [phone]) with any questions    Subjective:  Patient states that her pain is well controlled today  She says that she is ready to get out of the ICU back to a regular room    No other complaints    Meds/Allergies     current meds:   Current Facility-Administered Medications   Medication Dose Route Frequency    acetaminophen (TYLENOL) tablet 975 mg  975 mg Oral Q8H [address]    amiodarone tablet 200 mg  200 mg Oral Daily    amLODIPine (NORVASC) tablet 10 mg  10 mg Oral Daily    calcium carbonate (OYSTER SHELL,OSCAL) 500 mg tablet 1 tablet  1 tablet Oral Daily With Breakfast    carvedilol (COREG) tablet 3 125 mg  3 125 mg Oral BID With Meals    diclofenac sodium (VOLTAREN) 1 % topical gel 2 g  2 g Topical 4x Daily    diphenhydrAMINE (BENADRYL) injection 25 mg  25 mg Intravenous Q6H PRN    docusate sodium (COLACE) capsule 100 mg  100 mg Oral BID    econazole nitrate 1 % cream   Topical BID    fluticasone (FLONASE) 50 mcg/act nasal spray 1 spray  1 spray Nasal BID    heparin (porcine) subcutaneous injection 5,000 Units  5,000 Units Subcutaneous Q8H [address]    HYDROmorphone (DILAUDID) injection 0 2 mg  0 2 mg Intravenous Q4H PRN    isosorbide mononitrate (IMDUR) 24 hr tablet 60 mg  60 mg Oral Daily    lidocaine (LIDODERM) 5 % patch 1 patch  1 patch Topical Daily    nitroglycerin (NITROSTAT) SL tablet 0 4 mg  0 4 mg Sublingual Q5 Min PRN    ondansetron (ZOFRAN) injection 4 mg  4 mg Intravenous Q6H PRN    oxyCODONE (ROXICODONE) IR tablet 2 5 mg  2 5 mg Oral Q4H PRN    pravastatin (PRAVACHOL) tablet 40 mg  40 mg Oral Daily    prazosin (MINIPRESS) capsule 2 mg  2 mg Oral QAM    prazosin (MINIPRESS) capsule 5 mg  5 mg Oral HS    ropivacaine 0 2% PCEA   Epidural Continuous    senna (SENOKOT) tablet 8 6 mg  1 tablet Oral HS    sodium chloride 0 9 % infusion  50 mL/hr Intravenous Continuous       Allergies   Allergen Reactions    Aspirin     Percocet [Oxycodone-Acetaminophen]        Objective     Temp:  [98 1 °F (36 7 °C)-98 3 °F (36 8 °C)] 98 3 °F (36 8 °C)  HR:  [52-62] 62  Resp:  [16-28] 18  BP: (107-152)/(34-59) 136/58    Physical Exam   Constitutional: She is oriented to person, place, and time  No distress  HENT:   Head: Normocephalic and atraumatic  Eyes: Pupils are equal, round, and reactive to light  Conjunctivae and EOM are normal    Neck: Normal range of motion  Cardiovascular: Normal rate  Pulmonary/Chest: Effort normal  No respiratory distress  Musculoskeletal: Normal range of motion  Neurological: She is alert and oriented to person, place, and time  No cranial nerve deficit  Skin: Skin is warm  She is not diaphoretic  Psychiatric: She has a normal mood and affect   Her behavior is normal  Judgment and thought content normal

## 2019-01-28 NOTE — CONSULTS
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 ranging between 0 84 to 1 39 dating back to 2015  · Elevated creatinine likely on the basis of advanced age, hypertension, Ace inhibition, fluctuating volume status in the setting of chronic diuretics  However, her urinalysis in July of 2018 revealed 3+ heme and 1+ protein and therefore an underlying glomerular disease needs to be entertained  Her underlying severe aortic regurgitation may be contributory to her renal dysfunction as well  · The patient's current creatinine is slightly above her baseline and likely reflects over diuresis  · Continue to withhold diuretics and infuse low-flow chloride based solution  Will change to hypotonic solution as her sodium level is high normal  2  Hyperkalemia  · Secondary to Ace inhibition, potassium supplementation and volume depletion culminating in decreased distal sodium delivery  · Continue to hold Ace inhibition, potassium supplementation  She is status post acute treatment with a decrease in her potassium level to 5 4  · Recheck BMP at 3:00 p m  · Discontinue Voltaren gel as some may be absorbed the nonsteroidal effect can lead to hyperkalemia  · Check cortisol level given the fact that she is on chronic Flonase therapy  3  CO2 retention  · Likely chronic in nature given her chronically elevated bicarbonate level  Also as evidence by the fact that her pH was near normal despite a pCO2 Jordan of 70  If this were an acute rise in her CO2, her pH would be much lower as she did not have time to compensate  · Some component of metabolic alkalosis due to diuresis, may be contributory as well but the major issue is elevated CO2  · The etiology of her elevated CO2 is uncertain and she likely should have pulmonary evaluation/PFTs to decide on course of therapy  4   High normal sodium level  · Will infuse hypotonic chloride based fluids and trend sodium level  5  Hypertension  · Aim for systolic blood pressure of 130 to 150 given her advanced age and acute kidney injury  · Will split Norvasc dose in an effort to avoid relative hypotension-continue current hold parameter on amlodipine  · Will decrease Minipress dose and change hold parameter  · Some of blood pressure increases related to pain      Discussed case with Dr Marlo Amaral today            History of Present Illness   Physician Requesting Consult: Kyle Palomares MD  Reason for Consult / Principal Problem -  acute kidney injury/hyperkalemia   [facility]- Jurline Duty is a 80 y o  y o  female who we are asked to see because of acute kidney injury/hyperkalemia  The patient presents with a mechanical fall after she tripped using her walker  She sustained a left scapular and multiple rib fractures  She has been seen by the Pain service for pain control  Her course was also notable for hyperkalemia as well as elevated pCO2 noted on ABG  She received acute treatment for her hyperkalemia as well as IV fluid resuscitation and her potassium level has improved  Of note is that the patient does take potassium supplements, uses Voltaren gel, takes ramipril and uses Flonase at home  At the time of my visit, her main complaint was rib pain  She stated that she was somewhat short of breath with movement but overall her shortness of breath has improved somewhat  She denied any recent constipation abdominal pain, vomiting, diarrhea  She does have chronic leg edema and states that her leg swelling has improved  She does use Zaroxolyn at home but is not quite clear how often she does this  She has lost approximately 30-40 lb over the last 2 years per her report  Her appetite is diminished  She does not use any nonsteroidals at home     History obtained from chart review and the patient    Consults    Review of Systems   Constitutional: Positive for appetite change and fatigue  Negative for chills and fever  HENT: Negative for sinus pressure  Eyes: Negative for redness and visual disturbance  Respiratory: Positive for shortness of breath  Negative for cough and wheezing  Cardiovascular: Positive for leg swelling  Negative for chest pain and palpitations  Gastrointestinal: Negative for abdominal pain, constipation, diarrhea, nausea and vomiting  Endocrine: Negative for polyuria  Genitourinary: Negative for decreased urine volume, difficulty urinating, dysuria, flank pain, frequency, hematuria and urgency  Musculoskeletal: Negative for arthralgias, back pain and joint swelling  Skin: Negative for rash  Neurological: Negative for dizziness, syncope and headaches  Hematological: Does not bruise/bleed easily  Psychiatric/Behavioral: Negative for confusion and sleep disturbance         Historical Information   Patient Active Problem List   Diagnosis    Paroxysmal atrial fibrillation (HCC)    Essential hypertension    GERD (gastroesophageal reflux disease)    Other hyperlipidemia    Aortic valve regurgitation, nonrheumatic    Ascending aortic aneurysm (HCC)    Other chest pain    Lymphedema of both lower extremities    Chronic diastolic congestive heart failure (HCC)    Hematuria    Gait difficulty    Multiple rib fractures    Closed left scapular fracture    Acute kidney [facility] ([facility] [address] [facility]    Acute respiratory insufficiency    Acute pain due to trauma     Past Medical History:   Diagnosis Date    Acid reflux disease     Anemia     Last assessed: 10/26/16    Aortic valve regurgitation, nonrheumatic     Ascending aortic aneurysm ([facility] [address] )     Last assessed: 6/5/13    Atrial fibrillation ([facility] [address] [facility]     Hyperlipidemia     Hypertension 2/17/2016    Paroxysmal atrial fibrillation ([address] ) 2/17/2016     Past Surgical History:   Procedure Laterality Date    CARDIOVERSION      CHOLECYSTECTOMY      LAPAROSCOPIC SALPINGOOPHERECTOMY      TONSILLECTOMY       Social History   History   Alcohol Use No     History   Drug Use No     History   Smoking Status    Never Smoker   Smokeless Tobacco    Never Used     Family History   Problem Relation Age of Onset    No Known Problems Mother     Stomach cancer Father     Heart disease Sister         Cardiac Disorder    Breast cancer Sister     Colon cancer Brother     Cancer Brother        Meds/Allergies   all current active meds have been reviewed, current meds:   Current Facility-Administered Medications   Medication Dose Route Frequency    acetaminophen (TYLENOL) tablet 975 mg  975 mg Oral Q8H [address]    amiodarone tablet 200 mg  200 mg Oral Daily    amLODIPine (NORVASC) tablet 10 mg  10 mg Oral Daily    calcium carbonate (OYSTER SHELL,OSCAL) 500 mg tablet 1 tablet  1 tablet Oral Daily With Breakfast    carvedilol (COREG) tablet 3 125 mg  3 125 mg Oral BID With Meals    diclofenac sodium (VOLTAREN) 1 % topical gel 2 g  2 g Topical 4x Daily    docusate sodium (COLACE) capsule 100 mg  100 mg Oral BID    econazole nitrate 1 % cream   Topical BID    fluticasone (FLONASE) 50 mcg/act nasal spray 1 spray  1 spray Nasal BID    heparin (porcine) subcutaneous injection 5,000 Units  5,000 Units Subcutaneous Q8H [address]    HYDROmorphone (DILAUDID) injection 0 2 mg  0 2 mg Intravenous Q4H PRN    isosorbide mononitrate (IMDUR) 24 hr tablet 60 mg  60 mg Oral Daily    lidocaine (LIDODERM) 5 % patch 1 patch  1 patch Topical Daily    nitroglycerin (NITROSTAT) SL tablet 0 4 mg  0 4 mg Sublingual Q5 Min PRN    ondansetron (ZOFRAN) injection 4 mg  4 mg Intravenous Q6H PRN    oxyCODONE (ROXICODONE) IR tablet 2 5 mg  2 5 mg Oral Q4H PRN    polyethylene glycol (MIRALAX) packet 17 g  17 g Oral Daily    pravastatin (PRAVACHOL) tablet 40 mg  40 mg Oral Daily    prazosin (MINIPRESS) capsule 2 mg  2 mg Oral QAM    prazosin (MINIPRESS) capsule 5 mg  5 mg Oral HS    ropivacaine 0 2% PCEA   Epidural Continuous    senna (SENOKOT) tablet 8 6 mg  1 tablet Oral HS    sodium chloride 0 9 % infusion  50 mL/hr Intravenous Continuous    and PTA meds:   Prior to Admission Medications   Prescriptions Last Dose Informant Patient Reported? Taking? Calcium Carbonate (CALTRATE 600 PO)  Self Yes Yes   Sig: Take 600 mg by mouth daily  KLOR-CON M20 20 MEQ tablet   No Yes   Sig: Take 1 tablet (20 mEq total) by mouth 2 (two) times a day   acetaminophen (TYLENOL) 325 mg tablet  Self Yes No   Sig: Take 650 mg by mouth every 6 (six) hours as needed for mild pain     amLODIPine (NORVASC) 10 mg tablet   Yes Yes   Sig: Take 10 mg by mouth daily   amiodarone 200 mg tablet  Self No Yes   Sig: Take 1 tablet (200 mg total) by mouth daily   apixaban (ELIQUIS) 5 mg 2019 at Unknown time Self No Yes   Sig: Take 1 tablet (5 mg total) by mouth 2 (two) times a day   carvedilol (COREG) 3 125 mg tablet  Self No Yes   Sig: Take 1 tablet (3 125 mg total) by mouth 2 (two) times a day with meals   diclofenac sodium (VOLTAREN) 1 %  Self No Yes   Sig: Apply 2 g topically 4 (four) times a day   econazole nitrate 1 % cream  Self No Yes   Sig: Apply topically 2 (two) times a day   fluticasone (FLONASE) 50 mcg/act nasal spray  Self Yes Yes   Si spray into each nostril 2 (two) times a day   isosorbide mononitrate (IMDUR) 60 mg 24 hr tablet  Self No Yes   Sig: Take 1 tablet (60 mg total) by mouth daily   metolazone (ZAROXOLYN) 2 5 mg tablet   No Yes   Sig: Take 2 tablets (5 mg total) by mouth daily Take 2 for the next 3 days and as directed take as needed for increased leg swelling (148lbs)   nitroglycerin (NITROSTAT) 0 4 mg SL tablet  Self No Yes   Sig: Place 1 tablet (0 4 mg total) under the tongue every 5 (five) minutes as needed for chest pain   pravastatin (PRAVACHOL) 40 mg tablet  Self No Yes   Sig: Take 1 tablet (40 mg total) by mouth daily   prazosin (MINIPRESS) 2 mg capsule  Self No Yes   Sig: Take 1 capsule (2 mg total) by mouth every morning   prazosin (MINIPRESS) 5 mg capsule  Self No Yes   Sig: Take 1 capsule (5 mg total) by mouth daily at bedtime   ramipril (ALTACE) 10 MG capsule   No Yes   Sig: Take 1 capsule (10 mg total) by mouth 2 (two) times a day   torsemide (DEMADEX) 20 mg tablet   No Yes   Sig: Take 3 tablets (60 mg total) by mouth 2 (two) times a day      Facility-Administered Medications: None       Scheduled Meds:  Current Facility-Administered Medications:  acetaminophen 975 mg Oral Q8H [address] Bishnu Bright MD    amiodarone 200 mg Oral Daily Bishnu Bright MD    amLODIPine 10 mg Oral Daily Manjula Morse, DO    calcium carbonate 1 tablet Oral Daily With Breakfast Bishnu Bright MD    carvedilol 3 125 mg Oral BID With Meals Manjula Howard DO    diclofenac sodium 2 g Topical 4x Daily Jessica Ambrocio PA-C    diphenhydrAMINE 25 mg Intravenous Q6H PRN Haseeb Esqueda MD    docusate sodium 100 mg Oral BID Maxi Smith MD    econazole nitrate  Topical BID Bishnu Bright MD    fluticasone 1 spray Nasal BID Bishnu Bright MD    heparin (porcine) 5,000 Units Subcutaneous [facility] Ambrocio PA-C    HYDROmorphone 0 2 mg Intravenous Q4H PRN Jessicaevin Ambrocio PA-C    isosorbide mononitrate 60 mg Oral Daily Manjula K Mini, DO    lidocaine 1 patch Topical Daily Jessica Ambrocio PA-C    nitroglycerin 0 4 mg Sublingual Q5 Min PRN Bishnu Bright MD    ondansetron 4 mg Intravenous Q6H PRN Haseeb Esqueda MD    oxyCODONE 2 5 mg Oral Q4H PRN [place] Ambrocio PA-C    pravastatin 40 mg Oral Daily Bishnu Bright MD    prazosin 2 mg Oral QAM Manjula K Mini, DO    prazosin 5 mg Oral HS Manjula K Mini, DO    ropivacaine 0 2%  Epidural Continuous Haseeb Esqueda MD    senna 1 tablet Oral HS JAYJAY GutiérrezC    sodium chloride 50 mL/hr Intravenous Continuous Saman Mills MD Last Rate: 50 mL/hr (01/28/19 0235)       PRN Meds: diphenhydrAMINE    HYDROmorphone    nitroglycerin    ondansetron    oxyCODONE    Continuous Infusions:  ropivacaine 0 2%     sodium chloride 50 mL/hr Last Rate: 50 mL/hr (19 0235)       Allergies   Allergen Reactions    Aspirin     Percocet [Oxycodone-Acetaminophen]              BP (!) 112/46   Pulse 58   Temp 98 3 °F (36 8 °C) (Oral)   Resp 19   Ht 5' 1" (1 549 m)   Wt 64 8 kg (142 lb 13 7 oz)   SpO2 98%   BMI 26 99 kg/m²   Temp (24hrs), Av 2 °F (36 8 °C), Min:98 1 °F (36 7 °C), Max:98 3 °F (36 8 °C)      Objective     Intake/Output Summary (Last 24 hours) at 19 1251  Last data filed at 19 1200   Gross per 24 hour   Intake          2387 43 ml   Output             1030 ml   Net          1357 43 ml       I/O last 24 hours: In: 2525 4 [P O :836; I V :589 4; IV Piggyback:1100]  Out: 1380 [Urine:1380]      Current Weight: Weight - Scale: 64 8 kg (142 lb 13 7 oz)  First Weight: Weight - Scale: 63 2 kg (139 lb 5 3 oz)    PHYSICAL EXAM:     General -      the patient is awake, alert and appears her stated age    She is in no apparent distress and pleasant  HENT  -        mucous membranes were dry, normocephalic/atraumatic  Eyes    -        extraocular movements were intact, no overt scleral icterus was noted  Neck    -        no overt jugular venous distention was noted, supple, no thyromegaly was noted, trachea midline  Chest  -         clear with scant bibasilar coarse crackles, no wheezing or rhonchi were noted, decreased bowel sounds, decreased inspiratory effort due to pain  CVS  -           S1-S2, no pericardial friction rub or S3 were appreciated  Abdomen -    soft, nontender, no rebound or guarding was noted  Extremities-   peripheral edema was not appreciated, there was skin wrinkling bilaterally consistent with recent diuresis  Skin   -           no hives or rash were noted  Neuro   -        alert and oriented  Psych   -        mood affect were appropriate      Invasive Devices     Peripheral Intravenous Line            Peripheral IV 19 Right Antecubital less than 1 day    Peripheral IV 19 Left Wrist less than 1 day          Epidural Line            Epidural Catheter 01/26/19 2 days          Drain            External Urinary Catheter less than 1 day                Lab Results:      Results from last 7 days  Lab Units 01/28/19  0756 01/28/19  0226 01/27/19  2126 01/27/19  1401 01/25/19  1153 01/25/19  1000 01/24/19  0444 01/23/19  1405 01/23/19  1354   WBC Thousand/uL 7 52  --   --  6 28 5 51  --  6 17  --  7 50   HEMOGLOBIN g/dL 8 2*  --   --  9 0* 8 4*  --  8 3*  --  9 5*   I STAT HEMOGLOBIN g/dl  --   --   --   --   --   --   --  10 9*  --    HEMATOCRIT % 29 5*  --   --  33 1* 30 3*  --  29 4*  --  33 3*   HEMATOCRIT, ISTAT %  --   --   --   --   --   --   --  32*  --    PLATELETS Thousands/uL 138*  --   --  156 149  --  156  --  164   NEUTROS PCT % 86*  --   --  87* 81*  --   --   --  86*   LYMPHS PCT % 4*  --   --  4* 7*  --   --   --  6*   MONOS PCT % 8  --   --  7 8  --   --   --  6   EOS PCT % 2  --   --  2 3  --   --   --  1   POTASSIUM mmol/L 5 4* 6 2* 6 6* 6 2*  --  5 0 4 5  --  4 3   CHLORIDE mmol/L 104 106 103 103  --  104 104  --  102   CO2 mmol/L 36* 36* 38* 37*  --  33* 32  --  32   CO2, I-STAT mmol/L  --   --   --   --   --   --   --  35*  --    BUN mg/dL 70* 70* 70* 65*  --  49* 35*  --  38*   CREATININE mg/dL 1 63* 1 67* 1 75* 1 67*  --  1 78* 1 38*  --  1 51*   CALCIUM mg/dL 8 9 8 2* 8 5 9 0  --  8 3 8 6  --  8 9   GLUCOSE, ISTAT mg/dl  --   --   --   --   --   --   --  112  --    MAGNESIUM mg/dL 2 6  --   --   --   --   --   --   --   --    PHOSPHORUS mg/dL 3 8  --   --   --   --   --   --   --   --        CUTURES:  Lab Results   Component Value Date    URINECX 80,000-89,000 cfu/ml  07/17/2018         Pertinent studies were reviewed          Portions of the record may have been created with voice recognition software  Occasional wrong word or "sound a like" substitutions may have occurred due to the inherent limitations of voice recognition software   Read the chart carefully and recognize, using context, where substitutions have occurred  If you have any questions, please contact the dictating provider

## 2019-01-28 NOTE — UTILIZATION REVIEW
Continued Stay Review    Date: 1-27-19     80year old female with  Multiple rib fractures       Vital Signs: BP (!) 99/34   Pulse 58   Temp 98 3 °F (36 8 °C) (Oral)   Resp 22   Ht 5' 1" (1 549 m)   Wt 64 8 kg (142 lb 13 7 oz)   SpO2 97%   BMI 26 99 kg/m²          Assessment/Plan:  * Multiple rib fractures   Assessment & Plan     - Left 3, 4, 5, 9, 10 rib fractures  - Rib fracture protocol  - Pain control: EDC placed yesterday, patient's pain slightly improved  Appreciate APS input  Continue scheduled tylenol and lidoderm patches, PO oxycodone 2 5 mg q4h prn with IV dilaudid 0 25 mg q4h prn for breakthrough  - Pulmonary toilet/IS-continues to get 250 mL on her ISS         Closed left scapular fracture   Assessment & Plan     -non operative management, nonweightbearing left upper extremity in sling  -follow-up with orthopedics as an outpatient, appreciate recommendations       Acute pain due to trauma   Assessment & Plan     -epidural catheter placed yesterday  -patient has oxycodone 2 5 mg, however is only receiving 1 dose every 24 hr   Encourage use of oxycodone and will up titrate dose if needed    Patient has not received any IV Dilaudid  -continue scheduled Tylenol and Lidoderm patches  -continue bowel regimen         Medications:     acetaminophen 975 mg Oral Q8H [facility]   amiodarone 200 mg Oral Daily   amLODIPine 5 mg Oral Q12H [facility]   calcium carbonate 1 tablet Oral Daily With Breakfast   carvedilol 3 125 mg Oral BID With Meals   docusate sodium 100 mg Oral BID   econazole nitrate  Topical BID   fluticasone 1 spray Nasal BID   heparin (porcine) 5,000 Units Subcutaneous Q8H [facility]   HYDROmorphone 0 2 mg Intravenous Q4H PRN   isosorbide mononitrate 60 mg Oral Daily   lidocaine 1 patch Topical Daily   nitroglycerin 0 4 mg Sublingual Q5 Min PRN   ondansetron 4 mg Intravenous Q6H PRN   oxyCODONE 2 5 mg Oral Q4H PRN   polyethylene glycol 17 g Oral Daily   pravastatin 40 mg Oral Daily   prazosin 2 mg Oral HS [START ON 1/29/2019] prazosin 2 mg Oral QAM   ropivacaine 0 2%  Epidural Continuous   senna 1 tablet Oral HS   sodium chloride 45 mL/hr Intravenous Continuous       Discharge Plan: to be determined

## 2019-01-28 NOTE — PROGRESS NOTES
Bashir Mendoza 80 y o  female MRN: [id number]  Unit/Bed#: [facility] 699-44 Encounter: [id number]     Physician Requesting Consult: Melinda Hickman MD  Consults     Reason for Consultation / Chief Complaint: Hyperkalemia, hypercapnea     History of Present Illness:  Donnette Holstein is a 80 y o  female who presented on 1/23 following a mechanical fall  Her injuries include left scapula fracture, left ribs 3, 4, 5, 9, 10 fractures  Orthopedic surgery was following for the non-op scapula fracture  Medical history includes paroxysmal afib on eliquis, CHF, HTN  Acute pain has been following for epidural management  The eliquis has been held due to epidural  She has been on her home torsemide 60mg BID but last nights dose was held  She has been taking her home potassium repletion regiment of 20 BID  Afternoon BMP was checked showing K of 6 2   She was given 50 ml 50% dextrose and 10U insulin at 7PM  At 9PM recheck, the potassium had increased to 6 6  ABG was done at midnight showing hypercapnea with pCO2 of 12 7 with metabolic compensation of HCO3 41 1 for pH 7 35  EKG showing normal T waves  She was then given 10U insulin, amp dextrose, Kayexalate, 1L NS bolus, and albuterol breathing treatment  She was just placed on BiPAP 10/5 to help remove CO2  History obtained from chart review       Past Medical History:  Past Medical History:   Diagnosis Date    Acid reflux disease     Anemia     Last assessed: 10/26/16    Aortic valve regurgitation, nonrheumatic     Ascending aortic aneurysm (HCC)     Last assessed: 6/5/13    Atrial fibrillation (HCC)     Hyperlipidemia     Hypertension 2/17/2016    Paroxysmal atrial fibrillation ([facility] [address] ) 2/17/2016        Past Surgical History:  Past Surgical History:   Procedure Laterality Date    CARDIOVERSION      CHOLECYSTECTOMY      LAPAROSCOPIC SALPINGOOPHERECTOMY      TONSILLECTOMY          Past Family History:  Family History   Problem Relation Age of Onset    No Known Problems Mother     Stomach cancer Father     Heart disease Sister         Cardiac Disorder    Breast cancer Sister     Colon cancer Brother     Cancer Brother         Social History:  History   Smoking Status    Never Smoker   Smokeless Tobacco    Never Used     History   Alcohol Use No     History   Drug Use No     Marital Status:   Exercise History: N/A     Home Medications:   Prior to Admission medications    Medication Sig Start Date End Date Taking? Authorizing Provider   amiodarone 200 mg tablet Take 1 tablet (200 mg total) by mouth daily 3/14/18  Yes Stefany Antoine MD   amLODIPine (NORVASC) 10 mg tablet Take 10 mg by mouth daily   Yes Historical Provider, MD   apixaban (ELIQUIS) 5 mg Take 1 tablet (5 mg total) by mouth 2 (two) times a day 12/3/18  Yes Stefany Antoine MD   Calcium Carbonate (CALTRATE 600 PO) Take 600 mg by mouth daily     Yes Historical Provider, MD   carvedilol (COREG) 3 125 mg tablet Take 1 tablet (3 125 mg total) by mouth 2 (two) times a day with meals 4/11/18  Yes Stefany Antoine MD   diclofenac sodium (VOLTAREN) 1 % Apply 2 g topically 4 (four) times a day 10/3/18  Yes Stefany Antoine MD   econazole nitrate 1 % cream Apply topically 2 (two) times a day 10/3/18  Yes Stefany Antoine MD   fluticasone (FLONASE) 50 mcg/act nasal spray 1 spray into each nostril 2 (two) times a day 2/3/16  Yes Historical Provider, MD   isosorbide mononitrate (IMDUR) 60 mg 24 hr tablet Take 1 tablet (60 mg total) by mouth daily 7/26/18  Yes Stefany Antoine MD   KLOR-CON M20 20 MEQ tablet Take 1 tablet (20 mEq total) by mouth 2 (two) times a day 12/26/18  Yes Stefany Antoine MD   metolazone (ZAROXOLYN) 2 5 mg tablet Take 2 tablets (5 mg total) by mouth daily Take 2 for the next 3 days and as directed take as needed for increased leg swelling (148lbs) 12/12/18  Yes Stefany Antoine MD   nitroglycerin (NITROSTAT) 0 4 mg SL tablet Place 1 tablet (0 4 mg total) under the tongue every 5 (five) minutes as needed for chest pain 2/6/18  Yes Beni Talavera MD   pravastatin (PRAVACHOL) 40 mg tablet Take 1 tablet (40 mg total) by mouth daily 9/27/18  Yes Fadia Perrin MD   prazosin (MINIPRESS) 2 mg capsule Take 1 capsule (2 mg total) by mouth every morning 5/2/18  Yes Fadia Perrin MD   prazosin (MINIPRESS) 5 mg capsule Take 1 capsule (5 mg total) by mouth daily at bedtime 3/28/18  Yes Fadia Perrin MD   ramipril (ALTACE) 10 MG capsule Take 1 capsule (10 mg total) by mouth 2 (two) times a day 1/16/19  Yes Fadia Perrin MD   torsemide (DEMADEX) 20 mg tablet Take 3 tablets (60 mg total) by mouth 2 (two) times a day 12/26/18  Yes Fadia Perrin MD   acetaminophen (TYLENOL) 325 mg tablet Take 650 mg by mouth every 6 (six) hours as needed for mild pain      Historical Provider, MD        Inpatient Medications:  Scheduled Meds:  Current Facility-Administered Medications:  acetaminophen 975 mg Oral Q8H 3101 Sacha Cohen MD    amiodarone 200 mg Oral Daily Patricia Matson MD    amLODIPine 10 mg Oral Daily Patricia Matson MD    calcium carbonate 1 tablet Oral Daily With Breakfast Patricia Matson MD    calcium gluconate 1 g Intravenous Once Patricia Matson MD    carvedilol 3 125 mg Oral BID With Meals Patricia Matson MD    diclofenac sodium 2 g Topical 4x Daily Selvin Lindsey PA-C    diphenhydrAMINE 25 mg Intravenous Q6H PRN Martin Thayer MD    econazole nitrate  Topical BID Patricia Matson MD    fluticasone 1 spray Nasal BID Patricia Matson MD    heparin (porcine) 5,000 Units Subcutaneous [facility] Selvin Lindsey PA-C    HYDROmorphone 0 2 mg Intravenous Q4H PRN Selvin Lindsey PA-C    isosorbide mononitrate 60 mg Oral Daily Patricia Matson MD    lidocaine 1 patch Topical Daily Selvin Lindsey PA-C    lisinopril 40 mg Oral Daily Selvin Lindsey PA-C    multi-electrolyte 50 mL/hr Intravenous Continuous Patricia Matson MD    nitroglycerin 0 4 mg Sublingual Q5 Min PRN Patricia Matson MD    ondansetron 4 mg Intravenous Q6H PRN Martin Thayer MD    oxyCODONE 2 5 mg Oral Q4H PRN Selvin Lindsey, MOLLY    pravastatin 40 mg Oral Daily Kathy Lanier MD    prazosin 2 mg Oral QAM Kathy Lanier MD    prazosin 5 mg Oral HS Kathy Lanier MD    ropivacaine 0 2%  Epidural Continuous Enio Goldberg MD    senna 1 tablet Oral HS Marti Fu PA-C    sodium chloride 1,000 mL Intravenous Once Kathy Lanier MD Last Rate: 1,000 mL (19 003)     Continuous Infusions:  multi-electrolyte 50 mL/hr   ropivacaine 0 2%      PRN Meds:    diphenhydrAMINE 25 mg Q6H PRN   HYDROmorphone 0 2 mg Q4H PRN   nitroglycerin 0 4 mg Q5 Min PRN   ondansetron 4 mg Q6H PRN   oxyCODONE 2 5 mg Q4H PRN        Allergies: Allergies   Allergen Reactions    Aspirin     Percocet [Oxycodone-Acetaminophen]         ROS:   Review of Systems   Constitutional: Negative for chills and fever  Respiratory: Negative for chest tightness, shortness of breath and wheezing  Cardiovascular: Positive for chest pain  Negative for palpitations  Gastrointestinal: Negative for abdominal pain and nausea  Skin: Negative for rash and wound  Neurological: Negative for dizziness and weakness  All other systems reviewed and are negative  Vitals:  Vitals:    19   BP:  (!) 133/37 (!) 133/37    Pulse:   57    Resp:       Temp:       TempSrc:       SpO2: 98%  95% 97%   Weight:       Height:         Temperature:   Temp (24hrs), Av 1 °F (36 7 °C), Min:97 9 °F (36 6 °C), Max:98 2 °F (36 8 °C)    Current Temperature: 98 1 °F (36 7 °C)     Weights:   IBW: 47 8 kg  Body mass index is 26 99 kg/m²       Hemodynamic Monitoring:  N/A     Non-Invasive/Invasive Ventilation Settings:  Respiratory    Lab Data (Last 4 hours)       0023            pH, Arterial       7 353           pCO2, Arterial       (!)75 6           pO2, Arterial       (!)70 8           HCO3, Arterial       (!)41 1           Base Excess, Arterial       13 4                O2/Vent Data     None              Lab Results   Component Value Date PHART 7 353 01/28/2019    OBC8PNN 75 6 (HH) 01/28/2019    PO2ART 70 8 (L) 01/28/2019    RWM1CLA 41 1 (H) 01/28/2019    BEART 13 4 01/28/2019    SOURCE Radial, Right 01/28/2019     SpO2: SpO2: 97 %     Physical Exam:  Physical Exam   Constitutional: She appears well-developed  No distress  HENT:   Head: Normocephalic and atraumatic  Eyes: Pupils are equal, round, and reactive to light  EOM are normal    Cardiovascular: Normal rate, regular rhythm and intact distal pulses  Pulmonary/Chest: No respiratory distress  Abdominal: Soft  There is no tenderness  There is no guarding  Musculoskeletal: She exhibits no edema or deformity  Neurological: She is alert  Skin: Skin is warm  No rash noted  She is not diaphoretic  Psychiatric: She has a normal mood and affect  Her behavior is normal    Vitals reviewed  Labs:    Results from last 7 days  Lab Units 01/27/19  1401 01/25/19  1153 01/24/19  0444  01/23/19  1354   WBC Thousand/uL 6 28 5 51 6 17  --  7 50   HEMOGLOBIN g/dL 9 0* 8 4* 8 3*  --  9 5*   I STAT HEMOGLOBIN   --   --   --   < >  --    HEMATOCRIT % 33 1* 30 3* 29 4*  --  33 3*   HEMATOCRIT, ISTAT   --   --   --   < >  --    PLATELETS Thousands/uL 156 149 156  --  164   NEUTROS PCT % 87* 81*  --   --  86*   MONOS PCT % 7 8  --   --  6   < > = values in this interval not displayed  Results from last 7 days  Lab Units 01/27/19  2126 01/27/19  1401 01/25/19  1000  01/23/19  1405   SODIUM mmol/L 142 140 138  < >  --    POTASSIUM mmol/L 6 6* 6 2* 5 0  < >  --    CHLORIDE mmol/L 103 103 104  < >  --    CO2 mmol/L 38* 37* 33*  < >  --    CO2, I-STAT mmol/L  --   --   --   --  35*   BUN mg/dL 70* 65* 49*  < >  --    CREATININE mg/dL 1 75* 1 67* 1 78*  < >  --    CALCIUM mg/dL 8 5 9 0 8 3  < >  --    GLUCOSE, ISTAT mg/dl  --   --   --   --  112   < > = values in this interval not displayed                       0  Lab Value Date/Time   TROPONINI <0 02 01/23/2019 1354        Imaging: N/A   EKG: No peaked T waves This was personally reviewed by myself  Micro:  Lab Results   Component Value Date    URINECX 80,000-89,000 cfu/ml  07/17/2018       Assessment: 88yF s/p fall w/ L rib 3, 4, 5, 9, 10 and L scapula fracture  Now w/ worsening kidney function and hyperkalemia  Plan:                  Neuro:   Pain   - Pain control per acute pain service  Currently has epidural  Pain seems well controlled  - Continue tylenol 975 q8 scheduled  PRN dilaudid  CV:   Afib   - History of afib on amiodarone and eliquis  Amiodarone has been continued at 200mg qd  Eliquis has been held  This will continue to be held while the epidural is in place  HTN and diastolic CHF   - History of HTN on amlodipine 10qd, coreg 3 125 BID, imdur 60 qd, rampiril 10qd continued here as lisinopril 40qd, torsemide 60 BID, and prazosin 5mg qd   - These have all been continued  BP have been controlled with recent SBPs 130s  On 1/26 there were SBP below 100  -WIll hold lisinopril given worsening kidney function                 Lung:   Had been on 1L nasal cannula  CXR stable but difficult to read left side given enlarged cardiac silhouette  - Placed on 10/5 BiPAP to help with the CO2 retention  - AM CXR                 GI/FEN:   677 total in yesterday over 24 hrs, Total out 955 in urine  DESMOND   - Baseline mostly at 1  Presented at 1 5, 1 38 (1/24) > 1 78 ( 1/24), 1 67 (1/27 2PM) to 1 75 (1/27 9PM)   - No clear etiology  Does not appear to have been overdiuresed  Has been on home regiment of K repletion 20 BID  Hypotensive episodes on 1/26 but trend in creatinine had started before this  - Obtain Echo for possibly decreased cardiac output as etiology  Cardiology has also been consulted  Will follow up  CO2 retention that is compensated by HCO3- retention    - pH 7 353, pCO2 75 6m HCO3- 41 1 and base excess 13    Hyperkalemia   - K 6 6   Given insulin, dextrose, breathing treatment, 1L NS, calcium gluconate  Normal T waves  - Concern that due to declining kidney function her ability to compensate metabolically for her respiratory acidosis may diminish  Will place on BiPAP and recheck a BMP at 2AM and ABG at 5 AM     - Potassium supplements held   - Nephro consulted  Will follow up      2gm Sodium restricted diet continues  NS at 50ml/hr  Senna                :   No acute issues  ID:   No issues  Afebrile  WBC 6 3 yesterday  Heme:   Home Eliquis held  DVT PPx SQH                 Endo:   No issues                 Msk/Skin:   Rib fractures L 3, 4, 5, 9, 10 and L scapula fracture   - Pain management of rib fractures  Nonop of scapula per ortho  NWB LUE in sling  F/u in office 2-3 weeks after discharge                 Disposition: SD1     VTE Pharmacologic Prophylaxis: Heparin  VTE Mechanical Prophylaxis: sequential compression device     Invasive lines and devices: Invasive Devices     Peripheral Intravenous Line            Peripheral IV 01/27/19 Right Antecubital less than 1 day          Epidural Line            Epidural Catheter 01/26/19 1 day          Drain            External Urinary Catheter 4 days                 Code Status: Level 1 - Full Code  POA:    POLST:       Given critical illness, patient length of stay will require greater than two midnights  Counseling / Coordination of Care  Total Critical Care time spent 40 minutes excluding procedures, teaching and family updates  Portions of the record may have been created with voice recognition software  Occasional wrong word or "sound a like" substitutions may have occurred due to the inherent limitations of voice recognition software  Read the chart carefully and recognize, using context, where substitutions have occurred          Denia Kirby MD

## 2019-01-28 NOTE — MEDICAL STUDENT
Reason for Consult / Principal Problem: CHF    Physician Requesting Consult:  Josey Dunbar MD    Cardiologist: Dr Noreen Barber    Assessment/Plan:  Current Problem List   Principal Problem:    Multiple rib fractures  Active Problems:    Paroxysmal atrial fibrillation (HCC)    Essential hypertension    Chronic diastolic congestive heart failure (HCC)    Closed left scapular fracture    Acute kidney injury ([facility] [address] )    Acute respiratory insufficiency    Acute pain due to trauma    Assessment/Plan:    1  CHF exacerbation  · Last seen by Dr Kavon Lock 12/2018 - med change from amlodipine to torsemide 60mg BID   · No leg swelling but complains of orthopnea   · 1 NS bolus was given last night  · Home torsemide was held since admission and 40mg furosemide was given this morning  · Increase diuresis at least to her home med dose for worsened symptoms: torsemide 60mg BID  2  Afib  - Eliquis was held due to epidural pain management   - rate is controlled, pt is asymptomatic   - continue with amiodarone 200mg D, and Carvedilol bid    3  Hyperkalemia in the setting of hypercapnea  · home torsemide was discontinued on 1/23  · Furosemide 40mg was given at 3:40am   · K was 6 2 yesterday afternoon and 6 6 without EKG changes after getting 50% dextrose, 10U insulin  Then pt received 10U insulin, dextrose, kayexalate, 1NS bollus and albuterol breathing treatment  · Likely due to hypercapnia from difficulty breathing from pain  · K is 5 4 this morning   · Continue to monitor telemetry   4  HTN  · BP has been acceptable  · Continue with home meds   · Plan to slowly d/c prazosin as it can cause lightheadedness and pt is s/p a fall    HPI: Nery Carroll is a 80y o  year old female with hx of CHF, afib on Eliquis, Coreg, and amiodarone, HTN and HLD who presents to ED from [facility] on 1/23/19 after a fall  She fell on the left side of body  CT scan showed acute left sided rib fractures (3,4,5,9,10) and left scapular fracture  Acute pain service has been following for epidural management  Eliquis has been held for epidural  And Orthopedics was following for the non-op scapula fracture  Cardiology was consulted for optimal CHF control for her DESMOND status  Yesterday K showed 6 2  50% dextrose and 10U insulin were given at 7pm  At 9pm, K was increased to 6 6  EKG showed normal T waves  She was then given 10U insulin, dextrose kayexalate, 1 NS bollus and albuterol breathing treatment  She was also placed on BiPAP 10/5 to help remove CO2  This morning, patient seems confused but denies CP, palpitation, breathing difficulty but complains of shoulder pain  Remainder of ROS done and negative    Telemetry: sinus rhythm     Net fluid balance:    +178 3 over 24hrs   +827 1 over previous 24hr    Weight: 64 8kg (142lb)    EKG:   Rate 58  Right axis deviation   Sinus rhythm       ECHO:   02/2016  EF =60%  No regional wall motion abnormalities   Moderately dilated LA   Moderately dilated LA appendage  Trace MR, TR   Moderate AR   There was moderate dilatation of the ascending aorta with anteroposterior  diameter measured at 5 cm  There was mild atheroma in the mid descending aorta  Images:  Xr Chest Portable    Result Date: 1/25/2019  Impression: Left basilar opacity likely representing a combination of left lower lung field atelectasis and pleural fluid  No pneumothorax  Workstation performed: [id number]     Xr Chest Pa & Lateral    Result Date: 1/24/2019  Impression: 1  No evidence of pneumothorax 2  Enlargement of cardiac silhouette and small bilateral effusions left larger than right Workstation performed: [id number]     Xr Ribs With Pa Chest Min 3 Views Left    Result Date: 1/23/2019  Impression: 1  Left-sided rib fractures, involving the 3rd 4th and 5th ribs and potentially nondisplaced fractures of the 9th and 10th ribs as well  2  Enlargement of cardiac silhouette 3   Left scapular fracture Workstation performed: [id number] Xr Shoulder 2+ Views Left    Result Date: 1/23/2019  Impression: Slightly displaced fracture of the left scapular body  Workstation performed: [id number]     Xr Elbow 2 Vw Left    Result Date: 1/23/2019  Impression: No acute osseous abnormality  Workstation performed: [id number]     Xr Tibia Fibula 2 Vw Left    Result Date: 1/24/2019  Impression: No acute osseous abnormality  Workstation performed: [id number]     Ct Head Without Contrast    Result Date: 1/23/2019  Impression: 1  No acute intracranial CT abnormality  2   Approximately 1 3 cm dural base appearing ovoid soft tissue density along the right posterior inferior falx with associated peripheral calcification, and minimal mass effect upon adjacent right occipital gyrus  This most likely represent a meningioma and can be further assessed with MRI if clinically indicated  3   Age-related cerebral atrophy and chronic white matter microangiopathy  Workstation performed: [id number]     Ct Chest Without Contrast    Result Date: 1/23/2019  Impression: 1  Cardiomegaly with moderate water density pericardial effusion 2  Ascending thoracic aortic aneurysm, which has increased in size since 2015 (5 7 cm versus 5 0 cm) 3  Left scapular fracture, and fractures of the left 3rd, 4th and 5th ribs  4  Left basilar subsegmental atelectasis  No pneumothorax or hemothorax  I personally discussed this study with DR Elif Roque on 1/23/2019 at 3:59 PM  Workstation performed: [id number]     Ct Cervical Spine Without Contrast    Result Date: 1/23/2019  Impression: 1  No acute osseous injury in the cervical spine  2   Thyroid nodules, please refer to prior dedicated ultrasound on 4/15/2014  Workstation performed: [id number]     Xr Pelvis Ap Only 1 Or 2 Vw    Result Date: 1/24/2019  Impression: Limited study  No definite acute fracture    If symptoms persist, dedicated views of the pertinent hip, CT or MRI would be recommended Workstation performed: [id number] Family History:   Family History   Problem Relation Age of Onset    No Known Problems Mother     Stomach cancer Father     Heart disease Sister         Cardiac Disorder    Breast cancer Sister     Colon cancer Brother     Cancer Brother      Historical Information   Past Medical History:   Diagnosis Date    Acid reflux disease     Anemia     Last assessed: 10/26/16    Aortic valve regurgitation, nonrheumatic     Ascending aortic aneurysm (HCC)     Last assessed: 6/5/13    Atrial fibrillation ([facility] [address] )     Hyperlipidemia     Hypertension 2/17/2016    Paroxysmal atrial fibrillation ([facility] [address] ) 2/17/2016     Cardiac hx:   AI   Aortic aneurysm   APOLINAR with cardioeversion 02/2016      Past Surgical History:   Procedure Laterality Date    CARDIOVERSION      CHOLECYSTECTOMY      LAPAROSCOPIC SALPINGOOPHERECTOMY      TONSILLECTOMY       Social History   History   Alcohol Use No     History   Drug Use No     History   Smoking Status    Never Smoker   Smokeless Tobacco    Never Used     Family History:   Family History   Problem Relation Age of Onset    No Known Problems Mother     Stomach cancer Father     Heart disease Sister         Cardiac Disorder    Breast cancer Sister     Colon cancer Brother     Cancer Brother        Review of Systems:  Review of Systems   Respiratory: Negative for cough, chest tightness and shortness of breath  Cardiovascular: Negative for chest pain and leg swelling  Gastrointestinal: Negative for abdominal pain  Musculoskeletal: Positive for back pain         Scheduled Meds:    Current Facility-Administered Medications:  acetaminophen 975 mg Oral [facility] Bishnu Bright MD    amiodarone 200 mg Oral Daily Bishnu Bright MD    amLODIPine 10 mg Oral Daily Manjula Morse DO    calcium carbonate 1 tablet Oral Daily With Breakfast Bishnu Bright MD    carvedilol 3 125 mg Oral BID With Meals Manjula Howard DO    diclofenac sodium 2 g Topical 4x Daily Jessica Ambrocio PA-C diphenhydrAMINE 25 mg Intravenous Q6H PRN Christa Courtney, MD    econazole nitrate  Topical BID Farrel Mean, MD    fluticasone 1 spray Nasal BID Farrel Mean, MD    heparin (porcine) 5,000 Units Subcutaneous [facility] Yessenia Layer, PA-C    HYDROmorphone 0 2 mg Intravenous Q4H PRN Yessenia Layer, PA-C    isosorbide mononitrate 60 mg Oral Daily Manjula K Mini, DO    lidocaine 1 patch Topical Daily Yessenia Layer, PA-C    nitroglycerin 0 4 mg Sublingual Q5 Min PRN Farrel Mean, MD    ondansetron 4 mg Intravenous Q6H PRN Chirsta Courtney, MD    oxyCODONE 2 5 mg Oral Q4H PRN Yessenia Layer, PA-C    pravastatin 40 mg Oral Daily Farrel Mean, MD    prazosin 2 mg Oral QAM Manjula Berhaneda Luis Fernando, DO    prazosin 5 mg Oral HS Manjula Shelby Gould, DO    ropivacaine 0 2%  Epidural Continuous Christa Courtney MD    senna 1 tablet Oral HS Nadia Norton, PA-C    sodium chloride 50 mL/hr Intravenous Continuous Joan Lott MD Last Rate: 50 mL/hr (01/28/19 0235)     Continuous Infusions:    ropivacaine 0 2%     sodium chloride 50 mL/hr Last Rate: 50 mL/hr (01/28/19 0235)     PRN Meds: diphenhydrAMINE    HYDROmorphone    nitroglycerin    ondansetron    oxyCODONE  current meds:   Current Facility-Administered Medications   Medication Dose Route Frequency    acetaminophen (TYLENOL) tablet 975 mg  975 mg Oral Q8H [address]    amiodarone tablet 200 mg  200 mg Oral Daily    amLODIPine (NORVASC) tablet 10 mg  10 mg Oral Daily    calcium carbonate (OYSTER SHELL,OSCAL) 500 mg tablet 1 tablet  1 tablet Oral Daily With Breakfast    carvedilol (COREG) tablet 3 125 mg  3 125 mg Oral BID With Meals    diclofenac sodium (VOLTAREN) 1 % topical gel 2 g  2 g Topical 4x Daily    diphenhydrAMINE (BENADRYL) injection 25 mg  25 mg Intravenous Q6H PRN    econazole nitrate 1 % cream   Topical BID    fluticasone (FLONASE) 50 mcg/act nasal spray 1 spray  1 spray Nasal BID    heparin (porcine) subcutaneous injection 5,000 Units  5,000 Units Subcutaneous Q8H [address]    HYDROmorphone (DILAUDID) injection 0 2 mg  0 2 mg Intravenous Q4H PRN    isosorbide mononitrate (IMDUR) 24 hr tablet 60 mg  60 mg Oral Daily    lidocaine (LIDODERM) 5 % patch 1 patch  1 patch Topical Daily    nitroglycerin (NITROSTAT) SL tablet 0 4 mg  0 4 mg Sublingual Q5 Min PRN    ondansetron (ZOFRAN) injection 4 mg  4 mg Intravenous Q6H PRN    oxyCODONE (ROXICODONE) IR tablet 2 5 mg  2 5 mg Oral Q4H PRN    pravastatin (PRAVACHOL) tablet 40 mg  40 mg Oral Daily    prazosin (MINIPRESS) capsule 2 mg  2 mg Oral QAM    prazosin (MINIPRESS) capsule 5 mg  5 mg Oral HS    ropivacaine 0 2% PCEA   Epidural Continuous    senna (SENOKOT) tablet 8 6 mg  1 tablet Oral HS    sodium chloride 0 9 % infusion  50 mL/hr Intravenous Continuous    and PTA meds:   Prior to Admission Medications   Prescriptions Last Dose Informant Patient Reported? Taking? Calcium Carbonate (CALTRATE 600 PO)  Self Yes Yes   Sig: Take 600 mg by mouth daily  KLOR-CON M20 20 MEQ tablet   No Yes   Sig: Take 1 tablet (20 mEq total) by mouth 2 (two) times a day   acetaminophen (TYLENOL) 325 mg tablet  Self Yes No   Sig: Take 650 mg by mouth every 6 (six) hours as needed for mild pain     amLODIPine (NORVASC) 10 mg tablet   Yes Yes   Sig: Take 10 mg by mouth daily   amiodarone 200 mg tablet  Self No Yes   Sig: Take 1 tablet (200 mg total) by mouth daily   apixaban (ELIQUIS) 5 mg 2019 at Unknown time Self No Yes   Sig: Take 1 tablet (5 mg total) by mouth 2 (two) times a day   carvedilol (COREG) 3 125 mg tablet  Self No Yes   Sig: Take 1 tablet (3 125 mg total) by mouth 2 (two) times a day with meals   diclofenac sodium (VOLTAREN) 1 %  Self No Yes   Sig: Apply 2 g topically 4 (four) times a day   econazole nitrate 1 % cream  Self No Yes   Sig: Apply topically 2 (two) times a day   fluticasone (FLONASE) 50 mcg/act nasal spray  Self Yes Yes   Si spray into each nostril 2 (two) times a day   isosorbide mononitrate (IMDUR) 60 mg 24 hr tablet  Self No Yes   Sig: Take 1 tablet (60 mg total) by mouth daily   metolazone (ZAROXOLYN) 2 5 mg tablet   No Yes   Sig: Take 2 tablets (5 mg total) by mouth daily Take 2 for the next 3 days and as directed take as needed for increased leg swelling (148lbs)   nitroglycerin (NITROSTAT) 0 4 mg SL tablet  Self No Yes   Sig: Place 1 tablet (0 4 mg total) under the tongue every 5 (five) minutes as needed for chest pain   pravastatin (PRAVACHOL) 40 mg tablet  Self No Yes   Sig: Take 1 tablet (40 mg total) by mouth daily   prazosin (MINIPRESS) 2 mg capsule  Self No Yes   Sig: Take 1 capsule (2 mg total) by mouth every morning   prazosin (MINIPRESS) 5 mg capsule  Self No Yes   Sig: Take 1 capsule (5 mg total) by mouth daily at bedtime   ramipril (ALTACE) 10 MG capsule   No Yes   Sig: Take 1 capsule (10 mg total) by mouth 2 (two) times a day   torsemide (DEMADEX) 20 mg tablet   No Yes   Sig: Take 3 tablets (60 mg total) by mouth 2 (two) times a day      Facility-Administered Medications: None     Allergies   Allergen Reactions    Aspirin     Percocet [Oxycodone-Acetaminophen]      Objective  NC 2L  Vitals: Temp (24hrs), Av 2 °F (36 8 °C), Min:98 1 °F (36 7 °C), Max:98 2 °F (36 8 °C)  Current: Temperature: 98 2 °F (36 8 °C)  Patient Vitals for the past 24 hrs:   BP Temp Temp src Pulse Resp SpO2   19 0800 (!) 152/45 - - 58 (!) 28 96 %   19 0700 142/59 - - 56 16 99 %   19 0600 (!) 123/45 - - (!) 52 17 100 %   19 0500 (!) 115/39 - - 56 17 99 %   19 0400 (!) 107/34 - - 58 21 99 %   19 0356 - - - - - 99 %   19 0300 (!) 140/40 - - 58 16 99 %   19 0245 (!) 128/41 98 2 °F (36 8 °C) Oral 56 16 99 %   01/28/19 0035 - - - - - 97 %   196 (!) 135/38 - - (!) 53 16 96 %   19 (!) 133/37 - - 57 - 95 %   19 (!) 133/37 - - - - -   19 - - - - - 95 %   19 - - - - - 98 %   19 (!) 138/37 - - 56 - 99 %   19 (!) 132/40 98 1 °F (36 7 °C) Oral 58 19 -   01/27/19 1519 (!) 143/38 98 2 °F (36 8 °C) - 57 18 96 %   01/27/19 1123 - - - 57 13 97 %   01/27/19 1104 - - - - - 96 %    Body mass index is 26 99 kg/m²  Orthostatic Blood Pressures      Most Recent Value   Blood Pressure   152/45 filed at 01/28/2019 0800   Patient Position - Orthostatic VS  Lying filed at 01/27/2019 2346          Invasive Devices     Peripheral Intravenous Line            Peripheral IV 01/27/19 Right Antecubital less than 1 day    Peripheral IV 01/28/19 Left Wrist less than 1 day          Epidural Line            Epidural Catheter 01/26/19 1 day                Physical Exam:  General:  AO x3, no acute distress, breathing and talking without SOB with NS 2L   Cardiac:  S1-S2, murmurs best heard on right 2nd intercostal space   Lungs:  Clear to auscultation bilaterally from the front, no wheezing or crackles    Abdomen:  Soft nontender nondistended,  Extremities:  Warm, well perfused, pulses palpable, no ulcers or rashes, no pedal edema  Neuro: Grossly nonfocal  Psych:  Normal affect    Lab Results:     Results from last 7 days  Lab Units 01/28/19  0756 01/27/19  1401 01/25/19  1153 01/24/19  0444 01/23/19  1405 01/23/19  1354   WBC Thousand/uL 7 52 6 28 5 51 6 17  --  7 50   HEMOGLOBIN g/dL 8 2* 9 0* 8 4* 8 3*  --  9 5*   I STAT HEMOGLOBIN g/dl  --   --   --   --  10 9*  --    HEMATOCRIT % 29 5* 33 1* 30 3* 29 4*  --  33 3*   HEMATOCRIT, ISTAT %  --   --   --   --  32*  --    PLATELETS Thousands/uL 138* 156 149 156  --  164   NEUTROS PCT % 86* 87* 81*  --   --  86*   MONOS PCT % 8 7 8  --   --  6        Results from last 7 days  Lab Units 01/28/19  0756 01/28/19  0226 01/27/19  2126 01/27/19  1401 01/25/19  1000 01/24/19  0444 01/23/19  1405 01/23/19  1354   SODIUM mmol/L 142 143 142 140 138 142  --  138   POTASSIUM mmol/L 5 4* 6 2* 6 6* 6 2* 5 0 4 5  --  4 3   CHLORIDE mmol/L 104 106 103 103 104 104  --  102   CO2 mmol/L 36* 36* 38* 37* 33* 32  --  32   CO2, I-STAT mmol/L  --   --   --   --   --   --  35*  --    BUN mg/dL 70* 70* 70* 65* 49* 35*  --  38*   CREATININE mg/dL 1 63* 1 67* 1 75* 1 67* 1 78* 1 38*  --  1 51*   CALCIUM mg/dL 8 9 8 2* 8 5 9 0 8 3 8 6  --  8 9   GLUCOSE, ISTAT mg/dl  --   --   --   --   --   --  112  --    TROPONIN I ng/mL  --   --   --   --   --   --   --  <0 02   MAGNESIUM mg/dL 2 6  --   --   --   --   --   --   --    PHOSPHORUS mg/dL 3 8  --   --   --   --   --   --   --    EGFR ml/min/1 73sq m 28 27 26 27 25 34  --  31               Results from last 7 days  Lab Units 01/23/19  1354   TROPONIN I ng/mL <0 02     Lab Results   Component Value Date    PHART 7 327 (L) 01/28/2019    WUX5FJS 73 1 (HH) 01/28/2019    PO2ART 128 4 01/28/2019    PIP2CYE 37 4 (H) 01/28/2019    BEART 9 8 01/28/2019    SOURCE Radial, Right 01/28/2019

## 2019-01-28 NOTE — CONSULTS
Cardiology Consult  Zulema Singletary 80 y o  female MRN: [id number]  Unit/Bed#: ICU 02 Encounter: [id number]      Reason for Consult / Principal Problem: CHF    Physician Requesting Consult: Claudia Harvey MD    OP Cardiologist: Jeaneth Haskins    Assessment: #HFpEF, decompensated   -on torsemide 60mg BID, prn 5mg metolazone as OP  -dry weight reported 135lb; currently 142lb  #Severe AI, TAA 51mm  -pt has declined intervention in the past; medical mgmt  #PAF  -on eliquis 5mg BID (held 2/2 epidural), amio 200mg  #CKD, Cr near baseline  #Anemia of chronic dz  #HTN  #HLD  #L scapular, multiple rib fx  #Hyperkalemia, resolved  #Hypercapnic and hypoxic respiratory failure    Plan:  1  Recommend stopping IV fluids and restarting home torsemide 60mg BID as patient is volume overloaded  Low Na diet with 1500cc fluid restrictions  2  Continue amio 200mg, pravastatin 40mg, Imdur 60mg, Coreg 3 125mg BID, amlodipine 10mg  Eliquis held per ICU team due to epidural  Home prazosin is not a great BP med for an elderly patient with her co-morbidities  Will plan to gradually wean off and uptitrate Coreg if necessary  3  Will discuss with patient's daughter about goals of care, whether a POLST has been recently completed  Consider palliative care consult  4  Monitor on telemetry  Keep Mg >2 5, K >4 5  HPI: Zulema Singletary 80y o  year old female with a history of HFpEF on Torsemide 60mg BID and metolazone, HTN, HLD, PAF on amio/Eliquis, TAA 51mm, mod-severe AI, CKD, anemia who presented 1/23 with mechanical fall (tripped while using walker)  Work up revealed L scapula and multiple rib fractures  Ortho is on board, nonsurgical approach taken with epidural anaesthesia  Her clinical course was complicated by hyperkalemia at 6 6 yesterday and hypercarbic respiratory failure with ABG showing a PCO2 of 75  She was given a 1L fluid bolus and hyperkalemia was treated acutely with insulin/D50, kayexylate/lasix 40mg IV now 5 4   She was also placed on BPAP per critical care, removed overnight  She is on NS 50cc/hr currently  Currently she is somewhat confused  Admits to dyspnea, orthopnea, unchanged over the last few days but reports that the last 4-6 weeks her breathing has slowly gotten worse  Denies dizziness, lightheadedness, palpitations, CP, diaphoresis, NVD  She lives independently at a facility, uses walker to ambulate  Part of PALS program    Pt states she has a POLST and implied she may be DNR but was not sure  Not a reliable historian at this time  Family History: reviewed  Historical Information   Past Medical History:   Diagnosis Date    Acid reflux disease     Anemia     Last assessed: 10/26/16    Aortic valve regurgitation, nonrheumatic     Ascending aortic aneurysm (HCC)     Last assessed: 6/5/13    At[facility] fibrillation ([facility] [address] )     Hyperlipidemia     Hypertension 2/17/2016    [facility]oxysmal [facility] fibrillation ([facility] ) 2/17/2016     Past Surgical History:   Procedure Laterality Date    CARDIOVERSION      CHOLECYSTECTOMY      LAPAROSCOPIC SALPINGOOPHERECTOMY      TONSILLECTOMY       Social History   History   Alcohol Use No     History   Drug Use No     History   Smoking Status    Never Smoker   Smokeless Tobacco    Never Used     Family History:   Family History   Problem Relation Age of Onset    No Known Problems Mother     Stomach cancer Father     Heart disease Sister         Cardiac Disorder    Breast cancer Sister     Colon cancer Brother     Cancer Brother        Review of Systems:  Review of Systems  Except as noted in the HPI and above, a comprehensive 14 point review of systems was negative      Scheduled Meds:  Current Facility-Administered Medications:  acetaminophen 975 mg Oral [facility] Rhett Rosa MD    amiodarone 200 mg Oral Daily Rhett Rosa MD    amLODIPine 10 mg Oral Daily Manjula Morse DO    calcium carbonate 1 tablet Oral Daily With Breakfast Rhett Rosa MD    carvedilol 3 125 mg Oral BID With Meals Manjula Rodriguez DO    diclofenac sodium 2 g Topical 4x Daily Lizbet Mendosa PA-C    diphenhydrAMINE 25 mg Intravenous Q6H PRN Ольга Jerez MD    econazole nitrate  Topical BID Ena Sosa MD    fluticasone 1 spray Nasal BID Ena Sosa MD    heparin (porcine) 5,000 Units Subcutaneous [facility] Lizbet Mendosa PA-C    HYDROmorphone 0 2 mg Intravenous Q4H PRN Lizbet Mendosa PA-C    isosorbide mononitrate 60 mg Oral Daily Manjula Morse DO    lidocaine 1 patch Topical Daily Lizbet Mendosa PA-C    nitroglycerin 0 4 mg Sublingual Q5 Min PRN Ena Sosa MD    ondansetron 4 mg Intravenous Q6H PRN Ольга Jerez MD    oxyCODONE 2 5 mg Oral Q4H PRN Lizbet Mendosa PA-C    pravastatin 40 mg Oral Daily Ena Sosa MD    prazosin 2 mg Oral QAM Manjula Rodriguez DO    prazosin 5 mg Oral HS Manjula Rodriguez DO    ropivacaine 0 2%  Epidural Continuous Ольга Jerez MD    senna 1 tablet Oral HS Katja Dailey PA-C    sodium chloride 50 mL/hr Intravenous Continuous Oli Rowland MD Last Rate: 50 mL/hr (01/28/19 0235)     Continuous Infusions:  ropivacaine 0 2%     sodium chloride 50 mL/hr Last Rate: 50 mL/hr (01/28/19 0235)     PRN Meds: diphenhydrAMINE    HYDROmorphone    nitroglycerin    ondansetron    oxyCODONE  all current active meds have been reviewed    Allergies   Allergen Reactions    Aspirin     Percocet [Oxycodone-Acetaminophen]        Objective   Vitals: Blood pressure (!) 152/45, pulse 58, temperature 98 2 °F (36 8 °C), temperature source Oral, resp  rate (!) 28, height 5' 1" (1 549 m), weight 64 8 kg (142 lb 13 7 oz), SpO2 96 %  , Body mass index is 26 99 kg/m² , Orthostatic Blood Pressures      Most Recent Value   Blood Pressure   152/45 filed at 01/28/2019 0800   Patient Position - Orthostatic VS  Lying filed at 01/27/2019 2346            Intake/Output Summary (Last 24 hours) at 01/28/19 0900  Last data filed at 01/28/19 0800   Gross per 24 hour   Intake          2085 43 ml   Output 1080 ml   Net          1005 43 ml       Invasive Devices     Peripheral Intravenous Line            Peripheral IV 01/27/19 Right Antecubital less than 1 day    Peripheral IV 01/28/19 Left Wrist less than 1 day          Epidural Line            Epidural Catheter 01/26/19 1 day                Physical Exam:  Physical Exam   Constitutional: She appears well-developed and well-nourished  She appears distressed  HENT:   Head: Normocephalic and atraumatic  Eyes: Pupils are equal, round, and reactive to light  Conjunctivae and EOM are normal  No scleral icterus  Neck: Normal range of motion  Neck supple  JVD present  Cardiovascular: Normal rate, regular rhythm and intact distal pulses  Murmur (3/6 systolic and well as diastolic murmur over precordium) heard  Pulmonary/Chest: She is in respiratory distress (conversational dyspnea)  She has no wheezes  She has rales  Abdominal: Soft  She exhibits no distension  There is no tenderness  There is no rebound and no guarding  Musculoskeletal: She exhibits edema (trace)  Neurological: She is alert  Confused, not oriented     Skin: Skin is warm and dry  She is not diaphoretic  Psychiatric: She has a normal mood and affect  Lab Results: I have personally reviewed pertinent lab results  Imaging: I have personally reviewed pertinent reports  EKG: Personally reviewed    ECHO: reviewed  Previous Cath/PCI: n/a  Code Status: Level 1 - Full Code  Advance Directive and Living Will: Yes    Power of :    POLST:        Kathie Mckeon MD  Cardiology Fellow

## 2019-01-28 NOTE — SOCIAL WORK
Pt not medically stable for d/c today   Pt will d/c to [facility] once stable

## 2019-01-28 NOTE — PROGRESS NOTES
Progress Note - Carson Veliz 80 y o  female MRN: [id number]    Unit/Bed#: ICU 02 Encounter: [id number]      Assessment/Plan  1  Fall  Fall precautions  Recommend adding vitamin D3 1000 international units daily patient's medication regimen  Home meds unremarkable  PT, OT  Patient will benefit from rehab    2  Ambulatory dysfunction  Ambulates with rolled walker at baseline  PT, OT  Patient will need rehab    3  Constipation  Senna q h s  Colace added today  Will add MiraLax daily as well    4  Delirium  Likely complicated by 3 , as well as pain, unfamiliar environment, and probable cognitive deficit at baseline   Continue treating underlying conditions  Continue with pain medications as ordered  Monitor closely for urinary retention, bladder scan straight cath as needed  Patient at risk for developing delirium, delirium preventing tactics advised  Redirect unwanted patient behaviors as first line tx  Reorient patient frequently  Avoid deliriogenic meds including tramadol, benzodiazepines, benadryl  Good sleep hygiene important, limit night time interruptions  Encourage patient to stay awake during the day  Ensure adequate hydration/nutrition  Mobilize often     5  Forgetfulness  Daughter reports patient was forgetful at baseline, and could be repetitive  Patient's cognitive screen was within normal limits, patient did score 4/5 on mini cog assessment  Patient's presentation currently complicated by 4, the patient was alert and oriented  Patient may benefit from follow up at [facility] upon discharge  No signs macrocytosis, will defer B12, folate checks    6  Acute pain due to trauma  Continue with pain medications as ordered    7  Multiple rib fxs   Trauma following and managing    Subjective:   Spoke to patient's daughter at length  Daughter reports patient has been confused, which is not her baseline    Daughter reports patient is repetitive at baseline but has never been this confused  Upon exam patient does seem to be better, alert oriented x4  Complains of constipation  Patient denies fatigue, headaches, vision changes, chest pain, sob, N/V/D, constipation, difficulty with urination, muscle pain  Objective:     Vitals: Blood pressure (!) 112/46, pulse 58, temperature 98 3 °F (36 8 °C), temperature source Oral, resp  rate 19, height 5' 1" (1 549 m), weight 64 8 kg (142 lb 13 7 oz), SpO2 98 %  ,Body mass index is 26 99 kg/m²  Intake/Output Summary (Last 24 hours) at 01/28/19 1314  Last data filed at 01/28/19 1200   Gross per 24 hour   Intake          2147 43 ml   Output             1030 ml   Net          1117 43 ml       Physical Exam: General : WD in NAD  HEENT : MMM no erythema or exudates  EOMI, sclera anicteric  Heart : Normal rate  Lungs : CTA  Abdomen : Soft, NT/ND, BS auscultated in all 4 quads  No organomegaly  Ext :  Pulses +2/4 B/L  Neg edema B/L  Neg calf swelling B/L  Skin : Pink, warm, dry, age appropriate turgor and mobility  Neuro : Nonfocal  Psych : A * O x 4, but confused per nurse and daughter       Invasive Devices     Peripheral Intravenous Line            Peripheral IV 01/27/19 Right Antecubital less than 1 day    Peripheral IV 01/28/19 Left Wrist less than 1 day          Epidural Line            Epidural Catheter 01/26/19 2 days          Drain            External Urinary Catheter less than 1 day                Lab, Imaging and other studies: I have personally reviewed pertinent reports      VTE Pharmacologic Prophylaxis: Sequential compression device (Venodyne)   VTE Mechanical Prophylaxis: sequential compression device

## 2019-01-28 NOTE — PROGRESS NOTES
Full consult will be done in am    Discussed with surgical resident  Patient with multiple left sided rib fractures  Was called for blood work showing K 6 6 at 9 pm  Hyperkalemia- was on lisinopril, KCL tablets, isolyte solution which could be contributing  Agree with stopping all these  CK level wnl  No hyperkalemia changes on EKG as per primary team  Medical treatment with insulin , dextrose, albuterol inhalation given and potassium came down to 6 2  Also IV NS bolus was given and kayexalate given  As potassium improving and pt is making urine, no need for dialysis  Expect K to improve  Recommended low K diet  Recommended lasix 40 mg IV and repeat insulin and dextrose and recheck labs in 4 hrs

## 2019-01-29 LAB
ALBUMIN SERPL BCP-MCNC: 2.9 G/DL (ref 3.5–5)
ALP SERPL-CCNC: 58 U/L (ref 46–116)
ALT SERPL W P-5'-P-CCNC: 35 U/L (ref 12–78)
ANION GAP SERPL CALCULATED.3IONS-SCNC: 4 MMOL/L (ref 4–13)
AST SERPL W P-5'-P-CCNC: 37 U/L (ref 5–45)
BASOPHILS # BLD AUTO: 0.02 THOUSANDS/ΜL (ref 0–0.1)
BASOPHILS NFR BLD AUTO: 0 % (ref 0–1)
BILIRUB SERPL-MCNC: 0.42 MG/DL (ref 0.2–1)
BUN SERPL-MCNC: 57 MG/DL (ref 5–25)
CA-I BLD-SCNC: 1.17 MMOL/L (ref 1.12–1.32)
CALCIUM SERPL-MCNC: 9 MG/DL (ref 8.3–10.1)
CHLORIDE SERPL-SCNC: 107 MMOL/L (ref 100–108)
CO2 SERPL-SCNC: 33 MMOL/L (ref 21–32)
CORTIS SERPL-MCNC: 28.9 UG/DL
CREAT SERPL-MCNC: 1.22 MG/DL (ref 0.6–1.3)
EOSINOPHIL # BLD AUTO: 0.17 THOUSAND/ΜL (ref 0–0.61)
EOSINOPHIL NFR BLD AUTO: 3 % (ref 0–6)
ERYTHROCYTE [DISTWIDTH] IN BLOOD BY AUTOMATED COUNT: 19.8 % (ref 11.6–15.1)
GFR SERPL CREATININE-BSD FRML MDRD: 40 ML/MIN/1.73SQ M
GLUCOSE SERPL-MCNC: 107 MG/DL (ref 65–140)
HCT VFR BLD AUTO: 32.4 % (ref 34.8–46.1)
HGB BLD-MCNC: 9.2 G/DL (ref 11.5–15.4)
IMM GRANULOCYTES # BLD AUTO: 0.02 THOUSAND/UL (ref 0–0.2)
IMM GRANULOCYTES NFR BLD AUTO: 0 % (ref 0–2)
LYMPHOCYTES # BLD AUTO: 0.58 THOUSANDS/ΜL (ref 0.6–4.47)
LYMPHOCYTES NFR BLD AUTO: 9 % (ref 14–44)
MAGNESIUM SERPL-MCNC: 2.9 MG/DL (ref 1.6–2.6)
MCH RBC QN AUTO: 25.7 PG (ref 26.8–34.3)
MCHC RBC AUTO-ENTMCNC: 28.4 G/DL (ref 31.4–37.4)
MCV RBC AUTO: 91 FL (ref 82–98)
MONOCYTES # BLD AUTO: 0.53 THOUSAND/ΜL (ref 0.17–1.22)
MONOCYTES NFR BLD AUTO: 8 % (ref 4–12)
NEUTROPHILS # BLD AUTO: 4.97 THOUSANDS/ΜL (ref 1.85–7.62)
NEUTS SEG NFR BLD AUTO: 80 % (ref 43–75)
NRBC BLD AUTO-RTO: 0 /100 WBCS
PHOSPHATE SERPL-MCNC: 3 MG/DL (ref 2.3–4.1)
PLATELET # BLD AUTO: 140 THOUSANDS/UL (ref 149–390)
PMV BLD AUTO: 11.5 FL (ref 8.9–12.7)
POTASSIUM SERPL-SCNC: 4.9 MMOL/L (ref 3.5–5.3)
PROT SERPL-MCNC: 6.8 G/DL (ref 6.4–8.2)
RBC # BLD AUTO: 3.58 MILLION/UL (ref 3.81–5.12)
SODIUM SERPL-SCNC: 144 MMOL/L (ref 136–145)
WBC # BLD AUTO: 6.29 THOUSAND/UL (ref 4.31–10.16)

## 2019-01-29 PROCEDURE — 99232 SBSQ HOSP IP/OBS MODERATE 35: CPT | Performed by: INTERNAL MEDICINE

## 2019-01-29 PROCEDURE — 84100 ASSAY OF PHOSPHORUS: CPT | Performed by: PHYSICIAN ASSISTANT

## 2019-01-29 PROCEDURE — 99233 SBSQ HOSP IP/OBS HIGH 50: CPT | Performed by: EMERGENCY MEDICINE

## 2019-01-29 PROCEDURE — 82533 TOTAL CORTISOL: CPT | Performed by: INTERNAL MEDICINE

## 2019-01-29 PROCEDURE — 85025 COMPLETE CBC W/AUTO DIFF WBC: CPT | Performed by: PHYSICIAN ASSISTANT

## 2019-01-29 PROCEDURE — 94760 N-INVAS EAR/PLS OXIMETRY 1: CPT

## 2019-01-29 PROCEDURE — 83735 ASSAY OF MAGNESIUM: CPT | Performed by: PHYSICIAN ASSISTANT

## 2019-01-29 PROCEDURE — 80053 COMPREHEN METABOLIC PANEL: CPT | Performed by: INTERNAL MEDICINE

## 2019-01-29 PROCEDURE — 94668 MNPJ CHEST WALL SBSQ: CPT

## 2019-01-29 PROCEDURE — 82330 ASSAY OF CALCIUM: CPT | Performed by: PHYSICIAN ASSISTANT

## 2019-01-29 RX ORDER — BISACODYL 10 MG
10 SUPPOSITORY, RECTAL RECTAL DAILY PRN
Status: DISCONTINUED | OUTPATIENT
Start: 2019-01-29 | End: 2019-02-02 | Stop reason: HOSPADM

## 2019-01-29 RX ORDER — DEXTROSE AND SODIUM CHLORIDE 5; .2 G/100ML; G/100ML
30 INJECTION, SOLUTION INTRAVENOUS CONTINUOUS
Status: DISPENSED | OUTPATIENT
Start: 2019-01-29 | End: 2019-01-30

## 2019-01-29 RX ADMIN — PRAVASTATIN SODIUM 40 MG: 40 TABLET ORAL at 09:14

## 2019-01-29 RX ADMIN — ECONAZOLE NITRATE: 10 CREAM TOPICAL at 09:17

## 2019-01-29 RX ADMIN — HEPARIN SODIUM 5000 UNITS: 5000 INJECTION INTRAVENOUS; SUBCUTANEOUS at 05:17

## 2019-01-29 RX ADMIN — POLYETHYLENE GLYCOL 3350 17 G: 17 POWDER, FOR SOLUTION ORAL at 09:16

## 2019-01-29 RX ADMIN — LIDOCAINE 1 PATCH: 50 PATCH CUTANEOUS at 09:15

## 2019-01-29 RX ADMIN — STANDARDIZED SENNA CONCENTRATE 8.6 MG: 8.6 TABLET ORAL at 22:10

## 2019-01-29 RX ADMIN — ROPIVACAINE HYDROCHLORIDE: 2 INJECTION, SOLUTION EPIDURAL; INFILTRATION at 09:09

## 2019-01-29 RX ADMIN — ACETAMINOPHEN 975 MG: 325 TABLET ORAL at 05:17

## 2019-01-29 RX ADMIN — HEPARIN SODIUM 5000 UNITS: 5000 INJECTION INTRAVENOUS; SUBCUTANEOUS at 13:23

## 2019-01-29 RX ADMIN — ACETAMINOPHEN 975 MG: 325 TABLET ORAL at 22:10

## 2019-01-29 RX ADMIN — OXYCODONE HYDROCHLORIDE 2.5 MG: 5 TABLET ORAL at 09:13

## 2019-01-29 RX ADMIN — HEPARIN SODIUM 5000 UNITS: 5000 INJECTION INTRAVENOUS; SUBCUTANEOUS at 22:11

## 2019-01-29 RX ADMIN — OXYCODONE HYDROCHLORIDE 2.5 MG: 5 TABLET ORAL at 22:10

## 2019-01-29 RX ADMIN — FLUTICASONE PROPIONATE 1 SPRAY: 50 SPRAY, METERED NASAL at 09:16

## 2019-01-29 RX ADMIN — ISOSORBIDE MONONITRATE 60 MG: 60 TABLET, EXTENDED RELEASE ORAL at 09:15

## 2019-01-29 RX ADMIN — Medication 1 TABLET: at 09:14

## 2019-01-29 RX ADMIN — DEXTROSE AND SODIUM CHLORIDE 50 ML/HR: 5; .2 INJECTION, SOLUTION INTRAVENOUS at 07:49

## 2019-01-29 RX ADMIN — ECONAZOLE NITRATE: 10 CREAM TOPICAL at 17:58

## 2019-01-29 RX ADMIN — PRAZOSIN HYDROCHLORIDE 2 MG: 2 CAPSULE ORAL at 01:01

## 2019-01-29 RX ADMIN — CARVEDILOL 3.12 MG: 3.12 TABLET, FILM COATED ORAL at 09:14

## 2019-01-29 RX ADMIN — AMIODARONE HYDROCHLORIDE 200 MG: 200 TABLET ORAL at 09:15

## 2019-01-29 RX ADMIN — DOCUSATE SODIUM 100 MG: 100 CAPSULE, LIQUID FILLED ORAL at 09:15

## 2019-01-29 RX ADMIN — FLUTICASONE PROPIONATE 1 SPRAY: 50 SPRAY, METERED NASAL at 17:58

## 2019-01-29 RX ADMIN — AMLODIPINE BESYLATE 5 MG: 5 TABLET ORAL at 09:15

## 2019-01-29 RX ADMIN — DOCUSATE SODIUM 100 MG: 100 CAPSULE, LIQUID FILLED ORAL at 17:58

## 2019-01-29 NOTE — PROGRESS NOTES
Anesthesia Progress Note - [facility]    Raquel Orosco MRN: [id number]  Unit/Bed#: [facility] 672-34 Encounter: [id number]        Epidural is runnin 2% Ropivacaine at 4cc/hr, bolus 0f 3cc every 10 minutes prn  Patient has no rib pain but does have pain in her scapula  She has not requested any of the prn pain medications she is ordered even though her shoulder is in pain    Plan:   Continue epidural as ordered  I instructed the nurse to give her 2 5mg oxycodone and to encourage the patient to ask for pain medicine if she is in pain        Assessment:   80 y o  female status post rib fx Epidural# 3  Epidural Site:C/D/I  Neurological assessment : Patient neurologically intact    Subjective:  Current pain location(s): left scapula  Pain Scale:   Unable to say      Meds/Allergies   current meds:   Current Facility-Administered Medications   Medication Dose Route Frequency    acetaminophen (TYLENOL) tablet 975 mg  975 mg Oral Q8H [facility]    amiodarone tablet 200 mg  200 mg Oral Daily    amLODIPine (NORVASC) tablet 5 mg  5 mg Oral Q12H [facility]    calcium carbonate (OYSTER SHELL,OSCAL) 500 mg tablet 1 tablet  1 tablet Oral Daily With Breakfast    carvedilol (COREG) tablet 3 125 mg  3 125 mg Oral BID With Meals    dextrose 5 % and sodium chloride 0 2 % infusion  50 mL/hr Intravenous Continuous    docusate sodium (COLACE) capsule 100 mg  100 mg Oral BID    econazole nitrate 1 % cream   Topical BID    fluticasone (FLONASE) 50 mcg/act nasal spray 1 spray  1 spray Nasal BID    heparin (porcine) subcutaneous injection 5,000 Units  5,000 Units Subcutaneous Q8H [facility]    HYDROmorphone (DILAUDID) injection 0 2 mg  0 2 mg Intravenous Q4H PRN    isosorbide mononitrate (IMDUR) 24 hr tablet 60 mg  60 mg Oral Daily    lidocaine (LIDODERM) 5 % patch 1 patch  1 patch Topical Daily    nitroglycerin (NITROSTAT) SL tablet 0 4 mg  0 4 mg Sublingual Q5 Min PRN    ondansetron (ZOFRAN) injection 4 mg  4 mg Intravenous Q6H PRN    oxyCODONE (ROXICODONE) IR tablet 2 5 mg  2 5 mg Oral Q4H PRN    polyethylene glycol (MIRALAX) packet 17 g  17 g Oral Daily    pravastatin (PRAVACHOL) tablet 40 mg  40 mg Oral Daily    prazosin (MINIPRESS) capsule 2 mg  2 mg Oral HS    prazosin (MINIPRESS) capsule 2 mg  2 mg Oral QAM    ropivacaine 0 2% PCEA   Epidural Continuous    senna (SENOKOT) tablet 8 6 mg  1 tablet Oral HS       Allergies   Allergen Reactions    Aspirin     Percocet [Oxycodone-Acetaminophen]        Objective     Temp:  [97 7 °F (36 5 °C)-98 2 °F (36 8 °C)] 98 2 °F (36 8 °C)  HR:  [56-71] 66  Resp:  [18-32] 18  BP: (99180)/(31-92) 180/50    SIGNATURE: Moises Koroma MD  DATE: January 29, 2019  TIME: 1:04 PM    Please call  ( [facility] [phone]) with any questions

## 2019-01-29 NOTE — PROGRESS NOTES
Progress Note - Jayshree William 9/29/1930, 80 y o  female MRN: [id number]    Unit/Bed#: [facility] 615-01 Encounter: [id number]    Primary Care Provider: Niraj Logan MD   Date and time admitted to hospital: 1/23/2019 11:46 AM        Hyperkalemia   Assessment & Plan    K was 6 6 now 4 9  Nephrology following, continue to follow recommendations  Avoid nephrotoxic agents (ACE-I, diuretics on hold)  Continue to trend labs     Acute pain due to trauma   Assessment & Plan    -epidural catheter placed 2 days ago, appreciate APS recs  -rib protocol and po narcotics, continue to encourage use as patient complains of pain with associated splinting/poor inspiratory effort and sedentary state    -continue bowel regimen (c/o constipation ---loaded with heavy bowel regimen yesterday)     Acute respiratory insufficiency   Assessment & Plan    -multifactorial: secondary to rib fractures/chest wall trauma, underlying COPD/CHF/CKD  -continue epidural catheter, analgesics  -IS, pulmonary toilet, continuous pulse ox  -monitor for fluid overload (hx of CHF, CXR on 1/27 showed left pleural effusion and atelectasis/infiltrate---afebrile/no leukocytosis)  -chronic hypercarbia likely 2/2 COPD, this in combination with baseline CHF/kidney insufficiency and now in setting of multiple rib fractures places patient at high risk of respiratory compromise  -will need OP f/u with Pulmonology at discharge for further management of chronic hypercarbia/pulmonary function tests  -PT/OT, OOB to chair         Acute kidney injury [facility])   Assessment & Plan    - 1/27/19 BMP showed K of 6 2   She was given 50 ml 50% dextrose and 10U insulin at 7PM on 1/27, recheck K that night 6 6   ABG showed pCO2 of 21 7 with metabolic compensation of HCO3 41 1 for pH 7 35, pt was given 10U insulin, amp dextrose, Kayexalate, 1L NS bolus, and albuterol breathing treatment and placed on Bipap, upgraded to ICU, on 1/28 IVFs were DC'd, torsemide 60 mg BID restarted, bipap dc'd, Prazosin and ACE-I were held with other home meds recontinued  -today pt has K of 4 9, BUN/Cr (57/1 22) from (62/1 45)  -Nephrology following patient (acute on chronic condition), less likely glomerular as UA with improvement in proteinuria/hematuria, hold ACE-I, diuretics and K supplementation, DC voltaren gel, D5 1/4 NS @ 50 ml/hr  -continue to monitor kidney function and electrolytes, f/u Nephro recs, avoid nephrotoxic agents       Closed left scapular fracture   Assessment & Plan    -non operative management, nonweightbearing left upper extremity in sling  -pt continues to have pain to area, EDC in place (day 3)  -follow-up with orthopedics as an outpatient, appreciate recommendations     [facility]   Assessment & Plan    - back on home coreg, diuretic held at this time per Nephrology recs  -monitor I/Os closely  -no signs of fluid overload at this time, O2 sats stable on RA     Benign hypertension with CKD (chronic kidney disease) stage III ([facility] )   Assessment & Plan    -back on home meds-Imdur, amlodipine, will hold on ACE-I in setting of DESMOND on CKD  -Norvasc dose decreased for labile BPs     Paroxysmal atrial fibrillation (HCC)   Assessment & Plan    -Hx of afib, rate controlled on home regimen, will continue  -home Eliquis is on hold while EDC in place     * Multiple rib fractures   Assessment & Plan    - Left 3, 4, 5, 9, 10 rib fractures  - Rib fracture protocol  - Pain control: EDC placed 2 days ago, patient's pain slightly improved  Appreciate APS input  Continue scheduled tylenol and lidoderm patches, PO oxycodone 2 5 mg q4h prn with IV dilaudid 0 25 mg q4h prn for breakthrough     - Pulmonary toilet and encourage IS  -pt has not been OOB since going to ICU will need to work on getting patient OOB/PT/OT         Dispo: renal function/electrolyte issues slowly improving, continue to f/u Nephro recs, f/u APS recs --EDC catheter (3rd day), pt needs aggressive pulmonary/rehab 2/2 traumatic injuries in setting of potential multifactorial respiratory complications, PT/OT/OOB to chair--will need rehab/placement when medically stable        Subjective/Objective   Chief Complaint: left sided chest/scapular pain, constipation      Meds/Allergies   Prescriptions Prior to Admission   Medication Sig Dispense Refill Last Dose    amiodarone 200 mg tablet Take 1 tablet (200 mg total) by mouth daily 90 tablet 3 Taking    amLODIPine (NORVASC) 10 mg tablet Take 10 mg by mouth daily       apixaban (ELIQUIS) 5 mg Take 1 tablet (5 mg total) by mouth 2 (two) times a day 180 tablet 1 1/23/2019 at Unknown time    Calcium Carbonate (CALTRATE 600 PO) Take 600 mg by mouth daily     Taking    carvedilol (COREG) 3 125 mg tablet Take 1 tablet (3 125 mg total) by mouth 2 (two) times a day with meals 180 tablet 3 Taking    diclofenac sodium (VOLTAREN) 1 % Apply 2 g topically 4 (four) times a day 100 g 1 Taking    econazole nitrate 1 % cream Apply topically 2 (two) times a day 85 g 1 Taking    fluticasone (FLONASE) 50 mcg/act nasal spray 1 spray into each nostril 2 (two) times a day   Taking    isosorbide mononitrate (IMDUR) 60 mg 24 hr tablet Take 1 tablet (60 mg total) by mouth daily 90 tablet 3 Taking    KLOR-CON M20 20 MEQ tablet Take 1 tablet (20 mEq total) by mouth 2 (two) times a day 180 tablet 0     metolazone (ZAROXOLYN) 2 5 mg tablet Take 2 tablets (5 mg total) by mouth daily Take 2 for the next 3 days and as directed take as needed for increased leg swelling (148lbs) 180 tablet 1     nitroglycerin (NITROSTAT) 0 4 mg SL tablet Place 1 tablet (0 4 mg total) under the tongue every 5 (five) minutes as needed for chest pain 30 tablet 0 Taking    pravastatin (PRAVACHOL) 40 mg tablet Take 1 tablet (40 mg total) by mouth daily 90 tablet 3 Taking    prazosin (MINIPRESS) 2 mg capsule Take 1 capsule (2 mg total) by mouth every morning 90 capsule 3 Taking    prazosin (MINIPRESS) 5 mg capsule Take 1 capsule (5 mg total) by mouth daily at bedtime 90 capsule 3 Taking    ramipril (ALTACE) 10 MG capsule Take 1 capsule (10 mg total) by mouth 2 (two) times a day 180 capsule 2     torsemide (DEMADEX) 20 mg tablet Take 3 tablets (60 mg total) by mouth 2 (two) times a day 540 tablet 1     acetaminophen (TYLENOL) 325 mg tablet Take 650 mg by mouth every 6 (six) hours as needed for mild pain  Taking       Vitals: Blood pressure (!) 130/41, pulse 60, temperature 98 2 °F (36 8 °C), resp  rate 18, height 5' 1" (1 549 m), weight 64 8 kg (142 lb 13 7 oz), SpO2 98 %  Body mass index is 26 99 kg/m²  SpO2: SpO2: 98 %    ABG:   Lab Results   Component Value Date    PHART 7 420 01/28/2019    XIK3MLC 61 5 (HH) 01/28/2019    PO2ART 51 0 (LL) 01/28/2019    QUM2XDW 39 0 (H) 01/28/2019    BEART 12 6 01/28/2019    SOURCE Radial, Right 01/28/2019         Intake/Output Summary (Last 24 hours) at 01/29/19 1403  Last data filed at 01/29/19 0913   Gross per 24 hour   Intake           478 35 ml   Output              578 ml   Net           -99 65 ml       Invasive Devices     Peripheral Intravenous Line            Peripheral IV 01/27/19 Right Antecubital 2 days    Peripheral IV 01/28/19 Left;Upper Arm less than 1 day          Epidural Line            Epidural Catheter 01/26/19 3 days          Drain            External Urinary Catheter 1 day                Nutrition/GI Proph/Bowel Reg: Renal Potassium 2 Gm, Na 2 Gm, 1500 cc fluid restriction, ensures lunch/dinner, D51/4 NS @ 50ml/hr  [facility] (received colace/senna/miralax and kayexalate yesterday)    Physical Exam:   General: Frail elderly female with mild discomfort secondary to chest wall/scapular pain and constipation  Head: Normocephalic, atraumatic, nontender  Eyes: PERRL, EOM-I    ENT: Nose atraumatic  Pharynx normal  No stridor  MMM  Neck: Trachea midline  No JVD  No c-spine tenderness  CV: RRR   Left-sided chest wall/L scapula tenderness, Peripheral pulses +2 throughout  EDC catheter in place  Lungs: Pt splinting during exam, poor inspiratory effort with breath sounds diminished in bilateral lung bases L > R,  No crepitus  No paradoxical motion  Abd: +BS, soft, NT/ND  No guarding/rigidity  No peritoneal signs  Pelvis stable  MSK: Difficulty moving LUE secondary to referred pain to ribcage/scapula, no tenderness of L arm/glenohumeral joint, neurovascularly intact, able to range RUE without difficulty, generalized weakness but able to move jose LEs/ neurovascularly intact, no significant dependent edema noted   Skin: Dry, intact  Neuro: AAOx3, GCS 15, CN II-XII grossly intact  Generalized weakness/limited ROM 2/2 pain, no focal weakness noted, sensation intact distally    Lab Results:   BMP/CMP:   Lab Results   Component Value Date    SODIUM 144 01/29/2019    K 4 9 01/29/2019     01/29/2019    CO2 33 (H) 01/29/2019    BUN 57 (H) 01/29/2019    CREATININE 1 22 01/29/2019    CALCIUM 9 0 01/29/2019    AST 37 01/29/2019    ALT 35 01/29/2019    ALKPHOS 58 01/29/2019    EGFR 40 01/29/2019    and CBC:   Lab Results   Component Value Date    WBC 6 29 01/29/2019    HGB 9 2 (L) 01/29/2019    HCT 32 4 (L) 01/29/2019    MCV 91 01/29/2019     (L) 01/29/2019    MCH 25 7 (L) 01/29/2019    MCHC 28 4 (L) 01/29/2019    RDW 19 8 (H) 01/29/2019    MPV 11 5 01/29/2019    NRBC 0 01/29/2019     Imaging/EKG Studies: Results: I have personally reviewed pertinent reports        VTE Prophylaxis: SQH, jose SCDs

## 2019-01-29 NOTE — PROGRESS NOTES
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 ranging between 0 84 to 1 39 dating back to 2015  · Elevated creatinine likely on the basis of advanced age, hypertension, Ace inhibition, fluctuating volume status in the setting of chronic diuretics  However, her urinalysis in July of 2018 revealed 3+ heme and 1+ protein and therefore an underlying glomerular disease needs to be considered  however, repeat urinalysis at this time reveals no hematuria and no proteinuria making a glomerular disease on likely as a cause of her elevated creatinine  Her underlying severe aortic regurgitation may be contributory to her renal dysfunction as well  · The creatinine has improved to her baseline, after intravenous fluids  · Continue to withhold diuretics and infuse low-flow chloride based solution  Will change to more hypotonic fluids as her sodium level is high normal  2  Hyperkalemia  · Secondary to Ace inhibition, potassium supplementation and volume depletion culminating in decreased distal sodium delivery  · Continue to hold Ace inhibition, potassium supplementation  potassium level has improved and is now normal   · Discontinue Voltaren gel as some may be absorbed the nonsteroidal effect can lead to hyperkalemia  · Cortisol level is acceptable-check because she is on chronic Flonase and adrenal insufficiency can present with hyperkalemia  3  CO2 retention  · Likely chronic in nature given her chronically elevated bicarbonate level  Also as evidence by the fact that her pH was near normal despite a pCO2 [address] of 70  If this were an acute rise in her CO2, her pH would be much lower as she did not have time to compensate  · Some component of metabolic alkalosis due to diuresis, may be contributory as well but the major issue is elevated CO2    Bicarbonate level has improved, as metabolic alkalosis improved by withholding diuretics and infusing intravenous fluids  · The etiology of her elevated CO2 is uncertain and she likely should have pulmonary evaluation/PFTs to decide on course of therapy  4  High normal sodium level  · Will infuse hypotonic chloride based fluids and trend sodium level  · Change fluids to D5 1/4 normal saline as her sodium level has increased to 144 her fluid intake is diminished  5  Hypertension  · Aim for systolic blood pressure of 130 to 150 given her advanced age and acute kidney injury  · Norvasc dose split in an effort to avoid relative hypotension-continue current hold parameter on amlodipine  · Blood pressure was diminished yesterday afternoon  Blood pressure has currently improved but is somewhat labile  Will continue with current antihypertensive regimen and hold parameters that were placed and trend blood pressure level  · Some of blood pressure increases related to pain        Subjective: The patient was seen examined  She denied any shortness of breath, chest pain or pressure, abdominal pain, vomiting, diarrhea  Her appetite is diminished  Her rib pain continues  She was somewhat despondent today over her condition and because she is in the hospital      Objective:     Vitals: Blood pressure 112/92, pulse 71, temperature 97 7 °F (36 5 °C), temperature source Oral, resp  rate 18, height 5' 1" (1 549 m), weight 64 8 kg (142 lb 13 7 oz), SpO2 97 %  ,Body mass index is 26 99 kg/m²  Temp (24hrs), Av 7 °F (36 5 °C), Min:97 7 °F (36 5 °C), Max:97 7 °F (36 5 °C)      Weight (last 2 days)     None                 Intake/Output Summary (Last 24 hours) at 19 1128  Last data filed at 19 0908   Gross per 24 hour   Intake           678 35 ml   Output             1228 ml   Net          -549 65 ml     I/O last 24 hours: In: 856 7 [P O :240;  I V :616 7]  Out: 1228 [Urine:1228]        Physical Exam    Physical Exam   Constitutional: No distress  Elderly appearing female in no apparent distress   Neck: No JVD present  Cardiovascular: Normal heart sounds  Exam reveals no gallop and no friction rub  Pulmonary/Chest: Effort normal and breath sounds normal  No respiratory distress  She has no wheezes  She has no rales  Abdominal: Soft  Bowel sounds are normal  She exhibits no distension  There is no tenderness  There is no rebound and no guarding  Musculoskeletal: She exhibits no edema  Neurological: She is alert  Skin: She is not diaphoretic  Vitals reviewed                Invasive Devices     Peripheral Intravenous Line            Peripheral IV 01/27/19 Right Antecubital 1 day    Peripheral IV 01/28/19 Left;Upper Arm less than 1 day          Epidural Line            Epidural Catheter 01/26/19 3 days          Drain            External Urinary Catheter 1 day                Medications:    Scheduled Meds:  Current Facility-Administered Medications:  acetaminophen 975 mg Oral Q8H [address] Vineet Patel MD    amiodarone 200 mg Oral Daily Vineet Patel MD    amLODIPine 5 mg Oral Q12H [address] Rudy Herzog MD    calcium carbonate 1 tablet Oral Daily With Breakfast Vineet Patel MD    carvedilol 3 125 mg Oral BID With Meals Manjula Morse, DO    dextrose 5 % and sodium chloride 0 2 % 50 mL/hr Intravenous Continuous Rudy Herzog MD Last Rate: 50 mL/hr (01/29/19 0749)   docusate sodium 100 mg Oral BID Prema Cruz MD    econazole nitrate  Topical BID Vineet Patel MD    fluticasone 1 spray Nasal BID Vineet Patel MD    heparin (porcine) 5,000 Units Subcutaneous [facility] Darlene Koenig PA-C    HYDROmorphone 0 2 mg Intravenous Q4H PRN Darlene Koenig PA-C    isosorbide mononitrate 60 mg Oral Daily Manjula K Mini, DO    lidocaine 1 patch Topical Daily Darlene Koenig PA-C    nitroglycerin 0 4 mg Sublingual Q5 Min PRN Vineet Patel MD    ondansetron 4 mg Intravenous Q6H PRN Trav Escobedo MD    oxyCODONE 2 5 mg Oral Q4H PRN Darlene Koenig PA-C polyethylene glycol 17 g Oral Daily Jareth Walker PA-C    pravastatin 40 mg Oral Daily Angelika Gibbs MD    prazosin 2 mg Oral HS Eliseo Barahona MD    prazosin 2 mg Oral QAJIM Barahona MD    ropivacaine 0 2%  Epidural Continuous Clau Garcia MD    senna 1 tablet Oral HS Yasmany Wick PA-C        PRN Meds:  HYDROmorphone    nitroglycerin    ondansetron    oxyCODONE    Continuous Infusions:  dextrose 5 % and sodium chloride 0 2 % 50 mL/hr Last Rate: 50 mL/hr (01/29/19 0749)   ropivacaine 0 2%             LAB RESULTS:        Results from last 7 days  Lab Units 01/29/19  0526 01/28/19  1427 01/28/19  0756 01/28/19  0226 01/27/19  2126 01/27/19  1401 01/25/19  1153 01/25/19  1000 01/24/19  0444 01/23/19  1405 01/23/19  1354   WBC Thousand/uL 6 29  --  7 52  --   --  6 28 5 51  --  6 17  --  7 50   HEMOGLOBIN g/dL 9 2*  --  8 2*  --   --  9 0* 8 4*  --  8 3*  --  9 5*   I STAT HEMOGLOBIN g/dl  --   --   --   --   --   --   --   --   --  10 9*  --    HEMATOCRIT % 32 4*  --  29 5*  --   --  33 1* 30 3*  --  29 4*  --  33 3*   HEMATOCRIT, ISTAT %  --   --   --   --   --   --   --   --   --  32*  --    PLATELETS Thousands/uL 140*  --  138*  --   --  156 149  --  156  --  164   NEUTROS PCT % 80*  --  86*  --   --  87* 81*  --   --   --  86*   LYMPHS PCT % 9*  --  4*  --   --  4* 7*  --   --   --  6*   MONOS PCT % 8  --  8  --   --  7 8  --   --   --  6   EOS PCT % 3  --  2  --   --  2 3  --   --   --  1   POTASSIUM mmol/L 4 9 4 9 5 4* 6 2* 6 6* 6 2*  --  5 0 4 5  --  4 3   CHLORIDE mmol/L 107 104 104 106 103 103  --  104 104  --  102   CO2 mmol/L 33* 36* 36* 36* 38* 37*  --  33* 32  --  32   CO2, I-STAT mmol/L  --   --   --   --   --   --   --   --   --  35*  --    BUN mg/dL 57* 62* 70* 70* 70* 65*  --  49* 35*  --  38*   CREATININE mg/dL 1 22 1 45* 1 63* 1 67* 1 75* 1 67*  --  1 78* 1 38*  --  1 51*   CALCIUM mg/dL 9 0 8 6 8 9 8 2* 8 5 9 0  --  8 3 8 6  --  8 9   ALK PHOS U/L 58  --   --   --   --   --   --   -- --   --   --    ALT U/L 35  --   --   --   --   --   --   --   --   --   --    AST U/L 37  --   --   --   --   --   --   --   --   --   --    GLUCOSE, ISTAT mg/dl  --   --   --   --   --   --   --   --   --  112  --    MAGNESIUM mg/dL 2 9*  --  2 6  --   --   --   --   --   --   --   --    PHOSPHORUS mg/dL 3 0  --  3 8  --   --   --   --   --   --   --   --        CUTURES:  Lab Results   Component Value Date    URINECX 80,000-89,000 cfu/ml  07/17/2018                 PLEASE NOTE:  This encounter was completed utilizing the [employer] Direct Speech Voice Recognition Software  Grammatical errors, random word insertions, pronoun errors and incomplete sentences are occasional consequences of the system due to software limitations, ambient noise and hardware issues  These may be missed by proof reading prior to affixing electronic signature  Any questions or concerns about the content, text or information contained within the body of this dictation should be directly addressed to the physician for clarification  Please do not hesitate to call me directly if you have any any questions or concerns

## 2019-01-29 NOTE — ASSESSMENT & PLAN NOTE
- 1/27/19 BMP showed K of 6 2   She was given 50 ml 50% dextrose and 10U insulin at 7PM on 1/27, recheck K that night 6 6   ABG showed pCO2 of 08 4 with metabolic compensation of HCO3 41 1 for pH 7 35, pt was given 10U insulin, amp dextrose, Kayexalate, 1L NS bolus, and albuterol breathing treatment and placed on Bipap, upgraded to ICU, on 1/28 IVFs were DC'd, torsemide 60 mg BID restarted, bipap dc'd, Prazosin and ACE-I were held with other home meds recontinued  -today pt has K of 4 9, BUN/Cr (57/1 22) from (62/1 45)  -Nephrology following patient (acute on chronic condition), less likely glomerular as UA with improvement in proteinuria/hematuria, hold ACE-I, diuretics and K supplementation, DC voltaren gel, D5 1/4 NS @ 50 ml/hr  -continue to monitor kidney function and electrolytes, f/u Nephro recs, avoid nephrotoxic agents

## 2019-01-29 NOTE — ASSESSMENT & PLAN NOTE
-non operative management, nonweightbearing left upper extremity in sling  -pt continues to have pain to area, EDC in place (day 3)  -follow-up with orthopedics as an outpatient, appreciate recommendations

## 2019-01-29 NOTE — ASSESSMENT & PLAN NOTE
-back on home meds-Imdur, amlodipine, will hold on ACE-I in setting of DESMOND on CKD  -Norvasc dose decreased for labile BPs

## 2019-01-29 NOTE — ASSESSMENT & PLAN NOTE
- back on home coreg, diuretic held at this time per Nephrology recs  -monitor I/Os closely  -no signs of fluid overload at this time, O2 sats stable on RA

## 2019-01-29 NOTE — ASSESSMENT & PLAN NOTE
-multifactorial: secondary to rib fractures/chest wall trauma, underlying COPD/CHF/CKD  -continue epidural catheter, analgesics  -IS, pulmonary toilet, continuous pulse ox  -monitor for fluid overload (hx of CHF, CXR on 1/27 showed left pleural effusion and atelectasis/infiltrate---afebrile/no leukocytosis)  -chronic hypercarbia likely 2/2 COPD, this in combination with baseline CHF/kidney insufficiency and now in setting of multiple rib fractures places patient at high risk of respiratory compromise  -will need OP f/u with Pulmonology at discharge for further management of chronic hypercarbia/pulmonary function tests  -PT/OT, OOB to chair

## 2019-01-29 NOTE — PROGRESS NOTES
Cardiology Progress Note - Zulema Singletary 80 y o  female MRN: [id number]    Unit/Bed#: [facility] 395-24 Encounter: [id number]      Assessment/Recommendations:  1  Chronic diastolic CHF: with relatively euvolemic volume status  Renal function did improve with IVF  Would defer to nephrology as to when to resume her diuretics  2   Mod to Sev AI: continue medical management with B-blocker  3   PAF: rates controlled with coreg, home Eliquis is held due to epidural - resume when able  4   HTN: well controlled, continue current regimen - but would consider changing Minipress as she could have falls with orthostatic hypotension  5   HLD: continued on pravastatin  6   L scapular fracture  7  Hyperkalemia: per nephrology, due to overdiuresis and volume depletion, now improved  Subjective:   Patient seen and examined  No significant events overnight   ; pertinent negatives - chest pain, chest pressure/discomfort, dyspnea, irregular heart beat, near-syncope and palpitations  Objective:     Vitals: Blood pressure 112/92, pulse 71, temperature 97 7 °F (36 5 °C), temperature source Oral, resp  rate 18, height 5' 1" (1 549 m), weight 64 8 kg (142 lb 13 7 oz), SpO2 97 %  , Body mass index is 26 99 kg/m² , Orthostatic Blood Pressures      Most Recent Value   Blood Pressure  112/92 filed at 01/29/2019 0800   Patient Position - Orthostatic VS  Lying filed at 01/29/2019 0800            Intake/Output Summary (Last 24 hours) at 01/29/19 0854  Last data filed at 01/29/19 0742   Gross per 24 hour   Intake           678 35 ml   Output             1228 ml   Net          -549 65 ml       TELE: No significant arrhythmias seen      Physical Exam:    GEN: Zulema Singletary appears well, alert and oriented x 3, pleasant and cooperative   HEENT: pupils equal, round, and reactive to light; extraocular muscles intact  NECK: supple, no carotid bruits   HEART: regular rhythm, normal S1 and S2, +systolic/diastolic murmur, no clicks, gallops or rubs   LUNGS: clear to auscultation bilaterally; no wheezes, rales, or rhonchi   ABDOMEN: normal bowel sounds, soft, no tenderness, no distention  EXTREMITIES: peripheral pulses normal; no clubbing, cyanosis, or edema  NEURO: no focal findings   SKIN: normal without suspicious lesions on exposed skin    Medications:      Current Facility-Administered Medications:     acetaminophen (TYLENOL) tablet 975 mg, 975 mg, Oral, Q8H [address], Christy Padron MD, 975 mg at 01/29/19 0812    amiodarone tablet 200 mg, 200 mg, Oral, Daily, Christy Padron MD, 200 mg at 01/28/19 0918    amLODIPine (NORVASC) tablet 5 mg, 5 mg, Oral, Q12H [address], Saintclair Merle, MD, 5 mg at 01/28/19 2237    calcium carbonate (OYSTER SHELL,OSCAL) 500 mg tablet 1 tablet, 1 tablet, Oral, Daily With Breakfast, Christy Padron MD, 1 tablet at 01/28/19 0917    carvedilol (COREG) tablet 3 125 mg, 3 125 mg, Oral, BID With Meals, Manjula Morse, DO, 3 125 mg at 01/28/19 1831    dextrose 5 % and sodium chloride 0 2 % infusion, 50 mL/hr, Intravenous, Continuous, Saintclair Merle, MD, Last Rate: 50 mL/hr at 01/29/19 0749, 50 mL/hr at 01/29/19 0749    docusate sodium (COLACE) capsule 100 mg, 100 mg, Oral, BID, Clemente Carter MD, 100 mg at 01/28/19 1831    econazole nitrate 1 % cream, , Topical, BID, Christy Padron MD    fluticasone [facility]) 50 mcg/act nasal spray 1 spray, 1 spray, Nasal, BID, Christy Padron MD, 1 spray at 01/28/19 1831    heparin (porcine) subcutaneous injection 5,000 Units, 5,000 Units, Subcutaneous, Q8H [address], Darshan Adorno PA-C, 5,000 Units at 01/29/19 0517    HYDROmorphone (DILAUDID) injection 0 2 mg, 0 2 mg, Intravenous, Q4H PRN, Darshan Adorno PA-C    isosorbide mononitrate (IMDUR) 24 hr tablet 60 mg, 60 mg, Oral, Daily, Manjula Morse DO, 60 mg at 01/28/19 0919    lidocaine (LIDODERM) 5 % patch 1 patch, 1 patch, Topical, Daily, Darshan Adorno PA-C, 1 patch at 01/28/19 0918    nitroglycerin (NITROSTAT) SL tablet 0 4 mg, 0 4 mg, Sublingual, Q5 Min PRN, Antonio Bustos MD    ondansetron [facility]) injection 4 mg, 4 mg, Intravenous, Q6H PRN, Billy Etienne MD, 4 mg at 01/26/19 1323    oxyCODONE (ROXICODONE) IR tablet 2 5 mg, 2 5 mg, Oral, Q4H PRN, Zulema Gamble PA-C, 2 5 mg at 01/26/19 0854    polyethylene glycol (MIRALAX) packet 17 g, 17 g, Oral, Daily, Stacey Lugo PA-C, 17 g at 01/28/19 1430    pravastatin (PRAVACHOL) tablet 40 mg, 40 mg, Oral, Daily, Antonio Bustos MD, 40 mg at 01/28/19 0919    prazosin (MINIPRESS) capsule 2 mg, 2 mg, Oral, HS, Silvana Whelan MD, 2 mg at 01/29/19 0101    prazosin (MINIPRESS) capsule 2 mg, 2 mg, Oral, QAM, Silvana Whelan MD    ropivacaine 0 2% PCEA, , Epidural, Continuous, Billy Etienne MD    [facility]) tablet 8 6 mg, 1 tablet, Oral, HS, Aneta Lin PA-C, 8 6 mg at 01/28/19 2237     Labs & Results:      Results from last 7 days  Lab Units 01/27/19  2126 01/23/19  1354   CK TOTAL U/L 20*  --    TROPONIN I ng/mL  --  <0 02       Results from last 7 days  Lab Units 01/29/19  0526 01/28/19  0756 01/27/19  1401   WBC Thousand/uL 6 29 7 52 6 28   HEMOGLOBIN g/dL 9 2* 8 2* 9 0*   HEMATOCRIT % 32 4* 29 5* 33 1*   PLATELETS Thousands/uL 140* 138* 156           Results from last 7 days  Lab Units 01/29/19  0526 01/28/19  1427 01/28/19  0756  01/23/19  1405   POTASSIUM mmol/L 4 9 4 9 5 4*  < >  --    CHLORIDE mmol/L 107 104 104  < >  --    CO2 mmol/L 33* 36* 36*  < >  --    CO2, I-STAT mmol/L  --   --   --   --  35*   BUN mg/dL 57* 62* 70*  < >  --    CREATININE mg/dL 1 22 1 45* 1 63*  < >  --    CALCIUM mg/dL 9 0 8 6 8 9  < >  --    ALK PHOS U/L 58  --   --   --   --    ALT U/L 35  --   --   --   --    AST U/L 37  --   --   --   --    GLUCOSE, ISTAT mg/dl  --   --   --   --  112   < > = values in this interval not displayed          Results from last 7 days  Lab Units 01/29/19  0526 01/28/19  0756   MAGNESIUM mg/dL 2 9* 2 6       Echo: personally reviewed - normal EF, G2DD, mod to sev AR, mild pulm HTN    EKG personally reviewed by Irma Kennedy MD

## 2019-01-29 NOTE — ASSESSMENT & PLAN NOTE
K was 6 6 now 4 9  Nephrology following, continue to follow recommendations  Avoid nephrotoxic agents (ACE-I, diuretics on hold)  Continue to trend labs

## 2019-01-29 NOTE — ASSESSMENT & PLAN NOTE
-Hx of afib, rate controlled on home regimen, will continue  -home Eliquis is on hold while EDC in place

## 2019-01-29 NOTE — ASSESSMENT & PLAN NOTE
-epidural catheter placed 2 days ago, appreciate APS recs  -rib protocol and po narcotics, continue to encourage use as patient complains of pain with associated splinting/poor inspiratory effort and sedentary state    -continue bowel regimen (c/o constipation ---loaded with heavy bowel regimen yesterday)

## 2019-01-29 NOTE — PROGRESS NOTES
Progress Note - Aaron Chakraborty 80 y o  female MRN: [id number]    Unit/Bed#: [facility] 686-78 Encounter: [id number]      Assessment/Plan  1  Fall  Recommendations remain the same  Fall precautions  Recommend adding vitamin D3 1000 international units daily patient's medication regimen  Home meds unremarkable  PT, OT  Patient will benefit from rehab     2  Ambulatory dysfunction  Ambulates with rolled walker at baseline  PT, OT  Patient will need rehab     3  Constipation  Senna q h s  Colace added today  Miralax added yesterday  Patient requesting prune juice today, will order     4  Delirium  Likely complicated by 3 , as well as pain, unfamiliar environment, and probable cognitive deficit at baseline   Patient A*O x person, place, month, and year today  Continue treating underlying conditions  Continue with pain medications as ordered  Monitor closely for urinary retention, bladder scan straight cath as needed  Patient at risk for developing delirium, delirium preventing tactics advised  Redirect unwanted patient behaviors as first line tx  Reorient patient frequently  Avoid deliriogenic meds including tramadol, benzodiazepines, benadryl  Good sleep hygiene important, limit night time interruptions  Encourage patient to stay awake during the day  Ensure adequate hydration/nutrition  Mobilize often      5  Forgetfulness  Daughter reports patient was forgetful at baseline, and can be repetitive  Patient's cognitive screen was within normal limits, patient did score 4/5 on mini cog assessment  Patient's presentation currently complicated by 4, the patient was alert and oriented  Patient may benefit from follow up at [facility] for positive aging upon discharge  No signs macrocytosis, will defer B12, folate checks     6   Acute pain due to trauma  Continue with pain medications as ordered     7  Multiple rib fxs   Trauma following and managing           Subjective:   Patient complains of issues with nursing staff overnight  She cannot really elaborate or provide any details in regards to the situation  Nursing manager is aware  Complains of ongoing constipation, was on senna, colace and miralax yesterday, still no BM  Requesting prune juice which nursing got for her  Patient denies fatigue, headaches, vision changes, chest pain, sob, N/V/D,  difficulty with urination, muscle pain  Objective:     Vitals: Blood pressure 112/92, pulse 71, temperature 97 7 °F (36 5 °C), temperature source Oral, resp  rate 18, height 5' 1" (1 549 m), weight 64 8 kg (142 lb 13 7 oz), SpO2 97 %  ,Body mass index is 26 99 kg/m²  Intake/Output Summary (Last 24 hours) at 01/29/19 1127  Last data filed at 01/29/19 0908   Gross per 24 hour   Intake           678 35 ml   Output             1228 ml   Net          -549 65 ml       Physical Exam: General : WD in NAD  HEENT : MMM no erythema or exudates  EOMI, sclera anicteric  Heart : Normal rate  Lungs : CTA  Abdomen : Soft, NT/ND, BS auscultated in all 4 quads  No organomegaly  Ext :  Pulses +2/4 B/L  Neg edema B/L  Neg calf swelling B/L  Skin : Pink, warm, dry, age appropriate turgor and mobility  Neuro : Nonfocal  Psych : A * O x 4, forgetful at baseline       Invasive Devices     Peripheral Intravenous Line            Peripheral IV 01/27/19 Right Antecubital 1 day    Peripheral IV 01/28/19 Left;Upper Arm less than 1 day          Epidural Line            Epidural Catheter 01/26/19 3 days          Drain            External Urinary Catheter 1 day                Lab, Imaging and other studies: I have personally reviewed pertinent reports      VTE Pharmacologic Prophylaxis: Sequential compression device (Venodyne)   VTE Mechanical Prophylaxis: sequential compression device

## 2019-01-30 ENCOUNTER — TELEPHONE (OUTPATIENT)
Dept: INTERNAL MEDICINE CLINIC | Facility: CLINIC | Age: 84
End: 2019-01-30

## 2019-01-30 PROBLEM — E87.5 HYPERKALEMIA: Status: RESOLVED | Noted: 2019-01-28 | Resolved: 2019-01-30

## 2019-01-30 LAB
ANION GAP SERPL CALCULATED.3IONS-SCNC: 3 MMOL/L (ref 4–13)
BASOPHILS # BLD AUTO: 0.02 THOUSANDS/ΜL (ref 0–0.1)
BASOPHILS NFR BLD AUTO: 0 % (ref 0–1)
BUN SERPL-MCNC: 46 MG/DL (ref 5–25)
CALCIUM SERPL-MCNC: 8.4 MG/DL (ref 8.3–10.1)
CHLORIDE SERPL-SCNC: 106 MMOL/L (ref 100–108)
CO2 SERPL-SCNC: 35 MMOL/L (ref 21–32)
CREAT SERPL-MCNC: 1.03 MG/DL (ref 0.6–1.3)
EOSINOPHIL # BLD AUTO: 0.07 THOUSAND/ΜL (ref 0–0.61)
EOSINOPHIL NFR BLD AUTO: 1 % (ref 0–6)
ERYTHROCYTE [DISTWIDTH] IN BLOOD BY AUTOMATED COUNT: 20.6 % (ref 11.6–15.1)
GFR SERPL CREATININE-BSD FRML MDRD: 49 ML/MIN/1.73SQ M
GLUCOSE SERPL-MCNC: 126 MG/DL (ref 65–140)
HCT VFR BLD AUTO: 33.4 % (ref 34.8–46.1)
HGB BLD-MCNC: 9.4 G/DL (ref 11.5–15.4)
IMM GRANULOCYTES # BLD AUTO: 0.06 THOUSAND/UL (ref 0–0.2)
IMM GRANULOCYTES NFR BLD AUTO: 1 % (ref 0–2)
LYMPHOCYTES # BLD AUTO: 0.43 THOUSANDS/ΜL (ref 0.6–4.47)
LYMPHOCYTES NFR BLD AUTO: 4 % (ref 14–44)
MAGNESIUM SERPL-MCNC: 2.7 MG/DL (ref 1.6–2.6)
MCH RBC QN AUTO: 25.1 PG (ref 26.8–34.3)
MCHC RBC AUTO-ENTMCNC: 28.1 G/DL (ref 31.4–37.4)
MCV RBC AUTO: 89 FL (ref 82–98)
MONOCYTES # BLD AUTO: 0.7 THOUSAND/ΜL (ref 0.17–1.22)
MONOCYTES NFR BLD AUTO: 6 % (ref 4–12)
NEUTROPHILS # BLD AUTO: 9.62 THOUSANDS/ΜL (ref 1.85–7.62)
NEUTS SEG NFR BLD AUTO: 88 % (ref 43–75)
NRBC BLD AUTO-RTO: 0 /100 WBCS
PLATELET # BLD AUTO: 146 THOUSANDS/UL (ref 149–390)
PMV BLD AUTO: 11.5 FL (ref 8.9–12.7)
POTASSIUM SERPL-SCNC: 4.6 MMOL/L (ref 3.5–5.3)
RBC # BLD AUTO: 3.74 MILLION/UL (ref 3.81–5.12)
SODIUM SERPL-SCNC: 144 MMOL/L (ref 136–145)
WBC # BLD AUTO: 10.9 THOUSAND/UL (ref 4.31–10.16)

## 2019-01-30 PROCEDURE — 94760 N-INVAS EAR/PLS OXIMETRY 1: CPT

## 2019-01-30 PROCEDURE — 97110 THERAPEUTIC EXERCISES: CPT

## 2019-01-30 PROCEDURE — 99232 SBSQ HOSP IP/OBS MODERATE 35: CPT | Performed by: PHYSICIAN ASSISTANT

## 2019-01-30 PROCEDURE — 94668 MNPJ CHEST WALL SBSQ: CPT

## 2019-01-30 PROCEDURE — 80048 BASIC METABOLIC PNL TOTAL CA: CPT | Performed by: PHYSICIAN ASSISTANT

## 2019-01-30 PROCEDURE — 83735 ASSAY OF MAGNESIUM: CPT | Performed by: EMERGENCY MEDICINE

## 2019-01-30 PROCEDURE — 85025 COMPLETE CBC W/AUTO DIFF WBC: CPT | Performed by: PHYSICIAN ASSISTANT

## 2019-01-30 PROCEDURE — 99232 SBSQ HOSP IP/OBS MODERATE 35: CPT | Performed by: INTERNAL MEDICINE

## 2019-01-30 PROCEDURE — 97530 THERAPEUTIC ACTIVITIES: CPT

## 2019-01-30 RX ADMIN — FLUTICASONE PROPIONATE 1 SPRAY: 50 SPRAY, METERED NASAL at 08:08

## 2019-01-30 RX ADMIN — ECONAZOLE NITRATE 1 APPLICATION: 10 CREAM TOPICAL at 08:10

## 2019-01-30 RX ADMIN — PRAZOSIN HYDROCHLORIDE 2 MG: 2 CAPSULE ORAL at 23:16

## 2019-01-30 RX ADMIN — ACETAMINOPHEN 975 MG: 325 TABLET ORAL at 05:28

## 2019-01-30 RX ADMIN — LIDOCAINE 1 PATCH: 50 PATCH CUTANEOUS at 08:07

## 2019-01-30 RX ADMIN — FLUTICASONE PROPIONATE 1 SPRAY: 50 SPRAY, METERED NASAL at 17:31

## 2019-01-30 RX ADMIN — DOCUSATE SODIUM 100 MG: 100 CAPSULE, LIQUID FILLED ORAL at 17:31

## 2019-01-30 RX ADMIN — ACETAMINOPHEN 975 MG: 325 TABLET ORAL at 14:48

## 2019-01-30 RX ADMIN — HEPARIN SODIUM 5000 UNITS: 5000 INJECTION INTRAVENOUS; SUBCUTANEOUS at 21:22

## 2019-01-30 RX ADMIN — HEPARIN SODIUM 5000 UNITS: 5000 INJECTION INTRAVENOUS; SUBCUTANEOUS at 05:28

## 2019-01-30 RX ADMIN — POLYETHYLENE GLYCOL 3350 17 G: 17 POWDER, FOR SOLUTION ORAL at 08:07

## 2019-01-30 RX ADMIN — ROPIVACAINE HYDROCHLORIDE: 2 INJECTION, SOLUTION EPIDURAL; INFILTRATION at 07:50

## 2019-01-30 RX ADMIN — ECONAZOLE NITRATE: 10 CREAM TOPICAL at 17:31

## 2019-01-30 RX ADMIN — AMLODIPINE BESYLATE 5 MG: 5 TABLET ORAL at 23:14

## 2019-01-30 RX ADMIN — ISOSORBIDE MONONITRATE 60 MG: 60 TABLET, EXTENDED RELEASE ORAL at 08:04

## 2019-01-30 RX ADMIN — DOCUSATE SODIUM 100 MG: 100 CAPSULE, LIQUID FILLED ORAL at 08:07

## 2019-01-30 RX ADMIN — PRAVASTATIN SODIUM 40 MG: 40 TABLET ORAL at 08:07

## 2019-01-30 RX ADMIN — ACETAMINOPHEN 975 MG: 325 TABLET ORAL at 21:22

## 2019-01-30 RX ADMIN — STANDARDIZED SENNA CONCENTRATE 8.6 MG: 8.6 TABLET ORAL at 21:22

## 2019-01-30 RX ADMIN — Medication 1 TABLET: at 08:07

## 2019-01-30 RX ADMIN — AMIODARONE HYDROCHLORIDE 200 MG: 200 TABLET ORAL at 08:06

## 2019-01-30 RX ADMIN — CARVEDILOL 3.12 MG: 3.12 TABLET, FILM COATED ORAL at 08:04

## 2019-01-30 RX ADMIN — HEPARIN SODIUM 5000 UNITS: 5000 INJECTION INTRAVENOUS; SUBCUTANEOUS at 14:48

## 2019-01-30 RX ADMIN — DEXTROSE AND SODIUM CHLORIDE 50 ML/HR: 5; .2 INJECTION, SOLUTION INTRAVENOUS at 05:26

## 2019-01-30 NOTE — PROGRESS NOTES
Anesthesia Progress Note - Epidural Pain Management    Johanna Thurston MRN: [id number]  Unit/Bed#: [facility] 356-22 Encounter: [id number]    SURGERY DATE:   * No surgery found *    Assessment:   80 y o  female STATUS POST     Epidural Day #4    Plan:   - Pt denies pain other than discomfort in her left scapula when she has to move  However, she continues to breathe rather fast and shallow although she denies feeling SOB     - No changes to epidural at this time as pt reports adequate pain control      - Recommend d/c epidural tomorrow, day #5  - APS will continue to follow  Please call  ( [facility] [phone]) with any questions      Subjective:  Current pain location(s): L shoulder  Pain Scale: None currently    Meds/Allergies   all current active meds have been reviewed    Allergies   Allergen Reactions    Aspirin     Percocet [Oxycodone-Acetaminophen]        Objective     Temp:  [97 9 °F (36 6 °C)-99 5 °F (37 5 °C)] 98 8 °F (37 1 °C)  HR:  [60-70] 68  Resp:  [18-20] 18  BP: (108-180)/(31-50) 123/50    Physical Exam:  Gen: NAD  CV: Reg rate  Pulm: +tachypnea  Neuro: A&O appropriately; CNs II-XII grossly intact (XI deferred), moves all extremities; nonfocal  Epidural Site: C/D/I    SIGNATURE: Hitesh Herndon MD  DATE: January 30, 2019  TIME: 10:04 AM

## 2019-01-30 NOTE — ASSESSMENT & PLAN NOTE
-Hx of afib, rate controlled on home regimen, will continue  -home Eliquis is on hold while EDC in place, will resume once removed

## 2019-01-30 NOTE — ASSESSMENT & PLAN NOTE
- Left 3, 4, 5, 9, 10 rib fractures  - Rib fracture protocol  - Pain control: EDC placed on 1/26, patient's pain slightly improved  Appreciate APS input   Continue scheduled tylenol and lidoderm patches, PO oxycodone 2 5 mg q4h prn    - Pulmonary toilet and encourage IS  - PT/OT and OOB, placement pending at [facility]

## 2019-01-30 NOTE — PHYSICAL THERAPY NOTE
Physical Therapy Treatment     Patient Name: Henny Harvey    DXEPJ'G Date: 1/30/2019     Problem List  Patient Active Problem List   Diagnosis    Paroxysmal atrial fibrillation (HCC)    Benign hypertension with CKD (chronic kidney disease) stage III (HCC)    GERD (gastroesophageal reflux disease)    Other hyperlipidemia    Aortic valve regurgitation, nonrheumatic    Ascending aortic aneurysm (HCC)    Other chest pain    Lymphedema of both lower extremities    Chronic diastolic congestive heart failure (HCC)    Hematuria    Gait difficulty    Multiple rib fractures    Closed left scapular fracture    Acute kidney injury ([facility] [address] )    Acute respiratory insufficiency    Acute pain due to trauma    Chronic kidney disease, stage III (moderate) (HCC)    Chronic respiratory acidosis    Hyperkalemia        Past Medical History  Past Medical History:   Diagnosis Date    Acid reflux disease     Anemia     Last assessed: 10/26/16    Aortic valve regurgitation, nonrheumatic     Ascending aortic aneurysm ([facility] [address] )     Last assessed: 6/5/13    Atrial fibrillation ([facility] [address] )     Hyperlipidemia     Hypertension 2/17/2016    Paroxysmal atrial fibrillation ([facility] [address] ) 2/17/2016        Past Surgical History  Past Surgical History:   Procedure Laterality Date    CARDIOVERSION      CHOLECYSTECTOMY      LAPAROSCOPIC SALPINGOOPHERECTOMY      TONSILLECTOMY          01/30/19 1052   Pain Assessment   Pain Assessment No/denies pain  (using PCA)   Pain Score No Pain  (usign epidural for pain control )   Restrictions/Precautions   Weight Bearing Precautions Per Order Yes   LUE Weight Bearing Per Order NWB  (sling )   Braces or Orthoses Sling   Other Precautions Cognitive; Chair Alarm; Bed Alarm; Fall Risk   General   Chart Reviewed Yes   Additional Pertinent History fitted for new L UE sling as sling not present in room on approach - pt educated on NWB to LUE   Family/Caregiver Present No   Cognition   Overall Cognitive Status WFL   Arousal/Participation Alert; Cooperative   Attention Within functional limits   Orientation Level Oriented X4   Memory Decreased recall of precautions;Decreased recall of recent events   Following Commands Follows one step commands without difficulty   Subjective   Subjective "I haven't been cleaned up yet- I would like to do that afterward"- PT informed RN that pt would require assist w/ ADL's following session- PT assisted w/ basic cleaning up prior to OOB to chair    Bed Mobility   Rolling R 2  Maximal assistance   Additional items Assist x 1   Rolling L Unable to assess   Supine to Sit 3  Moderate assistance   Additional items Assist x 2  (HOB elevated- using pad to assist (NWB LUE))   Additional Comments sits EOB w/ min>modA ionce positioned- forward head position w/ thoracic kyphotic posture- requiring dep A for L UE slingadjustment  - maxA for partial sit<>stnads    Transfers   Sit to Stand 3  Moderate assistance   Additional items Assist x 2  (NWB LUE)   Stand to Sit 3  Moderate assistance   Additional items Assist x 2   Stand pivot 3  Moderate assistance   Additional items Assist x 2  (to drop arm chair )   Additional Comments noted increased LE edema - RN and PA aware    Ambulation/Elevation   Gait pattern (SPV to chair only- unable to progress to functional gait )   Balance   Static Sitting Poor +   Dynamic Sitting Poor -   Static Standing Poor +   Dynamic Standing Poor -   Endurance Deficit   Endurance Deficit Yes   Endurance Deficit Description fatigue- limited endurance    Activity Tolerance   Activity Tolerance Patient limited by fatigue  (requires incread time to complete all tasks )   Medical Staff Made Aware yes- CM udpated    Nurse Made Aware yes- Peg    Exercises   Quad Sets Supine;15 reps;Right;Left   Heelslides Supine;AAROM;10 reps;Right;Left   Glute Sets Supine;15 reps  (x2)   Hip Abduction Supine;15 reps;Right;Left   Knee AROM [employer] Sitting;15 reps;Right;Left   Ankle Pumps Sitting;15 reps;AAROM; Right;Left  (x2)   Balance training  sit<> partial stnad from recliner for repositioning x2 w/ modA x2 for partial standing-   Assessment   Prognosis Fair   Problem List Decreased strength;Decreased range of motion;Decreased endurance; Impaired balance;Decreased mobility; Decreased coordination;Decreased cognition;Decreased safety awareness; Impaired hearing; Impaired sensation;Decreased skin integrity;Orthopedic restrictions;Pain   Assessment Pt continues to be significantly fatigued and requires extreme amount of time to complete all functional tasks as well as therex  Pt noted to require more assist w/ therex today w/ some edema noted in B/L LE's - pt requiring modA x2 for all functional mobility including partial sit<>stnad from chair as well as SPV xfer to drop arm chair  Pt was fitted for new LUE sling as hers was not in room and pt was instructed/ educated in NWB to LUE as well as sling use- pt tolerated session fair; but was significantly fatigued following session  Pt was not able to prgress to functional ambualtion at this time 2* LE weakness; edema and fatigue  Pt up in recliner w/ alarm intact and all needs in reach; SCD's in place and LE's elevated-  Pt reports "more comfortable" in recliner" Pt will require inpt rehab on d/c    Barriers to Discharge Decreased caregiver support   Goals   Patient Goals brush my teeth and get up    STG Expiration Date 02/03/19   Treatment Day 1   Plan   Treatment/Interventions Functional transfer training;LE strengthening/ROM; Therapeutic exercise; Endurance training;Cognitive reorientation;Patient/family training;Equipment eval/education; Bed mobility;Spoke to nursing;Spoke to case management  (aide (jacoby) )   Progress Slow progress, decreased activity tolerance   PT Frequency (3-5x/wk )   Recommendation   Recommendation Post acute IP rehab   PT - OK to Discharge Yes  (to rehab when medically stable ) Zack Miguel, PT

## 2019-01-30 NOTE — SOCIAL WORK
Pt not medically stable to d/c today  Likely can d/c tomorrow to [facility]

## 2019-01-30 NOTE — PROGRESS NOTES
Progress Note - Nephrology   Marcus Carroll 80 y o  female MRN: [id number]  Unit/Bed#: [facility] 470-15 Encounter: [id number]      Assessment / Plan:    1  CKD stage 3/4 with fluctuating creatinine ranging between 0 84 to 1 39 dating back to 2015  · Elevated creatinine likely on the basis of advanced age, hypertension, Ace inhibition, fluctuating volume status in the setting of chronic diuretics   However, her urinalysis in July of 2018 revealed 3+ heme and 1+ protein and therefore an underlying glomerular disease needs to be considered    however, repeat urinalysis at this time reveals no hematuria and no proteinuria making a glomerular disease on likely as a cause of her elevated creatinine  Her underlying severe aortic regurgitation may be contributory to her renal dysfunction as well  · The creatinine has improved to her baseline, after intravenous fluids  · Continue to withhold diuretics and infuse low-flow chloride based solution   IV fluid rate decreased and will discontinue later today  Encouraged increased intake of liquids to prevent increase in serum sodium  2  Hyperkalemia  · Secondary to Ace inhibition, potassium supplementation and volume depletion culminating in decreased distal sodium delivery  · Continue to hold Ace inhibition, potassium supplementation    potassium level has improved and is now normal   · Would continue to hold Voltaren gel as some may be absorbed the nonsteroidal effect can lead to hyperkalemia  Long term, would try to avoid it if pain could be controlled without it  · Cortisol level is acceptable-checked because she is on chronic Flonase and adrenal insufficiency can present with hyperkalemia  3  CO2 retention  · Likely chronic in nature given her chronically elevated bicarbonate level   Also as evidence by the fact that her pH was near normal despite a pCO2 Randol Rowdy of 70   If this were an acute rise in her CO2, her pH would be much lower as she did not have time to compensate  · Some component of metabolic alkalosis due to diuresis, may be contributory as well but the major issue is elevated CO2  Bicarbonate level has improved, as metabolic alkalosis improved by withholding diuretics and infusing intravenous fluids  · The etiology of her elevated CO2 is uncertain and she likely should have pulmonary evaluation/PFTs to decide on course of therapy  4  High normal sodium level  · Continue hypotonic fluids until later today and then discontinue and recheck BMP in a m  5    Hypertension  · Aim for systolic blood pressure of 130 to 150 given her advanced age and acute kidney injury  · Norvasc dose split in an effort to avoid relative hypotension-continue current hold parameter on amlodipine  · Some of blood pressure increases related to pain  · One blood pressure is less labile, can decrease Minipress dosing and try to discontinue if able        Subjective: The patient was seen examined  She denied shortness of breath but did seem somewhat dyspneic after movement when I saw her this morning  She denied any chest discomfort, abdominal pain, vomiting or diarrhea  She remains somewhat despondent over being in the hospital       Objective:     Vitals: Blood pressure 123/50, pulse 68, temperature 98 8 °F (37 1 °C), resp  rate 18, height 5' 1" (1 549 m), weight 64 8 kg (142 lb 13 7 oz), SpO2 100 %  ,Body mass index is 26 99 kg/m²  Temp (24hrs), Av 6 °F (37 °C), Min:97 9 °F (36 6 °C), Max:99 5 °F (37 5 °C)      Weight (last 2 days)     None                 Intake/Output Summary (Last 24 hours) at 19 1339  Last data filed at 19 0930   Gross per 24 hour   Intake           1400 3 ml   Output             1550 ml   Net           -149 7 ml     I/O last 24 hours: In: 1710 3 [P O :538; I V :1172 3]  Out: 1550 [Urine:1550]        Physical Exam    Physical Exam   Constitutional: No distress  Neck: No JVD present  Cardiovascular: Normal heart sounds    Exam reveals no gallop and no friction rub  Pulmonary/Chest: Effort normal  No respiratory distress  She has no wheezes  She has no rales  Decreased breath sounds at bases   Abdominal: Soft  Bowel sounds are normal  She exhibits no distension  There is no tenderness  There is no rebound and no guarding  Musculoskeletal: She exhibits no edema  Neurological: She is alert  Skin: She is not diaphoretic  Vitals reviewed                Invasive Devices     Peripheral Intravenous Line            Peripheral IV 01/27/19 Right Antecubital 2 days    Peripheral IV 01/28/19 Left;Upper Arm 1 day          Epidural Line            Epidural Catheter 01/26/19 4 days          Drain            External Urinary Catheter 2 days                Medications:    Scheduled Meds:  Current Facility-Administered Medications:  acetaminophen 975 mg Oral Q8H [facility] Gretel Ruano MD    amiodarone 200 mg Oral Daily Gretel Ruano MD    amLODIPine 5 mg Oral Q12H [facility] Syed Walker MD    bisacodyl 10 mg Rectal Daily PRN Nelson Clifton PA-C    calcium carbonate 1 tablet Oral Daily With Breakfast Gretel Ruano MD    carvedilol 3 125 mg Oral BID With Meals Manjula Morse,     dextrose 5 % and sodium chloride 0 2 % 30 mL/hr Intravenous Continuous Syed Walker MD Last Rate: 30 mL/hr (01/30/19 1031)   docusate sodium 100 mg Oral BID Sameer Rodriguez MD    econazole nitrate  Topical BID Gretel Ruano MD    fluticasone 1 spray Nasal BID Gretel Ruano MD    heparin (porcine) 5,000 Units Subcutaneous [facility] Olive Recio PA-C    HYDROmorphone 0 2 mg Intravenous Q4H PRN Olive Recio PA-C    isosorbide mononitrate 60 mg Oral Daily Manjula Morse DO    lidocaine 1 patch Topical Daily Olive Recio PA-C    nitroglycerin 0 4 mg Sublingual Q5 Min PRN Gretel Ruano MD    ondansetron 4 mg Intravenous Q6H PRN Geraldine Davis MD    oxyCODONE 2 5 mg Oral Q4H PRN Olive Recio PA-C    polyethylene glycol 17 g Oral Daily Shelbie Farmer PA-C    pravastatin 40 mg Oral Daily Rhett Rosa MD    prazosin 2 mg Oral HS Angle Patel MD    prazosin 2 mg Oral QAJIM Patel MD    ropivacaine 0 2%  Epidural Continuous Grupo Bradley MD    senna 1 tablet Oral HS Yuriy Frey PA-C        PRN Meds: bisacodyl    HYDROmorphone    nitroglycerin    ondansetron    oxyCODONE    Continuous Infusions:  dextrose 5 % and sodium chloride 0 2 % 30 mL/hr Last Rate: 30 mL/hr (01/30/19 1031)   ropivacaine 0 2%             LAB RESULTS:        Results from last 7 days  Lab Units 01/30/19  0857 01/30/19  0455 01/29/19  0526 01/28/19  1427 01/28/19  0756 01/28/19  0226 01/27/19  2126 01/27/19  1401 01/25/19  1153  01/24/19  0444 01/23/19  1405 01/23/19  1354   WBC Thousand/uL 10 90*  --  6 29  --  7 52  --   --  6 28 5 51  --  6 17  --  7 50   HEMOGLOBIN g/dL 9 4*  --  9 2*  --  8 2*  --   --  9 0* 8 4*  --  8 3*  --  9 5*   I STAT HEMOGLOBIN g/dl  --   --   --   --   --   --   --   --   --   --   --  10 9*  --    HEMATOCRIT % 33 4*  --  32 4*  --  29 5*  --   --  33 1* 30 3*  --  29 4*  --  33 3*   HEMATOCRIT, ISTAT %  --   --   --   --   --   --   --   --   --   --   --  32*  --    PLATELETS Thousands/uL 146*  --  140*  --  138*  --   --  156 149  --  156  --  164   NEUTROS PCT % 88*  --  80*  --  86*  --   --  87* 81*  --   --   --  86*   LYMPHS PCT % 4*  --  9*  --  4*  --   --  4* 7*  --   --   --  6*   MONOS PCT % 6  --  8  --  8  --   --  7 8  --   --   --  6   EOS PCT % 1  --  3  --  2  --   --  2 3  --   --   --  1   POTASSIUM mmol/L  --  4 6 4 9 4 9 5 4* 6 2* 6 6* 6 2*  --   < > 4 5  --  4 3   CHLORIDE mmol/L  --  106 107 104 104 106 103 103  --   < > 104  --  102   CO2 mmol/L  --  35* 33* 36* 36* 36* 38* 37*  --   < > 32  --  32   CO2, I-STAT mmol/L  --   --   --   --   --   --   --   --   --   --   --  35*  --    BUN mg/dL  --  46* 57* 62* 70* 70* 70* 65*  --   < > 35*  --  38*   CREATININE mg/dL  --  1 03 1 22 1 45* 1 63* 1 67* 1 75* 1 67*  --   < > 1 38*  --  1 51*   CALCIUM mg/dL  -- 8 4 9 0 8 6 8 9 8 2* 8 5 9 0  --   < > 8 6  --  8 9   ALK PHOS U/L  --   --  58  --   --   --   --   --   --   --   --   --   --    ALT U/L  --   --  35  --   --   --   --   --   --   --   --   --   --    AST U/L  --   --  37  --   --   --   --   --   --   --   --   --   --    GLUCOSE, ISTAT mg/dl  --   --   --   --   --   --   --   --   --   --   --  112  --    MAGNESIUM mg/dL  --  2 7* 2 9*  --  2 6  --   --   --   --   --   --   --   --    PHOSPHORUS mg/dL  --   --  3 0  --  3 8  --   --   --   --   --   --   --   --    < > = values in this interval not displayed  CUTURES:  Lab Results   Component Value Date    URINECX 80,000-89,000 cfu/ml  07/17/2018                 PLEASE NOTE:  This encounter was completed utilizing the [employer] Direct Speech Voice Recognition Software  Grammatical errors, random word insertions, pronoun errors and incomplete sentences are occasional consequences of the system due to software limitations, ambient noise and hardware issues  These may be missed by proof reading prior to affixing electronic signature  Any questions or concerns about the content, text or information contained within the body of this dictation should be directly addressed to the physician for clarification  Please do not hesitate to call me directly if you have any any questions or concerns

## 2019-01-30 NOTE — ASSESSMENT & PLAN NOTE
-Nephrology following patient (acute on chronic condition), less likely glomerular as UA with improvement in proteinuria/hematuria, hold ACE-I, diuretics and K supplementation, DC voltaren gel, and continue D5 1/4 NS @ 30 ml/hr as recommended by nephrology  -continue to monitor kidney function and electrolytes, f/u Nephro recs, avoid nephrotoxic agents

## 2019-01-30 NOTE — PROGRESS NOTES
Progress Note - Delgado Post 80 y o  female MRN: [id number]    Unit/Bed#: [facility] 907-30 Encounter: [id number]      Assessment/Plan  1  Fall  Recommendations remain the same  Fall precautions  Recommend adding vitamin D3 1000 international units daily patient's medication regimen  Home meds unremarkable  PT, OT  Patient will benefit from rehab     2  Ambulatory dysfunction  Ambulates with rolled walker at baseline  PT, OT  Patient will need rehab     3  Constipation  Senna q h s  Colace added   Miralax added   Patient had prune juice yesterday, had large BM, she feels much better, but would like prune juice every day, nursing aware and going to get her prune juice     4  Delirium  Patient alert oriented x4, delirium resolved  Continue treating underlying conditions  Continue with pain medications as ordered  Monitor closely for urinary retention, bladder scan straight cath as needed  Patient at risk for developing delirium, delirium preventing tactics advised  Redirect unwanted patient behaviors as first line tx  Reorient patient frequently  Avoid deliriogenic meds including tramadol, benzodiazepines, benadryl  Good sleep hygiene important, limit night time interruptions  Encourage patient to stay awake during the day  Ensure adequate hydration/nutrition  Mobilize often      5  Forgetfulness  Daughter reports patient was forgetful at baseline, and can be repetitive  Patient's cognitive screen was within normal limits, patient did score 4/5 on mini cog assessment  Patient's presentation currently complicated by 4, the patient was alert and oriented  Patient may benefit from follow up at [facility] upon discharge  No signs macrocytosis, will defer B12, folate checks     6   Acute pain due to trauma  Continue with pain medications as ordered  Patient complains of no pain at this time     7  Multiple rib fxs   Trauma following and managing              Subjective:   Patient reports no acute events overnight  She is alert oriented x4  She reports having a BM yesterday and feeling much better, but would like prune juice every day to maintain regularity  Patient denies fatigue, headaches, vision changes, chest pain, sob, N/V/D, constipation, difficulty with urination, muscle pain  Objective:     Vitals: Blood pressure 123/50, pulse 68, temperature 98 8 °F (37 1 °C), resp  rate 18, height 5' 1" (1 549 m), weight 66 4 kg (146 lb 6 2 oz), SpO2 100 %  ,Body mass index is 27 66 kg/m²  Intake/Output Summary (Last 24 hours) at 01/30/19 1436  Last data filed at 01/30/19 1300   Gross per 24 hour   Intake           1520 3 ml   Output              900 ml   Net            620 3 ml       Physical Exam: General : WD in NAD  HEENT : MMM no erythema or exudates  EOMI, sclera anicteric  Heart : Normal rate  Lungs : CTA  Abdomen : Soft, NT/ND, BS auscultated in all 4 quads  No organomegaly  Ext :  Pulses +2/4 B/L  Neg edema B/L  Neg calf swelling B/L  L arm in sling  Skin : Pink, warm, dry, age appropriate turgor and mobility  Neuro : Nonfocal  Psych : A * O x 4, no signs of delirium       Invasive Devices     Peripheral Intravenous Line            Peripheral IV 01/27/19 Right Antecubital 3 days    Peripheral IV 01/28/19 Left;Upper Arm 1 day          Epidural Line            Epidural Catheter 01/26/19 4 days          Drain            External Urinary Catheter 2 days                Lab, Imaging and other studies: I have personally reviewed pertinent reports      VTE Pharmacologic Prophylaxis: Sequential compression device (Venodyne)   VTE Mechanical Prophylaxis: sequential compression device

## 2019-01-30 NOTE — PLAN OF CARE
Problem: PHYSICAL THERAPY ADULT  Goal: Performs mobility at highest level of function for planned discharge setting  See evaluation for individualized goals  Treatment/Interventions: Functional transfer training, LE strengthening/ROM, Therapeutic exercise, Endurance training, Bed mobility, Gait training, Spoke to nursing, OT          See flowsheet documentation for full assessment, interventions and recommendations  Outcome: Progressing  Prognosis: Fair  Problem List: Decreased strength, Decreased range of motion, Decreased endurance, Impaired balance, Decreased mobility, Decreased coordination, Decreased cognition, Decreased safety awareness, Impaired hearing, Impaired sensation, Decreased skin integrity, Orthopedic restrictions, Pain  Assessment: Pt continues to be significantly fatigued and requires extreme amount of time to complete all functional tasks as well as therex  Pt noted to require more assist w/ therex today w/ some edema noted in B/L LE's - pt requiring modA x2 for all functional mobility including partial sit<>stnad from chair as well as SPV xfer to drop arm chair  Pt was fitted for new LUE sling as hers was not in room and pt was instructed/ educated in NWB to LUE as well as sling use- pt tolerated session fair; but was significantly fatigued following session  Pt was not able to prgress to functional ambualtion at this time 2* LE weakness; edema and fatigue  Pt up in recliner w/ alarm intact and all needs in reach; SCD's in place and LE's elevated-  Pt reports "more comfortable" in recliner" Pt will require inpt rehab on d/c   Barriers to Discharge: Decreased caregiver support     Recommendation: Post acute IP rehab     PT - OK to Discharge: Yes (to rehab when medically stable )    See flowsheet documentation for full assessment

## 2019-01-30 NOTE — ASSESSMENT & PLAN NOTE
-multifactorial: secondary to rib fractures/chest wall trauma, underlying COPD/CHF/CKD  -continue epidural catheter, analgesics  -IS, pulmonary toilet, continuous pulse ox  -monitor for fluid overload (hx of CHF, CXR on 1/27 showed left pleural effusion and atelectasis/infiltrate---afebrile/no leukocytosis)  -chronic hypercarbia likely 2/2 COPD, this in combination with baseline CHF/kidney insufficiency and now in setting of multiple rib fractures places patient at high risk of respiratory compromise  -will need OP f/u with Pulmonology at discharge for further management of chronic hypercarbia/pulmonary function tests  -PT/OT, OOB to chair, wean oxygen as able

## 2019-01-30 NOTE — ASSESSMENT & PLAN NOTE
-epidural catheter placed on 1/26, appreciate APS recs  -rib protocol and po narcotics, continue to encourage use as patient complains of pain with associated splinting/poor inspiratory effort and sedentary state    -continue bowel regimen, patient is now having BMs

## 2019-01-30 NOTE — PROGRESS NOTES
Cardiology Progress Note - Jessica Perdue 80 y o  female MRN: [id number]    Unit/Bed#: [facility] 758-62 Encounter: [id number]      Assessment/Recommendations:  1  Chronic diastolic CHF: with relatively euvolemic volume status  Renal function did improve with IVF  Would defer to nephrology as to when to resume her diuretics  2   Mod to Sev AI: continue medical management with B-blocker  3   PAF: rates controlled with coreg, home Eliquis is held due to epidural - resume when able  4   HTN: well controlled, continue current regimen - but would consider changing Minipress as she could have falls with orthostatic hypotension  5   HLD: continued on pravastatin  6   L scapular fracture  7  Hyperkalemia: per nephrology, due to overdiuresis and volume depletion, now improved  8   Cardiac status stable, please call with questions  Subjective:   Patient seen and examined  No significant events overnight   ; pertinent negatives - chest pain, chest pressure/discomfort, dyspnea, irregular heart beat, near-syncope and palpitations  Objective:     Vitals: Blood pressure 123/50, pulse 68, temperature 98 8 °F (37 1 °C), resp  rate 18, height 5' 1" (1 549 m), weight 64 8 kg (142 lb 13 7 oz), SpO2 100 %  , Body mass index is 26 99 kg/m² ,   Orthostatic Blood Pressures      Most Recent Value   Blood Pressure  123/50 filed at 01/30/2019 0807   Patient Position - Orthostatic VS  Lying filed at 01/29/2019 0800            Intake/Output Summary (Last 24 hours) at 01/30/19 0925  Last data filed at 01/30/19 0700   Gross per 24 hour   Intake           1220 3 ml   Output             1550 ml   Net           -329 7 ml       TELE: No significant arrhythmias seen      Physical Exam:    GEN: Jessica Perdue appears well, alert and oriented x 3, pleasant and cooperative   HEENT: pupils equal, round, and reactive to light; extraocular muscles intact  NECK: supple, no carotid bruits   HEART: regular rhythm, normal S1 and S2, +systolic/diastolic murmur, no clicks, gallops or rubs   LUNGS: clear to auscultation bilaterally; no wheezes, rales, or rhonchi   ABDOMEN: normal bowel sounds, soft, no tenderness, no distention  EXTREMITIES: peripheral pulses normal; no clubbing, cyanosis, or edema  NEURO: no focal findings   SKIN: normal without suspicious lesions on exposed skin      Medications:      Current Facility-Administered Medications:     acetaminophen (TYLENOL) tablet 975 mg, 975 mg, Oral, Q8H [facility], Jose J Crews MD, 975 mg at 01/30/19 2827    amiodarone tablet 200 mg, 200 mg, Oral, Daily, Jose J Crews MD, 200 mg at 01/30/19 0806    amLODIPine (NORVASC) tablet 5 mg, 5 mg, Oral, Q12H [facility], Brittany Samaniego MD, 5 mg at 01/29/19 0915    bisacodyl (DULCOLAX) rectal suppository 10 mg, 10 mg, Rectal, Daily PRN, Dee Gabriel PA-C    calcium carbonate (OYSTER SHELL,OSCAL) 500 mg tablet 1 tablet, 1 tablet, Oral, Daily With Breakfast, Jose J Crews MD, 1 tablet at 01/30/19 0807    carvedilol (COREG) tablet 3 125 mg, 3 125 mg, Oral, BID With Meals, Manjula Morse DO, 3 125 mg at 01/30/19 0804    dextrose 5 % and sodium chloride 0 2 % infusion, 30 mL/hr, Intravenous, Continuous, Brittany Samaniego MD, Last Rate: 50 mL/hr at 01/30/19 0526, 50 mL/hr at 01/30/19 0526    docusate sodium (COLACE) capsule 100 mg, 100 mg, Oral, BID, Selena Crooks MD, 100 mg at 01/30/19 5426    econazole nitrate 1 % cream, , Topical, BID, Jose J Crews MD    fluticasone [facility]) 50 mcg/act nasal spray 1 spray, 1 spray, Nasal, BID, Jose J Crews MD, 1 spray at 01/30/19 0808    heparin (porcine) subcutaneous injection 5,000 Units, 5,000 Units, Subcutaneous, Q8H [facility], Christiano Kim PA-C, 5,000 Units at 01/30/19 0528    HYDROmorphone (DILAUDID) injection 0 2 mg, 0 2 mg, Intravenous, Q4H PRN, Christiano Kim PA-C    isosorbide mononitrate (IMDUR) 24 hr tablet 60 mg, 60 mg, Oral, Daily, Manjula Morse DO, 60 mg at 01/30/19 0804    lidocaine (LIDODERM) 5 % patch 1 patch, 1 patch, Topical, Daily, Han Dubose PA-C, 1 patch at 01/30/19 0807    nitroglycerin (NITROSTAT) SL tablet 0 4 mg, 0 4 mg, Sublingual, Q5 Min PRN, Kala Ron MD    ondansetron [facility]) injection 4 mg, 4 mg, Intravenous, Q6H PRN, Chalino Mullins MD, 4 mg at 01/26/19 1323    oxyCODONE (ROXICODONE) IR tablet 2 5 mg, 2 5 mg, Oral, Q4H PRN, Han Dubose PA-C, 2 5 mg at 01/29/19 2210    polyethylene glycol (MIRALAX) packet 17 g, 17 g, Oral, Daily, Jossy Richards PA-C, 17 g at 01/30/19 9731    pravastatin (PRAVACHOL) tablet 40 mg, 40 mg, Oral, Daily, Kala Ron MD, 40 mg at 01/30/19 0807    prazosin (MINIPRESS) capsule 2 mg, 2 mg, Oral, HS, Coleen Colby MD, 2 mg at 01/29/19 0101    prazosin (MINIPRESS) capsule 2 mg, 2 mg, Oral, QAM, Coleen Colby MD    ropivacaine 0 2% PCEA, , Epidural, Continuous, Chalino Mullins MD    [facility]) tablet 8 6 mg, 1 tablet, Oral, HS, Bryce Brunner PA-C, 8 6 mg at 01/29/19 2210     Labs & Results:      Results from last 7 days  Lab Units 01/27/19  2126 01/23/19  1354   CK TOTAL U/L 20*  --    TROPONIN I ng/mL  --  <0 02       Results from last 7 days  Lab Units 01/29/19  0526 01/28/19  0756 01/27/19  1401   WBC Thousand/uL 6 29 7 52 6 28   HEMOGLOBIN g/dL 9 2* 8 2* 9 0*   HEMATOCRIT % 32 4* 29 5* 33 1*   PLATELETS Thousands/uL 140* 138* 156           Results from last 7 days  Lab Units 01/30/19  0455 01/29/19  0526 01/28/19  1427  01/23/19  1405   POTASSIUM mmol/L 4 6 4 9 4 9  < >  --    CHLORIDE mmol/L 106 107 104  < >  --    CO2 mmol/L 35* 33* 36*  < >  --    CO2, I-STAT mmol/L  --   --   --   --  35*   BUN mg/dL 46* 57* 62*  < >  --    CREATININE mg/dL 1 03 1 22 1 45*  < >  --    CALCIUM mg/dL 8 4 9 0 8 6  < >  --    ALK PHOS U/L  --  58  --   --   --    ALT U/L  --  35  --   --   --    AST U/L  --  37  --   --   --    GLUCOSE, ISTAT mg/dl  --   --   --   --  112   < > = values in this interval not displayed          Results from last 7 days  Lab Units 01/30/19  0455 01/29/19  0526 01/28/19  0756   MAGNESIUM mg/dL 2 7* 2 9* 2 6       Echo: personally reviewed - normal EF, G2DD, mod to sev AR, mild pulm HTN    EKG personally reviewed by Yousif Talavera MD

## 2019-01-30 NOTE — TELEPHONE ENCOUNTER
The patient's daughter called to inform you that the patient has been in the hospital since 1/23/19  The patient fell in her kitchen  She has 5 broken ribs and a broken scapula on the left side  She was put in intensive care because her  Potassium went up and oxygen level was very low  The patient is at the [address] [place] [address] 6th floor room 610

## 2019-01-31 ENCOUNTER — APPOINTMENT (INPATIENT)
Dept: NON INVASIVE DIAGNOSTICS | Facility: HOSPITAL | Age: 84
DRG: 564 | End: 2019-01-31
Payer: MEDICARE

## 2019-01-31 PROBLEM — I31.3 PERICARDIAL EFFUSION: Status: ACTIVE | Noted: 2019-01-31

## 2019-01-31 LAB
ANION GAP SERPL CALCULATED.3IONS-SCNC: 3 MMOL/L (ref 4–13)
BUN SERPL-MCNC: 48 MG/DL (ref 5–25)
CALCIUM SERPL-MCNC: 8.2 MG/DL (ref 8.3–10.1)
CHLORIDE SERPL-SCNC: 104 MMOL/L (ref 100–108)
CO2 SERPL-SCNC: 36 MMOL/L (ref 21–32)
CREAT SERPL-MCNC: 1.05 MG/DL (ref 0.6–1.3)
GFR SERPL CREATININE-BSD FRML MDRD: 48 ML/MIN/1.73SQ M
GLUCOSE SERPL-MCNC: 90 MG/DL (ref 65–140)
POTASSIUM SERPL-SCNC: 4.5 MMOL/L (ref 3.5–5.3)
SODIUM SERPL-SCNC: 143 MMOL/L (ref 136–145)

## 2019-01-31 PROCEDURE — 93306 TTE W/DOPPLER COMPLETE: CPT | Performed by: INTERNAL MEDICINE

## 2019-01-31 PROCEDURE — 97168 OT RE-EVAL EST PLAN CARE: CPT

## 2019-01-31 PROCEDURE — 93306 TTE W/DOPPLER COMPLETE: CPT

## 2019-01-31 PROCEDURE — 94640 AIRWAY INHALATION TREATMENT: CPT

## 2019-01-31 PROCEDURE — G8987 SELF CARE CURRENT STATUS: HCPCS

## 2019-01-31 PROCEDURE — 80048 BASIC METABOLIC PNL TOTAL CA: CPT | Performed by: INTERNAL MEDICINE

## 2019-01-31 PROCEDURE — G8979 MOBILITY GOAL STATUS: HCPCS

## 2019-01-31 PROCEDURE — G8988 SELF CARE GOAL STATUS: HCPCS

## 2019-01-31 PROCEDURE — 99232 SBSQ HOSP IP/OBS MODERATE 35: CPT | Performed by: PHYSICIAN ASSISTANT

## 2019-01-31 PROCEDURE — 97164 PT RE-EVAL EST PLAN CARE: CPT

## 2019-01-31 PROCEDURE — G8978 MOBILITY CURRENT STATUS: HCPCS

## 2019-01-31 PROCEDURE — 99232 SBSQ HOSP IP/OBS MODERATE 35: CPT | Performed by: INTERNAL MEDICINE

## 2019-01-31 PROCEDURE — 94760 N-INVAS EAR/PLS OXIMETRY 1: CPT

## 2019-01-31 PROCEDURE — 99221 1ST HOSP IP/OBS SF/LOW 40: CPT | Performed by: INTERNAL MEDICINE

## 2019-01-31 PROCEDURE — 94668 MNPJ CHEST WALL SBSQ: CPT

## 2019-01-31 RX ORDER — LEVALBUTEROL 1.25 MG/.5ML
1.25 SOLUTION, CONCENTRATE RESPIRATORY (INHALATION)
Status: DISCONTINUED | OUTPATIENT
Start: 2019-01-31 | End: 2019-02-02 | Stop reason: HOSPADM

## 2019-01-31 RX ORDER — LEVALBUTEROL INHALATION SOLUTION 0.63 MG/3ML
0.63 SOLUTION RESPIRATORY (INHALATION)
Status: DISCONTINUED | OUTPATIENT
Start: 2019-01-31 | End: 2019-01-31

## 2019-01-31 RX ORDER — LEVALBUTEROL 1.25 MG/.5ML
SOLUTION, CONCENTRATE RESPIRATORY (INHALATION)
Status: COMPLETED
Start: 2019-01-31 | End: 2019-01-31

## 2019-01-31 RX ADMIN — LEVALBUTEROL 1.25 MG: 1.25 SOLUTION, CONCENTRATE RESPIRATORY (INHALATION) at 16:59

## 2019-01-31 RX ADMIN — Medication 1 TABLET: at 08:58

## 2019-01-31 RX ADMIN — DOCUSATE SODIUM 100 MG: 100 CAPSULE, LIQUID FILLED ORAL at 18:42

## 2019-01-31 RX ADMIN — IPRATROPIUM BROMIDE 0.5 MG: 0.5 SOLUTION RESPIRATORY (INHALATION) at 19:50

## 2019-01-31 RX ADMIN — HEPARIN SODIUM 5000 UNITS: 5000 INJECTION INTRAVENOUS; SUBCUTANEOUS at 13:36

## 2019-01-31 RX ADMIN — PRAZOSIN HYDROCHLORIDE 2 MG: 2 CAPSULE ORAL at 22:41

## 2019-01-31 RX ADMIN — IPRATROPIUM BROMIDE 0.5 MG: 0.5 SOLUTION RESPIRATORY (INHALATION) at 16:59

## 2019-01-31 RX ADMIN — ACETAMINOPHEN 975 MG: 325 TABLET ORAL at 13:36

## 2019-01-31 RX ADMIN — ACETAMINOPHEN 975 MG: 325 TABLET ORAL at 05:16

## 2019-01-31 RX ADMIN — CARVEDILOL 3.12 MG: 3.12 TABLET, FILM COATED ORAL at 08:58

## 2019-01-31 RX ADMIN — PRAZOSIN HYDROCHLORIDE 2 MG: 2 CAPSULE ORAL at 08:58

## 2019-01-31 RX ADMIN — HEPARIN SODIUM 5000 UNITS: 5000 INJECTION INTRAVENOUS; SUBCUTANEOUS at 22:42

## 2019-01-31 RX ADMIN — ROPIVACAINE HYDROCHLORIDE: 2 INJECTION, SOLUTION EPIDURAL; INFILTRATION at 07:45

## 2019-01-31 RX ADMIN — AMLODIPINE BESYLATE 5 MG: 5 TABLET ORAL at 22:42

## 2019-01-31 RX ADMIN — LEVALBUTEROL 1.25 MG: 1.25 SOLUTION, CONCENTRATE RESPIRATORY (INHALATION) at 19:50

## 2019-01-31 RX ADMIN — STANDARDIZED SENNA CONCENTRATE 8.6 MG: 8.6 TABLET ORAL at 22:42

## 2019-01-31 RX ADMIN — POLYETHYLENE GLYCOL 3350 17 G: 17 POWDER, FOR SOLUTION ORAL at 08:56

## 2019-01-31 RX ADMIN — PRAVASTATIN SODIUM 40 MG: 40 TABLET ORAL at 08:58

## 2019-01-31 RX ADMIN — ECONAZOLE NITRATE 1 APPLICATION: 10 CREAM TOPICAL at 08:59

## 2019-01-31 RX ADMIN — ACETAMINOPHEN 975 MG: 325 TABLET ORAL at 22:42

## 2019-01-31 RX ADMIN — HEPARIN SODIUM 5000 UNITS: 5000 INJECTION INTRAVENOUS; SUBCUTANEOUS at 05:17

## 2019-01-31 RX ADMIN — LIDOCAINE 1 PATCH: 50 PATCH CUTANEOUS at 08:57

## 2019-01-31 RX ADMIN — DOCUSATE SODIUM 100 MG: 100 CAPSULE, LIQUID FILLED ORAL at 08:59

## 2019-01-31 RX ADMIN — FLUTICASONE PROPIONATE 1 SPRAY: 50 SPRAY, METERED NASAL at 08:59

## 2019-01-31 RX ADMIN — ISOSORBIDE MONONITRATE 60 MG: 60 TABLET, EXTENDED RELEASE ORAL at 08:57

## 2019-01-31 RX ADMIN — AMIODARONE HYDROCHLORIDE 200 MG: 200 TABLET ORAL at 08:58

## 2019-01-31 RX ADMIN — AMLODIPINE BESYLATE 5 MG: 5 TABLET ORAL at 08:59

## 2019-01-31 NOTE — ASSESSMENT & PLAN NOTE
-multifactorial: secondary to rib fractures/chest wall trauma, underlying COPD/CHF/CKD  -continue epidural catheter, analgesics  -IS, pulmonary toilet, continuous pulse ox  -this is improving overall  Saturating high 90s on 2 L nasal cannula    -will need OP f/u with Pulmonology at discharge for further management of chronic hypercarbia/pulmonary function tests  -PT/OT, OOB to chair, wean oxygen as able

## 2019-01-31 NOTE — SOCIAL WORK
Epidural likely to come out today   Plan for potential d/c tomorrow to [facility] if medically stable

## 2019-01-31 NOTE — ASSESSMENT & PLAN NOTE
-back on home meds-Imdur, amlodipine, Coreg    Holding ACE-I in setting of DESMOND on CKD  -discontinue prazosin as recommended by Nephrology when blood pressures are more stable

## 2019-01-31 NOTE — PHYSICAL THERAPY NOTE
Physical Therapy Re-Evaluation     Patient's Name: Demetrius Sportsman    Admitting Diagnosis  Closed fracture of left scapula, unspecified part of scapula, initial encounter [S42 102A]  Multiple fractures of ribs, left side, initial encounter for closed fracture [S22 42XA]  Unspecified multiple injuries, initial encounter [T07  XXXA]    Problem List  Patient Active Problem List   Diagnosis    Paroxysmal atrial fibrillation (HCC)    Benign hypertension with CKD (chronic kidney disease) stage III (HCC)    GERD (gastroesophageal reflux disease)    Other hyperlipidemia    Aortic valve regurgitation, nonrheumatic    Ascending aortic aneurysm ([address] )    Other chest pain    Lymphedema of both lower extremities    Chronic diastolic congestive heart failure (HCC)    Hematuria    Gait difficulty    Multiple rib fractures    Closed left scapular fracture    Acute kidney injury ([address] )    Acute respiratory insufficiency    Acute pain due to trauma    Chronic kidney disease, stage III (moderate) (HCC)    Chronic respiratory acidosis    Pericardial effusion       Past Medical History  Past Medical History:   Diagnosis Date    Acid reflux disease     Anemia     Last assessed: 10/26/16    Aortic valve regurgitation, nonrheumatic     Ascending aortic aneurysm ([address] )     Last assessed: 6/5/13    Atrial fibrillation ([address] )     Hyperlipidemia     Hypertension 2/17/2016    Paroxysmal atrial fibrillation ([address] ) 2/17/2016       Past Surgical History  Past Surgical History:   Procedure Laterality Date    CARDIOVERSION      CHOLECYSTECTOMY      LAPAROSCOPIC SALPINGOOPHERECTOMY      TONSILLECTOMY        01/31/19 8184   Note Type   Note type Re-eval   Pain Assessment   Pain Assessment No/denies pain   Pain Score No Pain   Home Living   Additional Comments See initial PT eval   Prior Function   Comments See initial PT eval   Restrictions/Precautions   Weight Bearing Precautions Per Order Yes   LUE Weight Bearing Per Order NWB  (In sling)   Braces or Orthoses Sling  (LUE)   Other Precautions Cognitive; Bed Alarm;Telemetry;Pain; Fall Risk   General   Additional Pertinent History Pt returned to F from ICU   Family/Caregiver Present Yes   Cognition   Overall Cognitive Status Impaired   Arousal/Participation Lethargic   Attention Attends with cues to redirect   Orientation Level Oriented X4   Following Commands Follows one step commands with increased time or repetition   RLE Assessment   RLE Assessment (Minimally 3/5 )   LLE Assessment   LLE Assessment (Minimally 3/5)   Bed Mobility   Supine to Sit 2  Maximal assistance   Additional items Assist x 2; Increased time required;Verbal cues;LE management   Sit to Supine 2  Maximal assistance   Additional items Assist x 2; Increased time required;Verbal cues;LE management   Transfers   Sit to Stand 2  Maximal assistance   Additional items Assist x 2; Increased time required;Verbal cues   Stand to Sit 2  Maximal assistance   Additional items Assist x 2; Increased time required;Verbal cues   Ambulation/Elevation   Gait pattern Improper Weight shift;R Knee Abigail;L Knee Abigail;Narrow FRANC;Shuffling; Short stride; Step to;Excessively slow   Gait Assistance 2  Maximal assist   Additional items Assist x 2;Verbal cues; Tactile cues   Assistive Device (hand held assist)   Distance x5 bedside steps   Balance   Static Sitting Fair +   Dynamic Sitting Fair   Static Standing Fair -   Dynamic Standing Poor +   Ambulatory Poor   Endurance Deficit   Endurance Deficit Yes   Endurance Deficit Description Fatigue, fear of falling   Activity Tolerance   Activity Tolerance Patient limited by fatigue   Nurse Made Aware Yes   Assessment   Prognosis Fair   Problem List Decreased strength;Decreased endurance;Decreased range of motion; Impaired balance;Decreased coordination;Decreased mobility;Pain;Orthopedic restrictions   Assessment Pt is an 79 yo female seen for physical therapy re-evaluation s/p transfer to ICU for SOB/hypoxia, pt now returned to [facility]  Pt is supine at start of session and agreeable to therapy  Pt transferred supine <> sit with maxA x2  Pt sat EOB for 10 minutes with Elly for balance  Pt transferred sit <> stand with maxA x2 and hand held assist  Pt took x5 bedside steps with maxA x2 and hand held assist, required constant cues for sequencing  Pt is fearful of falling throughout session, requiring encouragement for PT throughout  Pt remained supine with bed in chair position and all needs within reach  PT to recommend inpt rehab for deficits in strength, endurance, functional mobility, and balance  Barriers to Discharge Decreased caregiver support   Goals   Patient Goals To go back to bed   STG Expiration Date 02/10/19   Short Term Goal #2 Previous goals remain appropriate   Treatment Day 2   Plan   Treatment/Interventions Functional transfer training;LE strengthening/ROM; Therapeutic exercise; Endurance training;Bed mobility;Gait training;Spoke to nursing;OT;Family   PT Frequency (3-5x/wk)   Recommendation   Recommendation Post acute IP rehab   PT - OK to Discharge Yes  (To rehab when medically stable)   Modified Cherry Scale   Modified Jude Scale 4   Barthel Index   Feeding 5   Bathing 0   Grooming Score 0   Dressing Score 5   Bladder Score 5   Bowels Score 10   Toilet Use Score 5   Transfers (Bed/Chair) Score 5   Mobility (Level Surface) Score 0   Stairs Score 0   Barthel Index Score 35         Thaddeus James, PT, DPT

## 2019-01-31 NOTE — ASSESSMENT & PLAN NOTE
-Nephrology following patient (acute on chronic condition), less likely glomerular as UA with improvement in proteinuria/hematuria, hold ACE-I, diuretics and K supplementation, hold voltaren gel   -continue to monitor kidney function and electrolytes, f/u Nephro recs, avoid nephrotoxic agents

## 2019-01-31 NOTE — OCCUPATIONAL THERAPY NOTE
[address] Re-Evaluation     Patient Name: Lloyd Chaidez  RTABC'E Date: 1/31/2019  Problem List  Patient Active Problem List   Diagnosis    Paroxysmal atrial fibrillation (HCC)    Benign hypertension with CKD (chronic kidney disease) stage III (HCC)    GERD (gastroesophageal reflux disease)    Other hyperlipidemia    Aortic valve regurgitation, nonrheumatic    [facility]ending [facility] ([facility]    Other chest pain    Lymphedema of both lower extremities    Chronic diastolic congestive heart failure (HCC)    Hematuria    Gait difficulty    Multiple rib fractures    Closed left scapular fracture    Acute kidney injury ([facility] )    Acute respiratory insufficiency    Acute pain due to trauma    Chronic kidney disease, stage III (moderate) (HCC)    Chronic respiratory acidosis    Pericardial effusion     Past Medical History  Past Medical History:   Diagnosis Date    Acid reflux disease     Anemia     Last assessed: 10/26/16    Aortic valve regurgitation, nonrheumatic     Ascending aortic aneurysm ([facility] )     Last assessed: 6/5/13    Atrial fibrillation ([facility] )     Hyperlipidemia     Hypertension 2/17/2016    Paroxysmal atrial fibrillation ([facility] ) 2/17/2016     Past Surgical History  Past Surgical History:   Procedure Laterality Date    CARDIOVERSION      CHOLECYSTECTOMY      LAPAROSCOPIC SALPINGOOPHERECTOMY      TONSILLECTOMY           01/31/19 6915   Note Type   Note type Re-eval  (S/P TRANSFER TO ICU 2* SOB/HYPOXIA)   Restrictions/Precautions   Weight Bearing Precautions Per Order Yes   RUE Weight Bearing Per Order WBAT   LUE Weight Bearing Per Order NWB   RLE Weight Bearing Per Order WBAT   LLE Weight Bearing Per Order WBAT   Braces or Orthoses Sling   Other Precautions Chair Alarm; Bed Alarm; Fall Risk;WBS;O2;Multiple lines   Pain Assessment   Pain Assessment (DENIES PAIN AT REST)   Home Living   Type of Home Apartment  ([facility])   Home Layout One level;Elevator   Bathroom Shower/Tub Walk-in shower   Bathroom Toilet Standard   Bathroom Equipment Grab bars in shower;Grab bars around toilet   [facility]   Prior Function   Level of [address] with ADLs and functional mobility; Needs assistance with IADLs   Lives With Alone; Facility staff   Receives Help From Family;Personal care attendant   ADL Assistance Independent   IADLs Needs assistance   Falls in the last 6 months 1 to 4   Vocational Retired   [address] - MEALS/HOMEMAKING PROVIDED   Reciprocal Relationships SUPPORTIVE FAMILY AND FACILITY STAFF   Service to Others RETIRED   Intrinsic Gratification MOSTLY SEDENTARY   Subjective   Subjective "I'M GOING TO BE GOING BACK TO [facility]"   ADL   Eating Assistance 5  Supervision/Setup   Grooming Assistance 4  Minimal Assistance   UB Bathing Assistance 3  Moderate Assistance   LB Bathing Assistance 2  Maximal Assistance   UB Dressing Assistance 3  Moderate Assistance   LB Dressing Assistance 2  Maximal Assistance   Toileting Assistance  2  Maximal Assistance   Bed Mobility   Supine to Sit 2  Maximal assistance   Additional items Assist x 2   Sit to Supine 2  Maximal assistance   Additional items Assist x 2   Transfers   Sit to Stand 2  Maximal assistance   Additional items Assist x 2   Stand to Sit 2  Maximal assistance   Additional items Assist x 2   Stand pivot Unable to assess   Balance   Static Sitting Fair -   Dynamic Sitting Poor +   Static Standing Poor   Dynamic Standing Poor   Activity Tolerance   Activity Tolerance Patient limited by fatigue;Treatment limited secondary to medical complications (Comment)   RUE Assessment   RUE Assessment WFL   LUE Assessment   LUE Assessment ([place] )   Cognition   Arousal/Participation Alert; Cooperative   Attention Attends with cues to redirect   Orientation Level Oriented X4   Memory Decreased recall of precautions;Decreased short term memory   Following Commands Follows one step commands with increased time or repetition   Assessment   Limitation Decreased UE ROM; Decreased UE strength;Decreased Safe judgement during ADL;Decreased endurance;Decreased self-care trans;Decreased high-level ADLs; Non-func L UE   Prognosis Good;Fair   Assessment 89YO FEMALE SEEN FOR OT REEVAL S/P TRANSFER TO ICU 2* SOB/HYPOXIA - PT NOW BACK ON P6 - ADMITTED 1/23 S/P FALL WITH MULTIPLE RIB FX AND L SCAPULAR FX (NWB) PT PRESENTS AWAKE, ALERT AND GENERALLY ORIENTED - ABLE TO FOLLOW ALL SIMPLE COMMANDS WITH INCREASED TIME- REQUIRES SETUP FOR SELF FEEDING AND LIGHT GROOMING TASKS - MOD A UB ADLS AND MAX A LB ADLS, MAX A FOR TOILETING - MOD A X 2 FOR BED MOBILITY AND MAX A X 2 SIT TO STAND AND TO TAKE 3-4 LATERAL SIDE STEPS TOWARDS HOB - PT ANXIOUS RE: MOBILITY AND FEARFUL OF FALLING - CONTINUE TO RECOMMEND INPT REHAB PRIOR TO RETURN TO [facility]DEP[facility]ENT LIVING APT - OT TO CONTINUE TO FOLLOW 3-5X/WK TO ADDRESS THE FOLLOWING GOALS:    Goals   Patient Goals REST   LTG Time Frame 10-14   Long Term Goal #1 REFER TO ESTABLISHED GOALS BELOW   Plan   Treatment Interventions ADL retraining;Functional transfer training;UE strengthening/ROM; Endurance training;Cognitive reorientation;Patient/family training;Equipment evaluation/education; Compensatory technique education; Activityengagement   Goal Expiration Date 02/14/19   Treatment Day 2   OT Frequency 3-5x/wk   Recommendation   OT Discharge Recommendation Short Term Rehab   Barthel Index   Feeding 5   Bathing 0   Grooming Score 0   Dressing Score 5   Bladder Score 5   Bowels Score 10   Toilet Use Score 5   Transfers (Bed/Chair) Score 5   Mobility (Level Surface) Score 0   Stairs Score 0   Barthel Index Score 35       OCCUPATIONAL THERAPY GOALS:    *I FEEDING/GROOMING AFTER SETUP   *MIN A  ADLS AFTER SETUP WITH USE OF ADAPTIVE DEVICES AND 1 HANDED TECHNIQUES   *MIN A  TOILETING AND CLOTHING MANAGEMENT   *MIN A FUNCTIONAL MOB AND TRANSFERS TO ALL SURFACES WITH FAIR TO FAIR+ BALANCE/SAFETY FOR PARTICIPATION IN DYNAMIC ADLS   *DEMONSTRATE FAIR+ CARRYOVER WITH SAFE USE OF AD, NWB LUE AND USE OF 1 HANDED TECHNIQUES DURING FUNCTIONAL TASKS  *ASSESS DME NEEDS  *INCREASE ACTIVITY TOLERANCE TO 30-35 MIN FOR PARTICIPATION IN ADLS AND ENJOYABLE ACTIVITIES     *PT TO PARTICIPATE IN ONGOING FUNCTIONAL COGNITIVE ASSESSMENT WITH GOOD ATTENTION/PARTICIPATION TO ASSIST WITH SAFE D/C RECOMMENDATIONS     John Vasquez, OT

## 2019-01-31 NOTE — NURSING NOTE
Notified Romeo Milligan with trauma that pts BP still low at 90/54 and a HR of 67  Pt is currently asymptomatic  Per Romeo Milligan he will be up to see her  Will continue to monitor

## 2019-01-31 NOTE — PROGRESS NOTES
Progress Note - [facility] Follow Up  Jurline Duty 80 y o  female MRN: [id number]  Unit/Bed#: [facility] 615-01 Encounter: [id number]      Assessment:   Principal Problem:    Multiple rib fractures  Active Problems:    Paroxysmal atrial fibrillation (HCC)    Benign hypertension with CKD (chronic kidney disease) stage III (HCC)    Chronic diastolic congestive heart failure (HCC)    Closed left scapular fracture    Acute kidney injury ([address] )    Acute respiratory insufficiency    Acute pain due to trauma    Chronic kidney disease, stage III (moderate) ([facility])    Chronic respiratory acidosis    Pericardial effusion    Pertinent labs and anticoagulants reviewed  Epidural catheter removed and catheter tip intact  Site of epidural clean, dry without erythema or pus  May restart Eliquis 6 hours after epidural removal per guidelines  APS sign off  Thank you for the Consult  Please call  / [phone] ( [facility] [phone]) with any further questions    Physical Exam   Constitutional: She is oriented to person, place, and time  No distress  Pulmonary/Chest: Effort normal  No respiratory distress  Neurological: She is alert and oriented to person, place, and time  Skin: She is not diaphoretic

## 2019-01-31 NOTE — PROGRESS NOTES
Progress Note - Presley Singh 9/29/1930, 80 y o  female MRN: [id number]    Unit/Bed#: [facility] 422-76 Encounter: [id number]    Primary Care Provider: Neva Schwartz MD   Date and time admitted to hospital: 1/23/2019 11:46 AM        * Multiple rib fractures   Assessment & Plan    - Left 3, 4, 5, 9, 10 rib fractures  - Rib fracture protocol  - Pain control: EDC placed on 1/26, patient's pain slightly improved  Appreciate APS input  Continue scheduled tylenol and lidoderm patches, PO oxycodone 2 5 mg q4h prn  Plan for epidural catheter removal today by acute Pain service  Today is day 5    - Pulmonary toilet and encourage IS  - PT/OT and OOB, placement pending at [facility] when medically stable       Closed left scapular fracture   Assessment & Plan    -non operative management, nonweightbearing left upper extremity in sling  -follow-up with orthopedics as an outpatient, appreciate recommendations     Paroxysmal atrial fibrillation (HCC)   Assessment & Plan    -Hx of afib, rate controlled on home regimen, will continue  -home Eliquis is on hold while EDC in place, will resume once removed     Benign hypertension with CKD (chronic kidney disease) stage III ([facility] [address] )   Assessment & Plan    -back on home meds-Imdur, amlodipine, Coreg  Holding ACE-I in setting of DESMOND on CKD  -discontinue prazosin as recommended by Nephrology when blood pressures are more stable     Chronic diastolic congestive heart failure ([facility] [address] )   Assessment & Plan    - back on home coreg, diuretic held at this time per Nephrology recs  Resume diuretics at discretion of Nephrology  IV fluids have been discontinued    -monitor I/Os closely  -no signs of fluid overload at this time, O2 sats stable on RA     Acute kidney injury [facility])   Assessment & Plan    -Nephrology following patient (acute on chronic condition), less likely glomerular as UA with improvement in proteinuria/hematuria, hold ACE-I, diuretics and K supplementation, hold voltaren gel   -continue to monitor kidney function and electrolytes, f/u Nephro recs, avoid nephrotoxic agents       Pericardial effusion   Assessment & Plan    Moderate in size seen on CT scan  - will check ECHO due to labile BPs over the last 24h to r/o tamponade physiology     Acute pain due to trauma   Assessment & Plan    -epidural catheter placed on 1/26, appreciate APS recs  Plan to remove epidural catheter today  Will resume home Eliquis once cleared by acute Pain Service   -rib protocol and po narcotics, continue to encourage use as patient complains of pain with associated splinting/poor inspiratory effort and sedentary state    -continue bowel regimen, patient is now having BMs     Acute respiratory insufficiency   Assessment & Plan    -multifactorial: secondary to rib fractures/chest wall trauma, underlying COPD/CHF/CKD  -continue epidural catheter, analgesics  -IS, pulmonary toilet, continuous pulse ox  -this is improving overall  Saturating high 90s on 2 L nasal cannula  -will need OP f/u with Pulmonology at discharge for further management of chronic hypercarbia/pulmonary function tests  -PT/OT, OOB to chair, wean oxygen as able         Chronic respiratory acidosis   Assessment & Plan    Secondary to underlying COPD  Consult pulmonology prior to d/c to long term follow up       Prophylaxis:  SCDs, subcutaneous heparin  Disposition:  Continue med surge status  Discharge tumor [address] when medically stable  Bedside nurse rounds completed with nurse Padma Wong  Patient aware plan of care for the day  Chief Complaint:  I am doing better today    Subjective:  Patient states that her pain is controlled  She just states she is unable to move the arm on the left due to her broken scapula and ribs  She slept well last night  And has no complaints    Objective:   This morning    Meds/Allergies     Current Facility-Administered Medications:     acetaminophen (TYLENOL) tablet 975 mg, 975 mg, Oral, Q8H [address], Luiz Hennessy MD, 975 mg at 01/31/19 4364    amiodarone tablet 200 mg, 200 mg, Oral, Daily, Luiz Hennessy MD, 200 mg at 01/31/19 0858    amLODIPine (NORVASC) tablet 5 mg, 5 mg, Oral, Q12H [address], Riaz Mackey MD, 5 mg at 01/31/19 0859    bisacodyl (DULCOLAX) rectal suppository 10 mg, 10 mg, Rectal, Daily PRN, Yadi Andersen PA-C    calcium carbonate (OYSTER SHELL,OSCAL) 500 mg tablet 1 tablet, 1 tablet, Oral, Daily With Breakfast, Luiz Hennessy MD, 1 tablet at 01/31/19 0858    carvedilol (COREG) tablet 3 125 mg, 3 125 mg, Oral, BID With Meals, Manjula K Mini, DO, 3 125 mg at 01/31/19 0858    docusate sodium (COLACE) capsule 100 mg, 100 mg, Oral, BID, Mariya Bass MD, 100 mg at 01/31/19 9956    econazole nitrate 1 % cream, , Topical, BID, Luiz Hennessy MD, 1 application at 92/02/38 0859    fluticasone (FLONASE) 50 mcg/act nasal spray 1 spray, 1 spray, Nasal, BID, Luiz Hennessy MD, 1 spray at 01/31/19 0859    heparin (porcine) subcutaneous injection 5,000 Units, 5,000 Units, Subcutaneous, Q8H [address], Jaxson Parr PA-C, 5,000 Units at 01/31/19 0517    HYDROmorphone (DILAUDID) injection 0 2 mg, 0 2 mg, Intravenous, Q4H PRN, Jaxson Parr PA-C    isosorbide mononitrate (IMDUR) 24 hr tablet 60 mg, 60 mg, Oral, Daily, Manjula K Mini, DO, 60 mg at 01/31/19 0857    lidocaine (LIDODERM) 5 % patch 1 patch, 1 patch, Topical, Daily, Jaxson Parr PA-C, 1 patch at 01/31/19 0857    nitroglycerin (NITROSTAT) SL tablet 0 4 mg, 0 4 mg, Sublingual, Q5 Min PRN, Luiz Hennessy MD    ondansetron [facility]) injection 4 mg, 4 mg, Intravenous, Q6H PRN, Leah Zazueta MD, 4 mg at 01/26/19 1323    oxyCODONE (ROXICODONE) IR tablet 2 5 mg, 2 5 mg, Oral, Q4H PRN, Jaxson Parr PA-C, 2 5 mg at 01/29/19 2210    polyethylene glycol (MIRALAX) packet 17 g, 17 g, Oral, Daily, Shelbie Farmer PA-C, 17 g at 01/31/19 0856    pravastatin (PRAVACHOL) tablet 40 mg, 40 mg, Oral, Daily, Luiz Hennessy MD, 40 mg at 01/31/19 2015   prazosin (MINIPRESS) capsule 2 mg, 2 mg, Oral, HS, Rick Monteiro MD, 2 mg at 01/30/19 2316    prazosin (MINIPRESS) capsule 2 mg, 2 mg, Oral, QAM, Rick Monteiro MD, 2 mg at 01/31/19 0858    ropivacaine 0 2% PCEA, , Epidural, Continuous, Ankita Theodore MD    [facility]) tablet 8 6 mg, 1 tablet, Oral, HS, Víctor Ruggiero PA-C, 8 6 mg at 01/30/19 2122    Vitals: Blood pressure (!) 133/112, pulse 56, temperature 98 2 °F (36 8 °C), resp  rate 16, height 5' 1" (1 549 m), weight 72 9 kg (160 lb 11 5 oz), SpO2 100 %  Body mass index is 30 37 kg/m²   SpO2: SpO2: 100 %, SpO2 Device: O2 Device: Nasal cannula    ABG: Lab Results   Component Value Date    PHART 7 420 01/28/2019    QVB1DMM 61 5 (HH) 01/28/2019    PO2ART 51 0 (LL) 01/28/2019    KHD3WGM 39 0 (H) 01/28/2019    BEART 12 6 01/28/2019    SOURCE Radial, Right 01/28/2019         Intake/Output Summary (Last 24 hours) at 01/31/19 1000  Last data filed at 01/31/19 0932   Gross per 24 hour   Intake            674 2 ml   Output                0 ml   Net            674 2 ml       Invasive Devices     Peripheral Intravenous Line            Peripheral IV 01/30/19 Left Arm less than 1 day          Epidural Line            Epidural Catheter 01/26/19 4 days          Drain            External Urinary Catheter 3 days                          Nutrition/GI Proph/Bowel Reg:  Renal, 2 g potassium, a 2 g sodium diet    Physical Exam:   GENERAL APPEARANCE:  No acute distress  HEENT:  Normocephalic, atraumatic  CV:  Regular rate and rhythm, no murmurs gallops or rubs  LUNGS:  Clear to auscultation bilaterally; +pulling 250 mL on her incentive spirometer  ABD:  Soft, nontender, nondistended  EXT:  Moving all extremities equally with the exception of the left upper extremity which is in a sling, neurovascularly intact throughout  NEURO:  GCS 15, nonfocal exam  SKIN:  Pink, warm, dry      Lab Results:   BMP/CMP:   Lab Results   Component Value Date    SODIUM 143 01/31/2019    K 4 5 01/31/2019     01/31/2019    CO2 36 (H) 01/31/2019    BUN 48 (H) 01/31/2019    CREATININE 1 05 01/31/2019    CALCIUM 8 2 (L) 01/31/2019    EGFR 48 01/31/2019    and CBC: No results found for: WBC, HGB, HCT, MCV, PLT, ADJUSTEDWBC, MCH, MCHC, RDW, MPV, NRBC  Imaging/EKG Studies: Results: I have personally reviewed pertinent reports      Other Studies:  No new  VTE Prophylaxis:  SCDs, subcutaneous heparin

## 2019-01-31 NOTE — ASSESSMENT & PLAN NOTE
- back on home coreg, diuretic held at this time per Nephrology recs  Resume diuretics at discretion of Nephrology  IV fluids have been discontinued    -monitor I/Os closely  -no signs of fluid overload at this time, O2 sats stable on RA

## 2019-01-31 NOTE — PLAN OF CARE
Problem: PHYSICAL THERAPY ADULT  Goal: Performs mobility at highest level of function for planned discharge setting  See evaluation for individualized goals  Treatment/Interventions: Functional transfer training, LE strengthening/ROM, Therapeutic exercise, Endurance training, Bed mobility, Gait training, Spoke to nursing, OT          See flowsheet documentation for full assessment, interventions and recommendations  Outcome: Progressing  Prognosis: Fair  Problem List: Decreased strength, Decreased endurance, Decreased range of motion, Impaired balance, Decreased coordination, Decreased mobility, Pain, Orthopedic restrictions  Assessment: Pt is an 81 yo female seen for physical therapy re-evaluation s/p transfer to ICU for SOB/hypoxia, [facility] now [facility]  Pt is supine at start of session and agreeable to therapy  Pt transferred supine <> sit with maxA x2  Pt sat EOB for 10 minutes with Elly for balance  Pt transferred sit <> stand with maxA x2 and hand held assist  Pt took x5 bedside steps with maxA x2 and hand held assist, required constant cues for sequencing  Pt is fearful of falling throughout session, requiring encouragement for PT throughout  Pt remained supine with bed in chair position and all needs within reach  PT to recommend inpt rehab for deficits in strength, endurance, functional mobility, and balance  Barriers to Discharge: Decreased caregiver support     Recommendation: Post acute IP rehab     PT - OK to Discharge: Yes (To rehab when medically stable)    See flowsheet documentation for full assessment         Comments: Piyush Camarillo, PT, DPT

## 2019-01-31 NOTE — ADDENDUM NOTE
Addendum  created 01/31/19 1215 by Cory Lloyd MD    Anesthesia Intra LDAs edited, LDA properties accepted, Order list changed, Sign clinical note

## 2019-01-31 NOTE — PROGRESS NOTES
Progress Note - Nephrology   Zee Mcclain 80 y o  female MRN: [id number]  Unit/Bed#: [facility] 393-73 Encounter: [id number]      Assessment / Plan:    1  CKD stage 3/4 with fluctuating creatinine ranging between 0 84 to 1 39 dating back to 2015  · Elevated creatinine likely on the basis of advanced age, hypertension, Ace inhibition, fluctuating volume status in the setting of chronic diuretics   Her urinalysis in July of 2018 revealed 3+ heme and 1+ protein and therefore an underlying glomerular disease needs to be considered, however, repeat urinalysis at this time reveals no hematuria and no proteinuria making a glomerular disease unlikely as a cause of her elevated creatinine  Her underlying severe aortic regurgitation may be contributory to her renal dysfunction as well  · The creatinine has improved to her baseline, after intravenous fluids  · Continue to withhold diuretics  2  Hyperkalemia  · Secondary to Ace inhibition, potassium supplementation and volume depletion culminating in decreased distal sodium delivery  · Continue to hold Ace inhibition, potassium supplementation    Potassium level has improved and is now normal   · Would continue to hold Voltaren gel as some may be absorbed the nonsteroidal effect can lead to hyperkalemia  Long term, would try to avoid it if pain could be controlled without it  · Cortisol level is acceptable-checked because she is on chronic Flonase and adrenal insufficiency can present with hyperkalemia  · Would probably hold ACE-inhibitor given her advanced age  If needed from a cardiac standpoint, would use angiotensin receptor blocker as this class of medications is less apt to increased potassium levels  Will defer to Cardiology for future management of this  3  CO2 retention  · Likely chronic in nature given her chronically elevated bicarbonate level   Also as evidence by the fact that her pH was near normal despite a pCO2 [place] of 70   If this were an acute rise in her CO2, her pH would be much lower as she did not have time to compensate  · Some component of metabolic alkalosis due to diuresis, may be contributory as well but the major issue is elevated CO2   Bicarbonate level has improved, as metabolic alkalosis improved by withholding diuretics and infusing intravenous fluids  · The etiology of her elevated CO2 is uncertain and she likely should have pulmonary evaluation/PFTs to decide on course of therapy  Discussed with the trauma team today  4  High normal sodium level  · This has improved today even with discontinuation of hypotonic fluids yesterday  5    Hypertension  · Aim for systolic blood pressure of 130 to 150 given her advanced age and acute kidney injury  · Norvasc dose split in an effort to avoid relative hypotension-continue current hold parameter on amlodipine  · Some of blood pressure increases related to pain  · Once blood pressure is less labile, can decrease Minipress dosing and try to discontinue if able  · Blood pressure lability seems to have increased  Echocardiogram ordered as she did have a pericardial effusion on CT scan        Subjective: The patient was seen examined  She denied any shortness of breath, chest pain or pressure, abdominal pain, vomiting or diarrhea  Her appetite is diminished but slowly improving somewhat  Objective:     Vitals: Blood pressure (!) 133/112, pulse 56, temperature 98 2 °F (36 8 °C), resp  rate 16, height 5' 1" (1 549 m), weight 72 9 kg (160 lb 11 5 oz), SpO2 100 %  ,Body mass index is 30 37 kg/m²  Temp (24hrs), Av 9 °F (36 6 °C), Min:97 3 °F (36 3 °C), Max:98 2 °F (36 8 °C)      Weight (last 2 days)     Date/Time   Weight    19  72 9 (160 72)    19 1004  66 4 (146 39)                     Intake/Output Summary (Last 24 hours) at 19 1130  Last data filed at 19 0932   Gross per 24 hour   Intake            674 2 ml   Output                0 ml   Net            674 2 ml     I/O last 24 hours: In: 854 2 [P O :350; I V :504 2]  Out: -         Physical Exam    Physical Exam   Constitutional: No distress  Neck: No JVD present  Cardiovascular: Normal heart sounds  Exam reveals no gallop and no friction rub  Pulmonary/Chest: Effort normal  No respiratory distress  She has no wheezes  She has no rales  Decreased breath sounds at bases   Abdominal: Soft  Bowel sounds are normal  She exhibits no distension  There is no tenderness  There is no rebound and no guarding  Musculoskeletal: She exhibits no edema  Neurological: She is alert  Skin: She is not diaphoretic  Vitals reviewed                Invasive Devices     Peripheral Intravenous Line            Peripheral IV 01/30/19 Left Arm less than 1 day          Epidural Line            Epidural Catheter 01/26/19 5 days          Drain            External Urinary Catheter 3 days                Medications:    Scheduled Meds:  Current Facility-Administered Medications:  acetaminophen 975 mg Oral Q8H [facility] Zachery Shaffer MD   amiodarone 200 mg Oral Daily Zachery Shaffer MD   amLODIPine 5 mg Oral Q12H [facility] Renee Infante MD   bisacodyl 10 mg Rectal Daily PRN Jhaona Lucia PA-C   calcium carbonate 1 tablet Oral Daily With Breakfast Zachery Shaffer MD   carvedilol 3 125 mg Oral BID With Meals Manjula Garber DO   docusate sodium 100 mg Oral BID Wilton Musa MD   econazole nitrate  Topical BID Zachery Shaffer MD   fluticasone 1 spray Nasal BID Zachery Shaffer MD   heparin (porcine) 5,000 Units Subcutaneous [facility] Iain Tapia PA-C   HYDROmorphone 0 2 mg Intravenous Q4H PRN Iain Tapia PA-C   isosorbide mononitrate 60 mg Oral Daily Manjula SACHI Morse DO   lidocaine 1 patch Topical Daily Iain Tapia PA-C   nitroglycerin 0 4 mg Sublingual Q5 Min PRN Zachery Shaffer MD   ondansetron 4 mg Intravenous Q6H PRN Shruthi Taylor MD   oxyCODONE 2 5 mg Oral Q4H PRN Iain Tapia PA-C   polyethylene glycol 17 g Oral Daily Phi Barber PA-C   pravastatin 40 mg Oral Daily Tj Whelan MD   prazosin 2 mg Oral HS Leah Wolff MD   prazosin 2 mg Oral QAM Leah Wolff MD   ropivacaine 0 2%  Epidural Continuous Severiano Lugo MD   senna 1 tablet Oral HS Matteo Knapp PA-C       PRN Meds: bisacodyl    HYDROmorphone    nitroglycerin    ondansetron    oxyCODONE    Continuous Infusions:  ropivacaine 0 2%            LAB RESULTS:        Results from last 7 days  Lab Units 01/31/19  0526 01/30/19  0857 01/30/19  0455 01/29/19  0526 01/28/19  1427 01/28/19  0756 01/28/19  0226 01/27/19  2126 01/27/19  1401 01/25/19  1153   WBC Thousand/uL  --  10 90*  --  6 29  --  7 52  --   --  6 28 5 51   HEMOGLOBIN g/dL  --  9 4*  --  9 2*  --  8 2*  --   --  9 0* 8 4*   HEMATOCRIT %  --  33 4*  --  32 4*  --  29 5*  --   --  33 1* 30 3*   PLATELETS Thousands/uL  --  146*  --  140*  --  138*  --   --  156 149   NEUTROS PCT %  --  88*  --  80*  --  86*  --   --  87* 81*   LYMPHS PCT %  --  4*  --  9*  --  4*  --   --  4* 7*   MONOS PCT %  --  6  --  8  --  8  --   --  7 8   EOS PCT %  --  1  --  3  --  2  --   --  2 3   POTASSIUM mmol/L 4 5  --  4 6 4 9 4 9 5 4* 6 2* 6 6* 6 2*  --    CHLORIDE mmol/L 104  --  106 107 104 104 106 103 103  --    CO2 mmol/L 36*  --  35* 33* 36* 36* 36* 38* 37*  --    BUN mg/dL 48*  --  46* 57* 62* 70* 70* 70* 65*  --    CREATININE mg/dL 1 05  --  1 03 1 22 1 45* 1 63* 1 67* 1 75* 1 67*  --    CALCIUM mg/dL 8 2*  --  8 4 9 0 8 6 8 9 8 2* 8 5 9 0  --    ALK PHOS U/L  --   --   --  58  --   --   --   --   --   --    ALT U/L  --   --   --  35  --   --   --   --   --   --    AST U/L  --   --   --  37  --   --   --   --   --   --    MAGNESIUM mg/dL  --   --  2 7* 2 9*  --  2 6  --   --   --   --    PHOSPHORUS mg/dL  --   --   --  3 0  --  3 8  --   --   --   --        CUTURES:  Lab Results   Component Value Date    URINECX 80,000-89,000 cfu/ml  07/17/2018                 PLEASE NOTE:  This encounter was completed utilizing the [employer] Direct Speech Voice Recognition Software  Grammatical errors, random word insertions, pronoun errors and incomplete sentences are occasional consequences of the system due to software limitations, ambient noise and hardware issues  These may be missed by proof reading prior to affixing electronic signature  Any questions or concerns about the content, text or information contained within the body of this dictation should be directly addressed to the physician for clarification  Please do not hesitate to call me directly if you have any any questions or concerns

## 2019-01-31 NOTE — CONSULTS
Pulmonary Consultation   Tyree Meigs 80 y o  female MRN: [id number]  Unit/Bed#: [facility] 615-01 Encounter: [id number]      Reason for consultation:   Chronic respiratory failure    Impressions:   Acute hypoxic/ hypercapnic respiratory failure   Multiple rib fractures  COPD  Chronic diastolic CHF  Acute on chronic KI  Paroxysmal atrial fib    Recommendations:  Maintain O2 sat over 90%  Aggressive pulmonary toilet, with following airway clearance protocol, and use of Flutter valve  Add Xopenex and Atrovent  Neb treatmentsto see if we can help lung functiona nd airway clearance and may be improve O2 requirement  Will make appt with OP Pulmonary     She will probably need O2 after discharge,  For now she is on 3 L NC      History of Present Illness   HPI:  Tyree Meigs is a 80 y o  female who was admitted after a fall with multiple rib Fractures  She has history of COPD and CHF, atrial fib and has been followed by Cardiologist   She does not see Pulmonary physician  No home O2  Non smoker    Review of systems:  12 point review of systems was completed and was otherwise negative except as listed in HPI        Historical Information   Past Medical History:   Diagnosis Date    Acid reflux disease     Anemia     Last assessed: 10/26/16    Aortic valve regurgitation, nonrheumatic     Ascending aortic aneurysm (HCC)     Last assessed: 6/5/13    Atrial fibrillation (HCC)     Hyperlipidemia     Hypertension 2/17/2016    Paroxysmal atrial fibrillation ([address] ) 2/17/2016     Past Surgical History:   Procedure Laterality Date    CARDIOVERSION      CHOLECYSTECTOMY      LAPAROSCOPIC SALPINGOOPHERECTOMY      TONSILLECTOMY       Family History   Problem Relation Age of Onset    No Known Problems Mother     Stomach cancer Father     Heart disease Sister         Cardiac Disorder    Breast cancer Sister     Colon cancer Brother     Cancer Brother        Occupational History: none    Social History: none    Meds/Allergies   Current Facility-Administered Medications   Medication Dose Route Frequency    acetaminophen (TYLENOL) tablet 975 mg  975 mg Oral Q8H [address]    amiodarone tablet 200 mg  200 mg Oral Daily    amLODIPine (NORVASC) tablet 5 mg  5 mg Oral Q12H [address]    bisacodyl (DULCOLAX) rectal suppository 10 mg  10 mg Rectal Daily PRN    calcium carbonate (OYSTER SHELL,OSCAL) 500 mg tablet 1 tablet  1 tablet Oral Daily With Breakfast    carvedilol (COREG) tablet 3 125 mg  3 125 mg Oral BID With Meals    docusate sodium (COLACE) capsule 100 mg  100 mg Oral BID    econazole nitrate 1 % cream   Topical BID    fluticasone (FLONASE) 50 mcg/act nasal spray 1 spray  1 spray Nasal BID    heparin (porcine) subcutaneous injection 5,000 Units  5,000 Units Subcutaneous Q8H [address]    HYDROmorphone (DILAUDID) injection 0 2 mg  0 2 mg Intravenous Q4H PRN    isosorbide mononitrate (IMDUR) 24 hr tablet 60 mg  60 mg Oral Daily    lidocaine (LIDODERM) 5 % patch 1 patch  1 patch Topical Daily    nitroglycerin (NITROSTAT) SL tablet 0 4 mg  0 4 mg Sublingual Q5 Min PRN    ondansetron (ZOFRAN) injection 4 mg  4 mg Intravenous Q6H PRN    oxyCODONE (ROXICODONE) IR tablet 2 5 mg  2 5 mg Oral Q4H PRN    polyethylene glycol (MIRALAX) packet 17 g  17 g Oral Daily    pravastatin (PRAVACHOL) tablet 40 mg  40 mg Oral Daily    prazosin (MINIPRESS) capsule 2 mg  2 mg Oral HS    prazosin (MINIPRESS) capsule 2 mg  2 mg Oral QAM    senna (SENOKOT) tablet 8 6 mg  1 tablet Oral HS     Prescriptions Prior to Admission   Medication    amiodarone 200 mg tablet    amLODIPine (NORVASC) 10 mg tablet    apixaban (ELIQUIS) 5 mg    Calcium Carbonate (CALTRATE 600 PO)    carvedilol (COREG) 3 125 mg tablet    diclofenac sodium (VOLTAREN) 1 %    econazole nitrate 1 % cream    fluticasone (FLONASE) 50 mcg/act nasal spray    isosorbide mononitrate (IMDUR) 60 mg 24 hr tablet    KLOR-CON M20 20 MEQ tablet    metolazone (ZAROXOLYN) 2 5 mg tablet    nitroglycerin (NITROSTAT) 0 4 mg SL tablet    pravastatin (PRAVACHOL) 40 mg tablet    prazosin (MINIPRESS) 2 mg capsule    prazosin (MINIPRESS) 5 mg capsule    ramipril (ALTACE) 10 MG capsule    torsemide (DEMADEX) 20 mg tablet    acetaminophen (TYLENOL) 325 mg tablet     Allergies   Allergen Reactions    Aspirin     Percocet [Oxycodone-Acetaminophen]        Vitals: Blood pressure (!) 108/35, pulse (!) 49, temperature 97 5 °F (36 4 °C), resp  rate 16, height 5' 1" (1 549 m), weight 72 9 kg (160 lb 11 5 oz), SpO2 100 %  , 3L NC, Body mass index is 30 37 kg/m²  Intake/Output Summary (Last 24 hours) at 01/31/19 1337  Last data filed at 01/31/19 0932   Gross per 24 hour   Intake            554 2 ml   Output                0 ml   Net            554 2 ml       Physical Exam  General: Awake alert and oriented x 3, conversant but C/O pain  Shallow breathing because of rib pain  HEENT:  PERRL, Sclera noninjected, nonicteric OU, Nares patent, no nasal flaring, no nasal drainage, Mucous membranes, moist, no oral lesions, normal dentition  NECK: Trachea midline, no accessory muscle use, no stridor, no cervical or supraclavicular adenopathy, JVP not elevated,   CARDIAC: RR  Loud  murmur  PULM: Decrease BS bilateral  rhonchi    ABD: Normoactive bowel sounds, soft nontender, nondistended, no rebound, no rigidity, no guarding    EXT: No cyanosis, no clubbing, no edema,   SKIN:  No rashes, no lesions  NEURO: no focal neurologic deficits, AAOx3,     Labs: I have personally reviewed pertinent lab results  , ABG: No results found for: PHART, MEU4JSB, PO2ART, FBR1HIO, X2RBRWJQ, BEART, SOURCE, BNP: No results found for: BNP, CBC: No results found for: WBC, HGB, HCT, MCV, PLT, ADJUSTEDWBC, MCH, MCHC, RDW, MPV, NRBC, CMP:   Lab Results   Component Value Date    SODIUM 143 01/31/2019    K 4 5 01/31/2019     01/31/2019    CO2 36 (H) 01/31/2019    BUN 48 (H) 01/31/2019    CREATININE 1 05 01/31/2019    CALCIUM 8 2 (L) 01/31/2019    EGFR 48 01/31/2019   , PT/INR: No results found for: PT, INR, Troponin: No results found for: TROPONINI    Results from last 7 days  Lab Units 01/30/19  0857 01/29/19  0526 01/28/19  0756   WBC Thousand/uL 10 90* 6 29 7 52   HEMOGLOBIN g/dL 9 4* 9 2* 8 2*   HEMATOCRIT % 33 4* 32 4* 29 5*   PLATELETS Thousands/uL 146* 140* 138*   NEUTROS PCT % 88* 80* 86*   MONOS PCT % 6 8 8      Results from last 7 days  Lab Units 01/31/19  0526 01/30/19  0455 01/29/19  0526   POTASSIUM mmol/L 4 5 4 6 4 9   CHLORIDE mmol/L 104 106 107   CO2 mmol/L 36* 35* 33*   BUN mg/dL 48* 46* 57*   CREATININE mg/dL 1 05 1 03 1 22   CALCIUM mg/dL 8 2* 8 4 9 0   ALK PHOS U/L  --   --  58   ALT U/L  --   --  35   AST U/L  --   --  37       Results from last 7 days  Lab Units 01/30/19  0455 01/29/19  0526 01/28/19  0756   MAGNESIUM mg/dL 2 7* 2 9* 2 6       Results from last 7 days  Lab Units 01/29/19  0526 01/28/19  0756   PHOSPHORUS mg/dL 3 0 3 8                0  Lab Value Date/Time   TROPONINI <0 02 01/23/2019 1354       Imaging and other studies: I have personally reviewed pertinent reports  and I have personally reviewed pertinent films in PACS    Pulmonary function testing: NA    EKG, Pathology, and Other Studies: I have personally reviewed pertinent reports     and I have personally reviewed pertinent films in PACS  SR    Echo today    Code Status: Level 1 - Full Code    MD Canelo [facility]

## 2019-01-31 NOTE — PLAN OF CARE
Problem: OCCUPATIONAL THERAPY ADULT  Goal: Performs self-care activities at highest level of function for planned discharge setting  See evaluation for individualized goals  Treatment Interventions: ADL retraining, Functional transfer training, UE strengthening/ROM, Endurance training, Cognitive reorientation, Patient/family training, Equipment evaluation/education, Compensatory technique education, Continued evaluation, Energy conservation, Activityengagement  Equipment Recommended: Bedside commode       See flowsheet documentation for full assessment, interventions and recommendations      Limitation: Decreased UE ROM, Decreased UE strength, Decreased Safe judgement during ADL, Decreased endurance, Decreased self-care trans, Decreased high-level ADLs, Non-func L UE  Prognosis: Good, Fair  Assessment: 89YO FEMALE SEEN FOR OT REEVAL S/P TRANSFER TO ICU 2* SOB/HYPOXIA - PT NOW BACK ON P6 - ADMITTED 1/23 S/P FALL WITH MULTIPLE RIB FX AND L SCAPULAR FX (NWB) PT PRESENTS AWAKE, ALERT AND GENERALLY ORIENTED - ABLE TO FOLLOW ALL SIMPLE COMMANDS WITH INCREASED TIME- REQUIRES SETUP FOR SELF FEEDING AND LIGHT GROOMING TASKS - MOD A UB ADLS AND MAX A LB ADLS, MAX A FOR TOILETING - MOD A X 2 FOR BED MOBILITY AND MAX A X 2 SIT TO STAND AND TO TAKE 3-4 LATERAL SIDE STEPS TOWARDS HOB - PT ANXIOUS RE: MOBILITY AND FEARFUL OF FALLING - CONTINUE TO RECOMMEND INPT REHAB PRIOR TO RETURN TO INDEPENDENT LIVING APT - OT TO CONTINUE TO FOLLOW 3-5X/WK TO ADDRESS THE FOLLOWING GOALS:      OT Discharge Recommendation: Short Term Rehab  OT - OK to Discharge: Yes (when medically stable)

## 2019-01-31 NOTE — ASSESSMENT & PLAN NOTE
Moderate in size seen on CT scan  -patient has not been consistently hypotensive with signs of tamponade, plan is to follow up with outpatient echocardiogram

## 2019-01-31 NOTE — ASSESSMENT & PLAN NOTE
- Left 3, 4, 5, 9, 10 rib fractures  - Rib fracture protocol  - Pain control: EDC placed on 1/26, patient's pain slightly improved  Appreciate APS input  Continue scheduled tylenol and lidoderm patches, PO oxycodone 2 5 mg q4h prn  Plan for epidural catheter removal today by acute Pain service    Today is day 5    - Pulmonary toilet and encourage IS  - PT/OT and OOB, placement pending at [facility] when medically stable

## 2019-01-31 NOTE — ASSESSMENT & PLAN NOTE
-epidural catheter placed on 1/26, appreciate APS recs  Plan to remove epidural catheter today    Will resume home Eliquis once cleared by acute Pain Service   -rib protocol and po narcotics, continue to encourage use as patient complains of pain with associated splinting/poor inspiratory effort and sedentary state    -continue bowel regimen, patient is now having BMs

## 2019-01-31 NOTE — UTILIZATION REVIEW
Continued Stay Review    Date: 1/31    Vital Signs: BP (!) 108/35   Pulse (!) 49   Temp 97 5 °F (36 4 °C)   Resp 16   Ht 5' 1" (1 549 m)   Wt 72 9 kg (160 lb 11 5 oz)   SpO2 100%   BMI 30 37 kg/m²      Assessment/Plan:   Multiple rib fractures    - Left 3, 4, 5, 9, 10 rib fractures  - Rib fracture protocol  - Pain control: EDC placed on 1/26, patient's pain slightly improved  Appreciate APS input  Continue scheduled tylenol and lidoderm patches, PO oxycodone 2 5 mg q4h prn  Plan for epidural catheter removal today by acute Pain service  Today is day 5    - Pulmonary toilet and encourage IS      Pericardial effusion    Moderate in size seen on CT scan  - will check ECHO due to labile BPs over the last 24h to r/o tamponade physiology    Acute pain due to trauma    -epidural catheter placed on 1/26, appreciate APS recs  Plan to remove epidural catheter today  Will resume home Eliquis once cleared by acute Pain Service   -rib protocol and po narcotics, continue to encourage use as patient complains of pain with associated splinting/poor inspiratory effort and sedentary state    -continue bowel regimen, patient is now having BMs    Acute respiratory insufficiency    -multifactorial: secondary to rib fractures/chest wall trauma, underlying COPD/CHF/CKD  -continue epidural catheter, analgesics  -IS, pulmonary toilet, continuous pulse ox  -this is improving overall  Saturating high 90s on 2 L nasal cannula    -will need OP f/u with Pulmonology at discharge for further management of chronic hypercarbia/pulmonary function tests  -PT/OT, OOB to chair, wean oxygen as able     Medications:   Scheduled Meds:   Current Facility-Administered Medications:  acetaminophen 975 mg Oral Q8H [address]   amiodarone 200 mg Oral Daily   amLODIPine 5 mg Oral Q12H [address]   calcium carbonate 1 tablet Oral Daily With Breakfast   carvedilol 3 125 mg Oral BID With Meals   docusate sodium 100 mg Oral BID   econazole nitrate  Topical BID fluticasone 1 spray Nasal BID   heparin (porcine) 5,000 Units Subcutaneous Q8H [facility]   isosorbide mononitrate 60 mg Oral Daily   lidocaine 1 patch Topical Daily   polyethylene glycol 17 g Oral Daily   pravastatin 40 mg Oral Daily   prazosin 2 mg Oral HS   prazosin 2 mg Oral QAM   senna 1 tablet Oral HS     Continuous Infusions:    PRN Meds: bisacodyl    HYDROmorphone    nitroglycerin    ondansetron    oxyCODONE     Pertinent Labs/Diagnostic Results:   No new    Age/Sex: 80 y o  female     Discharge Plan: [facility] when medically stable

## 2019-02-01 PROBLEM — N18.30 CHRONIC KIDNEY DISEASE, STAGE III (MODERATE) (HCC): Status: ACTIVE | Noted: 2019-02-01

## 2019-02-01 LAB
ANION GAP SERPL CALCULATED.3IONS-SCNC: 2 MMOL/L (ref 4–13)
BUN SERPL-MCNC: 61 MG/DL (ref 5–25)
CALCIUM SERPL-MCNC: 8.5 MG/DL (ref 8.3–10.1)
CHLORIDE SERPL-SCNC: 102 MMOL/L (ref 100–108)
CO2 SERPL-SCNC: 36 MMOL/L (ref 21–32)
CREAT SERPL-MCNC: 1.42 MG/DL (ref 0.6–1.3)
GFR SERPL CREATININE-BSD FRML MDRD: 33 ML/MIN/1.73SQ M
GLUCOSE SERPL-MCNC: 99 MG/DL (ref 65–140)
PHOSPHATE SERPL-MCNC: 3.7 MG/DL (ref 2.3–4.1)
POTASSIUM SERPL-SCNC: 4.5 MMOL/L (ref 3.5–5.3)
SODIUM SERPL-SCNC: 140 MMOL/L (ref 136–145)

## 2019-02-01 PROCEDURE — 99232 SBSQ HOSP IP/OBS MODERATE 35: CPT | Performed by: INTERNAL MEDICINE

## 2019-02-01 PROCEDURE — 94760 N-INVAS EAR/PLS OXIMETRY 1: CPT

## 2019-02-01 PROCEDURE — 84100 ASSAY OF PHOSPHORUS: CPT | Performed by: INTERNAL MEDICINE

## 2019-02-01 PROCEDURE — 94668 MNPJ CHEST WALL SBSQ: CPT

## 2019-02-01 PROCEDURE — 80048 BASIC METABOLIC PNL TOTAL CA: CPT | Performed by: INTERNAL MEDICINE

## 2019-02-01 PROCEDURE — 94640 AIRWAY INHALATION TREATMENT: CPT

## 2019-02-01 RX ORDER — SODIUM CHLORIDE 450 MG/100ML
40 INJECTION, SOLUTION INTRAVENOUS CONTINUOUS
Status: DISCONTINUED | OUTPATIENT
Start: 2019-02-01 | End: 2019-02-02 | Stop reason: HOSPADM

## 2019-02-01 RX ADMIN — IPRATROPIUM BROMIDE 0.5 MG: 0.5 SOLUTION RESPIRATORY (INHALATION) at 01:24

## 2019-02-01 RX ADMIN — FLUTICASONE PROPIONATE 1 SPRAY: 50 SPRAY, METERED NASAL at 17:51

## 2019-02-01 RX ADMIN — DOCUSATE SODIUM 100 MG: 100 CAPSULE, LIQUID FILLED ORAL at 17:35

## 2019-02-01 RX ADMIN — PRAZOSIN HYDROCHLORIDE 2 MG: 2 CAPSULE ORAL at 22:23

## 2019-02-01 RX ADMIN — AMIODARONE HYDROCHLORIDE 200 MG: 200 TABLET ORAL at 08:56

## 2019-02-01 RX ADMIN — ISOSORBIDE MONONITRATE 60 MG: 60 TABLET, EXTENDED RELEASE ORAL at 08:56

## 2019-02-01 RX ADMIN — STANDARDIZED SENNA CONCENTRATE 8.6 MG: 8.6 TABLET ORAL at 22:17

## 2019-02-01 RX ADMIN — ECONAZOLE NITRATE: 10 CREAM TOPICAL at 17:53

## 2019-02-01 RX ADMIN — LEVALBUTEROL 1.25 MG: 1.25 SOLUTION, CONCENTRATE RESPIRATORY (INHALATION) at 01:24

## 2019-02-01 RX ADMIN — LEVALBUTEROL 1.25 MG: 1.25 SOLUTION, CONCENTRATE RESPIRATORY (INHALATION) at 19:09

## 2019-02-01 RX ADMIN — IPRATROPIUM BROMIDE 0.5 MG: 0.5 SOLUTION RESPIRATORY (INHALATION) at 19:09

## 2019-02-01 RX ADMIN — IPRATROPIUM BROMIDE 0.5 MG: 0.5 SOLUTION RESPIRATORY (INHALATION) at 07:31

## 2019-02-01 RX ADMIN — IPRATROPIUM BROMIDE 0.5 MG: 0.5 SOLUTION RESPIRATORY (INHALATION) at 13:50

## 2019-02-01 RX ADMIN — APIXABAN 5 MG: 5 TABLET, FILM COATED ORAL at 13:10

## 2019-02-01 RX ADMIN — ACETAMINOPHEN 975 MG: 325 TABLET ORAL at 05:59

## 2019-02-01 RX ADMIN — LEVALBUTEROL 1.25 MG: 1.25 SOLUTION, CONCENTRATE RESPIRATORY (INHALATION) at 07:31

## 2019-02-01 RX ADMIN — FLUTICASONE PROPIONATE 1 SPRAY: 50 SPRAY, METERED NASAL at 09:09

## 2019-02-01 RX ADMIN — ACETAMINOPHEN 975 MG: 325 TABLET ORAL at 13:10

## 2019-02-01 RX ADMIN — APIXABAN 5 MG: 5 TABLET, FILM COATED ORAL at 17:35

## 2019-02-01 RX ADMIN — HEPARIN SODIUM 5000 UNITS: 5000 INJECTION INTRAVENOUS; SUBCUTANEOUS at 05:59

## 2019-02-01 RX ADMIN — POLYETHYLENE GLYCOL 3350 17 G: 17 POWDER, FOR SOLUTION ORAL at 08:55

## 2019-02-01 RX ADMIN — DOCUSATE SODIUM 100 MG: 100 CAPSULE, LIQUID FILLED ORAL at 08:56

## 2019-02-01 RX ADMIN — CARVEDILOL 3.12 MG: 3.12 TABLET, FILM COATED ORAL at 08:56

## 2019-02-01 RX ADMIN — LIDOCAINE 1 PATCH: 50 PATCH CUTANEOUS at 08:55

## 2019-02-01 RX ADMIN — PRAVASTATIN SODIUM 40 MG: 40 TABLET ORAL at 08:55

## 2019-02-01 RX ADMIN — Medication 1 TABLET: at 08:56

## 2019-02-01 RX ADMIN — ACETAMINOPHEN 975 MG: 325 TABLET ORAL at 22:17

## 2019-02-01 RX ADMIN — ECONAZOLE NITRATE: 10 CREAM TOPICAL at 09:09

## 2019-02-01 RX ADMIN — AMLODIPINE BESYLATE 5 MG: 5 TABLET ORAL at 22:24

## 2019-02-01 RX ADMIN — LEVALBUTEROL 1.25 MG: 1.25 SOLUTION, CONCENTRATE RESPIRATORY (INHALATION) at 13:50

## 2019-02-01 RX ADMIN — SODIUM CHLORIDE 40 ML/HR: 0.45 INJECTION, SOLUTION INTRAVENOUS at 08:56

## 2019-02-01 RX ADMIN — CARVEDILOL 3.12 MG: 3.12 TABLET, FILM COATED ORAL at 16:37

## 2019-02-01 NOTE — ASSESSMENT & PLAN NOTE
-multifactorial: secondary to rib fractures/chest wall trauma, underlying COPD/CHF/CKD  -continue analgesics  -flutter valve, pulmonary toilet, continuous pulse ox, Xopenex and Atrovent Neb  -on 3L NC, satting 100%; wean NC as tolerated  -Pulm following while IP; will need OP f/u with Pulmonology at discharge for further management of chronic hypercarbia/pulmonary function tests  -PT/OT, OOB to chair

## 2019-02-01 NOTE — PROGRESS NOTES
Progress Note - Nery Carroll 9/29/1930, 80 y o  female MRN: [id number]    Unit/Bed#: [facility] 110-18 Encounter: [id number]    Primary Care Provider: Jazz Desai MD   Date and time admitted to hospital: 1/23/2019 11:46 AM    Acute pain due to trauma   Assessment & Plan    -epidural catheter placed on 1/26, removed 1/31  acute Pain Service signed off  -rib protocol and po narcotics, continue to encourage use as patient complains of pain with associated splinting/poor inspiratory effort and sedentary state    -continue bowel regimen, patient is now having BMs     Acute respiratory insufficiency   Assessment & Plan    -multifactorial: secondary to rib fractures/chest wall trauma, underlying COPD/CHF/CKD  -continue analgesics  -flutter valve, pulmonary toilet, continuous pulse ox, Xopenex and Atrovent Neb  -on 3L NC, satting 100%; wean NC as tolerated  -Pulm following while IP; will need OP f/u with Pulmonology at discharge for further management of chronic hypercarbia/pulmonary function tests  -PT/OT, OOB to chair         Acute kidney injury [facility])   Assessment & Plan    -Nephrology following patient (acute on chronic condition), less likely glomerular as UA with improvement in proteinuria/hematuria, hold ACE-I, diuretics and K supplementation, hold voltaren gel   -continue to monitor kidney function and electrolytes, f/u Nephro recs, avoid nephrotoxic agents  - Cr elevation this a m , restarted on IV fluids per nephro; continue to trend Cr       Closed left scapular fracture   Assessment & Plan    -non operative management, nonweightbearing left upper extremity in sling  -follow-up with orthopedics as an outpatient, appreciate recommendations     Chronic diastolic congestive heart failure ([address] )   Assessment & Plan    - back on home coreg, diuretic held at this time per Nephrology recs     -monitor I/Os closely  -On IVF for DESMOND, monitor for signs on fluid overload       Benign hypertension with CKD (chronic kidney disease) stage III ([facility] [address] )   Assessment & Plan    -back on home meds-Imdur, amlodipine, Coreg  Holding ACE-I in setting of DESMOND on CKD  -discontinue prazosin as recommended by Nephrology when blood pressures are more stable     Paroxysmal atrial fibrillation (HCC)   Assessment & Plan    -Hx of afib, rate controlled on home regimen, will continue  -EDC removed yesterday, resume home eliquis     * Multiple rib fractures   Assessment & Plan    - Left 3, 4, 5, 9, 10 rib fractures  - Rib fracture protocol  - Pain control:  Appreciate APS input  Continue scheduled tylenol and lidoderm patches, PO oxycodone 2 5 mg q4h prn     - Pulmonary toilet and encourage IS  - PT/OT and OOB, placement pending at [facility] when medically stable                 Subjective/Objective   Chief Complaint: "I'm doing a lot better than yesterday"    Subjective: L shoulder/chest pain improved from yesterday  Last BM yesterday  Urinating normally  No SOB  Objective:     Meds/Allergies   Prescriptions Prior to Admission   Medication Sig Dispense Refill Last Dose    amiodarone 200 mg tablet Take 1 tablet (200 mg total) by mouth daily 90 tablet 3 Taking    amLODIPine (NORVASC) 10 mg tablet Take 10 mg by mouth daily       apixaban (ELIQUIS) 5 mg Take 1 tablet (5 mg total) by mouth 2 (two) times a day 180 tablet 1 1/23/2019 at Unknown time    Calcium Carbonate (CALTRATE 600 PO) Take 600 mg by mouth daily     Taking    carvedilol (COREG) 3 125 mg tablet Take 1 tablet (3 125 mg total) by mouth 2 (two) times a day with meals 180 tablet 3 Taking    diclofenac sodium (VOLTAREN) 1 % Apply 2 g topically 4 (four) times a day 100 g 1 Taking    econazole nitrate 1 % cream Apply topically 2 (two) times a day 85 g 1 Taking    fluticasone (FLONASE) 50 mcg/act nasal spray 1 spray into each nostril 2 (two) times a day   Taking    isosorbide mononitrate (IMDUR) 60 mg 24 hr tablet Take 1 tablet (60 mg total) by mouth daily 90 tablet 3 Taking    KLOR-CON M20 20 MEQ tablet Take 1 tablet (20 mEq total) by mouth 2 (two) times a day 180 tablet 0     metolazone (ZAROXOLYN) 2 5 mg tablet Take 2 tablets (5 mg total) by mouth daily Take 2 for the next 3 days and as directed take as needed for increased leg swelling (148lbs) 180 tablet 1     nitroglycerin (NITROSTAT) 0 4 mg SL tablet Place 1 tablet (0 4 mg total) under the tongue every 5 (five) minutes as needed for chest pain 30 tablet 0 Taking    pravastatin (PRAVACHOL) 40 mg tablet Take 1 tablet (40 mg total) by mouth daily 90 tablet 3 Taking    prazosin (MINIPRESS) 2 mg capsule Take 1 capsule (2 mg total) by mouth every morning 90 capsule 3 Taking    prazosin (MINIPRESS) 5 mg capsule Take 1 capsule (5 mg total) by mouth daily at bedtime 90 capsule 3 Taking    ramipril (ALTACE) 10 MG capsule Take 1 capsule (10 mg total) by mouth 2 (two) times a day 180 capsule 2     torsemide (DEMADEX) 20 mg tablet Take 3 tablets (60 mg total) by mouth 2 (two) times a day 540 tablet 1     acetaminophen (TYLENOL) 325 mg tablet Take 650 mg by mouth every 6 (six) hours as needed for mild pain  Taking       Vitals: Blood pressure 128/52, pulse 62, temperature 98 7 °F (37 1 °C), resp  rate 18, height 5' 1" (1 549 m), weight 71 5 kg (157 lb 10 1 oz), SpO2 100 %  Body mass index is 29 78 kg/m²   SpO2: SpO2: 100 %    ABG:   Lab Results   Component Value Date    PHART 7 420 01/28/2019    VJK4ING 61 5 (HH) 01/28/2019    PO2ART 51 0 (LL) 01/28/2019    EPS9MIC 39 0 (H) 01/28/2019    BEART 12 6 01/28/2019    SOURCE Radial, Right 01/28/2019         Intake/Output Summary (Last 24 hours) at 02/01/19 1211  Last data filed at 02/01/19 0601   Gross per 24 hour   Intake           429 25 ml   Output              650 ml   Net          -220 75 ml       Invasive Devices     Peripheral Intravenous Line            Peripheral IV 01/30/19 Left Arm 1 day          Drain            External Urinary Catheter 4 days                Nutrition: Cardio diet  GI Proph: Not indicated  Bowel Reg: sennakot, colace, miralax sched; dulcolax suppos PRN    Physical Exam:   Physical Examination: General appearance - alert, well appearing, and in no distress  Mental status - alert, oriented to person, place, and time, normal mood, behavior, speech, dress, motor activity, and thought processes  Chest - clear to auscultation, no wheezes, rales or rhonchi, symmetric air entry  Heart - S1 and S2 normal, systolic murmur 3/6   Abdomen - soft, nontender, nondistended, no masses or organomegaly  Neurological - alert, oriented, normal speech, no focal findings or movement disorder noted  Musculoskeletal - LUE sling in place; decreased ROM LUE   Extremities - SCDs in place; normal neurovascular exam BUE and BLE    Lab Results:   BMP/CMP:   Lab Results   Component Value Date    SODIUM 140 02/01/2019    K 4 5 02/01/2019     02/01/2019    CO2 36 (H) 02/01/2019    BUN 61 (H) 02/01/2019    CREATININE 1 42 (H) 02/01/2019    CALCIUM 8 5 02/01/2019    EGFR 33 02/01/2019     Imaging/EKG Studies: no new imaging studies  VTE Prophylaxis: Home eliquis resumed today; SCDs

## 2019-02-01 NOTE — PROGRESS NOTES
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 ranging between 0 84 to 1 39 dating back to 2015  · Elevated creatinine likely on the basis of advanced age, hypertension, Ace inhibition, fluctuating volume status in the setting of chronic diuretics   Her urinalysis in July of 2018 revealed 3+ heme and 1+ protein and therefore an underlying glomerular disease needs to be considered, however, repeat urinalysis at this time reveals no hematuria and no proteinuria making a glomerular disease unlikely as a cause of her elevated creatinine  Her underlying severe aortic regurgitation may be contributory to her renal dysfunction as well  · The creatinine had improved to her baseline, after intravenous fluids, and now has increased somewhat over last 24 hours, likely due to volume depletion  · Restart intravenous fluids  · Continue to withhold diuretics  2  Hyperkalemia  · Secondary to Ace inhibition, potassium supplementation and volume depletion culminating in decreased distal sodium delivery  · Continue to hold Ace inhibition, potassium supplementation    Potassium level have improved and is now normal   · Would continue to hold Voltaren gel as some may be absorbed the nonsteroidal effect can lead to hyperkalemia   Long term, would try to avoid it if pain could be controlled without it  · Cortisol level is acceptable-checked because she is on chronic Flonase and adrenal insufficiency can present with hyperkalemia  · Would probably hold ACE-inhibitor given her advanced age  If needed from a cardiac standpoint, would use angiotensin receptor blocker as this class of medications is less apt to increased potassium levels  Will defer to Cardiology for future management of this  3   CO2 retention  · Likely chronic in nature given her chronically elevated bicarbonate level  Carmencita Cole as evidence by the fact that her pH was near normal despite a pCO2 Jordan of 70  If this were an acute rise in her CO2, her pH would be much lower as she did not have time to compensate  · Some component of metabolic alkalosis due to diuresis, may be contributory as well but the major issue is elevated CO2   Bicarbonate level has improved, as metabolic alkalosis improved by withholding diuretics and infusing intravenous fluids  · The etiology of her elevated CO2 is uncertain,pulmonary evaluation noted  4  High normal sodium level  · This has improved and normalized  5    Hypertension  · Aim for systolic blood pressure of 130 to 150 given her advanced age and acute kidney injury  · Norvasc dose split in an effort to avoid relative hypotension-continue current hold parameter on amlodipine  · Some of blood pressure increases related to pain  · Once blood pressure is less labile, can decrease Minipress dosing and try to discontinue if able  · Blood pressure lability seems to have increased  Echocardiogram ordered as she did have a pericardial effusion on CT scan, no hemodynamic compromise from effusion    6  COPD/close left scapular fracture and multiple rib fractures after fall/paroxysmal atrial fibrillation/history of chronic diastolic congestive heart failure    Subjective: The patient was seen examined at 7:30 a m  This morning  She denied any shin S pain or pressure, abdominal pain, vomiting, diarrhea  She still has rib pain  Her appetite is diminished  She denies any peripheral edema or hand edema  She denies any chills or sweats, paroxysmal nocturnal dyspnea orthopnea  Objective:     Vitals: Blood pressure 128/52, pulse 62, temperature 98 7 °F (37 1 °C), resp  rate 18, height 5' 1" (1 549 m), weight 71 5 kg (157 lb 10 1 oz), SpO2 100 %  ,Body mass index is 29 78 kg/m²  Temp (24hrs), Av 8 °F (37 1 °C), Min:98 2 °F (36 8 °C), Max:99 1 °F (37 3 °C)      Weight (last 2 days)     Date/Time   Weight 02/01/19 0557  71 5 (157 63)    01/30/19 2039  72 9 (160 72)    01/30/19 1004  66 4 (146 39)                     Intake/Output Summary (Last 24 hours) at 02/01/19 1400  Last data filed at 02/01/19 0601   Gross per 24 hour   Intake              290 ml   Output              650 ml   Net             -360 ml     I/O last 24 hours: In: 479 3 [P O :460; I V :19 3]  Out: 650 [Urine:650]        Physical Exam    Physical Exam   Constitutional: No distress  A frail-appearing elderly female in no apparent distress   Neck: No JVD present  Cardiovascular: Normal heart sounds  Exam reveals no gallop and no friction rub  Pulmonary/Chest: Effort normal  No respiratory distress  She has no wheezes  She has no rales  Decreased breath sounds at bases   Abdominal: Soft  Bowel sounds are normal  She exhibits no distension  There is no tenderness  There is no rebound and no guarding  Musculoskeletal: She exhibits no edema  Neurological: She is alert  Skin: She is not diaphoretic  Vitals reviewed                Invasive Devices     Peripheral Intravenous Line            Peripheral IV 01/30/19 Left Arm 1 day          Drain            External Urinary Catheter 4 days                Medications:    Scheduled Meds:  Current Facility-Administered Medications:  acetaminophen 975 mg Oral [facility] Maddison Vo MD    amiodarone 200 mg Oral Daily Maddison Vo MD    amLODIPine 5 mg Oral Q12H [facility] Homero Clarke MD    apixaban 5 mg Oral BID Yovana Smith MD    bisacodyl 10 mg Rectal Daily PRN Nuvia Srivastava PA-C    calcium carbonate 1 tablet Oral Daily With Breakfast Maddison Vo MD    carvedilol 3 125 mg Oral BID With Meals Manjula Quach No, DO    docusate sodium 100 mg Oral BID Nikia Garcia MD    econazole nitrate  Topical BID Maddison Vo MD    fluticasone 1 spray Nasal BID Maddison Vo MD    HYDROmorphone 0 2 mg Intravenous Q4H PRN James Darnell PA-C    ipratropium 0 5 mg Nebulization Q6H Jeanette Antonio MD isosorbide mononitrate 60 mg Oral Daily Manjula K DO Mini    levalbuterol 1 25 mg Nebulization Q6H Kathy Engle MD    lidocaine 1 patch Topical Daily Iain Tapia PA-C    nitroglycerin 0 4 mg Sublingual Q5 Min PRN Zachery Shaffer MD    ondansetron 4 mg Intravenous Q6H PRN Shruthi Taylor MD    oxyCODONE 2 5 mg Oral Q4H PRN Iain Tapia PA-C    polyethylene glycol 17 g Oral Daily Phi Barber PA-C    pravastatin 40 mg Oral Daily Zachery Shaffer MD    prazosin 2 mg Oral HS Reene Infante MD    prazosin 2 mg Oral QAM Renee Infante MD    senna 1 tablet Oral HS Erin Naylor PA-C    sodium chloride 40 mL/hr Intravenous Continuous Renee Infante MD Last Rate: 40 mL/hr (02/01/19 0856)       PRN Meds: bisacodyl    HYDROmorphone    nitroglycerin    ondansetron    oxyCODONE    Continuous Infusions:  sodium chloride 40 mL/hr Last Rate: 40 mL/hr (02/01/19 0856)           LAB RESULTS:        Results from last 7 days  Lab Units 02/01/19  0457 01/31/19  0526 01/30/19  0857 01/30/19  0455 01/29/19  0526 01/28/19  1427 01/28/19  0756 01/28/19  0226  01/27/19  1401   WBC Thousand/uL  --   --  10 90*  --  6 29  --  7 52  --   --  6 28   HEMOGLOBIN g/dL  --   --  9 4*  --  9 2*  --  8 2*  --   --  9 0*   HEMATOCRIT %  --   --  33 4*  --  32 4*  --  29 5*  --   --  33 1*   PLATELETS Thousands/uL  --   --  146*  --  140*  --  138*  --   --  156   NEUTROS PCT %  --   --  88*  --  80*  --  86*  --   --  87*   LYMPHS PCT %  --   --  4*  --  9*  --  4*  --   --  4*   MONOS PCT %  --   --  6  --  8  --  8  --   --  7   EOS PCT %  --   --  1  --  3  --  2  --   --  2   POTASSIUM mmol/L 4 5 4 5  --  4 6 4 9 4 9 5 4* 6 2*  < > 6 2*   CHLORIDE mmol/L 102 104  --  106 107 104 104 106  < > 103   CO2 mmol/L 36* 36*  --  35* 33* 36* 36* 36*  < > 37*   BUN mg/dL 61* 48*  --  46* 57* 62* 70* 70*  < > 65*   CREATININE mg/dL 1 42* 1 05  --  1 03 1 22 1 45* 1 63* 1 67*  < > 1 67*   CALCIUM mg/dL 8 5 8 2*  --  8 4 9 0 8 6 8 9 8 2* < > 9 0   ALK PHOS U/L  --   --   --   --  58  --   --   --   --   --    ALT U/L  --   --   --   --  35  --   --   --   --   --    AST U/L  --   --   --   --  37  --   --   --   --   --    MAGNESIUM mg/dL  --   --   --  2 7* 2 9*  --  2 6  --   --   --    PHOSPHORUS mg/dL 3 7  --   --   --  3 0  --  3 8  --   --   --    < > = values in this interval not displayed  CUTURES:  Lab Results   Component Value Date    URINECX 80,000-89,000 cfu/ml  07/17/2018                 PLEASE NOTE:  This encounter was completed utilizing the [employer] Direct Speech Voice Recognition Software  Grammatical errors, random word insertions, pronoun errors and incomplete sentences are occasional consequences of the system due to software limitations, ambient noise and hardware issues  These may be missed by proof reading prior to affixing electronic signature  Any questions or concerns about the content, text or information contained within the body of this dictation should be directly addressed to the physician for clarification  Please do not hesitate to call me directly if you have any any questions or concerns

## 2019-02-01 NOTE — PROGRESS NOTES
Progress Note - Pulmonary   Johanna Thurston 80 y o  female MRN: [id number]  Unit/Bed#: [facility] 615-01 Encounter: [id number]      Assessment:  Acute hypoxic/ hypercapnic respiratory failure   Multiple rib fractures  COPD  Chronic diastolic CHF  Acute on chronic KI worsening, ? hypovol  Paroxysmal atrial fib     Severe AI    Plan:  Continue Neb treatments and use Flutter valve to help her clear airway  O2 sat , still on O2 3L NC    Will continue to follow    Subjective:   Trying to cough    Objective:   No change from yesterday      Vitals: Blood pressure 128/52, pulse 62, temperature 98 7 °F (37 1 °C), resp  rate 18, height 5' 1" (1 549 m), weight 71 5 kg (157 lb 10 1 oz), SpO2 100 % , NC 3L, Body mass index is 29 78 kg/m²  Intake/Output Summary (Last 24 hours) at 02/01/19 1117  Last data filed at 02/01/19 0601   Gross per 24 hour   Intake           429 25 ml   Output              650 ml   Net          -220 75 ml         Physical Exam  Gen: Awake, alert, oriented x 3, pain with deep respiration  HEENT: Mucous membranes moist,   NECK: No accessory muscle use, JVP not elevated  Cardiac: RR, murmur  Lungs: Decrease BS  Abdomen: normoactive bowel sounds, benign  Extremities: no cyanosis, no clubbing, no edema    Labs: I have personally reviewed pertinent lab results  , ABG: No results found for: PHART, SFA8WGD, PO2ART, UCF9JHN, N5DPMKZA, BEART, SOURCE, BNP: No results found for: BNP, CBC: No results found for: WBC, HGB, HCT, MCV, PLT, ADJUSTEDWBC, MCH, MCHC, RDW, MPV, NRBC, CMP:   Lab Results   Component Value Date    SODIUM 140 02/01/2019    K 4 5 02/01/2019     02/01/2019    CO2 36 (H) 02/01/2019    BUN 61 (H) 02/01/2019    CREATININE 1 42 (H) 02/01/2019    CALCIUM 8 5 02/01/2019    EGFR 33 02/01/2019   , PT/INR: No results found for: PT, INR, Troponin: No results found for: TROPONINI    Results from last 7 days  Lab Units 01/30/19  0857 01/29/19  0526 01/28/19  0756   WBC Thousand/uL 10 90* 6 29 7 52 HEMOGLOBIN g/dL 9 4* 9 2* 8 2*   HEMATOCRIT % 33 4* 32 4* 29 5*   PLATELETS Thousands/uL 146* 140* 138*   NEUTROS PCT % 88* 80* 86*   MONOS PCT % 6 8 8      Results from last 7 days  Lab Units 02/01/19  0457 01/31/19  0526 01/30/19  0455 01/29/19  0526   POTASSIUM mmol/L 4 5 4 5 4 6 4 9   CHLORIDE mmol/L 102 104 106 107   CO2 mmol/L 36* 36* 35* 33*   BUN mg/dL 61* 48* 46* 57*   CREATININE mg/dL 1 42* 1 05 1 03 1 22   CALCIUM mg/dL 8 5 8 2* 8 4 9 0   ALK PHOS U/L  --   --   --  58   ALT U/L  --   --   --  35   AST U/L  --   --   --  37       Results from last 7 days  Lab Units 01/30/19  0455 01/29/19  0526 01/28/19  0756   MAGNESIUM mg/dL 2 7* 2 9* 2 6       Results from last 7 days  Lab Units 02/01/19  0457 01/29/19  0526 01/28/19  0756   PHOSPHORUS mg/dL 3 7 3 0 3 8                0  Lab Value Date/Time   TROPONINI <0 02 01/23/2019 1354       Imaging and other studies: I have personally reviewed pertinent reports     and I have personally reviewed pertinent films in PACS        Memo Goff MD  [address]

## 2019-02-01 NOTE — RESPIRATORY THERAPY NOTE
respiratory      02/01/19 1350   Respiratory Assessment   Resp Comments Please be aware, pt is too weak to trigger flutter valve  She has been unable to advance past 250ml on the IS over a weeks time with us working with her as well as nursing staff  This is why I attempted EZ- PAP therapy which she is able to perform      Additional Assessments   SpO2 100 %

## 2019-02-01 NOTE — ASSESSMENT & PLAN NOTE
-Hx of afib, rate controlled on home regimen, will continue  -EDC removed yesterday, resume home eliquis

## 2019-02-01 NOTE — ASSESSMENT & PLAN NOTE
-epidural catheter placed on 1/26, removed 1/31   acute Pain Service signed off  -rib protocol and po narcotics, continue to encourage use as patient complains of pain with associated splinting/poor inspiratory effort and sedentary state    -continue bowel regimen, patient is now having BMs

## 2019-02-01 NOTE — ASSESSMENT & PLAN NOTE
- Left 3, 4, 5, 9, 10 rib fractures  - Rib fracture protocol  - Pain control:  Appreciate APS input   Continue scheduled tylenol and lidoderm patches, PO oxycodone 2 5 mg q4h prn     - Pulmonary toilet and encourage IS  - PT/OT and OOB, placement pending at [facility] when medically stable

## 2019-02-01 NOTE — ASSESSMENT & PLAN NOTE
-Nephrology following patient (acute on chronic condition), less likely glomerular as UA with improvement in proteinuria/hematuria, hold ACE-I, diuretics and K supplementation, hold voltaren gel   -continue to monitor kidney function and electrolytes, f/u Nephro recs, avoid nephrotoxic agents  - Cr elevation this a m , restarted on IV fluids per nephro; continue to trend Cr

## 2019-02-01 NOTE — ASSESSMENT & PLAN NOTE
- back on home coreg, diuretic held at this time per Nephrology recs     -monitor I/Os closely  -On IVF for DESMOND, monitor for signs on fluid overload

## 2019-02-02 VITALS
HEART RATE: 64 BPM | HEIGHT: 61 IN | TEMPERATURE: 98.4 F | WEIGHT: 162.92 LBS | RESPIRATION RATE: 20 BRPM | DIASTOLIC BLOOD PRESSURE: 49 MMHG | SYSTOLIC BLOOD PRESSURE: 165 MMHG | BODY MASS INDEX: 30.76 KG/M2 | OXYGEN SATURATION: 98 %

## 2019-02-02 LAB
ANION GAP SERPL CALCULATED.3IONS-SCNC: 4 MMOL/L (ref 4–13)
BUN SERPL-MCNC: 59 MG/DL (ref 5–25)
CALCIUM SERPL-MCNC: 8.5 MG/DL (ref 8.3–10.1)
CHLORIDE SERPL-SCNC: 102 MMOL/L (ref 100–108)
CO2 SERPL-SCNC: 34 MMOL/L (ref 21–32)
CREAT SERPL-MCNC: 1.14 MG/DL (ref 0.6–1.3)
GFR SERPL CREATININE-BSD FRML MDRD: 43 ML/MIN/1.73SQ M
GLUCOSE SERPL-MCNC: 111 MG/DL (ref 65–140)
POTASSIUM SERPL-SCNC: 4.3 MMOL/L (ref 3.5–5.3)
SODIUM SERPL-SCNC: 140 MMOL/L (ref 136–145)

## 2019-02-02 PROCEDURE — 80048 BASIC METABOLIC PNL TOTAL CA: CPT | Performed by: INTERNAL MEDICINE

## 2019-02-02 PROCEDURE — 94640 AIRWAY INHALATION TREATMENT: CPT

## 2019-02-02 PROCEDURE — 99238 HOSP IP/OBS DSCHRG MGMT 30/<: CPT | Performed by: SURGERY

## 2019-02-02 PROCEDURE — 99232 SBSQ HOSP IP/OBS MODERATE 35: CPT | Performed by: NURSE PRACTITIONER

## 2019-02-02 PROCEDURE — 94760 N-INVAS EAR/PLS OXIMETRY 1: CPT

## 2019-02-02 PROCEDURE — 97535 SELF CARE MNGMENT TRAINING: CPT | Performed by: STUDENT IN AN ORGANIZED HEALTH CARE EDUCATION/TRAINING PROGRAM

## 2019-02-02 PROCEDURE — 94668 MNPJ CHEST WALL SBSQ: CPT

## 2019-02-02 RX ORDER — LEVALBUTEROL 1.25 MG/.5ML
1.25 SOLUTION, CONCENTRATE RESPIRATORY (INHALATION)
Qty: 30 VIAL | Refills: 0 | Status: SHIPPED | OUTPATIENT
Start: 2019-02-02 | End: 2019-02-11 | Stop reason: HOSPADM

## 2019-02-02 RX ADMIN — DOCUSATE SODIUM 100 MG: 100 CAPSULE, LIQUID FILLED ORAL at 09:49

## 2019-02-02 RX ADMIN — LEVALBUTEROL 1.25 MG: 1.25 SOLUTION, CONCENTRATE RESPIRATORY (INHALATION) at 00:35

## 2019-02-02 RX ADMIN — APIXABAN 5 MG: 5 TABLET, FILM COATED ORAL at 09:49

## 2019-02-02 RX ADMIN — LIDOCAINE 1 PATCH: 50 PATCH CUTANEOUS at 09:50

## 2019-02-02 RX ADMIN — LEVALBUTEROL 1.25 MG: 1.25 SOLUTION, CONCENTRATE RESPIRATORY (INHALATION) at 08:06

## 2019-02-02 RX ADMIN — ISOSORBIDE MONONITRATE 60 MG: 60 TABLET, EXTENDED RELEASE ORAL at 09:50

## 2019-02-02 RX ADMIN — CARVEDILOL 3.12 MG: 3.12 TABLET, FILM COATED ORAL at 09:49

## 2019-02-02 RX ADMIN — AMLODIPINE BESYLATE 5 MG: 5 TABLET ORAL at 09:49

## 2019-02-02 RX ADMIN — SODIUM CHLORIDE 40 ML/HR: 0.45 INJECTION, SOLUTION INTRAVENOUS at 10:47

## 2019-02-02 RX ADMIN — CARVEDILOL 3.12 MG: 3.12 TABLET, FILM COATED ORAL at 17:06

## 2019-02-02 RX ADMIN — ACETAMINOPHEN 975 MG: 325 TABLET ORAL at 17:06

## 2019-02-02 RX ADMIN — IPRATROPIUM BROMIDE 0.5 MG: 0.5 SOLUTION RESPIRATORY (INHALATION) at 14:28

## 2019-02-02 RX ADMIN — APIXABAN 5 MG: 5 TABLET, FILM COATED ORAL at 17:06

## 2019-02-02 RX ADMIN — PRAVASTATIN SODIUM 40 MG: 40 TABLET ORAL at 09:49

## 2019-02-02 RX ADMIN — POLYETHYLENE GLYCOL 3350 17 G: 17 POWDER, FOR SOLUTION ORAL at 09:49

## 2019-02-02 RX ADMIN — FLUTICASONE PROPIONATE 1 SPRAY: 50 SPRAY, METERED NASAL at 17:13

## 2019-02-02 RX ADMIN — PRAZOSIN HYDROCHLORIDE 2 MG: 2 CAPSULE ORAL at 09:51

## 2019-02-02 RX ADMIN — ACETAMINOPHEN 975 MG: 325 TABLET ORAL at 05:28

## 2019-02-02 RX ADMIN — IPRATROPIUM BROMIDE 0.5 MG: 0.5 SOLUTION RESPIRATORY (INHALATION) at 08:06

## 2019-02-02 RX ADMIN — LEVALBUTEROL 1.25 MG: 1.25 SOLUTION, CONCENTRATE RESPIRATORY (INHALATION) at 14:28

## 2019-02-02 RX ADMIN — AMIODARONE HYDROCHLORIDE 200 MG: 200 TABLET ORAL at 09:49

## 2019-02-02 RX ADMIN — Medication 1 TABLET: at 09:49

## 2019-02-02 RX ADMIN — IPRATROPIUM BROMIDE 0.5 MG: 0.5 SOLUTION RESPIRATORY (INHALATION) at 00:35

## 2019-02-02 NOTE — SOCIAL WORK
Pt cleared for d/c  CM confirmed with [facility]  [facility] will transport at 1700   Pt's nurse made aware as well as pt's dtr

## 2019-02-02 NOTE — ASSESSMENT & PLAN NOTE
- Left 3, 4, 5, 9, 10 rib fractures  - Rib fracture protocol  - Pain control:  Appreciate APS input  Continue scheduled tylenol and lidoderm patches, PO oxycodone 2 5 mg q4h prn (none last 24h)      - Pulmonary toilet and encourage IS  - PT/OT and OOB, placement pending at [facility] when medically stable

## 2019-02-02 NOTE — PROGRESS NOTES
Progress Note - Pulmonary   Lorrane Brian 80 y o  female MRN: [id number]  Unit/Bed#: [facility] 615-01 Encounter: [id number]      Assessment/Plan:  1  Acute on suspected chronic hypoxic and hypercapnic respiratory failure   1  Titrate oxygen maintain SpO2 greater than or equal to 88%  2  Aggressive pulmonary toilet:  Incentive spirometry q 1 hour, flutter valve, increasing time out of bed with increasing activity as tolerated  3  Chest x-ray demonstrated left-sided atelectasis-importance of pulmonary toilet emphasized  4  Elevated CO2 level on ABG, with elevate bicarb on chemistry requiring BiPAP therapy initially, may benefit from BiPAP at discharge   5  Continue nebulizer treatments per order  2  Chronic diastolic heart failure with abnormal chest xray with left sided effusion and atelectasis and CT with moderate pericardial effusion and PAF  1  Plan for outpatient cardiology follow up with repeat Echocardiogram  2  Diuretics on hold due to DESMOND per nephrology  3  Strict I&O  4  Daily weights  5  IV fluids  6  Medical management per cardiology and internal medicine  3  DESMOND superimposed on CKD 3/4  1  Diuretics on hold  2  Gentle IV hydration  3  Daily BMP  4  S/p mechanical fall resulting in left scapular fracture and multiple left rib fractures  1  Management per orthopedics and trauma        Subjective:     Oly Malone was seen lying in bed upon entering the room  Denies acute overnight events  Reports shortness of breath and a mild nonproductive cough  Denies:  Fevers, chills, night sweats, nausea vomiting diarrhea headache dizziness, bronchospasm, chest pain or hemoptysis    Objective:         Vitals: Blood pressure (!) 165/49, pulse 64, temperature 98 4 °F (36 9 °C), resp  rate 20, height 5' 1" (1 549 m), weight 73 9 kg (162 lb 14 7 oz), SpO2 98 %  ,3LNC , Body mass index is 30 78 kg/m²        Intake/Output Summary (Last 24 hours) at 02/02/19 1449  Last data filed at 02/02/19 1448   Gross per 24 hour   Intake 670 ml   Output             1905 ml   Net            -1235 ml         Physical Exam  Gen: Awake, alert, oriented x 3, no acute distress  HEENT: Mucous membranes moist, no oral lesions, no thrush, oxygen via NC  NECK: No accessory muscle use, JVP not elevated  Cardiac: Regular, single S1, single S2, no murmurs, no rubs, no gallops  Lungs: bibasilar rales, no rhonchi or wheezes  Abdomen: normoactive bowel sounds, soft nontender, nondistended, no rebound or rigidity, no guarding  Extremities: no cyanosis, no clubbing, no edema    Labs: I have personally reviewed pertinent lab results  , CBC: No results found for: WBC, HGB, HCT, MCV, PLT, ADJUSTEDWBC, MCH, MCHC, RDW, MPV, NRBC, CMP:   Lab Results   Component Value Date    SODIUM 140 02/02/2019    K 4 3 02/02/2019     02/02/2019    CO2 34 (H) 02/02/2019    BUN 59 (H) 02/02/2019    CREATININE 1 14 02/02/2019    CALCIUM 8 5 02/02/2019    EGFR 43 02/02/2019     Imaging and other studies: none      DAWN Seth

## 2019-02-02 NOTE — DISCHARGE SUMMARY
Discharge Summary - Katie Champion 80 y o  female MRN: [id number]    Unit/Bed#: [facility] 615-01 Encounter: [id number]    Admission Date:   Admission Orders     Ordered        01/23/19 1757  Inpatient Admission  Once               Admitting Diagnosis: Closed fracture of left scapula, unspecified part of scapula, initial encounter [S42 102A]  Multiple fractures of ribs, left side, initial encounter for closed fracture [S22 42XA]  Unspecified multiple injuries, initial encounter [T07  XXXA]    HPI: Katie Champion is a 80 y o  female who presents with left chest wall pain and SOB after a mechanical slip and fall in her kitchen  She presented to the emergency department for worsening chest pain and SOB  She had a CT which demonstrated acute left sided rib fractures and left scapular fracture  She denies headstrike, loc, numbness weakness tingling        Procedures Performed: No orders of the defined types were placed in this encounter  Summary of Hospital Course: Patient was found to have L rib fractures and L scapular fractures  She was admitted to trauma service for further management  Her scapular fracture was evaluated by orthopedic surgery and was determined to be nonoperative  The LUE was placed in a sling  Acute pain team was consulted and epidural catheter was placed 1/26-1/31 for pain control  She developed hyperkalemia and DESMOND on CKD; nephrology was consulted and nephrotoxic medications were held per their recommendations  She developed acute respiratory failure due to decompensated heart failure in the setting of IV fluid administration for which cardiology was consulted  Her creatinine and respiratory status returned to baseline and she was deemed stable for discharge to rehab  Eliquis was resumed prior to discharge      Significant Findings, Care, Treatment and Services Provided:   L scapular fracture  L rib fractures  DESMOND on CKD  Epidural catheter for pain control    Complications:   DESMOND on CKD    Discharge Diagnosis:   L scapular fracture  L rib fractures    Resolved Problems  Date Reviewed: 2/2/2019          Resolved    Hyperkalemia 1/30/2019     Resolved by  MOLLY GONZALEZ          Condition at Discharge: stable         Discharge instructions/Information to patient and family:   See after visit summary for information provided to patient and family  Provisions for Follow-Up Care:  See after visit summary for information related to follow-up care and any pertinent home health orders  PCP: Brynn Fowler MD    Disposition: Skilled nursing facility at [facility] Readmission: No    Discharge Statement   I spent 22 minutes discharging the patient  This time was spent on the day of discharge  I had direct contact with the patient on the day of discharge  Additional documentation is required if more than 30 minutes were spent on discharge  Discharge Medications:  See after visit summary for reconciled discharge medications provided to patient and family

## 2019-02-02 NOTE — ASSESSMENT & PLAN NOTE
-Nephrology following patient (acute on chronic condition), less likely glomerular as UA with improvement in proteinuria/hematuria, hold ACE-I, diuretics and K supplementation, hold voltaren gel   -continue to monitor kidney function and electrolytes, f/u Nephro recs, avoid nephrotoxic agents  - Cr downtrending this a m   Following initiation of IV fluids per nephro

## 2019-02-02 NOTE — PROGRESS NOTES
Progress Note - Tyree Meigs 9/29/1930, 80 y o  female MRN: [id number]    Unit/Bed#: [facility] 615-01 Encounter: [id number]    Primary Care Provider: Lydia Woodall MD   Date and time admitted to hospital: 1/23/2019 11:46 AM      Pericardial effusion   Assessment & Plan    Moderate in size seen on CT scan  -patient has not been consistently hypotensive with signs of tamponade, plan is to follow up with outpatient echocardiogram     Chronic respiratory acidosis   Assessment & Plan    Secondary to underlying COPD     Acute pain due to trauma   Assessment & Plan    -epidural catheter placed on 1/26- 1/31  -rib protocol and po narcotics, continue to encourage use as patient complains of pain with associated splinting/poor inspiratory effort and sedentary state    -continue bowel regimen, patient is now having BMs     Acute respiratory insufficiency   Assessment & Plan    -multifactorial: secondary to rib fractures/chest wall trauma, underlying COPD/CHF/CKD  -continue analgesics  -flutter valve, pulmonary toilet, continuous pulse ox, Xopenex and Atrovent Neb  -on 3L NC, satting 100%; wean NC as tolerated  -Pulm following while IP; will need OP f/u with Pulmonology at discharge for further management of chronic hypercarbia/pulmonary function tests  -PT/OT, OOB to chair  -Per respiratory, too weak to trigger flutter valve and unable to advance past 250ml on IS; EZ- PAP therapy initiated  Acute kidney injury [facility])   Assessment & Plan    -Nephrology following patient (acute on chronic condition), less likely glomerular as UA with improvement in proteinuria/hematuria, hold ACE-I, diuretics and K supplementation, hold voltaren gel   -continue to monitor kidney function and electrolytes, f/u Nephro recs, avoid nephrotoxic agents  - Cr downtrending this a m   Following initiation of IV fluids per nephro     Closed left scapular fracture   Assessment & Plan    -non operative management, nonweightbearing left upper extremity in sling  -follow-up with orthopedics as an outpatient, appreciate recommendations     Chronic diastolic congestive heart failure ([facility] [address] )   Assessment & Plan    - back on home coreg, diuretic held at this time per Nephrology recs  -monitor I/Os closely  -On IVF for DESMOND, monitor for signs on fluid overload       Benign hypertension with CKD (chronic kidney disease) stage III ([facility] [address] )   Assessment & Plan    -back on home meds-Imdur, amlodipine, Coreg  Holding ACE-I in setting of DESMOND on CKD  -discontinue prazosin as recommended by Nephrology when blood pressures are more stable     Paroxysmal atrial fibrillation (HCC)   Assessment & Plan    -Hx of afib, rate controlled on home regimen, will continue  -Home eliquis     * Multiple rib fractures   Assessment & Plan    - Left 3, 4, 5, 9, 10 rib fractures  - Rib fracture protocol  - Pain control:  Appreciate APS input  Continue scheduled tylenol and lidoderm patches, PO oxycodone 2 5 mg q4h prn (none last 24h)  - Pulmonary toilet and encourage IS  - PT/OT and OOB, placement pending at [facility] when medically stable                 Subjective/Objective   Chief Complaint: "I didn't sleep at all last night"    Subjective:   Denies chest, shoulder pain  Denies shortness of breath  Hoping for discharge soon    Objective:     Meds/Allergies   Prescriptions Prior to Admission   Medication Sig Dispense Refill Last Dose    amiodarone 200 mg tablet Take 1 tablet (200 mg total) by mouth daily 90 tablet 3 Taking    amLODIPine (NORVASC) 10 mg tablet Take 10 mg by mouth daily       apixaban (ELIQUIS) 5 mg Take 1 tablet (5 mg total) by mouth 2 (two) times a day 180 tablet 1 1/23/2019 at Unknown time    Calcium Carbonate (CALTRATE 600 PO) Take 600 mg by mouth daily     Taking    carvedilol (COREG) 3 125 mg tablet Take 1 tablet (3 125 mg total) by mouth 2 (two) times a day with meals 180 tablet 3 Taking    diclofenac sodium (VOLTAREN) 1 % Apply 2 g topically 4 (four) times a day 100 g 1 Taking    econazole nitrate 1 % cream Apply topically 2 (two) times a day 85 g 1 Taking    fluticasone (FLONASE) 50 mcg/act nasal spray 1 spray into each nostril 2 (two) times a day   Taking    isosorbide mononitrate (IMDUR) 60 mg 24 hr tablet Take 1 tablet (60 mg total) by mouth daily 90 tablet 3 Taking    KLOR-CON M20 20 MEQ tablet Take 1 tablet (20 mEq total) by mouth 2 (two) times a day 180 tablet 0     metolazone (ZAROXOLYN) 2 5 mg tablet Take 2 tablets (5 mg total) by mouth daily Take 2 for the next 3 days and as directed take as needed for increased leg swelling (148lbs) 180 tablet 1     nitroglycerin (NITROSTAT) 0 4 mg SL tablet Place 1 tablet (0 4 mg total) under the tongue every 5 (five) minutes as needed for chest pain 30 tablet 0 Taking    pravastatin (PRAVACHOL) 40 mg tablet Take 1 tablet (40 mg total) by mouth daily 90 tablet 3 Taking    prazosin (MINIPRESS) 2 mg capsule Take 1 capsule (2 mg total) by mouth every morning 90 capsule 3 Taking    prazosin (MINIPRESS) 5 mg capsule Take 1 capsule (5 mg total) by mouth daily at bedtime 90 capsule 3 Taking    ramipril (ALTACE) 10 MG capsule Take 1 capsule (10 mg total) by mouth 2 (two) times a day 180 capsule 2     torsemide (DEMADEX) 20 mg tablet Take 3 tablets (60 mg total) by mouth 2 (two) times a day 540 tablet 1     acetaminophen (TYLENOL) 325 mg tablet Take 650 mg by mouth every 6 (six) hours as needed for mild pain  Taking       Vitals: Blood pressure (!) 165/49, pulse 64, temperature 98 4 °F (36 9 °C), resp  rate 20, height 5' 1" (1 549 m), weight 73 9 kg (162 lb 14 7 oz), SpO2 100 %  Body mass index is 30 78 kg/m²   SpO2: SpO2: 100 %, SpO2 Activity: SpO2 Activity: At Rest    ABG:   Lab Results   Component Value Date    PHART 7 420 01/28/2019    TSG5HOL 61 5 (HH) 01/28/2019    PO2ART 51 0 (LL) 01/28/2019    PQN7QYF 39 0 (H) 01/28/2019    BEART 12 6 01/28/2019    SOURCE Radial, Right 01/28/2019         Intake/Output Summary (Last 24 hours) at 02/02/19 1026  Last data filed at 02/02/19 8484   Gross per 24 hour   Intake              310 ml   Output             1755 ml   Net            -1445 ml       Invasive Devices     Peripheral Intravenous Line            Peripheral IV 01/30/19 Left Arm 2 days          Drain            External Urinary Catheter 5 days                Nutrition: Cardio diet  GI Proph: Not indicated  Bowel Reg: sennakot, colace, miralax sched; dulcolax suppos PRN    Physical Exam:   Physical Examination: General appearance - alert, well appearing, and in no distress  Mental status - alert, oriented to person, place, and time  Chest - clear to auscultation, no wheezes, rales or rhonchi, symmetric air entry  Heart - normal rate, regular rhythm, normal S1, S2, no murmurs, rubs, clicks or gallops  Abdomen - soft, nontender, nondistended, no masses or organomegaly  Neurological - alert, oriented, normal speech, no focal findings or movement disorder noted  Musculoskeletal - LUE in sling  No distal edema; neurovascularly intact    Extremities - SCDs in place BLE    Lab Results:   BMP/CMP:   Lab Results   Component Value Date    SODIUM 140 02/02/2019    K 4 3 02/02/2019     02/02/2019    CO2 34 (H) 02/02/2019    BUN 59 (H) 02/02/2019    CREATININE 1 14 02/02/2019    CALCIUM 8 5 02/02/2019    EGFR 43 02/02/2019     Imaging/EKG Studies: No new imaging  VTE Prophylaxis: home eliquis, SCDs

## 2019-02-02 NOTE — ASSESSMENT & PLAN NOTE
-multifactorial: secondary to rib fractures/chest wall trauma, underlying COPD/CHF/CKD  -continue analgesics  -flutter valve, pulmonary toilet, continuous pulse ox, Xopenex and Atrovent Neb  -on 3L NC, satting 100%; wean NC as tolerated  -Pulm following while IP; will need OP f/u with Pulmonology at discharge for further management of chronic hypercarbia/pulmonary function tests  -PT/OT, OOB to chair  -Per respiratory, too weak to trigger flutter valve and unable to advance past 250ml on IS; EZ- PAP therapy initiated

## 2019-02-02 NOTE — PLAN OF CARE
Problem: OCCUPATIONAL THERAPY ADULT  Goal: Performs self-care activities at highest level of function for planned discharge setting  See evaluation for individualized goals  Treatment Interventions: ADL retraining, Functional transfer training, UE strengthening/ROM, Endurance training, Cognitive reorientation, Patient/family training, Equipment evaluation/education, Compensatory technique education, Continued evaluation, Energy conservation, Activityengagement  Equipment Recommended: Bedside commode       See flowsheet documentation for full assessment, interventions and recommendations  Outcome: Progressing  Limitation: Decreased UE ROM, Decreased UE strength, Decreased Safe judgement during ADL, Decreased endurance, Decreased self-care trans, Decreased high-level ADLs, Non-func L UE  Prognosis: Good, Fair  Assessment: Pt participates in OT session with focus on toileting and bed mobility to increase I for d/c  Pt on bedpan and calling for help and session begins  Pt max Ax1 bed rolling to R with bed railing support  Pt dependent toileting for BM and bladder and requires A for perineal hygiene  RN notified regarding pt request for purewick  Bed alarm turned on post session  Pt will continue to benefit from activity tolerance, adls, and transfers       OT Discharge Recommendation: Short Term Rehab  OT - OK to Discharge: Yes (when medically stable)

## 2019-02-02 NOTE — TREATMENT PLAN
Opal Whitehead for discharge from renal perspective as sCr at baseline  Hold ACEi/torsemide upon discharge  Should have nephrology follow up upon discharge with repeat BMP next week  BP acceptable on average for age

## 2019-02-02 NOTE — ASSESSMENT & PLAN NOTE
-epidural catheter placed on 1/26- 1/31  -rib protocol and po narcotics, continue to encourage use as patient complains of pain with associated splinting/poor inspiratory effort and sedentary state    -continue bowel regimen, patient is now having BMs

## 2019-02-02 NOTE — OCCUPATIONAL THERAPY NOTE
02/02/19 1203   Restrictions/Precautions   Weight Bearing Precautions Per Order Yes   RUE Weight Bearing Per Order WBAT   LUE Weight Bearing Per Order NWB   RLE Weight Bearing Per Order WBAT   LLE Weight Bearing Per Order WBAT   Braces or Orthoses Sling  (LUE)   Other Precautions Cognitive; Bed Alarm;Telemetry;Multiple lines;Pain; Fall Risk   Pain Assessment   Pain Assessment No/denies pain   Pain Score No Pain   ADL   Where Assessed Supine, bed   Toileting Assistance  1  Total Assistance   Toileting Deficit Setup;Use of bedpan/urinal setup;Perineal hygiene   Toileting Comments bladder and BM   Bed Mobility   Rolling R 2  Maximal assistance   Additional items Assist x 1   Cognition   Overall Cognitive Status Impaired   Arousal/Participation Alert   Attention Within functional limits   Orientation Level Oriented X4   Memory Decreased recall of precautions   Following Commands Follows one step commands with increased time or repetition   Activity Tolerance   Activity Tolerance Patient tolerated treatment well   Assessment   Assessment Pt participates in OT session with focus on toileting and bed mobility to increase I for d/c  Pt on bedpan and calling for help and session begins  Pt max Ax1 bed rolling to R with bed railing support  Pt dependent toileting for BM and bladder and requires A for perineal hygiene  RN notified regarding pt request for purewick  Bed alarm turned on post session  Pt will continue to benefit from activity tolerance, adls, and transfers  Plan   Treatment Interventions ADL retraining; Activityengagement   Goal Expiration Date 02/14/19   Treatment Day 3   OT Frequency 3-5x/wk   Recommendation   OT Discharge Recommendation Short Term Rehab

## 2019-02-03 ENCOUNTER — APPOINTMENT (EMERGENCY)
Dept: RADIOLOGY | Facility: HOSPITAL | Age: 84
DRG: 291 | End: 2019-02-03
Payer: MEDICARE

## 2019-02-03 ENCOUNTER — HOSPITAL ENCOUNTER (INPATIENT)
Facility: HOSPITAL | Age: 84
LOS: 8 days | DRG: 291 | End: 2019-02-11
Attending: EMERGENCY MEDICINE | Admitting: INTERNAL MEDICINE
Payer: MEDICARE

## 2019-02-03 DIAGNOSIS — R06.03 RESPIRATORY DISTRESS: Primary | ICD-10-CM

## 2019-02-03 DIAGNOSIS — K22.0 ACHALASIA: ICD-10-CM

## 2019-02-03 DIAGNOSIS — R91.8 GROUND GLASS OPACITY PRESENT ON IMAGING OF LUNG: ICD-10-CM

## 2019-02-03 DIAGNOSIS — I50.33 ACUTE ON CHRONIC DIASTOLIC (CONGESTIVE) HEART FAILURE (HCC): ICD-10-CM

## 2019-02-03 DIAGNOSIS — R06.89 CO2 NARCOSIS: ICD-10-CM

## 2019-02-03 DIAGNOSIS — T68.XXXA HYPOTHERMIA, INITIAL ENCOUNTER: ICD-10-CM

## 2019-02-03 PROBLEM — R82.90 ABNORMAL URINALYSIS: Status: ACTIVE | Noted: 2019-02-03

## 2019-02-03 PROBLEM — I87.2 CHRONIC VENOUS INSUFFICIENCY: Status: ACTIVE | Noted: 2017-09-18

## 2019-02-03 PROBLEM — I87.2 CHRONIC STASIS DERMATITIS OF RIGHT LOWER EXTREMITY: Status: ACTIVE | Noted: 2017-05-10

## 2019-02-03 LAB
ALBUMIN SERPL BCP-MCNC: 3 G/DL (ref 3.5–5)
ALP SERPL-CCNC: 95 U/L (ref 46–116)
ALT SERPL W P-5'-P-CCNC: 79 U/L (ref 12–78)
ANION GAP BLD CALC-SCNC: 9 MMOL/L (ref 4–13)
ANION GAP SERPL CALCULATED.3IONS-SCNC: 6 MMOL/L (ref 4–13)
APTT PPP: 30 SECONDS (ref 26–38)
AST SERPL W P-5'-P-CCNC: 85 U/L (ref 5–45)
ATRIAL RATE: 61 BPM
BACTERIA UR QL AUTO: ABNORMAL /HPF
BASE EXCESS BLDA CALC-SCNC: 10 MMOL/L (ref -2–3)
BASE EXCESS BLDA CALC-SCNC: 8 MMOL/L (ref -2–3)
BASOPHILS # BLD AUTO: 0.04 THOUSANDS/ΜL (ref 0–0.1)
BASOPHILS NFR BLD AUTO: 1 % (ref 0–1)
BILIRUB SERPL-MCNC: 0.43 MG/DL (ref 0.2–1)
BILIRUB UR QL STRIP: NEGATIVE
BUN BLD-MCNC: 60 MG/DL (ref 5–25)
BUN SERPL-MCNC: 45 MG/DL (ref 5–25)
CA-I BLD-SCNC: 1.15 MMOL/L (ref 1.12–1.32)
CA-I BLD-SCNC: 1.17 MMOL/L (ref 1.12–1.32)
CA-I BLD-SCNC: 1.24 MMOL/L (ref 1.12–1.32)
CALCIUM SERPL-MCNC: 8.6 MG/DL (ref 8.3–10.1)
CHLORIDE BLD-SCNC: 103 MMOL/L (ref 100–108)
CHLORIDE SERPL-SCNC: 106 MMOL/L (ref 100–108)
CLARITY UR: ABNORMAL
CO2 SERPL-SCNC: 30 MMOL/L (ref 21–32)
COLOR UR: YELLOW
COLOR, POC: NORMAL
CREAT BLD-MCNC: 1.2 MG/DL (ref 0.6–1.3)
CREAT SERPL-MCNC: 1.06 MG/DL (ref 0.6–1.3)
EOSINOPHIL # BLD AUTO: 0.15 THOUSAND/ΜL (ref 0–0.61)
EOSINOPHIL NFR BLD AUTO: 2 % (ref 0–6)
ERYTHROCYTE [DISTWIDTH] IN BLOOD BY AUTOMATED COUNT: 21 % (ref 11.6–15.1)
GFR SERPL CREATININE-BSD FRML MDRD: 40 ML/MIN/1.73SQ M
GFR SERPL CREATININE-BSD FRML MDRD: 47 ML/MIN/1.73SQ M
GLUCOSE SERPL-MCNC: 118 MG/DL (ref 65–140)
GLUCOSE SERPL-MCNC: 158 MG/DL (ref 65–140)
GLUCOSE SERPL-MCNC: 165 MG/DL (ref 65–140)
GLUCOSE SERPL-MCNC: 169 MG/DL (ref 65–140)
GLUCOSE UR STRIP-MCNC: NEGATIVE MG/DL
HCO3 BLDA-SCNC: 35.2 MMOL/L (ref 24–30)
HCO3 BLDA-SCNC: 37.1 MMOL/L (ref 24–30)
HCT VFR BLD AUTO: 35 % (ref 34.8–46.1)
HCT VFR BLD CALC: 25 % (ref 34.8–46.1)
HCT VFR BLD CALC: 32 % (ref 34.8–46.1)
HCT VFR BLD CALC: 32 % (ref 34.8–46.1)
HGB BLD-MCNC: 9.7 G/DL (ref 11.5–15.4)
HGB BLDA-MCNC: 10.9 G/DL (ref 11.5–15.4)
HGB BLDA-MCNC: 10.9 G/DL (ref 11.5–15.4)
HGB BLDA-MCNC: 8.5 G/DL (ref 11.5–15.4)
HGB UR QL STRIP.AUTO: ABNORMAL
IMM GRANULOCYTES # BLD AUTO: 0.05 THOUSAND/UL (ref 0–0.2)
IMM GRANULOCYTES NFR BLD AUTO: 1 % (ref 0–2)
INR PPP: 1.25 (ref 0.86–1.17)
KETONES UR STRIP-MCNC: NEGATIVE MG/DL
L PNEUMO1 AG UR QL IA.RAPID: NEGATIVE
LACTATE SERPL-SCNC: 1.5 MMOL/L (ref 0.5–2)
LEUKOCYTE ESTERASE UR QL STRIP: ABNORMAL
LYMPHOCYTES # BLD AUTO: 0.39 THOUSANDS/ΜL (ref 0.6–4.47)
LYMPHOCYTES NFR BLD AUTO: 5 % (ref 14–44)
MCH RBC QN AUTO: 25.7 PG (ref 26.8–34.3)
MCHC RBC AUTO-ENTMCNC: 27.7 G/DL (ref 31.4–37.4)
MCV RBC AUTO: 93 FL (ref 82–98)
MONOCYTES # BLD AUTO: 0.92 THOUSAND/ΜL (ref 0.17–1.22)
MONOCYTES NFR BLD AUTO: 12 % (ref 4–12)
NEUTROPHILS # BLD AUTO: 5.98 THOUSANDS/ΜL (ref 1.85–7.62)
NEUTS SEG NFR BLD AUTO: 79 % (ref 43–75)
NITRITE UR QL STRIP: NEGATIVE
NON-SQ EPI CELLS URNS QL MICRO: ABNORMAL /HPF
NRBC BLD AUTO-RTO: 0 /100 WBCS
NT-PROBNP SERPL-MCNC: 3286 PG/ML
P AXIS: 70 DEGREES
PCO2 BLD: 37 MMOL/L (ref 21–32)
PCO2 BLD: 37 MMOL/L (ref 21–32)
PCO2 BLD: 39 MMOL/L (ref 21–32)
PCO2 BLD: 52.3 MM HG (ref 42–50)
PCO2 BLD: 76.6 MM HG (ref 42–50)
PH BLD: 7.29 [PH] (ref 7.3–7.4)
PH BLD: 7.44 [PH] (ref 7.3–7.4)
PH UR STRIP.AUTO: 6 [PH] (ref 4.5–8)
PLATELET # BLD AUTO: 225 THOUSANDS/UL (ref 149–390)
PMV BLD AUTO: 11 FL (ref 8.9–12.7)
PO2 BLD: 57 MM HG (ref 35–45)
PO2 BLD: 76 MM HG (ref 35–45)
POTASSIUM BLD-SCNC: 4.2 MMOL/L (ref 3.5–5.3)
POTASSIUM BLD-SCNC: 5.7 MMOL/L (ref 3.5–5.3)
POTASSIUM BLD-SCNC: 5.8 MMOL/L (ref 3.5–5.3)
POTASSIUM SERPL-SCNC: 4.8 MMOL/L (ref 3.5–5.3)
PR INTERVAL: 224 MS
PROCALCITONIN SERPL-MCNC: 0.22 NG/ML
PROT SERPL-MCNC: 7.1 G/DL (ref 6.4–8.2)
PROT UR STRIP-MCNC: ABNORMAL MG/DL
PROTHROMBIN TIME: 15.8 SECONDS (ref 11.8–14.2)
QRS AXIS: 115 DEGREES
QRSD INTERVAL: 170 MS
QT INTERVAL: 502 MS
QTC INTERVAL: 505 MS
RBC # BLD AUTO: 3.78 MILLION/UL (ref 3.81–5.12)
RBC #/AREA URNS AUTO: ABNORMAL /HPF
S PNEUM AG UR QL: NEGATIVE
SAO2 % BLD FROM PO2: 84 % (ref 95–98)
SAO2 % BLD FROM PO2: 95 % (ref 95–98)
SODIUM BLD-SCNC: 143 MMOL/L (ref 136–145)
SODIUM SERPL-SCNC: 142 MMOL/L (ref 136–145)
SP GR UR STRIP.AUTO: 1.02 (ref 1–1.03)
SPECIMEN SOURCE: ABNORMAL
T WAVE AXIS: 28 DEGREES
T4 FREE SERPL-MCNC: 1.3 NG/DL (ref 0.76–1.46)
TROPONIN I SERPL-MCNC: 0.02 NG/ML
TSH SERPL DL<=0.05 MIU/L-ACNC: 5.16 UIU/ML (ref 0.36–3.74)
UROBILINOGEN UR QL STRIP.AUTO: 0.2 E.U./DL
VENTRICULAR RATE: 61 BPM
WBC # BLD AUTO: 7.53 THOUSAND/UL (ref 4.31–10.16)
WBC #/AREA URNS AUTO: ABNORMAL /HPF

## 2019-02-03 PROCEDURE — 94668 MNPJ CHEST WALL SBSQ: CPT

## 2019-02-03 PROCEDURE — 84443 ASSAY THYROID STIM HORMONE: CPT | Performed by: EMERGENCY MEDICINE

## 2019-02-03 PROCEDURE — 96367 TX/PROPH/DG ADDL SEQ IV INF: CPT

## 2019-02-03 PROCEDURE — 71045 X-RAY EXAM CHEST 1 VIEW: CPT

## 2019-02-03 PROCEDURE — 93005 ELECTROCARDIOGRAM TRACING: CPT

## 2019-02-03 PROCEDURE — 87081 CULTURE SCREEN ONLY: CPT | Performed by: INTERNAL MEDICINE

## 2019-02-03 PROCEDURE — 81003 URINALYSIS AUTO W/O SCOPE: CPT

## 2019-02-03 PROCEDURE — 81001 URINALYSIS AUTO W/SCOPE: CPT

## 2019-02-03 PROCEDURE — 84145 PROCALCITONIN (PCT): CPT | Performed by: EMERGENCY MEDICINE

## 2019-02-03 PROCEDURE — 83605 ASSAY OF LACTIC ACID: CPT | Performed by: EMERGENCY MEDICINE

## 2019-02-03 PROCEDURE — 84295 ASSAY OF SERUM SODIUM: CPT

## 2019-02-03 PROCEDURE — 87077 CULTURE AEROBIC IDENTIFY: CPT

## 2019-02-03 PROCEDURE — 85610 PROTHROMBIN TIME: CPT | Performed by: EMERGENCY MEDICINE

## 2019-02-03 PROCEDURE — 87086 URINE CULTURE/COLONY COUNT: CPT

## 2019-02-03 PROCEDURE — 96374 THER/PROPH/DIAG INJ IV PUSH: CPT

## 2019-02-03 PROCEDURE — 99222 1ST HOSP IP/OBS MODERATE 55: CPT | Performed by: INTERNAL MEDICINE

## 2019-02-03 PROCEDURE — 85014 HEMATOCRIT: CPT

## 2019-02-03 PROCEDURE — 85730 THROMBOPLASTIN TIME PARTIAL: CPT | Performed by: EMERGENCY MEDICINE

## 2019-02-03 PROCEDURE — 94640 AIRWAY INHALATION TREATMENT: CPT

## 2019-02-03 PROCEDURE — 99223 1ST HOSP IP/OBS HIGH 75: CPT | Performed by: INTERNAL MEDICINE

## 2019-02-03 PROCEDURE — 36415 COLL VENOUS BLD VENIPUNCTURE: CPT | Performed by: EMERGENCY MEDICINE

## 2019-02-03 PROCEDURE — 94760 N-INVAS EAR/PLS OXIMETRY 1: CPT

## 2019-02-03 PROCEDURE — 82947 ASSAY GLUCOSE BLOOD QUANT: CPT

## 2019-02-03 PROCEDURE — 85025 COMPLETE CBC W/AUTO DIFF WBC: CPT | Performed by: EMERGENCY MEDICINE

## 2019-02-03 PROCEDURE — 84484 ASSAY OF TROPONIN QUANT: CPT | Performed by: EMERGENCY MEDICINE

## 2019-02-03 PROCEDURE — 84132 ASSAY OF SERUM POTASSIUM: CPT

## 2019-02-03 PROCEDURE — 82803 BLOOD GASES ANY COMBINATION: CPT

## 2019-02-03 PROCEDURE — 94664 DEMO&/EVAL PT USE INHALER: CPT

## 2019-02-03 PROCEDURE — 94660 CPAP INITIATION&MGMT: CPT

## 2019-02-03 PROCEDURE — 84439 ASSAY OF FREE THYROXINE: CPT | Performed by: EMERGENCY MEDICINE

## 2019-02-03 PROCEDURE — 87186 SC STD MICRODIL/AGAR DIL: CPT

## 2019-02-03 PROCEDURE — 87040 BLOOD CULTURE FOR BACTERIA: CPT | Performed by: EMERGENCY MEDICINE

## 2019-02-03 PROCEDURE — 70450 CT HEAD/BRAIN W/O DYE: CPT

## 2019-02-03 PROCEDURE — 82330 ASSAY OF CALCIUM: CPT

## 2019-02-03 PROCEDURE — 99291 CRITICAL CARE FIRST HOUR: CPT

## 2019-02-03 PROCEDURE — 93010 ELECTROCARDIOGRAM REPORT: CPT | Performed by: INTERNAL MEDICINE

## 2019-02-03 PROCEDURE — 83880 ASSAY OF NATRIURETIC PEPTIDE: CPT | Performed by: EMERGENCY MEDICINE

## 2019-02-03 PROCEDURE — 71250 CT THORAX DX C-: CPT

## 2019-02-03 PROCEDURE — 80053 COMPREHEN METABOLIC PANEL: CPT | Performed by: EMERGENCY MEDICINE

## 2019-02-03 PROCEDURE — 80047 BASIC METABLC PNL IONIZED CA: CPT

## 2019-02-03 PROCEDURE — 96365 THER/PROPH/DIAG IV INF INIT: CPT

## 2019-02-03 PROCEDURE — 87631 RESP VIRUS 3-5 TARGETS: CPT | Performed by: INTERNAL MEDICINE

## 2019-02-03 PROCEDURE — 87449 NOS EACH ORGANISM AG IA: CPT | Performed by: INTERNAL MEDICINE

## 2019-02-03 PROCEDURE — 87633 RESP VIRUS 12-25 TARGETS: CPT | Performed by: INTERNAL MEDICINE

## 2019-02-03 PROCEDURE — 99222 1ST HOSP IP/OBS MODERATE 55: CPT | Performed by: EMERGENCY MEDICINE

## 2019-02-03 RX ORDER — ACETAMINOPHEN 325 MG/1
650 TABLET ORAL EVERY 8 HOURS SCHEDULED
Status: DISCONTINUED | OUTPATIENT
Start: 2019-02-03 | End: 2019-02-10

## 2019-02-03 RX ORDER — PRAZOSIN HYDROCHLORIDE 1 MG/1
2 CAPSULE ORAL EVERY MORNING
Status: DISCONTINUED | OUTPATIENT
Start: 2019-02-04 | End: 2019-02-09

## 2019-02-03 RX ORDER — LIDOCAINE 50 MG/G
1 PATCH TOPICAL DAILY
Status: DISCONTINUED | OUTPATIENT
Start: 2019-02-04 | End: 2019-02-11 | Stop reason: HOSPADM

## 2019-02-03 RX ORDER — NALOXONE HYDROCHLORIDE 0.4 MG/ML
0.4 INJECTION, SOLUTION INTRAMUSCULAR; INTRAVENOUS; SUBCUTANEOUS ONCE
Status: DISCONTINUED | OUTPATIENT
Start: 2019-02-03 | End: 2019-02-03

## 2019-02-03 RX ORDER — LEVALBUTEROL 1.25 MG/.5ML
1.25 SOLUTION, CONCENTRATE RESPIRATORY (INHALATION)
Status: DISCONTINUED | OUTPATIENT
Start: 2019-02-03 | End: 2019-02-03

## 2019-02-03 RX ORDER — 0.9 % SODIUM CHLORIDE 0.9 %
3 VIAL (ML) INJECTION AS NEEDED
Status: DISCONTINUED | OUTPATIENT
Start: 2019-02-03 | End: 2019-02-03

## 2019-02-03 RX ORDER — VANCOMYCIN HYDROCHLORIDE 1 G/200ML
15 INJECTION, SOLUTION INTRAVENOUS ONCE
Status: COMPLETED | OUTPATIENT
Start: 2019-02-03 | End: 2019-02-03

## 2019-02-03 RX ORDER — AMLODIPINE BESYLATE 10 MG/1
10 TABLET ORAL DAILY
Status: DISCONTINUED | OUTPATIENT
Start: 2019-02-04 | End: 2019-02-09

## 2019-02-03 RX ORDER — METOLAZONE 5 MG/1
5 TABLET ORAL DAILY
COMMUNITY
End: 2019-02-11 | Stop reason: HOSPADM

## 2019-02-03 RX ORDER — LEVALBUTEROL 1.25 MG/.5ML
1.25 SOLUTION, CONCENTRATE RESPIRATORY (INHALATION)
Status: DISCONTINUED | OUTPATIENT
Start: 2019-02-03 | End: 2019-02-10

## 2019-02-03 RX ORDER — METHYLPREDNISOLONE SODIUM SUCCINATE 40 MG/ML
40 INJECTION, POWDER, LYOPHILIZED, FOR SOLUTION INTRAMUSCULAR; INTRAVENOUS EVERY 8 HOURS
Status: DISCONTINUED | OUTPATIENT
Start: 2019-02-03 | End: 2019-02-03

## 2019-02-03 RX ORDER — NALOXONE HYDROCHLORIDE 0.4 MG/ML
0.1 INJECTION, SOLUTION INTRAMUSCULAR; INTRAVENOUS; SUBCUTANEOUS ONCE
Status: DISCONTINUED | OUTPATIENT
Start: 2019-02-03 | End: 2019-02-03

## 2019-02-03 RX ORDER — METOLAZONE 5 MG/1
5 TABLET ORAL DAILY
Status: DISCONTINUED | OUTPATIENT
Start: 2019-02-04 | End: 2019-02-04

## 2019-02-03 RX ORDER — POTASSIUM CHLORIDE 20 MEQ/1
20 TABLET, EXTENDED RELEASE ORAL 2 TIMES DAILY
Status: DISCONTINUED | OUTPATIENT
Start: 2019-02-03 | End: 2019-02-07

## 2019-02-03 RX ORDER — PRAVASTATIN SODIUM 40 MG
40 TABLET ORAL DAILY
Status: DISCONTINUED | OUTPATIENT
Start: 2019-02-03 | End: 2019-02-09

## 2019-02-03 RX ORDER — TORSEMIDE 20 MG/1
40 TABLET ORAL DAILY
Status: DISCONTINUED | OUTPATIENT
Start: 2019-02-04 | End: 2019-02-03

## 2019-02-03 RX ORDER — NITROGLYCERIN 0.4 MG/1
0.4 TABLET SUBLINGUAL
Status: DISCONTINUED | OUTPATIENT
Start: 2019-02-03 | End: 2019-02-11 | Stop reason: HOSPADM

## 2019-02-03 RX ORDER — NALOXONE HYDROCHLORIDE 0.4 MG/ML
INJECTION, SOLUTION INTRAMUSCULAR; INTRAVENOUS; SUBCUTANEOUS
Status: COMPLETED
Start: 2019-02-03 | End: 2019-02-03

## 2019-02-03 RX ORDER — FLUTICASONE PROPIONATE 50 MCG
1 SPRAY, SUSPENSION (ML) NASAL 2 TIMES DAILY
Status: DISCONTINUED | OUTPATIENT
Start: 2019-02-03 | End: 2019-02-09

## 2019-02-03 RX ORDER — DOCUSATE SODIUM 100 MG/1
100 CAPSULE, LIQUID FILLED ORAL 2 TIMES DAILY
Status: DISCONTINUED | OUTPATIENT
Start: 2019-02-03 | End: 2019-02-06

## 2019-02-03 RX ORDER — VANCOMYCIN HYDROCHLORIDE 1 G/200ML
15 INJECTION, SOLUTION INTRAVENOUS EVERY 24 HOURS
Status: DISCONTINUED | OUTPATIENT
Start: 2019-02-03 | End: 2019-02-03

## 2019-02-03 RX ORDER — AMIODARONE HYDROCHLORIDE 200 MG/1
200 TABLET ORAL DAILY
Status: DISCONTINUED | OUTPATIENT
Start: 2019-02-03 | End: 2019-02-09

## 2019-02-03 RX ORDER — ACETAMINOPHEN 325 MG/1
650 TABLET ORAL EVERY 6 HOURS PRN
Status: DISCONTINUED | OUTPATIENT
Start: 2019-02-03 | End: 2019-02-11 | Stop reason: HOSPADM

## 2019-02-03 RX ORDER — SENNOSIDES 8.8 MG/5ML
8.8 LIQUID ORAL DAILY
Status: DISCONTINUED | OUTPATIENT
Start: 2019-02-03 | End: 2019-02-06

## 2019-02-03 RX ORDER — FUROSEMIDE 10 MG/ML
40 INJECTION INTRAMUSCULAR; INTRAVENOUS ONCE
Status: COMPLETED | OUTPATIENT
Start: 2019-02-03 | End: 2019-02-03

## 2019-02-03 RX ORDER — GUAIFENESIN 600 MG
600 TABLET, EXTENDED RELEASE 12 HR ORAL EVERY 12 HOURS SCHEDULED
Status: DISCONTINUED | OUTPATIENT
Start: 2019-02-03 | End: 2019-02-09

## 2019-02-03 RX ORDER — CARVEDILOL 3.12 MG/1
3.12 TABLET ORAL 2 TIMES DAILY WITH MEALS
Status: DISCONTINUED | OUTPATIENT
Start: 2019-02-03 | End: 2019-02-09

## 2019-02-03 RX ORDER — NALOXONE HYDROCHLORIDE 1 MG/ML
1 INJECTION INTRAMUSCULAR; INTRAVENOUS; SUBCUTANEOUS ONCE
Status: DISCONTINUED | OUTPATIENT
Start: 2019-02-03 | End: 2019-02-03

## 2019-02-03 RX ORDER — ISOSORBIDE MONONITRATE 60 MG/1
60 TABLET, EXTENDED RELEASE ORAL DAILY
Status: DISCONTINUED | OUTPATIENT
Start: 2019-02-03 | End: 2019-02-09

## 2019-02-03 RX ORDER — NALOXONE HYDROCHLORIDE 0.4 MG/ML
0.4 INJECTION, SOLUTION INTRAMUSCULAR; INTRAVENOUS; SUBCUTANEOUS ONCE
Status: COMPLETED | OUTPATIENT
Start: 2019-02-03 | End: 2019-02-03

## 2019-02-03 RX ADMIN — PRAZOSIN HYDROCHLORIDE 5 MG: 2 CAPSULE ORAL at 21:49

## 2019-02-03 RX ADMIN — GUAIFENESIN 600 MG: 600 TABLET, EXTENDED RELEASE ORAL at 15:50

## 2019-02-03 RX ADMIN — NALOXONE HYDROCHLORIDE 0.4 MG: 0.4 INJECTION, SOLUTION INTRAMUSCULAR; INTRAVENOUS; SUBCUTANEOUS at 06:45

## 2019-02-03 RX ADMIN — IPRATROPIUM BROMIDE 0.5 MG: 0.5 SOLUTION RESPIRATORY (INHALATION) at 19:03

## 2019-02-03 RX ADMIN — ACETAMINOPHEN 650 MG: 325 TABLET, FILM COATED ORAL at 13:33

## 2019-02-03 RX ADMIN — IPRATROPIUM BROMIDE 0.5 MG: 0.5 SOLUTION RESPIRATORY (INHALATION) at 14:28

## 2019-02-03 RX ADMIN — PRAVASTATIN SODIUM 40 MG: 40 TABLET ORAL at 15:51

## 2019-02-03 RX ADMIN — METRONIDAZOLE 500 MG: 500 INJECTION, SOLUTION INTRAVENOUS at 21:45

## 2019-02-03 RX ADMIN — LEVALBUTEROL HYDROCHLORIDE 1.25 MG: 1.25 SOLUTION, CONCENTRATE RESPIRATORY (INHALATION) at 14:28

## 2019-02-03 RX ADMIN — ACETAMINOPHEN 650 MG: 325 TABLET, FILM COATED ORAL at 21:51

## 2019-02-03 RX ADMIN — AZITHROMYCIN MONOHYDRATE 500 MG: 500 INJECTION, POWDER, LYOPHILIZED, FOR SOLUTION INTRAVENOUS at 09:40

## 2019-02-03 RX ADMIN — AMIODARONE HYDROCHLORIDE 200 MG: 200 TABLET ORAL at 15:51

## 2019-02-03 RX ADMIN — ACETAMINOPHEN 650 MG: 325 TABLET, FILM COATED ORAL at 16:44

## 2019-02-03 RX ADMIN — LEVALBUTEROL HYDROCHLORIDE 1.25 MG: 1.25 SOLUTION, CONCENTRATE RESPIRATORY (INHALATION) at 19:03

## 2019-02-03 RX ADMIN — DOCUSATE SODIUM 100 MG: 100 CAPSULE, LIQUID FILLED ORAL at 15:50

## 2019-02-03 RX ADMIN — GUAIFENESIN 600 MG: 600 TABLET, EXTENDED RELEASE ORAL at 21:51

## 2019-02-03 RX ADMIN — FUROSEMIDE 40 MG: 10 INJECTION, SOLUTION INTRAMUSCULAR; INTRAVENOUS at 16:46

## 2019-02-03 RX ADMIN — ISOSORBIDE MONONITRATE 60 MG: 60 TABLET, EXTENDED RELEASE ORAL at 16:38

## 2019-02-03 RX ADMIN — PIPERACILLIN SODIUM AND TAZOBACTAM SODIUM 3.38 G: 36; 4.5 INJECTION, POWDER, FOR SOLUTION INTRAVENOUS at 09:15

## 2019-02-03 RX ADMIN — SENNOSIDES A AND B 8.8 MG: 415.36 LIQUID ORAL at 16:04

## 2019-02-03 RX ADMIN — APIXABAN 5 MG: 5 TABLET, FILM COATED ORAL at 15:50

## 2019-02-03 RX ADMIN — METRONIDAZOLE 500 MG: 500 INJECTION, SOLUTION INTRAVENOUS at 15:59

## 2019-02-03 RX ADMIN — CARVEDILOL 3.12 MG: 3.12 TABLET, FILM COATED ORAL at 15:51

## 2019-02-03 RX ADMIN — POTASSIUM CHLORIDE 20 MEQ: 1500 TABLET, EXTENDED RELEASE ORAL at 15:50

## 2019-02-03 RX ADMIN — CEFEPIME HYDROCHLORIDE 2000 MG: 1 INJECTION, POWDER, FOR SOLUTION INTRAMUSCULAR; INTRAVENOUS at 16:50

## 2019-02-03 RX ADMIN — VANCOMYCIN HYDROCHLORIDE 1000 MG: 1 INJECTION, SOLUTION INTRAVENOUS at 10:53

## 2019-02-03 RX ADMIN — METHYLPREDNISOLONE SODIUM SUCCINATE 40 MG: 40 INJECTION, POWDER, FOR SOLUTION INTRAMUSCULAR; INTRAVENOUS at 15:57

## 2019-02-03 NOTE — ED NOTES
Pt resting with bipap in place, pt work of breathing improved and appears comfortable  Pt offers no complaints, VS improved ie  BP   Pt awaiting step down bed       Tracy Granda RN  02/03/19 2878

## 2019-02-03 NOTE — PROGRESS NOTES
Appreciated ICU eval, will try lasix 40 mg once and monitor OP  Depending on response, consider to continue diuretics tomorrow    Consider to consult Cardiology for diuretic management upon discharge

## 2019-02-03 NOTE — ASSESSMENT & PLAN NOTE
Creatinine appears to be a baseline, during recent admission she was found to have a KI which is currently resolved  Continue to monitor

## 2019-02-03 NOTE — ASSESSMENT & PLAN NOTE
Possibly related to health acquired pneumonia given recent hospitalization and discharged yesterday versus aspiration pneumonia given the presence of achalasia on CT of the chest  Provide vancomycin, cefepime, metronidazole  For now will continue with BiPAP given the presence of both hypoxic and hypercapnic respiratory failure  Repeat an ABG now, if pH is improved discontinue BiPAP and try high-flow nasal cannula  Continue with scheduled nebulizers  Respiratory protocol  Pending blood cultures  Try and obtain sputum culture  Urinary antigens request  Aspiration precaution  Speech evaluation  GI evaluation    Of note, patient was recently evaluated by Pulmonary during recent hospital stay, no formal diagnosis of COPD or asthma, although suspicion for chronic hypoxic respiratory failure unclear if related to CHF versus use of amiodarone?   Will ask Pulmonary to evaluate again the patient  Given the poor effort on exam, unclear if wheezing although patient did receive prolonged nebulizer treatment in the ER  For now will start on steroids, Solu-Medrol t i d  IV  Follow further recommendation by Pulmonary group  Check for influenza and RSV  Complete respiratory pathogen profile  First procalcitonin is negative, will repeat tomorrow morning

## 2019-02-03 NOTE — ED PROVIDER NOTES
History  Chief Complaint   Patient presents with    Respiratory Distress     as per ems - pt resides at [facility], ems called for resp distress, patient 46% on room air, decreased resp effort     80year-old woman with history of CHF, hypertension, aortic regurgitation, ascending aortic aneurysm, AFib on Eliquis, and hyperlipidemia presents for evaluation of respiratory distress  Patient was found this morning at her nursing home unresponsive with depressed respirations  She was discharged yesterday from the trauma service after mechanical fall with left-sided scapula, rib, and wrist fractures  No reported excess pain medication use  No reported falls or new symptoms  On arrival, patient is hypothermic at 95 9 with respirations of 8 and hypertensive at 172/74  On exam, patient is somnolent  She opens her eyes to voice and follows commands  She has a decreased respirations with no other acute findings  She is in a left upper extremity sling  Will administer small dose of IV Narcan  Will place on BiPAP  Will perform septic and cardiac workups  Will speak with patient's daughter for code status and further management  Prior to Admission Medications   Prescriptions Last Dose Informant Patient Reported? Taking? Calcium Carbonate (CALTRATE 600 PO) 2/2/2019 at Unknown time Self Yes Yes   Sig: Take 600 mg by mouth daily  KLOR-CON M20 20 MEQ tablet 2/2/2019 at Unknown time  No Yes   Sig: Take 1 tablet (20 mEq total) by mouth 2 (two) times a day   acetaminophen (TYLENOL) 325 mg tablet  Self Yes Yes   Sig: Take 650 mg by mouth every 6 (six) hours as needed for mild pain     amLODIPine (NORVASC) 10 mg tablet 2/2/2019 at Unknown time  Yes Yes   Sig: Take 10 mg by mouth daily   amiodarone 200 mg tablet 2/2/2019 at Unknown time Self No Yes   Sig: Take 1 tablet (200 mg total) by mouth daily   apixaban (ELIQUIS) 5 mg 2/2/2019 at Unknown time Self No Yes   Sig: Take 1 tablet (5 mg total) by mouth 2 (two) times a day   carvedilol (COREG) 3 125 mg tablet 2019 at Unknown time Self No Yes   Sig: Take 1 tablet (3 125 mg total) by mouth 2 (two) times a day with meals   econazole nitrate 1 % cream  Self No No   Sig: Apply topically 2 (two) times a day   fluticasone (FLONASE) 50 mcg/act nasal spray 2019 at Unknown time Self Yes Yes   Si spray into each nostril 2 (two) times a day   ipratropium (ATROVENT) 0 02 % nebulizer solution 2019 at Unknown time  No Yes   Sig: Take 1 vial (0 5 mg total) by nebulization every 6 (six) hours   isosorbide mononitrate (IMDUR) 60 mg 24 hr tablet 2019 at Unknown time Self No Yes   Sig: Take 1 tablet (60 mg total) by mouth daily   levalbuterol (XOPENEX) 1 25 mg/0 5 mL nebulizer solution 2019 at Unknown time  No Yes   Sig: Take 0 5 mL (1 25 mg total) by nebulization every 6 (six) hours   metolazone (ZAROXOLYN) 5 mg tablet 2019 at Unknown time  Yes Yes   Sig: Take 5 mg by mouth daily   nitroglycerin (NITROSTAT) 0 4 mg SL tablet  Self No Yes   Sig: Place 1 tablet (0 4 mg total) under the tongue every 5 (five) minutes as needed for chest pain   pravastatin (PRAVACHOL) 40 mg tablet 2019 at Unknown time Self No Yes   Sig: Take 1 tablet (40 mg total) by mouth daily   prazosin (MINIPRESS) 2 mg capsule 2019 at Unknown time Self No Yes   Sig: Take 1 capsule (2 mg total) by mouth every morning   prazosin (MINIPRESS) 5 mg capsule 2019 at Unknown time Self No Yes   Sig: Take 1 capsule (5 mg total) by mouth daily at bedtime      Facility-Administered Medications: None       Past Medical History:   Diagnosis Date    Acid reflux disease     Anemia     Last assessed: 10/26/16    Aortic valve regurgitation, nonrheumatic     Ascending aortic aneurysm (HCC)     Last assessed: 13    Atrial fibrillation (HCC)     Hyperlipidemia     Hypertension 2016    Paroxysmal atrial fibrillation ([facility] [address] ) 2016       Past Surgical History:   Procedure Laterality Date    CARDIOVERSION      CHOLECYSTECTOMY      LAPAROSCOPIC SALPINGOOPHERECTOMY      TONSILLECTOMY         Family History   Problem Relation Age of Onset    No Known Problems Mother     Stomach cancer Father     Heart disease Sister         Cardiac Disorder    Breast cancer Sister     Colon cancer Brother     Cancer Brother      I have reviewed and agree with the history as documented  Social History   Substance Use Topics    Smoking status: Never Smoker    Smokeless tobacco: Never Used    Alcohol use No        Review of Systems   Unable to perform ROS: Mental status change       Physical Exam  ED Triage Vitals   Temperature Pulse Respirations Blood Pressure SpO2   02/03/19 0634 02/03/19 0634 02/03/19 0634 02/03/19 0634 02/03/19 0634   (!) 95 9 °F (35 5 °C) 60 (!) 8 (!) 172/74 100 %      Temp Source Heart Rate Source Patient Position - Orthostatic VS BP Location FiO2 (%)   02/03/19 0634 02/03/19 0634 02/03/19 0730 02/03/19 0730 --   Rectal Monitor Lying Left arm       Pain Score       02/03/19 0634       No Pain           Orthostatic Vital Signs  Vitals:    02/03/19 1300 02/03/19 1400 02/03/19 1637 02/03/19 1903   BP: 164/67 167/61 (!) 182/50 139/53   Pulse: (!) 52 (!) 54 (!) 53 (!) 51   Patient Position - Orthostatic VS: Lying Lying Lying Lying       Physical Exam   Constitutional: She appears well-developed and well-nourished  No distress  HENT:   Head: Normocephalic and atraumatic  Eyes: Pupils are equal, round, and reactive to light  EOM are normal    Neck: Normal range of motion  Neck supple  No midline tenderness, no signs of meningismus    Cardiovascular: Normal rate, regular rhythm and normal heart sounds  Exam reveals no gallop and no friction rub  No murmur heard  Pulmonary/Chest: Breath sounds normal  She has no wheezes  She has no rales  Decreased respirations, protecting her airway   Abdominal: Soft  She exhibits no distension  There is no tenderness   There is no rebound and no guarding  Musculoskeletal: She exhibits no edema  LUE sling in place, 2+ radial pulse   Neurological:   GCS 12, opens eyes to voice, follows commands   Skin: Skin is warm and dry  She is not diaphoretic  Vitals reviewed        ED Medications  Medications   acetaminophen (TYLENOL) tablet 650 mg (650 mg Oral Given 2/3/19 1333)   amiodarone tablet 200 mg (200 mg Oral Given 2/3/19 1551)   apixaban (ELIQUIS) tablet 5 mg (5 mg Oral Not Given 2/3/19 1702)   carvedilol (COREG) tablet 3 125 mg (3 125 mg Oral Given 2/3/19 1551)   amLODIPine (NORVASC) tablet 10 mg (not administered)   econazole nitrate 1 % cream ( Topical Not Given 2/3/19 1702)   fluticasone (FLONASE) 50 mcg/act nasal spray 1 spray (1 spray Nasal Not Given 2/3/19 1702)   isosorbide mononitrate (IMDUR) 24 hr tablet 60 mg (60 mg Oral Given 2/3/19 1638)   potassium chloride (K-DUR,KLOR-CON) CR tablet 20 mEq (20 mEq Oral Not Given 2/3/19 1702)   nitroglycerin (NITROSTAT) SL tablet 0 4 mg (not administered)   pravastatin (PRAVACHOL) tablet 40 mg (40 mg Oral Given 2/3/19 1551)   prazosin (MINIPRESS) capsule 2 mg (not administered)   prazosin (MINIPRESS) capsule 5 mg (not administered)   docusate sodium (COLACE) capsule 100 mg (100 mg Oral Not Given 2/3/19 1701)   senna 8 8 mg/5 mL oral syrup 8 8 mg (8 8 mg Oral Given 2/3/19 1604)   cefepime (MAXIPIME) 2 g/50 mL dextrose IVPB (2,000 mg Intravenous New Bag 2/3/19 1650)   metroNIDAZOLE (FLAGYL) IVPB (premix) 500 mg (500 mg Intravenous New Bag 2/3/19 1559)   guaiFENesin (MUCINEX) 12 hr tablet 600 mg (600 mg Oral Given 2/3/19 1550)   vancomycin (VANCOCIN) 1,250 mg in sodium chloride 0 9 % 250 mL IVPB (not administered)   ipratropium (ATROVENT) 0 02 % inhalation solution 0 5 mg (0 5 mg Nebulization Given 2/3/19 1903)   levalbuterol (XOPENEX) inhalation solution 1 25 mg (1 25 mg Nebulization Given 2/3/19 1903)   acetaminophen (TYLENOL) tablet 650 mg (650 mg Oral Given 2/3/19 1644)   lidocaine (LIDODERM) 5 % patch 1 patch (not administered)   metolazone (ZAROXOLYN) tablet 5 mg (not administered)   naloxone (NARCAN) injection 0 4 mg (0 4 mg Intravenous Given 2/3/19 0645)   vancomycin (VANCOCIN) IVPB (premix) 1,000 mg (0 mg/kg × 72 6 kg Intravenous Stopped 2/3/19 1221)   piperacillin-tazobactam (ZOSYN) 3 375 g in sodium chloride 0 9 % 50 mL IVPB (0 g Intravenous Stopped 2/3/19 0936)   azithromycin (ZITHROMAX) 500 mg in sodium chloride 0 9% 250mL IVPB 500 mg (0 mg Intravenous Stopped 2/3/19 1047)   furosemide (LASIX) injection 40 mg (40 mg Intravenous Given 2/3/19 1646)       Diagnostic Studies  Results Reviewed     Procedure Component Value Units Date/Time    Strep Pneumoniae, Urine [[id number]]  (Normal) Collected:  02/03/19 1202    Lab Status:  Final result Specimen:  Urine from Urine, Catheter Updated:  02/03/19 1709     Strep pneumoniae antigen, urine Negative    Legionella antigen, urine [[id number]]  (Normal) Collected:  02/03/19 1202    Lab Status:  Final result Specimen:  Urine from Urine, Catheter Updated:  02/03/19 1709     Legionella Urinary Antigen Negative    MRSA culture [[id number]] Collected:  02/03/19 1215    Lab Status: In process Specimen:  Nares from Nose Updated:  02/03/19 1226    Influenza A/B and RSV by PCR [[id number]] Collected:  02/03/19 1215    Lab Status: In process Specimen:  Nasopharyngeal from Nasopharyngeal Swab Updated:  02/03/19 1226    Respiratory Pathogen Profile, PCR [[id number]] Collected:  02/03/19 1216    Lab Status:   In process Specimen:  Nasopharyngeal from Nasopharyngeal Swab Updated:  02/03/19 1226    POCT Blood Gas (CG8+) [[id number]]  (Abnormal) Collected:  02/03/19 1219    Lab Status:  Final result Updated:  02/03/19 1223     ph, Dominguez ISTAT 7 436 (H)     pCO2, Dominguez i-STAT 52 3 (H) mm HG      pO2, Dominguez i-STAT 76 0 (H) mm HG      BE, i-STAT 10 (H) mmol/L      HCO3, Dominguez i-STAT 35 2 (H) mmol/L      CO2, i-STAT 37 (H) mmol/L      O2 Sat, i-STAT 95 %      SODIUM, I-STAT 143 mmol/l Potassium, i-STAT 4 2 mmol/L      Calcium, Ionized i-STAT 1 24 mmol/L      Hct, i-STAT 25 (L) %      Hgb, i-STAT 8 5 (L) g/dl      Glucose, i-STAT 118 mg/dl      Specimen Type VENOUS    Procalcitonin [[id number]]     Lab Status:  No result Specimen:  Blood     Sputum culture and Gram stain [[id number]]     Lab Status:  No result Specimen:  Sputum from Expectorated Sputum     TSH [[id number]]  (Abnormal) Collected:  02/03/19 0647    Lab Status:  Final result Specimen:  Blood from Arm, Right Updated:  02/03/19 1034     TSH 3RD GENERATON 5 160 (H) uIU/mL     Narrative:         Patients undergoing fluorescein dye angiography may retain small amounts of fluorescein in the body for 48-72 hours post procedure  Samples containing fluorescein can produce falsely depressed TSH values  If the patient had this procedure,a specimen should be resubmitted post fluorescein clearance  The recommended reference ranges for TSH during pregnancy are as follows:  First trimester 0 1 to 2 5 uIU/mL  Second trimester  0 2 to 3 0 uIU/mL  Third trimester 0 3 to 3 0 uIU/m      T4, free [[id number]]  (Normal) Collected:  02/03/19 0647    Lab Status:  Final result Specimen:  Blood from Arm, Right Updated:  02/03/19 1034     Free T4 1 30 ng/dL     Urine Microscopic [[id number]]  (Abnormal) Collected:  02/03/19 0755    Lab Status:  Final result Specimen:  Urine from Urine, Indwelling Sheridan Catheter Updated:  02/03/19 0850     RBC, UA       Field obscured, unable to enumerate (A)     /hpf     WBC, UA Innumerable (A) /hpf      Epithelial Cells       Field obscured, unable to enumerate (A)     /hpf     Bacteria, UA Innumerable (A) /hpf     Urine culture [[id number]] Collected:  02/03/19 0755    Lab Status:   In process Specimen:  Urine from Urine, Indwelling Sheridan Catheter Updated:  02/03/19 0850    ED Urine Macroscopic [[id number]]  (Abnormal) Collected:  02/03/19 0755    Lab Status:  Final result Specimen:  Urine Updated:  02/03/19 5868 Color, UA Yellow     Clarity, UA Cloudy     pH, UA 6 0     Leukocytes, UA Large (A)     Nitrite, UA Negative     Protein,  (2+) (A) mg/dl      Glucose, UA Negative mg/dl      Ketones, UA Negative mg/dl      Urobilinogen, UA 0 2 E U /dl      Bilirubin, UA Negative     Blood, UA Moderate (A)     Specific [place], UA 1 025    Narrative:       CLINITEK RESULT    POCT urinalysis dipstick [[id number]]  (Normal) Resulted:  02/03/19 0749    Lab Status:  Final result Specimen:  Urine Updated:  02/03/19 0749     Color, UA see results    Procalcitonin [[id number]]  (Normal) Collected:  02/03/19 0645    Lab Status:  Final result Specimen:  Blood from Arm, Right Updated:  02/03/19 0748     Procalcitonin 0 22 ng/ml     Lactic Acid x2 [[id number]]  (Normal) Collected:  02/03/19 0704    Lab Status:  Final result Specimen:  Blood from Hand, Right Updated:  02/03/19 0743     LACTIC ACID 1 5 mmol/L     Narrative:         Result may be elevated if tourniquet was used during collection  Comprehensive metabolic panel [[id number]]  (Abnormal) Collected:  02/03/19 0647    Lab Status:  Final result Specimen:  Blood from Arm, Right Updated:  02/03/19 0732     Sodium 142 mmol/L      Potassium 4 8 mmol/L      Chloride 106 mmol/L      CO2 30 mmol/L      ANION GAP 6 mmol/L      BUN 45 (H) mg/dL      Creatinine 1 06 mg/dL      Glucose 158 (H) mg/dL      Calcium 8 6 mg/dL      AST 85 (H) U/L      ALT 79 (H) U/L      Alkaline Phosphatase 95 U/L      Total Protein 7 1 g/dL      Albumin 3 0 (L) g/dL      Total Bilirubin 0 43 mg/dL      eGFR 47 ml/min/1 73sq m     Narrative:         National Kidney Disease Education Program recommendations are as follows:  GFR calculation is accurate only with a steady state creatinine  Chronic Kidney disease less than 60 ml/min/1 73 sq  meters  Kidney failure less than 15 ml/min/1 73 sq  meters      B-type natriuretic peptide [[id number]]  (Abnormal) Collected:  02/03/19 0647    Lab Status:  Final result Specimen: Blood from Arm, Right Updated:  02/03/19 0732     NT-proBNP 3,286 (H) pg/mL     Troponin I [[id number]]  (Normal) Collected:  02/03/19 0645    Lab Status:  Final result Specimen:  Blood from Arm, Right Updated:  02/03/19 0725     Troponin I 0 02 ng/mL     Blood culture #2 [[id number]] Collected:  02/03/19 0704    Lab Status: In process Specimen:  Blood from Arm, Left Updated:  02/03/19 0720    Blood culture #1 [[id number]] Collected:  02/03/19 0704    Lab Status:   In process Specimen:  Blood from Hand, Right Updated:  02/03/19 0720    Protime-INR [[id number]]  (Abnormal) Collected:  02/03/19 0646    Lab Status:  Final result Specimen:  Blood from Arm, Right Updated:  02/03/19 0713     Protime 15 8 (H) seconds      INR 1 25 (H)    APTT [[id number]]  (Normal) Collected:  02/03/19 0646    Lab Status:  Final result Specimen:  Blood from Arm, Right Updated:  02/03/19 0713     PTT 30 seconds     CBC and differential [[id number]]  (Abnormal) Collected:  02/03/19 0645    Lab Status:  Final result Specimen:  Blood from Arm, Right Updated:  02/03/19 0659     WBC 7 53 Thousand/uL      RBC 3 78 (L) Million/uL      Hemoglobin 9 7 (L) g/dL      Hematocrit 35 0 %      MCV 93 fL      MCH 25 7 (L) pg      MCHC 27 7 (L) g/dL      RDW 21 0 (H) %      MPV 11 0 fL      Platelets 530 Thousands/uL      nRBC 0 /100 WBCs      Neutrophils Relative 79 (H) %      Immat GRANS % 1 %      Lymphocytes Relative 5 (L) %      Monocytes Relative 12 %      Eosinophils Relative 2 %      Basophils Relative 1 %      Neutrophils Absolute 5 98 Thousands/µL      Immature Grans Absolute 0 05 Thousand/uL      Lymphocytes Absolute 0 39 (L) Thousands/µL      Monocytes Absolute 0 92 Thousand/µL      Eosinophils Absolute 0 15 Thousand/µL      Basophils Absolute 0 04 Thousands/µL     POCT Blood Gas (CG8+) [[id number]]  (Abnormal) Collected:  02/03/19 0637    Lab Status:  Final result Updated:  02/03/19 0642     ph, Dominguez ISTAT 7 293 (L)     pCO2, Dominguez i-STAT 76 6 (HH) mm HG      pO2, Dominguez i-STAT 57 0 (H) mm HG      BE, i-STAT 8 (H) mmol/L      HCO3, Dominguez i-STAT 37 1 (HH) mmol/L      CO2, i-STAT 39 (H) mmol/L      O2 Sat, i-STAT 84 (L) %      SODIUM, I-STAT 143 mmol/l      Potassium, i-STAT 5 8 (H) mmol/L      Calcium, Ionized i-STAT 1 15 mmol/L      Hct, i-STAT 32 (L) %      Hgb, i-STAT 10 9 (L) g/dl      Glucose, i-STAT 165 (H) mg/dl      Specimen Type VENOUS    POCT Chem 8+ [[id number]]  (Abnormal) Collected:  02/03/19 0637    Lab Status:  Final result Updated:  02/03/19 0642     SODIUM, I-STAT 143 mmol/l      Potassium, i-STAT 5 7 (H) mmol/L      Chloride, istat 103 mmol/L      CO2, i-STAT 37 (H) mmol/L      Anion Gap, i-STAT 9 mmol/L      Calcium, Ionized i-STAT 1 17 mmol/L      BUN, I-STAT 60 (H) mg/dl      Creatinine, i-STAT 1 2 mg/dl      eGFR 40 ml/min/1 73sq m      Glucose, i-STAT 169 (H) mg/dl      Hct, i-STAT 32 (L) %      Hgb, i-STAT 10 9 (L) g/dl      Specimen Type VENOUS                 CT chest without contrast   Final Result by Krystina Oliva DO (02/03 4857)   Addendum 1 of 1 by Krystina Oliva DO (02/03 1733)   ADDENDUM:      ADDENDUM      There is a voice recognition software related error in the IMPRESSION of    the report  A phrase which reads    " 4  Diffuse abnormal appearance of the esophagus  Correlate for rectal    lesion  "          should read,    " 4  Diffuse abnormal appearance of the esophagus  Correlate for    ACHALASIA  "             Final   1  Groundglass infiltrates with more focal areas of consolidation in the lower lobes of the lungs bilaterally with associated bilateral pleural effusions  These findings have progressed from the prior study  Although the findings likely represent    congestive heart failure and volume overload with bibasilar atelectasis, superimposed pneumonia such as aspiration related, not entirely excluded  Clinical correlation and follow-up examination recommended     2   Cardiomegaly with 5 4 cm ascending aortic aneurysm, stable  3   Incidental thyromegaly with bilateral nodules identified on this CT  According to guidelines published in the February 2015 white paper on incidental thyroid nodules in the Journal of the [employer] of Radiology Rosa Booth), Because the nodule(s)    are greater than 1 5 cm in size, further characterization with thyroid ultrasound is recommended  4   Diffuse abnormal appearance of the esophagus  Correlate for rectal lesion  The study was marked in [facility] for immediate notification  Workstation performed: [id number]         CT head without contrast   Final Result by Lebron Burnette DO (02/03 4112)   Cerebral atrophy with chronic small vessel ischemic change  Stable right parafalcine calcified soft tissue mass, most compatible with meningioma  No acute intracranial abnormality  No significant change from prior study  Workstation performed: [id number]         XR chest 1 view portable   ED Interpretation by Hilario Rodriguez MD (12/02 0705)   Pulmonary vascular congestion            Procedures  Procedures      Phone Consults  ED Phone Contact    ED Course  ED Course as of Feb 03 2059   Sun Feb 03, 2019   9266 Procedure Note: EKG  Date/Time: 02/03/19 7:54 AM   Indications / Diagnosis: AMS  ECG reviewed by me, the ED Provider: yes   The EKG demonstrates:  Rhythm: normal sinus  Intervals: NE 224ms, QTc 505ms  Axis: normal axis  QRS/Blocks: QRS 170ms, RBBB  ST Changes: No acute ST Changes, no STD/JESSICA       1035 I spoke with the patient's daughter, who is her PoA, regarding goals of care  She confirms the patient is a DNR/DNI  She is okay with bipap, antibiotics, and heart failure treatment  She is on her way from [facility]  1036 I spoke with critical care who is aware of the patient and will consult on her            HEART Risk Score      Most Recent Value   History  0 Filed at: 02/03/2019 1035   ECG  1 Filed at: 02/03/2019 1035   Age  2 Filed at: 02/03/2019 1035   Risk Factors  1 Filed at: 02/03/2019 1035   Troponin  0 Filed at: 02/03/2019 1035   Heart Score Risk Calculator   History  0 Filed at: 02/03/2019 1035   ECG  1 Filed at: 02/03/2019 1035   Age  2 Filed at: 02/03/2019 1035   Risk Factors  1 Filed at: 02/03/2019 1035   Troponin  0 Filed at: 02/03/2019 1035   HEART Score  4 Filed at: 02/03/2019 1035   HEART Score  4 Filed at: 02/03/2019 1035                      Initial Sepsis Screening     Row Name 02/03/19 1030                Is the patient's history suggestive of a new or worsening infection? (!)  Yes (Proceed)  -AD        Suspected source of infection pneumonia  -AD        Are two or more of the following signs & symptoms of infection both present and new to the patient? (!)  Yes (Proceed)  -AD        Indicate SIRS criteria Hypothermia < 36C (96 8F); Altered mental status; Tachypnea > 20 resp per min  -AD        If the answer is yes to both questions, suspicion of sepsis is present          If severe sepsis is present AND tissue hypoperfusion perists in the hour after fluid resuscitation or lactate > 4, the patient meets criteria for SEPTIC SHOCK          Are any of the following organ dysfunction criteria present within 6 hours of suspected infection and SIRS criteria that are NOT considered to be chronic conditions? No  -AD        Organ dysfunction          Date of presentation of severe sepsis          Time of presentation of severe sepsis          Tissue hypoperfusion persists in the hour after crystalloid fluid administration, evidenced, by either:          Was hypotension present within one hour of the conclusion of crystalloid fluid administration?  No  -AD        Date of presentation of septic shock          Time of presentation of septic shock            User Key  (r) = Recorded By, (t) = Taken By, (c) = Cosigned By    [address] Name Provider Type    AD Gilbert Pérez MD Resident                  MDM  Number of Diagnoses or Management Options  CO2 narcosis:   Ground glass opacity present on imaging of lung:   Hypothermia, initial encounter:   Respiratory distress:   Diagnosis management comments: Patient was given 0 04mg of Narcan and placed on BiPAP for hypercapnic respiratory failure with significant improvement in mental status and respiratory effort  CT chest shows bibasilar ground glass opacities and effusions which could be infectious or related to volume overload  Given patient altered and hypothermic on arrival, she was covered with empiric broad-spectrum antibiotics  Cultures sent  Patient normothermic with Shira hugger  She failed trial off of BiPAP secondary to increased work of breathing and hypoxia  Patient was admitted to [facility] for further management with Critical Care consultation  Disposition  Final diagnoses:   Respiratory distress   CO2 narcosis   Ground glass opacity present on imaging of lung   Hypothermia, initial encounter     Time reflects when diagnosis was documented in both MDM as applicable and the Disposition within this note     Time User Action Codes Description Comment    2/3/2019 10:29 AM Dat Dowd Add [R06 03] Respiratory distress     2/3/2019 10:29 AM Dat Dowd Add [R06 89] CO2 narcosis     2/3/2019 10:29 AM Dat Dowd Add [R91 8] Ground glass opacity present on imaging of lung     2/3/2019 10:30 AM Dat Dowd Add Teetee Cho  XXXA] Hypothermia, initial encounter     2/3/2019 11:35 AM Bartholome Barrs Modify [R06 03] Respiratory distress     2/3/2019 11:36 AM Bartholome Barrs Modify [R06 03] Respiratory distress     2/3/2019 11:36 AM Bartholome Barrs Add [K22 0] Achalasia     2/3/2019 11:36 AM Bartholome Barrs Modify [R06 03] Respiratory distress     2/3/2019 11:36 AM Bartholome Barrs Modify [K22 0] Achalasia     2/3/2019 11:36 AM Bartholome Barrs Modify [R06 03] Respiratory distress     2/3/2019 11:36 AM Bartholome Barrs Modify [R06 03] Respiratory distress       ED Disposition     ED Disposition Condition Date/Time Comment    Admit  Sun Feb 3, 2019 10:30 AM Case was discussed with LARRY and the patient's admission status was agreed to be Admission Status: inpatient status to the service of Dr Esme Greer  Follow-up Information    None         Current Discharge Medication List      CONTINUE these medications which have NOT CHANGED    Details   acetaminophen (TYLENOL) 325 mg tablet Take 650 mg by mouth every 6 (six) hours as needed for mild pain  amiodarone 200 mg tablet Take 1 tablet (200 mg total) by mouth daily  Qty: 90 tablet, Refills: 3    Associated Diagnoses: Atrial fibrillation, unspecified type (HCC)      amLODIPine (NORVASC) 10 mg tablet Take 10 mg by mouth daily      apixaban (ELIQUIS) 5 mg Take 1 tablet (5 mg total) by mouth 2 (two) times a day  Qty: 180 tablet, Refills: 1    Associated Diagnoses: Atrial fibrillation, unspecified type ([facility])      Calcium Carbonate (CALTRATE 600 PO) Take 600 mg by mouth daily  carvedilol (COREG) 3 125 mg tablet Take 1 tablet (3 125 mg total) by mouth 2 (two) times a day with meals  Qty: 180 tablet, Refills: 3    Associated Diagnoses: Ascending aortic aneurysm ([address] );  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 ([facility])      fluticasone (FLONASE) 50 mcg/act nasal spray 1 spray into each nostril 2 (two) times a day      ipratropium (ATROVENT) 0 02 % nebulizer solution Take 1 vial (0 5 mg total) by nebulization every 6 (six) hours  Qty: 30 vial, Refills: 0    Associated Diagnoses: Multiple fractures of ribs, left side, initial encounter for closed fracture      isosorbide mononitrate (IMDUR) 60 mg 24 hr tablet Take 1 tablet (60 mg total) by mouth daily  Qty: 90 tablet, Refills: 3    Associated Diagnoses: Essential hypertension      KLOR-CON M20 20 MEQ tablet Take 1 tablet (20 mEq total) by mouth 2 (two) times a day  Qty: 180 tablet, Refills: 0    Associated Diagnoses: Hypokalemia levalbuterol (XOPENEX) 1 25 mg/0 5 mL nebulizer solution Take 0 5 mL (1 25 mg total) by nebulization every 6 (six) hours  Qty: 30 vial, Refills: 0    Associated Diagnoses: Multiple fractures of ribs, left side, initial encounter for closed fracture      metolazone (ZAROXOLYN) 5 mg tablet Take 5 mg by mouth daily      nitroglycerin (NITROSTAT) 0 4 mg SL tablet Place 1 tablet (0 4 mg total) under the tongue every 5 (five) minutes as needed for chest pain  Qty: 30 tablet, Refills: 0    Associated Diagnoses: Angina pectoris with documented spasm ([facility])      pravastatin (PRAVACHOL) 40 mg tablet Take 1 tablet (40 mg total) by mouth daily  Qty: 90 tablet, Refills: 3    Associated Diagnoses: Mixed hyperlipidemia      ! ! prazosin (MINIPRESS) 2 mg capsule Take 1 capsule (2 mg total) by mouth every morning  Qty: 90 capsule, Refills: 3    Associated Diagnoses: Hypertension, unspecified type      !! prazosin (MINIPRESS) 5 mg capsule Take 1 capsule (5 mg total) by mouth daily at bedtime  Qty: 90 capsule, Refills: 3    Associated Diagnoses: Essential hypertension      econazole nitrate 1 % cream Apply topically 2 (two) times a day  Qty: 85 g, Refills: 1    Associated Diagnoses: Candidal intertrigo       !! - Potential duplicate medications found  Please discuss with provider  No discharge procedures on file  ED Provider  Attending physically available and evaluated Rory Miller  USMAN managed the patient along with the ED Attending      Electronically Signed by         Tita Evans MD  02/03/19 8703

## 2019-02-03 NOTE — ED NOTES
Pt states can take smaller pills but if larger they need to be crushed, pt able to take Tylenol with minimal issues     Imani Grimm, RN  02/03/19 [address], RN  02/03/19 0685

## 2019-02-03 NOTE — ASSESSMENT & PLAN NOTE
On and off, ongoing since 2015  If further workup desired, patient could follow up as an outpatient with Urology

## 2019-02-03 NOTE — ED ATTENDING ATTESTATION
I, [address] [facility], DO, saw and evaluated the patient  I have discussed the patient with the resident/non-physician practitioner and agree with the resident's/non-physician practitioner's findings, Plan of Care, and MDM as documented in the resident's/non-physician practitioner's note, except where noted  All available labs and Radiology studies were reviewed  At this point I agree with the current assessment done in the Emergency Department  I have conducted an independent evaluation of this patient a history and physical is as follows:    20-year-old female presents with respiratory distress  Patient is from [facility] and just got there yesterday after being hospitalized for traumatic injuries and CHF  Per EMS patient was found respiratory distress with an oxygen saturation in the 40s  Patient has a DNR on her chart  Patient unable to give any reliable history secondary to altered mental status and respiratory difficulty  On exam-no acute distress, sleepy but arouses to voice, breathing about 7 times a minute, heart regular, lungs decreased bilaterally, abdomen soft, sling on right upper extremity  Plan-CT head, CT chest, chest x-ray, check labs, BiPAP  Dr Peggy Holguin spoke with POA who agrees patient is DNR and DNI  After review of chart, patient was hospitalized for scapular fracture    Given small dose of Narcan in ED with some improvement of symptoms    Critical Care Time  CriticalCare Time  Performed by: Robbie Ivan  Authorized by: Robbie Ivan     Critical care provider statement:     Critical care time (minutes):  32    Critical care time was exclusive of:  Separately billable procedures and treating other patients and teaching time    Critical care was necessary to treat or prevent imminent or life-threatening deterioration of the following conditions:  Respiratory failure    Critical care was time spent personally by me on the following activities:  Obtaining history from patient or surrogate, evaluation of patient's response to treatment, examination of patient, ordering and performing treatments and interventions, ordering and review of laboratory studies, ordering and review of radiographic studies, re-evaluation of patient's condition and review of old charts    I assumed direction of critical care for this patient from another provider in my specialty: no

## 2019-02-03 NOTE — ASSESSMENT & PLAN NOTE
Without acute exacerbation, no signs of volume overload at this time  Patient on metolazone daily without use of other diuretics as per recent discharge  Will hold diuretics for 24 hr, if patient remains hemodynamically stable will resume tomorrow  No indication for IV fluids at this specific time  Recent echocardiogram in January 2019 with ejection fraction of 57%, grade 2 diastolic dysfunction, normal pulmonary pressure

## 2019-02-03 NOTE — ASSESSMENT & PLAN NOTE
Continue with Eliquis for anticoagulation, continue amiodarone as well as Coreg  Telemetry monitoring on step-down level 2

## 2019-02-03 NOTE — ED NOTES
Pt removed from heating blanket, pt's temperature 97 2    Pt answers questions appropriately, remains sleepy, pt remains hypertensive     [address], RN  02/03/19 7091

## 2019-02-03 NOTE — ED NOTES
Attempted to take pt off bipap, pt with increased work of breathing, pt oxygen saturation in low 90's  Pt placed back on bipap for work of breathing  Pt aaox3, however, does not remember events of morning  Pt states she hates the "medical side" of [facility] [address] [place] [address]       Kit Cobos, ROBERT  02/03/19 Annemarie Bender, RN  02/03/19 6328

## 2019-02-03 NOTE — H&P
H&P- [place] [facility] 9/29/1930, 80 y o  female MRN: [id number]    Unit/Bed#: ED 14 Encounter: [id number]    Primary Care Provider: Fadia Perrin MD   Date and time admitted to hospital: 2/3/2019  6:29 AM        Abnormal urinalysis   Assessment & Plan    With hematuria and innumerable WBC, unclear at this time if UTI?   Patient is receiving cefepime for pneumonia, will obtain urine culture     Achalasia   Assessment & Plan    Speech pathology evaluation, GI evaluation     Non-toxic multinodular goiter   Assessment & Plan    As seen on CT scan of the chest  Outpatient follow-up if desired     Chronic kidney disease, stage III (moderate) ([address] )   Assessment & Plan    Creatinine appears to be a baseline, during recent admission she was found to have a KI which is currently resolved  Continue to monitor     Hematuria   Assessment & Plan    On and off, ongoing since 2015  If further workup desired, patient could follow up as an outpatient with Urology     Chronic diastolic congestive heart failure ([address] )   Assessment & Plan    Without acute exacerbation, no signs of volume overload at this time  Patient on metolazone daily without use of other diuretics as per recent discharge  Will hold diuretics for 24 hr, if patient remains hemodynamically stable will resume tomorrow  No indication for IV fluids at this specific time  Recent echocardiogram in January 2019 with ejection fraction of 64%, grade 2 diastolic dysfunction, normal pulmonary pressure     Ascending aortic aneurysm ([facility])   Assessment & Plan    5 4 cm as seen on recent CT scan of the chest, no further concerns at this time     Benign hypertension with CKD (chronic kidney disease) stage III ([address] )   Assessment & Plan    Will continue Coreg tonight as well as Minipress, holding parameter for systolic 313 and below  Resume amlodipine and day dose of Minipress tomorrow morning  Will monitor closely     Paroxysmal atrial fibrillation ([address] )   Assessment & Plan    Continue with Eliquis for anticoagulation, continue amiodarone as well as Coreg  Telemetry monitoring on step-down level 2     * Respiratory distress   Assessment & Plan    Possibly related to health acquired pneumonia given recent hospitalization and discharged yesterday versus aspiration pneumonia given the presence of achalasia on CT of the chest  Provide vancomycin, cefepime, metronidazole  For now will continue with BiPAP given the presence of both hypoxic and hypercapnic respiratory failure  Repeat an ABG now, if pH is improved discontinue BiPAP and try high-flow nasal cannula  Continue with scheduled nebulizers  Respiratory protocol  Pending blood cultures  Try and obtain sputum culture  Urinary antigens request  Aspiration precaution  Speech evaluation  GI evaluation    Of note, patient was recently evaluated by Pulmonary during recent hospital stay, no formal diagnosis of COPD or asthma, although suspicion for chronic hypoxic respiratory failure unclear if related to CHF versus use of amiodarone?   Will ask Pulmonary to evaluate again the patient  Given the poor effort on exam, unclear if wheezing although patient did receive prolonged nebulizer treatment in the ER  For now will start on steroids, Solu-Medrol t i d  IV  Follow further recommendation by Pulmonary group  Check for influenza and RSV  Complete respiratory pathogen profile  First procalcitonin is negative, will repeat tomorrow morning         VTE Prophylaxis: Apixaban (Eliquis)  / sequential compression device   Code Status:  DNR level 3  POLST: POLST is not applicable to this patient  Discussion with family:  Family discussion was held by ED provider with daughter bedside, at this time they would try reasonable attempt to treat possible pneumonia/UTI, although family does not wish for full resuscitation need therefore escalation of care    Anticipated Length of Stay:  Patient will be admitted on an Inpatient basis with an anticipated length of stay of greater than 2 midnights  Justification for Hospital Stay:  Please refer to above    Total Time for Visit, including Counseling / Coordination of Care: 1 hour  Greater than 50% of this total time spent on direct patient counseling and coordination of care  Chief Complaint:   Unable to obtain, patient is drowsy on BiPAP    History of Present Illness:    Ael Esqueda is a 80 y o  female who presents with acute hypoxic and hypercapnic respiratory failure with respiratory distress  This is an 51-year-old female with past medical history of anemia, GERD, aortic valve regurgitation, ascending aorta aneurysm, CKD stage 3, hypertension, chronic venous insufficiency, chronic lower extremities lymphedema, chronic diastolic heart failure, who came to the hospital today from rehabilitation center for an episode of respiratory distress  She was recently hospitalized under trauma service after a fall at home reporting several rib fractures plus left scapula fracture  She was discharged in good medical condition yesterday to rehabilitation center  Unfortunately, patient today developed respiratory distress, severe hypoxia and in the ER she was found to be acidotic with severe hypercapnia  She was started on BiPAP and antibiotic for possible aspiration/Hcap  No history can be obtained by the patient, she appears drowsy on BiPAP, she can be woken up although her attention span is very short  Also received 1 dose of Narcan in the ER without change in her respiratory status      Review of Systems:    Review of Systems   Unable to perform ROS: Acuity of condition       Past Medical and Surgical History:     Past Medical History:   Diagnosis Date    Acid reflux disease     Anemia     Last assessed: 10/26/16    Aortic valve regurgitation, nonrheumatic     Ascending aortic aneurysm ([address] )     Last assessed: 6/5/13    Atrial fibrillation ([address] )     Hyperlipidemia     Hypertension 2/17/2016    Paroxysmal atrial fibrillation ([facility] ) 2/17/2016       Past Surgical History:   Procedure Laterality Date    CARDIOVERSION      CHOLECYSTECTOMY      LAPAROSCOPIC SALPINGOOPHERECTOMY      TONSILLECTOMY         Meds/Allergies:    Prior to Admission medications    Medication Sig Start Date End Date Taking? Authorizing Provider   acetaminophen (TYLENOL) 325 mg tablet Take 650 mg by mouth every 6 (six) hours as needed for mild pain  Yes Historical Provider, MD   amiodarone 200 mg tablet Take 1 tablet (200 mg total) by mouth daily 3/14/18  Yes Lydia Woodall MD   amLODIPine (NORVASC) 10 mg tablet Take 10 mg by mouth daily   Yes Historical Provider, MD   apixaban (ELIQUIS) 5 mg Take 1 tablet (5 mg total) by mouth 2 (two) times a day 12/3/18  Yes Lydia Woodall MD   Calcium Carbonate (CALTRATE 600 PO) Take 600 mg by mouth daily     Yes Historical Provider, MD   carvedilol (COREG) 3 125 mg tablet Take 1 tablet (3 125 mg total) by mouth 2 (two) times a day with meals 4/11/18  Yes Lydia Woodall MD   fluticasone (FLONASE) 50 mcg/act nasal spray 1 spray into each nostril 2 (two) times a day 2/3/16  Yes Historical Provider, MD   ipratropium (ATROVENT) 0 02 % nebulizer solution Take 1 vial (0 5 mg total) by nebulization every 6 (six) hours 2/2/19  Yes Shannan Landon MD   isosorbide mononitrate (IMDUR) 60 mg 24 hr tablet Take 1 tablet (60 mg total) by mouth daily 7/26/18  Yes Lydia Woodall MD   KLOR-CON M20 20 MEQ tablet Take 1 tablet (20 mEq total) by mouth 2 (two) times a day 12/26/18  Yes Lydia Woodall MD   levalbuterol (XOPENEX) 1 25 mg/0 5 mL nebulizer solution Take 0 5 mL (1 25 mg total) by nebulization every 6 (six) hours 2/2/19  Yes Shannan Landon MD   metolazone (ZAROXOLYN) 5 mg tablet Take 5 mg by mouth daily   Yes Historical Provider, MD   nitroglycerin (NITROSTAT) 0 4 mg SL tablet Place 1 tablet (0 4 mg total) under the tongue every 5 (five) minutes as needed for chest pain 2/6/18  Yes Hamden Night, MD   pravastatin (PRAVACHOL) 40 mg tablet Take 1 tablet (40 mg total) by mouth daily 9/27/18  Yes Leslie Nava MD   prazosin (MINIPRESS) 2 mg capsule Take 1 capsule (2 mg total) by mouth every morning 5/2/18  Yes Leslie Nava MD   prazosin (MINIPRESS) 5 mg capsule Take 1 capsule (5 mg total) by mouth daily at bedtime 3/28/18  Yes Leslie Nava MD   econazole nitrate 1 % cream Apply topically 2 (two) times a day 10/3/18   Leslie Nava MD   metolazone (ZAROXOLYN) 2 5 mg tablet Take 2 tablets (5 mg total) by mouth daily Take 2 for the next 3 days and as directed take as needed for increased leg swelling (148lbs)  Patient taking differently: Take 5 mg by mouth daily   12/12/18 2/3/19  Leslie Nava MD     Recent discharge    Allergies: Allergies   Allergen Reactions    Aspirin     Percocet [Oxycodone-Acetaminophen]        Social History:     Marital Status:      Substance Use History:   History   Alcohol Use No     History   Smoking Status    Never Smoker   Smokeless Tobacco    Never Used     History   Drug Use No       Family History:    non-contributory    Physical Exam:     Vitals:   Blood Pressure: 167/72 (02/03/19 1100)  Pulse: 56 (02/03/19 1100)  Temperature: 98 6 °F (37 °C) (02/03/19 1100)  Temp Source: Rectal (02/03/19 2223)  Respirations: (!) 23 (02/03/19 1100)  Weight - Scale: 72 6 kg (160 lb) (02/03/19 0634)  SpO2: 99 % (02/03/19 1100)    Physical Exam   Constitutional: She appears well-developed  No distress  Cardiovascular: Normal rate, regular rhythm and normal heart sounds  Exam reveals no friction rub  No murmur heard  Pulmonary/Chest:   Very poor effort on exam, anterior auscultation secondary to poor mobility, at this point no clear abnormalities are identified although exam is extremely limited   Abdominal: Soft  She exhibits no distension  There is no tenderness  Musculoskeletal: Normal range of motion  Neurological:   Drowsy, can be woken up, very short attention span   Skin: Skin is warm     Chronic discoloration of the skin bilateral lower extremities           Additional Data:     Lab Results: I have personally reviewed pertinent reports  Results from last 7 days  Lab Units 02/03/19  0645   WBC Thousand/uL 7 53   HEMOGLOBIN g/dL 9 7*   HEMATOCRIT % 35 0   PLATELETS Thousands/uL 225   NEUTROS PCT % 79*   LYMPHS PCT % 5*   MONOS PCT % 12   EOS PCT % 2       Results from last 7 days  Lab Units 02/03/19  0647   SODIUM mmol/L 142   POTASSIUM mmol/L 4 8   CHLORIDE mmol/L 106   CO2 mmol/L 30   BUN mg/dL 45*   CREATININE mg/dL 1 06   ANION GAP mmol/L 6   CALCIUM mg/dL 8 6   ALBUMIN g/dL 3 0*   TOTAL BILIRUBIN mg/dL 0 43   ALK PHOS U/L 95   ALT U/L 79*   AST U/L 85*   GLUCOSE RANDOM mg/dL 158*       Results from last 7 days  Lab Units 02/03/19  0646   INR  1 25*               Results from last 7 days  Lab Units 02/03/19  0704 02/03/19  0645   LACTIC ACID mmol/L 1 5  --    PROCALCITONIN ng/ml  --  0 22       Imaging: I have personally reviewed pertinent reports  CT chest without contrast   Final Result by Afia Gilbert DO (02/03 5922)   Addendum 1 of 1 by Afia Gilbert DO (02/03 0669)   ADDENDUM:      ADDENDUM      There is a voice recognition software related error in the IMPRESSION of    the report  A phrase which reads    " 4  Diffuse abnormal appearance of the esophagus  Correlate for rectal    lesion  "          should read,    " 4  Diffuse abnormal appearance of the esophagus  Correlate for    ACHALASIA  "             Final   1  Groundglass infiltrates with more focal areas of consolidation in the lower lobes of the lungs bilaterally with associated bilateral pleural effusions  These findings have progressed from the prior study  Although the findings likely represent    congestive heart failure and volume overload with bibasilar atelectasis, superimposed pneumonia such as aspiration related, not entirely excluded  Clinical correlation and follow-up examination recommended     2   Cardiomegaly with 5 4 cm ascending aortic aneurysm, stable  3   Incidental thyromegaly with bilateral nodules identified on this CT  According to guidelines published in the February 2015 white paper on incidental thyroid nodules in the Journal of the [employer] of Radiology Miracle Rodríguez), Because the nodule(s)    are greater than 1 5 cm in size, further characterization with thyroid ultrasound is recommended  4   Diffuse abnormal appearance of the esophagus  Correlate for rectal lesion  The study was marked in [facility] for immediate notification  Workstation performed: [id number]         CT head without contrast   Final Result by Reji Smith DO (02/03 9409)   Cerebral atrophy with chronic small vessel ischemic change  Stable right parafalcine calcified soft tissue mass, most compatible with meningioma  No acute intracranial abnormality  No significant change from prior study  Workstation performed: [id number]         XR chest 1 view portable   ED Interpretation by Rosendo Mcgarry MD (52/71 3415)   Pulmonary vascular congestion            Allscripts / Epic Records Reviewed: Yes     ** Please Note: This note has been constructed using a voice recognition system   **

## 2019-02-03 NOTE — CONSULTS
Pulmonary Consultation   Tyree Meigs 80 y o  female MRN: [id number]  Unit/Bed#: [facility] 409-01 Encounter: [id number]      Reason for consultation:   Respiratory distress    Requesting physician:   Dr Raven Castañeda    Impressions/Recommendations:    1  Acute on chronic hypoxic and hypercapnic respiratory failure-no diagnosis of COPD, never smoker-suspect secondary to volume overload  1  Initial SpO2 of 46% at nursing facility on room air, was initially requiring BiPAP therapy now transitioned quickly to 4 L nasal cannula in oxygenating well  2  Continue titrate oxygen maintain SpO2 greater than or equal to 90%  3  Encourage aggressive pulmonary toilet:  Incentive spirometry q 1 hour, out of bed with increasing ambulation as tolerated  4  No indication for further steroids  5  Continue nebulizer treatments TID  2  Acute on chronic diastolic heart failure  1  Recommend diuresis with IV Lasix-40 mg given per critical care recommendation   2  Strict I&O Daily weights  3  Home regimen consists of 40 mg torsemide twice a day and 2 5 zaroxlyn daily     3  Abnormal chest Ct with diffuse groundglass  Opacities with increasing bilateral pleural effusions as well as evidence of achalasia-with symptoms of dysphagia  1  Diuresis indicated above  2  procalcitonin negative, antibiotics continued, will recheck PCT in the AM if negative recommend discontinuation of ABX  3  Speech/GI evaluation recommended for achalasia   4  PAF  1  Medical management per primary    History of Present Illness   HPI:  Tyree Meigs is a 80 y o  female seen in consult respiratory distress  She has a past medical history significant for grade 2 diastolic chronic heart failure, PAF, hypertension, ascending aortic aneurysm, and GERD  She has most recently admitted to the hospital 01/23/2019 and discharged on 02/02/2019 after sustaining a mechanical fall resulting in a left scapula fracture as well as left multiple rib fractures    During this time she developed hypoxic and hypercapnic respiratory failure requiring a short period of time BiPAP and oxygen therapy, which she was not on prior to admission  Hypoxia was later contributed to atelectasis and poor pulmonary toilet as well as diastolic heart failure  She was admitted early this morning from her known nursing facility with hypertension, hypothermia and respiratory distress  Found to be on RA with SpO2 of 46%  Chest CT demonstrated increase in ground-glass opacities and bilateral pleural effusions  She was started on antibiotics, and initially placed on BiPAP therapy and quickly transition to nasal cannula  Procalcitonin negative, without leukocytosis  At the time of evaluation she reports improvement symptoms in is alert oriented in oxygenating well on 4 L nasal cannula  Denies significant cough or sputum production  Currently she denies fevers or chills night sweats, chest pain, bronchospasm, headache, dizziness or hemoptysis  From pulmonary standpoint she is not follow up patient with pulmonologist nor did she have a formal lung disease diagnosis  She is a never smoker and does not have a diagnosis of COPD  She has not required oxygen untill her most recent hospital stay  Her ABG during this admission does not demonstrat symptoms of chronic hypercapnia, as well as bicarb is within normal limits on chemistry  No signs of untreated GERD, although chest CT concerning for achalasia, and Brandee Contreras does report some difficulty with swallowing  Denies symptoms of ENID, known exposures  Review of systems:  12 point review of systems was completed and was otherwise negative except as listed in HPI        Historical Information   Past Medical History:   Diagnosis Date    Acid reflux disease     Anemia     Last assessed: 10/26/16    Aortic valve regurgitation, nonrheumatic     Ascending aortic aneurysm (HCC)     Last assessed: 6/5/13    Atrial fibrillation (HCC)     Hyperlipidemia     Hypertension 2/17/2016    Paroxysmal atrial fibrillation ([facility] [address] ) 2/17/2016     Past Surgical History:   Procedure Laterality Date    CARDIOVERSION      CHOLECYSTECTOMY      LAPAROSCOPIC SALPINGOOPHERECTOMY      TONSILLECTOMY       Family History   Problem Relation Age of Onset    No Known Problems Mother     Stomach cancer Father     Heart disease Sister         Cardiac Disorder    Breast cancer Sister     Colon cancer Brother     Cancer Brother        Occupational history:   Noncontributory  Tobacco history:   Never smoker    Meds/Allergies   Current Facility-Administered Medications   Medication Dose Route Frequency     EMS REPLENISHMENT MED   Does not apply Once    acetaminophen (TYLENOL) tablet 650 mg  650 mg Oral Q6H PRN    amiodarone tablet 200 mg  200 mg Oral Daily    [START ON 2/4/2019] amLODIPine (NORVASC) tablet 10 mg  10 mg Oral Daily    apixaban (ELIQUIS) tablet 5 mg  5 mg Oral BID    carvedilol (COREG) tablet 3 125 mg  3 125 mg Oral BID With Meals    cefepime (MAXIPIME) 2 g/50 mL dextrose IVPB  2,000 mg Intravenous Q12H    docusate sodium (COLACE) capsule 100 mg  100 mg Oral BID    econazole nitrate 1 % cream   Topical BID    fluticasone (FLONASE) 50 mcg/act nasal spray 1 spray  1 spray Nasal BID    furosemide (LASIX) injection 40 mg  40 mg Intravenous Once    guaiFENesin (MUCINEX) 12 hr tablet 600 mg  600 mg Oral Q12H [facility]    ipratropium (ATROVENT) 0 02 % inhalation solution 0 5 mg  0 5 mg Nebulization TID    isosorbide mononitrate (IMDUR) 24 hr tablet 60 mg  60 mg Oral Daily    levalbuterol (XOPENEX) inhalation solution 1 25 mg  1 25 mg Nebulization TID    methylPREDNISolone sodium succinate (Solu-MEDROL) injection 40 mg  40 mg Intravenous Q8H    metroNIDAZOLE (FLAGYL) IVPB (premix) 500 mg  500 mg Intravenous Q8H    nitroglycerin (NITROSTAT) SL tablet 0 4 mg  0 4 mg Sublingual Q5 Min PRN    potassium chloride (K-DUR,KLOR-CON) CR tablet 20 mEq  20 mEq Oral BID    pravastatin (PRAVACHOL) tablet 40 mg  40 mg Oral Daily    [START ON 2/4/2019] prazosin (MINIPRESS) capsule 2 mg  2 mg Oral QAM    prazosin (MINIPRESS) capsule 5 mg  5 mg Oral HS    senna 8 8 mg/5 mL oral syrup 8 8 mg  8 8 mg Oral Daily    [START ON 2/4/2019] vancomycin (VANCOCIN) 1,250 mg in sodium chloride 0 9 % 250 mL IVPB  20 mg/kg (Adjusted) Intravenous Q24H     Prescriptions Prior to Admission   Medication    acetaminophen (TYLENOL) 325 mg tablet    amiodarone 200 mg tablet    amLODIPine (NORVASC) 10 mg tablet    apixaban (ELIQUIS) 5 mg    Calcium Carbonate (CALTRATE 600 PO)    carvedilol (COREG) 3 125 mg tablet    fluticasone (FLONASE) 50 mcg/act nasal spray    ipratropium (ATROVENT) 0 02 % nebulizer solution    isosorbide mononitrate (IMDUR) 60 mg 24 hr tablet    KLOR-CON M20 20 MEQ tablet    levalbuterol (XOPENEX) 1 25 mg/0 5 mL nebulizer solution    metolazone (ZAROXOLYN) 5 mg tablet    nitroglycerin (NITROSTAT) 0 4 mg SL tablet    pravastatin (PRAVACHOL) 40 mg tablet    prazosin (MINIPRESS) 2 mg capsule    prazosin (MINIPRESS) 5 mg capsule    econazole nitrate 1 % cream     Allergies   Allergen Reactions    Aspirin     Percocet [Oxycodone-Acetaminophen]        Vitals: Blood pressure 167/61, pulse (!) 54, temperature 99 °F (37 2 °C), resp  rate (!) 24, height 5' (1 524 m), weight 72 6 kg (160 lb), SpO2 98 %  , 4 L nasal cannula, Body mass index is 31 25 kg/m²        Intake/Output Summary (Last 24 hours) at 02/03/19 1616  Last data filed at 02/03/19 1511   Gross per 24 hour   Intake                0 ml   Output              675 ml   Net             -675 ml       Physical exam:    General Appearance:    Alert, cooperative, no conversational dyspnea or accessory     muscle use       Head/eyes:    Normocephalic, without obvious abnormality, atraumatic,         PERRL, extraocular muscles intact, no scleral icterus    Nose:   Nares normal, septum midline, mucosa normal, no drainage    or sinus tenderness, oxygen via nasal cannula   Throat:   Moist mucous membranes, no thrush   Neck:   Supple, trachea midline, no adenopathy; no carotid    bruit or JVD   Lungs:     Scattered rales and rhonchi, no wheezes   Chest Wall:    No tenderness or deformity    Heart:    Regular rate and rhythm, S1 and S2 normal, no murmur, rub   or gallop   Abdomen:     Soft, non-tender, bowel sounds active all four quadrants,     no masses, no organomegaly   Extremities:   Extremities normal, atraumatic, no cyanosis bilateral upper extremity nonpitting edema   Skin:   Warm, dry, turgor normal, no rashes or lesions   Lymph nodes:   Cervical and supraclavicular nodes normal   Neurologic:   CNII-XII intact, normal strength, non-focal         Labs: I have personally reviewed pertinent lab results  , ABG: No results found for: PHART, HAO9WBY, PO2ART, EFO7ZRG, E9PFRCTG, BEART, SOURCE, BNP: No results found for: BNP, CBC:   Lab Results   Component Value Date    WBC 7 53 02/03/2019    HGB 8 5 (L) 02/03/2019    HCT 25 (L) 02/03/2019    MCV 93 02/03/2019     02/03/2019    MCH 25 7 (L) 02/03/2019    MCHC 27 7 (L) 02/03/2019    RDW 21 0 (H) 02/03/2019    MPV 11 0 02/03/2019    NRBC 0 02/03/2019   , CMP:   Lab Results   Component Value Date    SODIUM 142 02/03/2019    K 4 8 02/03/2019     02/03/2019    CO2 37 (H) 02/03/2019    BUN 45 (H) 02/03/2019    CREATININE 1 06 02/03/2019    GLUCOSE 118 02/03/2019    CALCIUM 8 6 02/03/2019    AST 85 (H) 02/03/2019    ALT 79 (H) 02/03/2019    ALKPHOS 95 02/03/2019    EGFR 47 02/03/2019    EGFR 40 02/03/2019     Procalcitonin 02/03/2018:  22    Imaging and other studies: I have personally reviewed pertinent reports     and I have personally reviewed pertinent films in PACS  Chest CT 2/3/2019    FINDINGS:     LUNGS:    The lungs are well aerated      There is significant respiratory motion artifact      There are diffuse groundglass infiltrates throughout the lungs bilaterally with more focal areas of consolidation in the lower lobes bilaterally  There are intervening air bronchograms  There are bilateral pleural effusions, right side greater than   left  These findings have significantly progressed from the prior examination      PLEURA:  Bilateral pleural effusions, right side greater than left  These have progressed from the prior study  Pulmonary function testing: none    EKG, Pathology, and Other Studies: I have personally reviewed pertinent reports     and I have personally reviewed pertinent films in PACS  Echocardiogram 1/31/2019  EF 65% with grade 3 diastolic dysfunction  LA marked dilated  RA moderately dilated  Moderate aortic regurgitation  Mild tricuspid  Regurgitation  Marker dilation of the aorta  Small to moderate pericardial effusion  Small left pleural effusion  Code Status: Level 3 - DNAR and DNI      Patience Bro Carry

## 2019-02-03 NOTE — ED NOTES
Pt transported to ct scan on bipap, with RN    Pt more responsive asking  what happened, pt more oriented     Jonah Delcid, ROBERT  02/03/19 6773

## 2019-02-03 NOTE — ASSESSMENT & PLAN NOTE
Will continue Coreg tonight as well as Minipress, holding parameter for systolic 690 and below  Resume amlodipine and day dose of Minipress tomorrow morning  Will monitor closely

## 2019-02-03 NOTE — SEPSIS NOTE
Sepsis Note   Rory Miller 80 y o  female MRN: [id number]  Unit/Bed#: ED 14 Encounter: [id number]            Initial Sepsis Screening     Row Name 02/03/19 1030                Is the patient's history suggestive of a new or worsening infection? (!)  Yes (Proceed)  -AD        Suspected source of infection pneumonia  -AD        Are two or more of the following signs & symptoms of infection both present and new to the patient? (!)  Yes (Proceed)  -AD        Indicate SIRS criteria Hypothermia < 36C (96 8F); Altered mental status; Tachypnea > 20 resp per min  -AD        If the answer is yes to both questions, suspicion of sepsis is present          If severe sepsis is present AND tissue hypoperfusion perists in the hour after fluid resuscitation or lactate > 4, the patient meets criteria for SEPTIC SHOCK          Are any of the following organ dysfunction criteria present within 6 hours of suspected infection and SIRS criteria that are NOT considered to be chronic conditions? No  -AD        Organ dysfunction          Date of presentation of severe sepsis          Time of presentation of severe sepsis          Tissue hypoperfusion persists in the hour after crystalloid fluid administration, evidenced, by either:          Was hypotension present within one hour of the conclusion of crystalloid fluid administration?  No  -AD        Date of presentation of septic shock          Time of presentation of septic shock            User Key  (r) = Recorded By, (t) = Taken By, (c) = Cosigned By    [address] Name Provider Type    AD Tita Evans MD Resident

## 2019-02-03 NOTE — PROGRESS NOTES
Vancomycin Assessment    Jaime Hope is a 80 y o  female who is currently receiving vancomycin 1000mg IV q24H for MRSA suspected, Pneumonia     Relevant clinical data and objective history reviewed:  Creatinine   Date Value Ref Range Status   02/03/2019 1 06 0 60 - 1 30 mg/dL Final     Comment:     Standardized to IDMS reference method   02/02/2019 1 14 0 60 - 1 30 mg/dL Final     Comment:     Standardized to IDMS reference method   02/01/2019 1 42 (H) 0 60 - 1 30 mg/dL Final     Comment:     Standardized to IDMS reference method   09/23/2015 0 58 (L) 0 60 - 1 30 mg/dL Final     Comment:     Standardized to IDMS reference method   09/13/2015 0 87 0 60 - 1 30 mg/dL Final     Comment:     Standardized to IDMS reference method     /72 (BP Location: Right arm)   Pulse 56   Temp 98 6 °F (37 °C)   Resp (!) 23   Wt 72 6 kg (160 lb)   SpO2 99%   BMI 30 23 kg/m²   No intake/output data recorded  Lab Results   Component Value Date/Time    BUN 45 (H) 02/03/2019 06:47 AM    BUN 22 09/23/2015 10:40 AM    WBC 7 53 02/03/2019 06:45 AM    WBC 6 23 09/21/2015 12:12 PM    HGB 9 7 (L) 02/03/2019 06:45 AM    HGB 12 5 09/21/2015 12:12 PM    HCT 35 0 02/03/2019 06:45 AM    HCT 38 6 09/21/2015 12:12 PM    MCV 93 02/03/2019 06:45 AM    MCV 97 09/21/2015 12:12 PM     02/03/2019 06:45 AM     09/21/2015 12:12 PM     Temp Readings from Last 3 Encounters:   02/03/19 98 6 °F (37 °C)   02/02/19 98 4 °F (36 9 °C)   09/07/18 97 9 °F (36 6 °C)     Vancomycin Days of Therapy: 1    Assessment/Plan  The patient is currently on vancomycin utilizing scheduled dosing based on adjusted body weight (due to obesity)  Baseline risks associated with therapy include: advanced age  The patient is currently receiving 1000mg IV q24H and after clinical evaluation will be changed to 1250mg IV q24H   Will start dose 5 hours earlier tomorrow (at 6am since first dose was slightly lower than recommended)  Pharmacy will also follow closely for s/sx of nephrotoxicity, infusion reactions and appropriateness of therapy  BMP and CBC will be ordered per protocol  Plan for trough as patient approaches steady state, prior to the 4th  dose (starting with 1250mg IV dose) at approximately 5:30AM on Thursday, 2/7  Due to infection severity, will target a trough of 15-20 (appropriate for most indications)   Pharmacy will continue to follow the patients culture results and clinical progress daily      Buddy Gordon, Pharmacist

## 2019-02-03 NOTE — CONSULTS
Chaparro 69 F Sopko 80 y o  female MRN: [id number]  Unit/Bed#: RAMON Encounter: [id number]    Physician Requesting Consult: Patricia Patterson,     Reason for Consultation / Chief Complaint: change in mental status, respiratory distress    History of Present Illness:  Evelyn Bradley is a 80 y o  female with past medical history of atrial fibrillation on Eliquis, CKD 3, diastolic heart failure with 65%, aortic valve regurgitation, ascending aortic aneurysm who presents after being found unresponsive and with respiratory depression by [address] [place] [address] staff  Of note, patient was recently discharged from [facility] yesterday  Patient had recent fall and noted to have L scapular fracture and L rib fractures, pain managed for few days with epidural catheter  Her hospitalization was complicated by acute on chronic kidney injury, respiratory failure secondary to decompensated heart failure  She was followed by renal and cardiology, discharged on metolazone or 3 days and as needed for weight gain/leg swelling  She was also seen by pulmonary who recommended PFT studies concern for underlying lung disease and bipap recs to keep saturations > 88%  History obtained from patient, daughter and chart review      Past Medical History:  Past Medical History:   Diagnosis Date    Acid reflux disease     Anemia     Last assessed: 10/26/16    Aortic valve regurgitation, nonrheumatic     Ascending aortic aneurysm (HCC)     Last assessed: 6/5/13    Atrial fibrillation (HCC)     Hyperlipidemia     Hypertension 2/17/2016    Paroxysmal atrial fibrillation ([facility] [address] ) 2/17/2016       Past Surgical History:  Past Surgical History:   Procedure Laterality Date    CARDIOVERSION      CHOLECYSTECTOMY      LAPAROSCOPIC SALPINGOOPHERECTOMY      TONSILLECTOMY         Past Family History:  Family History   Problem Relation Age of Onset    No Known Problems Mother     Stomach cancer Father     Heart disease Sister         Cardiac Disorder    Breast cancer Sister     Colon cancer Brother     Cancer Brother        Social History:  History   Smoking Status    Never Smoker   Smokeless Tobacco    Never Used     History   Alcohol Use No     History   Drug Use No     Marital Status:     Home Medications:   Prior to Admission medications    Medication Sig Start Date End Date Taking? Authorizing Provider   acetaminophen (TYLENOL) 325 mg tablet Take 650 mg by mouth every 6 (six) hours as needed for mild pain  Yes Historical Provider, MD   amiodarone 200 mg tablet Take 1 tablet (200 mg total) by mouth daily 3/14/18  Yes Kami Price MD   amLODIPine (NORVASC) 10 mg tablet Take 10 mg by mouth daily   Yes Historical Provider, MD   apixaban (ELIQUIS) 5 mg Take 1 tablet (5 mg total) by mouth 2 (two) times a day 12/3/18  Yes Kami Price MD   Calcium Carbonate (CALTRATE 600 PO) Take 600 mg by mouth daily     Yes Historical Provider, MD   carvedilol (COREG) 3 125 mg tablet Take 1 tablet (3 125 mg total) by mouth 2 (two) times a day with meals 4/11/18  Yes Kami Price MD   fluticasone (FLONASE) 50 mcg/act nasal spray 1 spray into each nostril 2 (two) times a day 2/3/16  Yes Historical Provider, MD   ipratropium (ATROVENT) 0 02 % nebulizer solution Take 1 vial (0 5 mg total) by nebulization every 6 (six) hours 2/2/19  Yes Kaitlyn Tang MD   isosorbide mononitrate (IMDUR) 60 mg 24 hr tablet Take 1 tablet (60 mg total) by mouth daily 7/26/18  Yes Kami Price MD   KLOR-CON M20 20 MEQ tablet Take 1 tablet (20 mEq total) by mouth 2 (two) times a day 12/26/18  Yes Kami Price MD   levalbuterol (XOPENEX) 1 25 mg/0 5 mL nebulizer solution Take 0 5 mL (1 25 mg total) by nebulization every 6 (six) hours 2/2/19  Yes Kaitlyn Tang MD   metolazone (ZAROXOLYN) 5 mg tablet Take 5 mg by mouth daily   Yes Historical Provider, MD   nitroglycerin (NITROSTAT) 0 4 mg SL tablet Place 1 tablet (0 4 mg total) under the tongue every 5 (five) minutes as needed for chest pain 2/6/18  Yes Amanda Mustafa MD   pravastatin (PRAVACHOL) 40 mg tablet Take 1 tablet (40 mg total) by mouth daily 9/27/18  Yes Vangie Chávez MD   prazosin (MINIPRESS) 2 mg capsule Take 1 capsule (2 mg total) by mouth every morning 5/2/18  Yes Vangie Chávez MD   prazosin (MINIPRESS) 5 mg capsule Take 1 capsule (5 mg total) by mouth daily at bedtime 3/28/18  Yes Vangie Chávez MD   econazole nitrate 1 % cream Apply topically 2 (two) times a day 10/3/18   Vangie Chávez MD   metolazone (ZAROXOLYN) 2 5 mg tablet Take 2 tablets (5 mg total) by mouth daily Take 2 for the next 3 days and as directed take as needed for increased leg swelling (148lbs)  Patient taking differently: Take 5 mg by mouth daily   12/12/18 2/3/19  Vangie Chávez MD       Inpatient Medications:  Scheduled Meds:  Current Facility-Administered Medications:  EMS replenish medication  Does not apply Once Cody Miguel,    acetaminophen 650 mg Oral Q6H PRN Petros Ng MD   amiodarone 200 mg Oral Daily MD Marie Pritchard ON 2/4/2019] amLODIPine 10 mg Oral Daily Petros Ng MD   apixaban 5 mg Oral BID Petros Ng MD   carvedilol 3 125 mg Oral BID With Meals Petros Ng MD   cefepime 2,000 mg Intravenous Q12H Petros Ng MD   docusate sodium 100 mg Oral BID Petros Ng MD   econazole nitrate  Topical BID Petros Ng MD   fluticasone 1 spray Nasal BID Petros Ng MD   guaiFENesin 600 mg Oral Q12H [facility] Petros Ng MD   ipratropium 0 5 mg Nebulization TID Alonzo Acron Trager, DO   isosorbide mononitrate 60 mg Oral Daily Petros Ng MD   levalbuterol 1 25 mg Nebulization TID Alonzo Acron Trager, DO   methylPREDNISolone sodium succinate 40 mg Intravenous Q8H Petros Ng MD   metroNIDAZOLE 500 mg Intravenous Q8H Petros Ng MD   nitroglycerin 0 4 mg Sublingual Q5 Min PRN Petros Ng MD   potassium chloride 20 mEq Oral BID Petros Ng MD   pravastatin 40 mg Oral Daily Zehra Torre MD   [START ON 2/4/2019] prazosin 2 mg Oral QAM Zehra Torre MD   prazosin 5 mg Oral HS Zehra Torre MD   senna 8 8 mg Oral Daily Zehra Torre MD   [START ON 2/4/2019] vancomycin 20 mg/kg (Adjusted) Intravenous Q24H Zehra Torre MD     Continuous Infusions:   PRN Meds:    acetaminophen 650 mg Q6H PRN   nitroglycerin 0 4 mg Q5 Min PRN       Allergies: Allergies   Allergen Reactions    Aspirin     Percocet [Oxycodone-Acetaminophen]        ROS:   Review of Systems   Constitutional: Negative for chills, diaphoresis, fatigue and fever  HENT: Positive for congestion  Negative for sore throat  Eyes:        No vision changes   Respiratory: Positive for cough  Negative for shortness of breath and wheezing  Started with sputum production, non-bloody/not yellow or green or odorous   Cardiovascular: Negative for chest pain and leg swelling  States baseline bilateral leg swelling, greater improved in comparison to baseline per patient   Gastrointestinal: Positive for diarrhea  Negative for abdominal pain, constipation and nausea  Reported diarrhea yesterday and today   Genitourinary: Negative for difficulty urinating, dysuria, flank pain and hematuria  Musculoskeletal: Positive for back pain  Negative for neck pain  Rib pain   Skin:        Bruising on bilateral arms, chest   Neurological: Negative for dizziness, tremors, weakness, light-headedness and headaches  Psychiatric/Behavioral: Negative for agitation and confusion  The patient is not nervous/anxious          Vitals:  Vitals:    02/03/19 1145 02/03/19 1200 02/03/19 1300 02/03/19 1400   BP: 167/67 150/63 164/67 167/61   BP Location: Right arm Right arm Right arm Right arm   Pulse: (!) 50 (!) 50 (!) 52 (!) 54   Resp: 16 18 (!) 24 (!) 24   Temp: 99 °F (37 2 °C) 99 °F (37 2 °C) 99 °F (37 2 °C) 99 °F (37 2 °C)   TempSrc:       SpO2: 99% 98% 98% 97%   Weight:         Temperature: Temp (24hrs), Av 8 °F (36 6 °C), Min:95 9 °F (35 5 °C), Max:99 °F (37 2 °C)    Current Temperature: 99 °F (37 2 °C)    Weights:   IBW: -92 5 kg  Body mass index is 30 23 kg/m²  Hemodynamic Monitoring:  N/A     Non-Invasive/Invasive Ventilation Settings:  Respiratory    Lab Data (Last 4 hours)    None         O2/Vent Data (Last 4 hours)    None              No results found for: PHART, BAW7BIF, PO2ART, JTU4DPD, A7KIRXES, BEART, SOURCE  SpO2: SpO2: 98 %     Physical Exam:  Physical Exam   Constitutional: She is oriented to person, place, and time  No distress  HENT:   Head: Normocephalic and atraumatic  Eyes: Pupils are equal, round, and reactive to light  Neck: Normal range of motion  Neck supple  No JVD present  No tracheal deviation present  Cardiovascular: Normal rate, regular rhythm, normal heart sounds and intact distal pulses  No murmur heard  Pulmonary/Chest: Effort normal and breath sounds normal  No stridor  No respiratory distress  She has no wheezes  She exhibits tenderness  Bilateral lung sounds clear, bibasilar diminished   Abdominal: Soft  Bowel sounds are normal  She exhibits no distension  There is no tenderness  There is no rebound and no guarding  Musculoskeletal: Normal range of motion  She exhibits edema  Bilateral upper extremities +1 edema   Neurological: She is alert and oriented to person, place, and time  Skin: Skin is warm and dry  She is not diaphoretic  Psychiatric: She has a normal mood and affect         Labs:    Results from last 7 days  Lab Units 19  1219 19  0645 19  7301 19  0857 19  0526 19  0756   WBC Thousand/uL  --  7 53  --  10 90* 6 29 7 52   HEMOGLOBIN g/dL  --  9 7*  --  9 4* 9 2* 8 2*   I STAT HEMOGLOBIN g/dl 8 5*  --  10 9*  10 9*  --   --   --    HEMATOCRIT %  --  35 0  --  33 4* 32 4* 29 5*   HEMATOCRIT, ISTAT % 25*  --  32*  32*  --   --   --    PLATELETS Thousands/uL  --  225  --  146* 140* 138* NEUTROS PCT %  --  79*  --  88* 80* 86*   MONOS PCT %  --  12  --  6 8 8       Results from last 7 days  Lab Units 02/03/19  1219 02/03/19  1524 02/03/19  8316 02/02/19  0504 02/01/19 0457 01/31/19  0526 01/30/19  0455 01/29/19  0526 01/28/19  1427   SODIUM mmol/L  --  142  --  140 140 143 144 144 142   POTASSIUM mmol/L  --  4 8  --  4 3 4 5 4 5 4 6 4 9 4 9   CHLORIDE mmol/L  --  106  --  102 102 104 106 107 104   CO2 mmol/L  --  30  --  34* 36* 36* 35* 33* 36*   CO2, I-STAT mmol/L 37*  --  39*  37*  --   --   --   --   --   --    AGAP mmol/L  --   --  9  --   --   --   --   --   --    ANION GAP mmol/L  --  6  --  4 2* 3* 3* 4 2*   BUN mg/dL  --  45*  --  59* 61* 48* 46* 57* 62*   CREATININE mg/dL  --  1 06  --  1 14 1 42* 1 05 1 03 1 22 1 45*   CALCIUM mg/dL  --  8 6  --  8 5 8 5 8 2* 8 4 9 0 8 6   ALT U/L  --  79*  --   --   --   --   --  35  --    AST U/L  --  85*  --   --   --   --   --  37  --    ALK PHOS U/L  --  95  --   --   --   --   --  58  --    ALBUMIN g/dL  --  3 0*  --   --   --   --   --  2 9*  --    TOTAL BILIRUBIN mg/dL  --  0 43  --   --   --   --   --  0 42  --        Results from last 7 days  Lab Units 02/01/19 0457 01/30/19 0455 01/29/19 0526 01/28/19  0756   MAGNESIUM mg/dL  --  2 7* 2 9* 2 6   PHOSPHORUS mg/dL 3 7  --  3 0 3 8        Results from last 7 days  Lab Units 02/03/19  0646   INR  1 25*   PTT seconds 30       Results from last 7 days  Lab Units 02/03/19  0645   TROPONIN I ng/mL 0 02       Results from last 7 days  Lab Units 02/03/19  0704   LACTIC ACID mmol/L 1 5     ABG:  Lab Results   Component Value Date    PHART 7 420 01/28/2019    KMK7RWN 61 5 (HH) 01/28/2019    PO2ART 51 0 (LL) 01/28/2019    GFM1WZS 39 0 (H) 01/28/2019    BEART 12 6 01/28/2019    SOURCE Radial, Right 01/28/2019     VBG:  Results from last 7 days  Lab Units 01/28/19  1004   ABG SOURCE  Radial, Right       Results from last 7 days  Lab Units 02/03/19  0645   PROCALCITONIN ng/ml 0 22       Imaging:  I have personally reviewed pertinent reports  EKG: On telemonitor, sinus bradycardia     Micro: Pending blood culture x 2, urine cx, MRSA culture        ______________________________________________________________________    Assessment and Plan:     Neuro:  Pain   Schedule Tylenol 650 mg q8h   Start lidoderm patch   Will monitor off oxycodone at this time due to previous reaction and respiratory depression  Recently required ketamine for pain management by [facility], may need to consult them if unable  to manage pain adequately     Altered mental status   Likely related to UTI   Monitor for delirium   Sleep/wake cycle    CV:   Acute on chronic diastolic heart failure   Takes torsemide 60 mg BID at home   Resume torsemide but at 40 mg BID given recent DESMOND   Monitor electrolytes   Monitor intake and output   Daily weights     Atrial fibrillation   Takes amiodarone, carvedilol, and Eliquis at home, continue medications   Hold meds for bradycardia or hypotension    Hypertension   Takes amlodipine, isosorbide, prazosin at home, continue medications   Hold meds for hypotension    Lung:   Acute on chronic mixed hypercapnic hypoxic respiratory insufficiency   Seen by pulmonary previous admission, concern for underlying lung disease   Keep oxygen saturations >88%, consider bipap at night   Aggressive pulmonary toileting   Incentive spirometry   Patient weaned to nasal cannula at this time, started on steroids but currently no wheezing,  consider discontinuing   Likely AMS driving respiratory depression   Pulmonary consult    Rib fracture   Pulmonary toileting   IS goal > 500   Pain management may be needed to reach IS goal, if needed, consult APS    GI:    Diet per primary   Bowel regimen    F/E/N:    Diet per primary   Avoid IVF, consider diuresing   Trend electrolytes while diuresing and replete as needed    :    Abbott catheter intake, provide abbott care   Strict intake and output    ID:   UTI   On broad spectrum antibiotics, can likely downgrade per primary care team pending cultures   Rule out pulmonary infection, no leukocytosis, PCT  0 22, no lactic acidosis, UTI more likely to  source of active infection   Pending flu, cultures, swabs    Heme:   Anemia   No active bleeding,baseline   Continue Eliquis    Endo:    Elevated TSH, free  T4 normal - management per primary team   Monitor blood sugar on steroids    Msk/Skin:   L rib and L scapular fracture, NWB LUE   L arm in sling per ortho   Pain management   PT/OT   OOB as tolerated      Disposition: Step down 1    Counseling / Coordination of Care  Total time spent today 20 minutes  Greater than 50% of total time was spent with the patient and / or family counseling and / or coordination of care  A description of the counseling / coordination of care: plan reviewed with patient, her daughter, and nurse  ______________________________________________________________________    VTE Pharmacologic Prophylaxis: Apixaban (Eliquis)  VTE Mechanical Prophylaxis: sequential compression device    Invasive lines and devices: Invasive Devices     Peripheral Intravenous Line            Peripheral IV 02/03/19 Left Antecubital less than 1 day    Peripheral IV 02/03/19 Left Hand less than 1 day    Peripheral IV 02/03/19 Right Hand less than 1 day          Drain            Urethral Catheter Temperature probe 16 Fr  less than 1 day                Code Status: Level 3 - DNAR and DNI  POA:    POLST:      Given critical illness, patient length of stay will require greater than two midnights  Portions of the record may have been created with voice recognition software  Occasional wrong word or "sound a like" substitutions may have occurred due to the inherent limitations of voice recognition software  Read the chart carefully and recognize, using context, where substitutions have occurred        DAWN Garcia    Consult to critical care  Consult performed by: Leopoldo Heaps ordered by: [facility]

## 2019-02-03 NOTE — RESPIRATORY THERAPY NOTE
RT Protocol Note  Jaime Hope 80 y o  female MRN: [id number]  Unit/Bed#: ED 14 Encounter: [id number]    Assessment    Active Problems:    * No active hospital problems  *      Home Pulmonary Medications:  nebs       Past Medical History:   Diagnosis Date    Acid reflux disease     Anemia     Last assessed: 10/26/16    Aortic valve regurgitation, nonrheumatic     Ascending aortic aneurysm (HCC)     Last assessed: 6/5/13    Atrial fibrillation ([facility] [address] )     Hyperlipidemia     Hypertension 2/17/2016    Paroxysmal atrial fibrillation ([facility] ) 2/17/2016     Social History     Social History    Marital status:      Spouse name: N/A    Number of children: N/A    Years of education: N/A     Social History Main Topics    Smoking status: Never Smoker    Smokeless tobacco: Never Used    Alcohol use No    Drug use: No    Sexual activity: No     Other Topics Concern    None     Social History Narrative    Assistive devices: walker       Subjective         Objective    Physical Exam:   Assessment Type: Assess only  General Appearance: Drowsy  Respiratory Pattern: Spontaneous  Chest Assessment: Chest expansion symmetrical  Bilateral Breath Sounds: Diminished  O2 Device: BiPAP    Vitals:  Blood pressure 167/71, pulse 58, temperature 97 9 °F (36 6 °C), resp  rate 21, weight 72 6 kg (160 lb), SpO2 96 %  Results from last 7 days  Lab Units 01/28/19  1004   PH ART  7 420   PCO2 ART mm Hg 61 5*   PO2 ART mm Hg 51 0*   HCO3 ART mmol/L 39 0*   BASE EXC ART mmol/L 12 6   O2 CONTENT ART mL/dL 12 0*   O2 HGB, ARTERIAL % 86 7*   ABG SOURCE  Radial, Right   JUANCARLOS TEST  Yes       Imaging and other studies: I have personally reviewed pertinent reports  O2 Device: BiPAP     Plan    Respiratory Plan: (P) Home Bronchodilator Patient pathway        Resp Comments: (P) Pt  started on udn TID as per recent use   Pt  recently discharged and was using nebs in hospital

## 2019-02-03 NOTE — PROGRESS NOTES
Patient was seen and examined again once she was more awake this afternoon  Currently she is on nasal cannula comfortable, awake, alert and oriented x3 she is able to provide appropriate history  She reports that she started with some shortness of breath with cough and sputum production about yesterday night  With that she reports also chills and feeling feverish  This morning, she had increased shortness of breath and she felt unwell  She does not remember seeing me in the emergency department  Currently, she reports feeling little bit better, her breathing according to patient appears to be less heavy  Still with cough  She has no chest pain, no nausea, no vomiting, no abdominal pain  She reports poor appetite since the past 24 hours  She was able to take medication crushed in applesauce and drink few sips of an orange juice  Lung examination was repeated now that patient can better cooperate, good air entry bilaterally with bilateral rales, some scattered crackles at the right base, right auscultation better than the left, patient with difficulty to sit and roll secondary to a recent rib fractures as reported in the H&P  At this time, she has received antibiotics and she will receive soon 1st dose of Lasix 40 mg IV  For now will continue antibiotics given reports of cough, chills, sputum production, awaiting Pulmonary recommendation  As stated in previous note, try Lasix 40 mg time 1 dose now and monitor urinary output  Consideration for further dose of Lasix tomorrow if still clinically indicated  Consult Cardiology, unclear as off why patient is only on metolazone at home? Recent echocardiogram was dated on January 30 at, will not repeat unless cardiologist concerned about any acute changes    Will continue with step-down level 1, ICU will be following as indicated by protocol  Will apply compression stockings bilateral lower extremities although, patient reports large improvement over baseline edema at this point  As no wheezing are now clearly appreciated, steroids have been discontinue, will continue although with scheduled nebulizers, will request chest therapy

## 2019-02-04 ENCOUNTER — TRANSITIONAL CARE MANAGEMENT (OUTPATIENT)
Dept: INTERNAL MEDICINE CLINIC | Facility: CLINIC | Age: 84
End: 2019-02-04

## 2019-02-04 ENCOUNTER — TELEPHONE (OUTPATIENT)
Dept: NEPHROLOGY | Facility: CLINIC | Age: 84
End: 2019-02-04

## 2019-02-04 LAB
ADENOVIRUS: NOT DETECTED
ALBUMIN SERPL BCP-MCNC: 2.5 G/DL (ref 3.5–5)
ALP SERPL-CCNC: 83 U/L (ref 46–116)
ALT SERPL W P-5'-P-CCNC: 123 U/L (ref 12–78)
ANION GAP SERPL CALCULATED.3IONS-SCNC: 0 MMOL/L (ref 4–13)
AST SERPL W P-5'-P-CCNC: 116 U/L (ref 5–45)
BILIRUB SERPL-MCNC: 0.44 MG/DL (ref 0.2–1)
BUN SERPL-MCNC: 43 MG/DL (ref 5–25)
C PNEUM DNA SPEC QL NAA+PROBE: NOT DETECTED
CALCIUM SERPL-MCNC: 8.2 MG/DL (ref 8.3–10.1)
CHLORIDE SERPL-SCNC: 110 MMOL/L (ref 100–108)
CO2 SERPL-SCNC: 34 MMOL/L (ref 21–32)
CREAT SERPL-MCNC: 1.04 MG/DL (ref 0.6–1.3)
ERYTHROCYTE [DISTWIDTH] IN BLOOD BY AUTOMATED COUNT: 20.8 % (ref 11.6–15.1)
FLUAV AG SPEC QL: NOT DETECTED
FLUAV H1 RNA SPEC QL NAA+PROBE: NOT DETECTED
FLUAV H3 RNA SPEC QL NAA+PROBE: NOT DETECTED
FLUAV RNA SPEC QL NAA+PROBE: NOT DETECTED
FLUBV AG SPEC QL: NOT DETECTED
FLUBV RNA SPEC QL NAA+PROBE: NOT DETECTED
GFR SERPL CREATININE-BSD FRML MDRD: 48 ML/MIN/1.73SQ M
GLUCOSE SERPL-MCNC: 105 MG/DL (ref 65–140)
HBOV DNA SPEC QL NAA+PROBE: NOT DETECTED
HCOV 229E RNA SPEC QL NAA+PROBE: NOT DETECTED
HCOV HKU1 RNA SPEC QL NAA+PROBE: NOT DETECTED
HCOV NL63 RNA SPEC QL NAA+PROBE: NOT DETECTED
HCOV OC43 RNA SPEC QL NAA+PROBE: NOT DETECTED
HCT VFR BLD AUTO: 28 % (ref 34.8–46.1)
HGB BLD-MCNC: 7.7 G/DL (ref 11.5–15.4)
HPIV1 RNA SPEC QL NAA+PROBE: NOT DETECTED
HPIV2 RNA SPEC QL NAA+PROBE: NOT DETECTED
HPIV3 RNA SPEC QL NAA+PROBE: NOT DETECTED
HPIV4 RNA SPEC QL NAA+PROBE: NOT DETECTED
M PNEUMO DNA SPEC QL NAA+PROBE: NOT DETECTED
MAGNESIUM SERPL-MCNC: 3.2 MG/DL (ref 1.6–2.6)
MCH RBC QN AUTO: 25.3 PG (ref 26.8–34.3)
MCHC RBC AUTO-ENTMCNC: 27.5 G/DL (ref 31.4–37.4)
MCV RBC AUTO: 92 FL (ref 82–98)
METAPNEUMOVIRUS: NOT DETECTED
MRSA NOSE QL CULT: NORMAL
PHOSPHATE SERPL-MCNC: 3.4 MG/DL (ref 2.3–4.1)
PLATELET # BLD AUTO: 164 THOUSANDS/UL (ref 149–390)
PMV BLD AUTO: 10.8 FL (ref 8.9–12.7)
POTASSIUM SERPL-SCNC: 4.9 MMOL/L (ref 3.5–5.3)
PROCALCITONIN SERPL-MCNC: 0.47 NG/ML
PROT SERPL-MCNC: 6 G/DL (ref 6.4–8.2)
RBC # BLD AUTO: 3.04 MILLION/UL (ref 3.81–5.12)
RHINOVIRUS RNA SPEC QL NAA+PROBE: NOT DETECTED
RSV A RNA SPEC QL NAA+PROBE: NOT DETECTED
RSV B RNA SPEC QL NAA+PROBE: NOT DETECTED
RSV B RNA SPEC QL NAA+PROBE: NOT DETECTED
SODIUM SERPL-SCNC: 144 MMOL/L (ref 136–145)
WBC # BLD AUTO: 5.47 THOUSAND/UL (ref 4.31–10.16)

## 2019-02-04 PROCEDURE — G8988 SELF CARE GOAL STATUS: HCPCS

## 2019-02-04 PROCEDURE — 94640 AIRWAY INHALATION TREATMENT: CPT

## 2019-02-04 PROCEDURE — 97163 PT EVAL HIGH COMPLEX 45 MIN: CPT

## 2019-02-04 PROCEDURE — 97167 OT EVAL HIGH COMPLEX 60 MIN: CPT

## 2019-02-04 PROCEDURE — 94760 N-INVAS EAR/PLS OXIMETRY 1: CPT

## 2019-02-04 PROCEDURE — 85027 COMPLETE CBC AUTOMATED: CPT | Performed by: INTERNAL MEDICINE

## 2019-02-04 PROCEDURE — G8996 SWALLOW CURRENT STATUS: HCPCS

## 2019-02-04 PROCEDURE — 83735 ASSAY OF MAGNESIUM: CPT | Performed by: INTERNAL MEDICINE

## 2019-02-04 PROCEDURE — 97535 SELF CARE MNGMENT TRAINING: CPT

## 2019-02-04 PROCEDURE — G8987 SELF CARE CURRENT STATUS: HCPCS

## 2019-02-04 PROCEDURE — 94660 CPAP INITIATION&MGMT: CPT

## 2019-02-04 PROCEDURE — 80053 COMPREHEN METABOLIC PANEL: CPT | Performed by: INTERNAL MEDICINE

## 2019-02-04 PROCEDURE — TCMXX

## 2019-02-04 PROCEDURE — G8979 MOBILITY GOAL STATUS: HCPCS

## 2019-02-04 PROCEDURE — 84145 PROCALCITONIN (PCT): CPT | Performed by: INTERNAL MEDICINE

## 2019-02-04 PROCEDURE — 92610 EVALUATE SWALLOWING FUNCTION: CPT

## 2019-02-04 PROCEDURE — 99232 SBSQ HOSP IP/OBS MODERATE 35: CPT | Performed by: INTERNAL MEDICINE

## 2019-02-04 PROCEDURE — 84100 ASSAY OF PHOSPHORUS: CPT | Performed by: INTERNAL MEDICINE

## 2019-02-04 PROCEDURE — 99221 1ST HOSP IP/OBS SF/LOW 40: CPT | Performed by: INTERNAL MEDICINE

## 2019-02-04 PROCEDURE — 99222 1ST HOSP IP/OBS MODERATE 55: CPT | Performed by: INTERNAL MEDICINE

## 2019-02-04 PROCEDURE — G8998 SWALLOW D/C STATUS: HCPCS

## 2019-02-04 PROCEDURE — G8978 MOBILITY CURRENT STATUS: HCPCS

## 2019-02-04 PROCEDURE — G8997 SWALLOW GOAL STATUS: HCPCS

## 2019-02-04 RX ORDER — FUROSEMIDE 10 MG/ML
40 INJECTION INTRAMUSCULAR; INTRAVENOUS
Status: DISCONTINUED | OUTPATIENT
Start: 2019-02-04 | End: 2019-02-07

## 2019-02-04 RX ORDER — FUROSEMIDE 10 MG/ML
20 INJECTION INTRAMUSCULAR; INTRAVENOUS ONCE
Status: DISCONTINUED | OUTPATIENT
Start: 2019-02-04 | End: 2019-02-04

## 2019-02-04 RX ADMIN — POTASSIUM CHLORIDE 20 MEQ: 1500 TABLET, EXTENDED RELEASE ORAL at 17:22

## 2019-02-04 RX ADMIN — METOLAZONE 5 MG: 5 TABLET ORAL at 09:00

## 2019-02-04 RX ADMIN — CARVEDILOL 3.12 MG: 3.12 TABLET, FILM COATED ORAL at 17:22

## 2019-02-04 RX ADMIN — ACETAMINOPHEN 650 MG: 325 TABLET, FILM COATED ORAL at 14:35

## 2019-02-04 RX ADMIN — METRONIDAZOLE 500 MG: 500 INJECTION, SOLUTION INTRAVENOUS at 11:35

## 2019-02-04 RX ADMIN — APIXABAN 5 MG: 5 TABLET, FILM COATED ORAL at 09:00

## 2019-02-04 RX ADMIN — LEVALBUTEROL HYDROCHLORIDE 1.25 MG: 1.25 SOLUTION, CONCENTRATE RESPIRATORY (INHALATION) at 07:58

## 2019-02-04 RX ADMIN — LEVALBUTEROL HYDROCHLORIDE 1.25 MG: 1.25 SOLUTION, CONCENTRATE RESPIRATORY (INHALATION) at 19:53

## 2019-02-04 RX ADMIN — AMLODIPINE BESYLATE 10 MG: 10 TABLET ORAL at 08:59

## 2019-02-04 RX ADMIN — CEFEPIME HYDROCHLORIDE 2000 MG: 1 INJECTION, POWDER, FOR SOLUTION INTRAMUSCULAR; INTRAVENOUS at 23:52

## 2019-02-04 RX ADMIN — LIDOCAINE 1 PATCH: 50 PATCH CUTANEOUS at 09:10

## 2019-02-04 RX ADMIN — DOCUSATE SODIUM 100 MG: 100 CAPSULE, LIQUID FILLED ORAL at 08:59

## 2019-02-04 RX ADMIN — APIXABAN 5 MG: 5 TABLET, FILM COATED ORAL at 17:22

## 2019-02-04 RX ADMIN — ACETAMINOPHEN 650 MG: 325 TABLET, FILM COATED ORAL at 05:44

## 2019-02-04 RX ADMIN — AMIODARONE HYDROCHLORIDE 200 MG: 200 TABLET ORAL at 09:00

## 2019-02-04 RX ADMIN — GUAIFENESIN 600 MG: 600 TABLET, EXTENDED RELEASE ORAL at 08:59

## 2019-02-04 RX ADMIN — METRONIDAZOLE 500 MG: 500 INJECTION, SOLUTION INTRAVENOUS at 04:03

## 2019-02-04 RX ADMIN — ACETAMINOPHEN 650 MG: 325 TABLET, FILM COATED ORAL at 21:02

## 2019-02-04 RX ADMIN — PRAZOSIN HYDROCHLORIDE 5 MG: 2 CAPSULE ORAL at 21:02

## 2019-02-04 RX ADMIN — ECONAZOLE NITRATE: 10 CREAM TOPICAL at 09:01

## 2019-02-04 RX ADMIN — IPRATROPIUM BROMIDE 0.5 MG: 0.5 SOLUTION RESPIRATORY (INHALATION) at 07:58

## 2019-02-04 RX ADMIN — CEFEPIME HYDROCHLORIDE 2000 MG: 1 INJECTION, POWDER, FOR SOLUTION INTRAMUSCULAR; INTRAVENOUS at 14:35

## 2019-02-04 RX ADMIN — CARVEDILOL 3.12 MG: 3.12 TABLET, FILM COATED ORAL at 08:56

## 2019-02-04 RX ADMIN — VANCOMYCIN HYDROCHLORIDE 1250 MG: 10 INJECTION, POWDER, LYOPHILIZED, FOR SOLUTION INTRAVENOUS at 05:44

## 2019-02-04 RX ADMIN — GUAIFENESIN 600 MG: 600 TABLET, EXTENDED RELEASE ORAL at 21:02

## 2019-02-04 RX ADMIN — FLUTICASONE PROPIONATE 1 SPRAY: 50 SPRAY, METERED NASAL at 08:55

## 2019-02-04 RX ADMIN — POTASSIUM CHLORIDE 20 MEQ: 1500 TABLET, EXTENDED RELEASE ORAL at 08:57

## 2019-02-04 RX ADMIN — PRAVASTATIN SODIUM 40 MG: 40 TABLET ORAL at 08:58

## 2019-02-04 RX ADMIN — ISOSORBIDE MONONITRATE 60 MG: 60 TABLET, EXTENDED RELEASE ORAL at 09:01

## 2019-02-04 RX ADMIN — IPRATROPIUM BROMIDE 0.5 MG: 0.5 SOLUTION RESPIRATORY (INHALATION) at 19:53

## 2019-02-04 RX ADMIN — PRAZOSIN HYDROCHLORIDE 2 MG: 2 CAPSULE ORAL at 08:59

## 2019-02-04 RX ADMIN — FLUTICASONE PROPIONATE 1 SPRAY: 50 SPRAY, METERED NASAL at 17:22

## 2019-02-04 RX ADMIN — CEFEPIME HYDROCHLORIDE 2000 MG: 1 INJECTION, POWDER, FOR SOLUTION INTRAMUSCULAR; INTRAVENOUS at 00:43

## 2019-02-04 RX ADMIN — FUROSEMIDE 40 MG: 10 INJECTION, SOLUTION INTRAMUSCULAR; INTRAVENOUS at 17:22

## 2019-02-04 RX ADMIN — SENNOSIDES A AND B 8.8 MG: 415.36 LIQUID ORAL at 08:59

## 2019-02-04 RX ADMIN — DOCUSATE SODIUM 100 MG: 100 CAPSULE, LIQUID FILLED ORAL at 17:22

## 2019-02-04 RX ADMIN — LEVALBUTEROL HYDROCHLORIDE 1.25 MG: 1.25 SOLUTION, CONCENTRATE RESPIRATORY (INHALATION) at 13:50

## 2019-02-04 RX ADMIN — FUROSEMIDE 40 MG: 10 INJECTION, SOLUTION INTRAMUSCULAR; INTRAVENOUS at 11:35

## 2019-02-04 RX ADMIN — IPRATROPIUM BROMIDE 0.5 MG: 0.5 SOLUTION RESPIRATORY (INHALATION) at 13:50

## 2019-02-04 NOTE — SOCIAL WORK
Cm met with pt to discuss DCP and cm role  Pt is a < 30D  readmit for resp failure  Pt was discharged to [facility] on 2/2 for rehab  Prior to hospitalization pt was residing at [facility] independent apartments  Per pt plan is to return to [facility] at discharge  CM reviewed d/c planning process including the following: identifying help at home, patient preference for d/c planning needs, Discharge Lounge, [facility] Meds to Bed program, availability of treatment team to discuss questions or concerns patient and/or family may have regarding understanding medications and recognizing signs and symptoms once discharged  CM also encouraged patient to follow up with all recommended appointments after discharge  Patient advised of importance for patient and family to participate in managing patients medical well being

## 2019-02-04 NOTE — PHYSICIAN ADVISOR
Current patient class: Inpatient  The patient is currently on Hospital Day: 2 at [facility]      The patient was admitted to the hospital at [phone] on 2/3/19 for the following diagnosis:  Achalasia [K22 0]  Respiratory distress [R06 03]  CO2 narcosis [R06 89]  Hypothermia, initial encounter [T68  XXXA]  Ground glass opacity present on imaging of lung [R91 8]       There is documentation in the medical record of an expected length of stay of at least 2 midnights  The patient is therefore expected to satisfy the 2 midnight benchmark and given the 2 midnight presumption is appropriate for INPATIENT ADMISSION  Given this expectation of a satisfying stay, CMS instructs us that the patient is most often appropriate for inpatient admission under part A provided medical necessity is documented in the chart  After review of the relevant documentation, labs, vital signs and test results, the patient is appropriate for INPATIENT ADMISSION  This admission is RELATED to the patient's prior admission     Admission to the hospital as an inpatient is a complex decision making process which requires the practitioner to consider the patients presenting complaint, history and physical examination and all relevant testing  With this in mind, in this case, the patient was deemed appropriate for INPATIENT ADMISSION  After review of the documentation and testing available at the time of the admission I concur with this clinical determination of medical necessity  Rationale is as follows: The patient is a 80 yrs old Female who presented to the ED at 2/3/2019  6:29 AM with a chief complaint of Respiratory Distress (as per ems - pt resides at [facility], ems called for resp distress, patient 46% on room air, decreased resp effort)     The patient had a prior admission from 1/23-2/2 due to multiple rib fractures   The patient had a scapular fracture which was felt to be non-operative    The patient presented on this admission with increased respiratory distress  The patient in the ER was found to be acidotic with hypercapnia  The plan of care includes BIPAP, IV abx for pneumonia +/- aspiration, repeat ABG, urinary antigen studies, respiratory protocol, speech and GI evaluation  This patient is appropriate for INPATIENT admission; this admission is RELATED to the prior admission as she was readmitted within 24 hours       The patients vitals on arrival were ED Triage Vitals   Temperature Pulse Respirations Blood Pressure SpO2   02/03/19 0634 02/03/19 0634 02/03/19 0634 02/03/19 0634 02/03/19 0634   (!) 95 9 °F (35 5 °C) 60 (!) 8 (!) 172/74 100 %      Temp Source Heart Rate Source Patient Position - Orthostatic VS BP Location FiO2 (%)   02/03/19 0634 02/03/19 0634 02/03/19 0730 02/03/19 0730 --   Rectal Monitor Lying Left arm       Pain Score       02/03/19 0634       No Pain           Past Medical History:   Diagnosis Date    Acid reflux disease     Anemia     Last assessed: 10/26/16    Aortic valve regurgitation, nonrheumatic     Ascending aortic aneurysm (HCC)     Last assessed: 6/5/13    Atrial fibrillation (HCC)     Hyperlipidemia     Hypertension 2/17/2016    Paroxysmal atrial fibrillation ([facility] [address] ) 2/17/2016     Past Surgical History:   Procedure Laterality Date    CARDIOVERSION      CHOLECYSTECTOMY      LAPAROSCOPIC SALPINGOOPHERECTOMY      TONSILLECTOMY             Consults have been placed to:   IP CONSULT TO MEDICAL CRITICAL CARE  IP CONSULT TO PULMONOLOGY  IP CONSULT TO CASE MANAGEMENT  IP CONSULT TO GASTROENTEROLOGY  IP CONSULT TO PHARMACY  IP CONSULT TO CARDIOLOGY    Vitals:    02/04/19 0653 02/04/19 0759 02/04/19 0838 02/04/19 0856   BP: (!) 156/49      BP Location:       Pulse: 60   65   Resp: 18      Temp:       TempSrc:       SpO2: 98% 99% 99%    Weight:       Height:           Most recent labs:    Recent Labs      02/03/19   0645  02/03/19   0111 02/04/19   0603   WBC  7 53   --    --   5 47   HGB  9 7*   --    < >  7 7*   HCT  35 0   --    < >  28 0*   PLT  225   --    --   164   K   --    --    < >  4 9   CALCIUM   --    --    < >  8 2*   BUN   --    --    < >  43*   CREATININE   --    --    < >  1 04   INR   --   1 25*   --    --    TROPONINI  0 02   --    --    --    AST   --    --    < >  116*   ALT   --    --    < >  123*   ALKPHOS   --    --    < >  83    < > = values in this interval not displayed         Scheduled Meds:  Current Facility-Administered Medications:  acetaminophen 650 mg Oral Q6H PRN Jacquelin Price MD    acetaminophen 650 mg Oral Q8H [address] DAWN Dietz    amiodarone 200 mg Oral Daily Jacquelin Price MD    amLODIPine 10 mg Oral Daily Jacquelin Price MD    apixaban 5 mg Oral BID Jacquelin Price MD    carvedilol 3 125 mg Oral BID With Meals Jacquelin Price MD    cefepime 2,000 mg Intravenous Q12H Jacquelin Price MD Last Rate: 2,000 mg (02/04/19 0043)   docusate sodium 100 mg Oral BID Jacquelin Price MD    econazole nitrate  Topical BID Jacquelin Price MD    fluticasone 1 spray Nasal BID Jacquelin Price MD    guaiFENesin 600 mg Oral Q12H [address] Jacquelin Price MD    ipratropium 0 5 mg Nebulization TID [place] Labrum Trakhanh, DO    isosorbide mononitrate 60 mg Oral Daily Jacquelin Price MD    levalbuterol 1 25 mg Nebulization TID Félix Labrum Trager, DO    lidocaine 1 patch Topical Daily DAWN Luna    metolazone 5 mg Oral Daily Jacquelin Price MD    metroNIDAZOLE 500 mg Intravenous Q8H Jacquelin Price MD Last Rate: 500 mg (02/04/19 0403)   nitroglycerin 0 4 mg Sublingual Q5 Min PRN Jacquelin Price MD    potassium chloride 20 mEq Oral BID Jacquelin Price MD    pravastatin 40 mg Oral Daily Jacquelin Price MD    prazosin 2 mg Oral QAM Jacquelin Price MD    prazosin 5 mg Oral HS Jacquelin Price MD    senna 8 8 mg Oral Daily Jacquelin Price MD    vancomycin 20 mg/kg (Adjusted) Intravenous Q24H Lizbeth Bruner MD Last Rate: 1,250 mg (02/04/19 0544)     Continuous Infusions:   PRN Meds:   acetaminophen    nitroglycerin    Surgical procedures (if appropriate):

## 2019-02-04 NOTE — PROGRESS NOTES
Critical Care Resource Service Progress Note    Assessment and Plan:   · Acute on chronic CHF  · Cardiology following  · Agree with 40mg IV BID dosing with closely monitoring of renal function  Goal -1L  · Continuing to hold home torsemide and metolazone for now  · Imdur 60mg daily, coreg 3 125mg BID    · PAF  · Amio PO 200mg  · Continue telemetry monitoring  · Eliquis 5mg BID    · Acute on chronic hypoxic hypercapnic respiratory failure  · Pulmonology following  · Weaned off BiPAP  Continuing to come down on NC requirements as patient diureses   · Titrate supplemental O2 to maintain Spo2 >88%  · IS, pulm hygiene  · Guaifenesin 600mg q12h, atrovent/xopenex TID  · No history of COPD or smoking       Patient improved from respiratory standpoint  Critical Care to sign off  Please call [phone] with any questions or concerns  Subjective:     Chief Complaint: "I feel okay"     Patient on BiPAP 12/5, 40% overnight  She was transitioned to 6 L nasal cannula approximately 0 800 and has been slowly been weaned down to 3 L nasal cannula with SpO2 in the high 90s  Objective:   Vitals:    02/04/19 1200 02/04/19 1350 02/04/19 1530 02/04/19 1600   BP:   128/65    BP Location:       Pulse:   57    Resp:   18    Temp:   97 5 °F (36 4 °C)    TempSrc:   Oral    SpO2: 99% 99% 99% 100%   Weight:       Height:           I/O       02/02 0701 - 02/03 0700 02/03 0701 - 02/04 0700 02/04 0701 - 02/05 0700    P  O    580    Total Intake(mL/kg)   580 (8 9)    Urine (mL/kg/hr)  1400 (0 9) 325 (0 5)    Total Output   1400 325    Net   -1400 +255                 Physical Exam:  Constitutional: No apparent distress  Well-nourished, well-developed  Neuro/Psych: Awake, alert, and oriented to person, place, and time  GCS 15  Moves all four extremities  HENT: Normocephalic, atraumatic      Eyes: PEERL, EOM-intact, sclera antiicteric  Neck: No JVD  No tracheal deviation  Cardiovascular: S1, S2  Sinus tip  No murrmurs, rubs, or gallops      +1 dorsalis pedis, posterior tibial, and 2+ radial pulses  Pulmonary: Lungs diminished  Bibasilar crackles  No ronchi or wheezing  Gastrointestinal: Soft, non-distended, non-tender  Genitourinary:  Clear, yellow urine  [place]: yes  MSK/Extremities: Trace peripheral edema  No clubbing or cyanosis  Skin: Warm, clean, dry  No jaundice

## 2019-02-04 NOTE — ASSESSMENT & PLAN NOTE
Will continue Coreg tonight as well as Minipress, holding parameter for systolic 246 and below  Resume amlodipine and day dose of Minipress   Will monitor closely

## 2019-02-04 NOTE — ASSESSMENT & PLAN NOTE
Possibly related to health acquired pneumonia given recent hospitalization and discharged on February 2nd 2019 versus aspiration pneumonia given the presence of achalasia on CT of the chest versus possible occult urinary tract infection  Provide vancomycin, cefepime, metronidazole  Patient is off BiPAP currently  Respiratory status is improved  Continue with scheduled nebulizers  Respiratory protocol  Pending blood cultures  Try and obtain sputum culture  Urinary antigens request  Aspiration precaution  Speech evaluation  GI evaluation    Of note, patient was recently evaluated by Pulmonary during recent hospital stay, no formal diagnosis of COPD or asthma, although suspicion for chronic hypoxic respiratory failure unclear if related to CHF versus use of amiodarone? Will ask Pulmonary to evaluate again the patient  Given the poor effort on exam, unclear if wheezing although patient did receive prolonged nebulizer treatment in the ER  Steroids discontinued by Pulmonary    Recommending diuretics  Check for influenza and RSV  Complete respiratory pathogen profile  First procalcitonin is negative,

## 2019-02-04 NOTE — RESTORATIVE TECHNICIAN NOTE
Restorative Specialist Mobility Note       Activity: Bedrest, Dangle, Stand at bedside, Turn, Chair     Assistive Device: Other (Comment) (HHA x 2)     Ambulation Response:  Tolerated fairly well  Repositioned: Sitting, Up in chair, Other (Comment) (chair alarm on)

## 2019-02-04 NOTE — CONSULTS
Consultation - Jean Carlos Rodriguez 80 y o  female MRN: [id number]  Unit/Bed#: [facility] 519-01 Encounter: [id number]      Assessment/Plan     1  Abnormal CT finding - abnormal appearance of the distal esophagus noted on CT imaging of the chest concerning for possible achalasia  However, patient denies any symptoms suggestive of achalasia  Does endorse occasional oropharyngeal dysphagia which may be secondary to known nontoxic multinodular goiter/thyromegaly  Suspect imaging findings are indicative of presbyesophagus  Differential also includes pseudoachalasia in setting of known ascending aortic aneurysm versus occult malignancy  · Agree with speech therapy for swallow evaluation for possible oropharyngeal dysphagia  · Recommend barium esophagram and can consider endoscopic evaluation if patient is agreeable  · Recommend elective outpatient esophageal manometry for definitive evaluation if patient wishes to pursue further workup  · Aspiration precautions  · Continue supportive care and management per primary team    2  Constipation - patient denies any outpatient history of constipation  Patient states she had large bowel movement after laxatives during recent hospitalization  Since then, she reported reports having frequent, small loose bowel movements  · Titrate bowel regimen as needed to achieve daily bowel movement regularity    3  GERD - symptoms are mild and occasional according to patient  Does not take any chronic outpatient medications for reflux  Asymptomatic at this time    · Recommend continuing lifestyle and dietary modifications including elevating head of bed, avoiding meals 2 hours before bedtime, sitting upright for all meals, and avoiding foods known to aggravate reflux  · Continue to monitor    History of Present Illness   Physician Requesting Consult: Han Garcia, DO  Reason for Consult / Principal Problem: Achalasia  HPI: Demarcus Thomason is a 80y o  year old female with history of chronic anemia, GERD, aortic regurgitation, ascending aortic aneurysm, chronic kidney disease stage 3, hypertension, chronic venous insufficiency and chronic lower extremity lymphedema, chronic diastolic heart failure  Patient brought to [facility] from rehabilitation center for respiratory distress and severe hypoxia  Patient was recently hospitalized under the trauma service after a fall at home resulting in several rib fractures and a left scapular fracture  Patient was discharged to rehabilitation center 2 days ago in stable clinical condition  Patient subsequently developed respiratory distress and was brought to the hospital for evaluation  Patient was found to be acidotic with severe hypercapnia  Patient was started on BiPAP as well as empiric antibiotic therapy for possible aspiration/HCAP  Although initially lethargic, patient has since improved in her mentation and alertness  During her workup, patient underwent CT imaging of the chest which revealed a diffusely abnormal appearance of the esophagus suspicious for possible achalasia  Gastroenterology consultation requested for evaluation of achalasia based on CT findings  Patient was pleasant and alert during our encounter  Although she had some difficulty recalling past history, she was able to provide adequate historical information  Patient reported history of mild reflux  She was not on any chronic outpatient medications for reflux  Patient did report occasional issues with dysphagia although patient's description was more consistent with oral pharyngeal dysphagia  She denied any dysphagia specific to solids or liquids, globus sensation, regurgitation of undigested food, chest pain, or odynophagia  Patient states that she has never seen a gastroenterologist and is unable to recall ever having undergone an upper endoscopy    Patient does report having had several colonoscopies in the past although these were done in [place] prior to her moving to [place]  She says her last colonoscopy was many years ago  Her only other GI issue was intermittent constipation although she states that this has since resolved and she is having more frequent small loose bowel movements as a result of bowel regimen  Her only complaints at this time are regards to her respiratory issues  She denies any other GI complaints  Inpatient consult to gastroenterology     Date/Time 2/4/2019 8:36 AM     Performed by  Sheree Mclean     Authorized by Carmen Hook          Review of Systems   Respiratory: Positive for shortness of breath  Gastrointestinal: Positive for constipation  All other systems reviewed and are negative      Historical Information   Past Medical History:   Diagnosis Date    Acid reflux disease     Anemia     Last assessed: 10/26/16    Aortic valve regurgitation, nonrheumatic     Ascending aortic aneurysm (HCC)     Last assessed: 6/5/13    Atrial fibrillation (HCC)     Hyperlipidemia     Hypertension 2/17/2016    Paroxysmal atrial fibrillation ([facility] [address] ) 2/17/2016     Past Surgical History:   Procedure Laterality Date    CARDIOVERSION      CHOLECYSTECTOMY      LAPAROSCOPIC SALPINGOOPHERECTOMY      TONSILLECTOMY       Social History   History   Alcohol Use No     History   Drug Use No     History   Smoking Status    Never Smoker   Smokeless Tobacco    Never Used     Family History:   Family History   Problem Relation Age of Onset    No Known Problems Mother     Stomach cancer Father     Heart disease Sister         Cardiac Disorder    Breast cancer Sister     Colon cancer Brother     Cancer Brother      Meds/Allergies   all current active meds have been reviewed, current meds:   Current Facility-Administered Medications   Medication Dose Route Frequency    acetaminophen (TYLENOL) tablet 650 mg  650 mg Oral Q6H PRN    acetaminophen (TYLENOL) tablet 650 mg  650 mg Oral Q8H [address]    amiodarone tablet 200 mg  200 mg Oral Daily    amLODIPine (NORVASC) tablet 10 mg  10 mg Oral Daily    apixaban (ELIQUIS) tablet 5 mg  5 mg Oral BID    carvedilol (COREG) tablet 3 125 mg  3 125 mg Oral BID With Meals    cefepime (MAXIPIME) 2 g/50 mL dextrose IVPB  2,000 mg Intravenous Q12H    docusate sodium (COLACE) capsule 100 mg  100 mg Oral BID    econazole nitrate 1 % cream   Topical BID    fluticasone (FLONASE) 50 mcg/act nasal spray 1 spray  1 spray Nasal BID    guaiFENesin (MUCINEX) 12 hr tablet 600 mg  600 mg Oral Q12H BOB    ipratropium (ATROVENT) 0 02 % inhalation solution 0 5 mg  0 5 mg Nebulization TID    isosorbide mononitrate (IMDUR) 24 hr tablet 60 mg  60 mg Oral Daily    levalbuterol (XOPENEX) inhalation solution 1 25 mg  1 25 mg Nebulization TID    lidocaine (LIDODERM) 5 % patch 1 patch  1 patch Topical Daily    metolazone (ZAROXOLYN) tablet 5 mg  5 mg Oral Daily    metroNIDAZOLE (FLAGYL) IVPB (premix) 500 mg  500 mg Intravenous Q8H    nitroglycerin (NITROSTAT) SL tablet 0 4 mg  0 4 mg Sublingual Q5 Min PRN    potassium chloride (K-DUR,KLOR-CON) CR tablet 20 mEq  20 mEq Oral BID    pravastatin (PRAVACHOL) tablet 40 mg  40 mg Oral Daily    prazosin (MINIPRESS) capsule 2 mg  2 mg Oral QAM    prazosin (MINIPRESS) capsule 5 mg  5 mg Oral HS    senna 8 8 mg/5 mL oral syrup 8 8 mg  8 8 mg Oral Daily    vancomycin (VANCOCIN) 1,250 mg in sodium chloride 0 9 % 250 mL IVPB  20 mg/kg (Adjusted) Intravenous Q24H    and PTA meds:   Prior to Admission Medications   Prescriptions Last Dose Informant Patient Reported? Taking? Calcium Carbonate (CALTRATE 600 PO) 2/2/2019 at Unknown time Self Yes Yes   Sig: Take 600 mg by mouth daily  KLOR-CON M20 20 MEQ tablet 2/2/2019 at Unknown time  No Yes   Sig: Take 1 tablet (20 mEq total) by mouth 2 (two) times a day   acetaminophen (TYLENOL) 325 mg tablet  Self Yes Yes   Sig: Take 650 mg by mouth every 6 (six) hours as needed for mild pain     amLODIPine (NORVASC) 10 mg tablet 2/2/2019 at Unknown time  Yes Yes   Sig: Take 10 mg by mouth daily   amiodarone 200 mg tablet 2019 at Unknown time Self No Yes   Sig: Take 1 tablet (200 mg total) by mouth daily   apixaban (ELIQUIS) 5 mg 2019 at Unknown time Self No Yes   Sig: Take 1 tablet (5 mg total) by mouth 2 (two) times a day   carvedilol (COREG) 3 125 mg tablet 2019 at Unknown time Self No Yes   Sig: Take 1 tablet (3 125 mg total) by mouth 2 (two) times a day with meals   econazole nitrate 1 % cream  Self No No   Sig: Apply topically 2 (two) times a day   fluticasone (FLONASE) 50 mcg/act nasal spray 2019 at Unknown time Self Yes Yes   Si spray into each nostril 2 (two) times a day   ipratropium (ATROVENT) 0 02 % nebulizer solution 2019 at Unknown time  No Yes   Sig: Take 1 vial (0 5 mg total) by nebulization every 6 (six) hours   isosorbide mononitrate (IMDUR) 60 mg 24 hr tablet 2019 at Unknown time Self No Yes   Sig: Take 1 tablet (60 mg total) by mouth daily   levalbuterol (XOPENEX) 1 25 mg/0 5 mL nebulizer solution 2019 at Unknown time  No Yes   Sig: Take 0 5 mL (1 25 mg total) by nebulization every 6 (six) hours   metolazone (ZAROXOLYN) 5 mg tablet 2019 at Unknown time  Yes Yes   Sig: Take 5 mg by mouth daily   nitroglycerin (NITROSTAT) 0 4 mg SL tablet  Self No Yes   Sig: Place 1 tablet (0 4 mg total) under the tongue every 5 (five) minutes as needed for chest pain   pravastatin (PRAVACHOL) 40 mg tablet 2019 at Unknown time Self No Yes   Sig: Take 1 tablet (40 mg total) by mouth daily   prazosin (MINIPRESS) 2 mg capsule 2019 at Unknown time Self No Yes   Sig: Take 1 capsule (2 mg total) by mouth every morning   prazosin (MINIPRESS) 5 mg capsule 2019 at Unknown time Self No Yes   Sig: Take 1 capsule (5 mg total) by mouth daily at bedtime      Facility-Administered Medications: None     Allergies   Allergen Reactions    Aspirin     Percocet [Oxycodone-Acetaminophen]      Objective     Intake/Output Summary (Last 24 hours) at 02/04/19 0836  Last data filed at 02/04/19 6669   Gross per 24 hour   Intake                0 ml   Output             1200 ml   Net            -1200 ml     Invasive Devices:   Peripheral IV 02/03/19 Left Antecubital (Active)   Site Assessment Clean;Dry; Intact 2/4/2019  4:00 AM   Dressing Type Transparent 2/4/2019  4:00 AM   Line Status Flushed;Saline locked; Blood return noted 2/4/2019  4:00 AM   Dressing Status Clean;Dry; Intact 2/4/2019  4:00 AM   Reason Not Rotated Not due 2/4/2019  4:00 AM       Urethral Catheter Temperature probe 16 Fr  (Active)   Site Assessment Clean;Skin intact; Patent 2/3/2019  8:00 PM   Collection Container Standard drainage bag 2/3/2019  8:00 PM   Securement Method Securing device (Describe) 2/3/2019  8:00 PM   Output (mL) 200 mL 2/4/2019  3:12 AM     Physical Exam   Constitutional: She appears well-developed  No distress  Elderly female   HENT:   Head: Normocephalic and atraumatic  Nose: Nose normal    Mouth/Throat: No oropharyngeal exudate  Eyes: Conjunctivae and EOM are normal  No scleral icterus  Neck: Neck supple  No tracheal deviation present  Thyromegaly (Palpable) present  Cardiovascular: Normal rate and regular rhythm  Exam reveals no gallop and no friction rub  Murmur (Grade 3/6 systolic ejection murmur) heard  Pulmonary/Chest: Effort normal  No respiratory distress  She has no wheezes  She exhibits no tenderness  Scattered rhonchi, decreased basilar breath sounds   Abdominal: Soft  She exhibits no distension  There is no tenderness  There is no rebound and no guarding  Hypoactive bowel sounds   Musculoskeletal: She exhibits tenderness (Back and parts of ribs from recent fall with known rib fractures)  She exhibits no edema  Neurological: She is alert  No cranial nerve deficit  Answers questions, follows commands, some difficulty recalling history   Skin: Skin is warm and dry  She is not diaphoretic     Psychiatric: She has a normal mood and affect  Her behavior is normal  Judgment and thought content normal    Nursing note and vitals reviewed  Lab Results: I have personally reviewed pertinent reports  Results from last 7 days  Lab Units 02/04/19  0603 02/03/19  1219 02/03/19  0647 02/03/19  0637 02/02/19  0504  01/29/19  0526   POTASSIUM mmol/L 4 9  --  4 8  --  4 3  < > 4 9   CHLORIDE mmol/L 110*  --  106  --  102  < > 107   CO2 mmol/L 34*  --  30  --  34*  < > 33*   CO2, I-STAT mmol/L  --  37*  --  39*  37*  --   --   --    BUN mg/dL 43*  --  45*  --  59*  < > 57*   CREATININE mg/dL 1 04  --  1 06  --  1 14  < > 1 22   CALCIUM mg/dL 8 2*  --  8 6  --  8 5  < > 9 0   ALK PHOS U/L 83  --  95  --   --   --  58   ALT U/L 123*  --  79*  --   --   --  35   AST U/L 116*  --  85*  --   --   --  37   GLUCOSE, ISTAT mg/dl  --  118  --  165*  169*  --   --   --    < > = values in this interval not displayed  Imaging Studies: I have personally reviewed pertinent films in PACS   XR Chest 1 View Portable  · Result Date: 2/4/2019  · Impression: Vascular congestion, with persistent small right pleural effusion  Workstation performed: [id number]     CT Head Without Contrast  · Result Date: 2/3/2019  · Impression: Cerebral atrophy with chronic small vessel ischemic change  Stable right parafalcine calcified soft tissue mass, most compatible with meningioma  No acute intracranial abnormality  No significant change from prior study  Workstation performed: [id number]     CT Chest Without Contrast  · Addendum Date: 2/3/2019    · ADDENDUM: ADDENDUM There is a voice recognition software related error in the IMPRESSION of the report  A phrase which reads " 4  Diffuse abnormal appearance of the esophagus  Correlate for rectal lesion  " should read, " 4  Diffuse abnormal appearance of the esophagus  Correlate for ACHALASIA  "    · Result Date: 2/3/2019  · Impression: 1    Groundglass infiltrates with more focal areas of consolidation in the lower lobes of the lungs bilaterally with associated bilateral pleural effusions  These findings have progressed from the prior study  Although the findings likely represent congestive heart failure and volume overload with bibasilar atelectasis, superimposed pneumonia such as aspiration related, not entirely excluded  Clinical correlation and follow-up examination recommended  2   Cardiomegaly with 5 4 cm ascending aortic aneurysm, stable  3   Incidental thyromegaly with bilateral nodules identified on this CT  According to guidelines published in the February 2015 white paper on incidental thyroid nodules in the Journal of the [employer] of Radiology Miracle Rodríguez), Because the nodule(s) are greater than 1 5 cm in size, further characterization with thyroid ultrasound is recommended  4   Diffuse abnormal appearance of the esophagus  Correlate for rectal lesion  The study was marked in [facility] for immediate notification  Workstation performed: [id number]     EKG, Pathology, and Other Studies: I have personally reviewed pertinent reports  VTE Prophylaxis: Eliquis, SCDs    Counseling / Coordination of Care  Total floor / unit time spent today 30 minutes  Greater than 50% of total time was spent with the patient and / or family counseling and / or coordination of care  A description of the counseling / coordination of care: See above

## 2019-02-04 NOTE — TELEPHONE ENCOUNTER
Called and left a voicemail to schedule hospital follow up - patient is still admitted at this time  ----- Message from Fe Gonzalez DO sent at 2/3/2019  8:31 AM EST -----  Regarding: hospital follow up for CKD  Patient was discharged 2/2 and readmitted 2/3 but renal not consulted as sCr at baseline  Ensure this patient is called in about 1 week to schedule outpatient nephrology follow up in the office in about 3-4 weeks for CKD with BMP prior to visit  Thanks

## 2019-02-04 NOTE — CONSULTS
Cardiology Consult  Marcus Carroll 80 y o  female MRN: [id number]  Unit/Bed#: [facility] 297-39 Encounter: [id number]      Reason for Consult / Principal Problem: CHF    Physician Requesting Consult: Greg Hsieh DO    OP Cardiologist: Ramo Low    Assessment: #HFpEF, decompensated   -likely due to stopping of diuretic on discharge  -used to be on torsemide 60mg BID, prn 5mg metolazone as OP before last admission  -dry weight reported 135lb; currently 143lb; wt 142lb on 1/30 during last admission  #Severe AI, TAA 51mm  -pt has declined intervention in the past; medical mgmt  #PAF, NSR currently  -on eliquis 5mg BID, amio 200mg  #CKD, Cr near baseline  #Anemia of chronic dz  #HTN  #HLD  #Recent L scapular, multiple rib fx  #Hypercapnic and hypoxic respiratory failure, improving    Plan:  1  Start lasix 40mg IV BID  Hold metolazone for now  Patient will need to eventually be converted and discharged on lower dose (compared to previously) of BID torsemide with prn metolazone  Appreciate nephrology input on this matter  2  Continue amio 200mg, pravastatin 40mg, Imdur 60mg, Coreg 3 125mg BID, amlodipine 10mg, Eliquis 5mg BID  Home prazosin is not a great BP med for an elderly patient with her co-morbidities  Will plan to gradually wean off and uptitrate Coreg if necessary  3  Keep K >4 5, Mg >2 5   4  There is no need to repeat echo at this time  HPI: Marcus Carroll 80y o  year old female with a history of HFpEF, HTN, HLD, PAF on amio/Eliquis, TAA 51mm, mod-severe AI, CKD, anemia  who presented from rehab with fevers, chills, productive cough, worsening dyspnea  She does have a hx of chronic orthopnea unclear if worse  She is not sure if her LE edema has worsened, does use compression stocking  She had a recent prolonged admission for L scapula and multiple rib fx treated conservatively  She also had CHF and combined resp failure requiring BPAP last time  BP on admission was as high as 198/77   Imaging including CXR and CT chest revealed congestion, bilateral effusions worse from prior studies  NTBNP was 3300  EKG was NSR with RBBB, no overt ischemic changes  She received IV lasix 40mg yesterday and 20mg IV today  She had a net -1400 recorded for the admission  Prior home torsemide 60mg BID was held by nephrology 2/2 on discharge from last admission  Metolazone 5mg daily seems to have been continued? Family History: reviewed  Historical Information   Past Medical History:   Diagnosis Date    Acid reflux disease     Anemia     Last assessed: 10/26/16    Aortic valve regurgitation, nonrheumatic     Ascending aortic aneurysm (HCC)     Last assessed: 6/5/13    Atrial fibrillation ([facility] [address] )     Hyperlipidemia     Hypertension 2/17/2016    Paroxysmal atrial fibrillation ([facility] [address] ) 2/17/2016     Past Surgical History:   Procedure Laterality Date    CARDIOVERSION      CHOLECYSTECTOMY      LAPAROSCOPIC SALPINGOOPHERECTOMY      TONSILLECTOMY       Social History   History   Alcohol Use No     History   Drug Use No     History   Smoking Status    Never Smoker   Smokeless Tobacco    Never Used     Family History:   Family History   Problem Relation Age of Onset    No Known Problems Mother     Stomach cancer Father     Heart disease Sister         Cardiac Disorder    Breast cancer Sister     Colon cancer Brother     Cancer Brother        Review of Systems:  Review of Systems  Except as noted in the HPI and above, a comprehensive 14 point review of systems was negative      Scheduled Meds:  Current Facility-Administered Medications:  acetaminophen 650 mg Oral Q6H PRN Katarina Wooten MD    acetaminophen 650 mg Oral Q8H [facility] DAWN Dietz    amiodarone 200 mg Oral Daily Katarina Wooten MD    amLODIPine 10 mg Oral Daily Katarina Wooten MD    apixaban 5 mg Oral BID Katarina Wooten MD    carvedilol 3 125 mg Oral BID With Meals Katarina Wooten MD    cefepime 2,000 mg Intravenous Q12H Katarina Wooten MD Last Rate: 2,000 mg (02/04/19 0043)   docusate sodium 100 mg Oral BID Katarina Wooten MD    econazole nitrate  Topical BID Katarina Wooten MD    fluticasone 1 spray Nasal BID Katarina Wooten MD    furosemide 20 mg Intravenous Once Hetul Serbian Duos, DO    guaiFENesin 600 mg Oral Q12H [address] Katarina Wooten MD    ipratropium 0 5 mg Nebulization TID Jen Arash Trager, DO    isosorbide mononitrate 60 mg Oral Daily Katarina Wooten MD    levalbuterol 1 25 mg Nebulization TID Bernabejocelyn Farrar, DO    lidocaine 1 patch Topical Daily [place] CRMARKO    metolazone 5 mg Oral Daily Katarina Wooten MD    metroNIDAZOLE 500 mg Intravenous Q8H Katarina Wooten MD Last Rate: 500 mg (02/04/19 0403)   nitroglycerin 0 4 mg Sublingual Q5 Min PRN Katarina Wooten MD    potassium chloride 20 mEq Oral BID Katarina Wooten MD    pravastatin 40 mg Oral Daily Katarina Wooten MD    prazosin 2 mg Oral QAM Katarina Wooten MD    prazosin 5 mg Oral HS Katarina Wooten MD    senna 8 8 mg Oral Daily Katarina Wooten MD    vancomycin 20 mg/kg (Adjusted) Intravenous Q24H Katarina Wooten MD Last Rate: 1,250 mg (02/04/19 0544)     Continuous Infusions:   PRN Meds:   acetaminophen    nitroglycerin  all current active meds have been reviewed    Allergies   Allergen Reactions    Aspirin     Percocet [Oxycodone-Acetaminophen]        Objective   Vitals: Blood pressure (!) 156/49, pulse 65, temperature 98 2 °F (36 8 °C), temperature source Axillary, resp  rate 18, height 5' (1 524 m), weight 65 2 kg (143 lb 11 8 oz), SpO2 99 %  , Body mass index is 28 07 kg/m² , Orthostatic Blood Pressures      Most Recent Value   Blood Pressure   156/49 filed at 02/04/2019 6628   Patient Position - Orthostatic VS  Lying filed at 02/04/2019 0531            Intake/Output Summary (Last 24 hours) at 02/04/19 1033  Last data filed at 02/04/19 0906   Gross per 24 hour   Intake              120 ml   Output             1200 ml   Net            -1080 ml       Invasive Devices     Peripheral Intravenous Line            Peripheral IV 02/03/19 Left Antecubital 1 day          Drain            Urethral Catheter Temperature probe 16 Fr  1 day                Physical Exam:  Physical Exam   Constitutional: She is oriented to person, place, and time  She appears well-developed and well-nourished  No distress  HENT:   Head: Normocephalic and atraumatic  Eyes: Pupils are equal, round, and reactive to light  Conjunctivae and EOM are normal  No scleral icterus  Neck: Normal range of motion  Neck supple  JVD present  Cardiovascular: Normal rate and regular rhythm  Exam reveals no gallop and no friction rub  Murmur (3/6 systolic and diastolic over precordium)) heard  Pulmonary/Chest: Effort normal  No respiratory distress  She has no wheezes  She has rales (diffuse to bilateral upper airways)  She exhibits no tenderness  Abdominal: Soft  She exhibits no distension  There is no tenderness  There is no rebound  Musculoskeletal: Normal range of motion  She exhibits edema (1-2+)  Neurological: She is alert and oriented to person, place, and time  Skin: Skin is warm and dry  She is not diaphoretic  Psychiatric: She has a normal mood and affect  Lab Results: I have personally reviewed pertinent lab results  Imaging: I have personally reviewed pertinent reports  EKG: Personally reviewed    ECHO: reviewed  Previous Cath/PCI: reviewed  Code Status: Level 3 - DNAR and DNI  Advance Directive and Living Will: Yes    Power of :    POLST:        Baby Goltz, MD  Cardiology Fellow

## 2019-02-04 NOTE — OCCUPATIONAL THERAPY NOTE
[address] Evaluation     Patient Name: Aaron Chakraborty  RARDG'Y Date: 2/4/2019  Problem List  Patient Active Problem List   Diagnosis    Paroxysmal atrial fibrillation (HCC)    Benign hypertension with CKD (chronic kidney disease) stage III (HCC)    GERD (gastroesophageal reflux disease)    Other hyperlipidemia    Aortic valve regurgitation, nonrheumatic    Ascending aortic aneurysm (HCC)    Other chest pain    Lymphedema of both lower extremities    Chronic diastolic congestive heart failure (HCC)    Hematuria    Gait difficulty    Multiple rib fractures    Closed left scapular fracture    [facility] ([facility] [address] )    Acute pain due to trauma    Chronic respiratory acidosis    Pericardial effusion    Chronic kidney disease, stage III (moderate) (HCC)    Respiratory distress    Tao esophagus    Cataract of right eye    Chronic stasis dermatitis of right lower extremity    Chronic venous insufficiency    Diverticulosis    Inguinal hernia    Insomnia    Non-toxic multinodular goiter    Urine, incontinence, stress female    Achalasia    Abnormal urinalysis     Past Medical History  Past Medical History:   Diagnosis Date    Acid reflux disease     Anemia     Last assessed: 10/26/16    Aortic valve regurgitation, nonrheumatic     Ascending aortic aneurysm ([facility] [address] )     Last assessed: 6/5/13    Atrial fibrillation ([facility] [address] )     Hyperlipidemia     Hypertension 2/17/2016    Paroxysmal atrial fibrillation ([facility] [address] ) 2/17/2016     Past Surgical History  Past Surgical History:   Procedure Laterality Date    CARDIOVERSION      CHOLECYSTECTOMY      LAPAROSCOPIC SALPINGOOPHERECTOMY      TONSILLECTOMY           02/04/19 1200   Note Type   Note type Eval/Treat   Restrictions/Precautions   Weight Bearing Precautions Per Order Yes   LUE Weight Bearing Per Order NWB   Braces or Orthoses Sling   Other Precautions Chair Alarm;Multiple lines;Telemetry;O2;Fall Risk;Droplet precautions   Pain Assessment   Pain Assessment No/denies pain   Pain Score No Pain   Home Living   Type of Home Apartment   Home Layout One level;Elevator   Bathroom Shower/Tub Walk-in shower   Bathroom Toilet Standard   Bathroom Equipment Grab bars in shower;Grab bars around toilet   [address] [facility] [address]   Prior Function   Level of [address] with ADLs and functional mobility   Lives With Alone   Receives Help From Family   ADL Assistance Independent   IADLs Independent   Falls in the last 6 months 1 to 4   Vocational Retired   Comments pt admit from rehab, at baseline pt independent   Lifestyle   Autonomy at baseline pt I in ADLs, receives assist for IADLs   Reciprocal Relationships supportive family   Service to Others retired   Intrinsic Gratification pt enjoys reading and watching    Psychosocial   Psychosocial (WDL) WDL   Subjective   Subjective "I want to get out of bed for when my family comes"   ADL   Where Assessed Edge of bed   Eating Assistance 5  Supervision/Setup   Eating Deficit (opening items)   Grooming Assistance 4  Minimal Assistance   UB Bathing Assistance 3  Moderate Assistance   LB Bathing Assistance 2  Maximal Assistance   UB Dressing Assistance 3  Moderate Assistance   Bed Mobility   Rolling R 2  Maximal assistance   Additional items Assist x 1; Increased time required   Rolling L 2  Maximal assistance   Additional items Assist x 1; Increased time required   Supine to Sit 2  Maximal assistance   Additional items Assist x 2; Increased time required;Verbal cues;LE management   Additional Comments pt oob in chair at end of session   Transfers   Sit to Stand 3  Moderate assistance   Additional items Assist x 2; Increased time required;Verbal cues   Stand to Sit 3  Moderate assistance   Additional items Assist x 2; Increased time required;Verbal cues   Stand pivot 2  Maximal assistance   Additional items Assist x 2; Increased time required; Impulsive;Verbal cues   Functional Mobility   Functional Mobility 2 Maximal assistance   Additional items Hand hold assistance   Balance   Static Sitting Poor +   Dynamic Sitting Poor   Static Standing Poor -   Dynamic Standing Poor -   Ambulatory Poor -   Activity Tolerance   Activity Tolerance Patient tolerated treatment well   Medical Staff Made Aware [facility] present   Nurse Made Aware okay to see per RN, RN aware of need for sling and sling re-ordered   RUE Assessment   RUE Assessment WFL   LUE Assessment   LUE Assessment X  (NWB in [facility])   Hand Function   Gross Motor Coordination Functional   Fine Motor Coordination Impaired   Cognition   Overall Cognitive Status WFL   Arousal/Participation Cooperative   Attention Within functional limits   Orientation Level Oriented X4   Memory Decreased recall of precautions;Decreased recall of recent events   Following Commands Follows one step commands with increased time or repetition   Assessment   Limitation Decreased ADL status; Decreased Safe judgement during ADL;Decreased endurance;Decreased self-care trans;Decreased high-level ADLs; Decreased fine motor control;Non-func L UE   Prognosis Fair   Assessment Pt is a 81 YO  Female admitted to [facility] on 2/3/19 w/ fever, chills, productive cough, and worsening dyspnea  Comorbidities include a h/o recent fall w/ L scapula and multiple rib fx, HFpEF, HTN, HLD, PAF, mod-severe AI, CKD, and anemia   Pt with active OT orders and up with assistance  orders PT IS NWB LUE PER PREVIOUS ADMISSION W/ SLING  Pt resides in a an independent living apt with elevator access  Pt was I w/  ADLS, required assist for IADLs, and used a RW  Currently pt is Mod A x 2 for bed mobility and transfers, pt is Max A x 3 for SPT to chair, mod A for UB ADLs, and Max A for LB ADLs    Pt is limited at this time 2*: endurance, activity tolerance, functional mobility, balance, functional standing tolerance, unsupportive home environment, decreased I w/ ADLS/IADLS, decreased safety awareness, decreased insight into deficits, impaired fine motor skills and WBS  The following Occupational Performance Areas to address include: grooming, bathing/shower, toilet hygiene, dressing, health maintenance, functional mobility, community mobility, clothing management and household maintenance  Pt scored overall  30/100 on the Barthel Index  Based on the aforementioned OT evaluation, functional performance deficits, and assessments, pt has been identified as a high complexity evaluation  From OT standpoint, anticipate d/c STR  Pt to continue to benefit from acute immediate OT services to address the following goals 3-5x/week to  w/in 7-10 days: Cholo [place] Goals   Patient Goals to get up in the chair for her family   LTG Time Frame 7-10   Plan   Treatment Interventions ADL retraining;Functional transfer training; Endurance training;Patient/family training;Equipment evaluation/education; Compensatory technique education; Fine motor coordination activities;Continued evaluation; Energy conservation; Activityengagement   Goal Expiration Date 19   Treatment Day 1   OT Frequency 3-5x/wk   Additional Treatment Session   Start Time 1140   End Time 1200   Treatment Assessment Patient participated in Skilled OT session this date with interventions consisting of  donning/doffing splint*,  therapeutic activities to: increase activity tolerance, increase dynamic sit/ stand balance during functional activity , increase postural control, increase trunk control and increase OOB/ sitting tolerance   Patient agreeable to OT treatment session, upon arrival patient was found supine in bed  Additional time taken for teaching of how to don/doff sling and maintain WBS  Patient requiring verbal cues for correct technique, verbal cues for pacing thru activity steps and ocassional safety reminders   Patient continues to be functioning below baseline level, occupational performance remains limited secondary to factors listed above and increased risk for falls and injury  From OT standpoint, recommendation at time of d/c would be Short Term Rehab  Patient to benefit from continued Occupational Therapy treatment while in the hospital to address deficits as defined above and maximize level of functional independence with ADLs and functional mobility  Recommendation   OT Discharge Recommendation Short Term Rehab   OT - OK to Discharge Yes  (to STR)   Barthel Index   Feeding 5   Bathing 0   Grooming Score 0   Dressing Score 5   Bladder Score 0   Bowels Score 10   Toilet Use Score 5   Transfers (Bed/Chair) Score 5   Mobility (Level Surface) Score 0   Stairs Score 0   Barthel Index Score 30   Modified [place] Scale   Modified [place] Scale 4     GOALS    1) Pt will increase activity tolerance to G for 30 min txment sessions    2) Pt will complete UB/LB dressing/self care w/ S using adaptive device and DME as needed w/ one handed techniques  3) Pt will complete bathing w/ S w/ use of AE and DME as needed    4) Pt will complete toileting w/ S w/ G hygiene/thoroughness using DME as needed    5) Pt will improve functional transfers to S on/off all surfaces using DME as needed w/ G balance/safety     6) Pt will improve functional mobility during ADL/IADL/leisure tasks to S using DME as needed w/ G balance/safety     7) Pt will participate in simulated IADL management task to increase independence to  w/ G safety and endurance    8) Pt will engage in ongoing cognitive assessment w/ G participation to assist w/ safe d/c planning/recommendations    9) Pt will demonstrate G carryover of pt/caregiver education and training as appropriate  10) Pt will demonstrate 100% carryover of energy conservation techniques t/o functional I/ADL/leisure tasks w/o cues s/p skilled education    11) Pt will independently identify and utilize 2-3 coping strategies to increase positive affect and promote overall well-being      Keesha Mahan MS, OTR/L

## 2019-02-04 NOTE — PHYSICAL THERAPY NOTE
Physical Therapy Evaluation     Patient's Name: Demetrius Sportsman    Admitting Diagnosis  Achalasia [K22 0]  Respiratory distress [R06 03]  CO2 narcosis [R06 89]  Hypothermia, initial encounter Syliva Riddles  XXXA]  Ground glass opacity present on imaging of lung [R91 8]    Problem List  Patient Active Problem List   Diagnosis    Paroxysmal atrial fibrillation (HCC)    Benign hypertension with CKD (chronic kidney disease) stage III (HCC)    GERD (gastroesophageal reflux disease)    Other hyperlipidemia    Aortic valve regurgitation, nonrheumatic    Ascending aortic aneurysm (HCC)    Other chest pain    Lymphedema of both lower extremities    Chronic diastolic congestive heart failure (HCC)    Hematuria    Gait difficulty    Multiple rib fractures    Closed left scapular fracture    Acute kidney injury ([address] )    Acute pain due to trauma    Chronic respiratory acidosis    Pericardial effusion    Chronic kidney disease, stage III (moderate) (HCC)    Respiratory distress    Tao esophagus    Cataract of right eye    Chronic stasis dermatitis of right lower extremity    Chronic venous insufficiency    Diverticulosis    Inguinal hernia    Insomnia    Non-toxic multinodular goiter    Urine, incontinence, stress female    Achalasia    Abnormal urinalysis       Past Medical History  Past Medical History:   Diagnosis Date    Acid reflux disease     Anemia     Last assessed: 10/26/16    Aortic valve regurgitation, nonrheumatic     Ascending aortic aneurysm ([facility] [address] )     Last assessed: 6/5/13    Atrial fibrillation ([facility] [address] )     Hyperlipidemia     Hypertension 2/17/2016    Paroxysmal atrial fibrillation ([facility] [address] ) 2/17/2016       Past Surgical History  Past Surgical History:   Procedure Laterality Date    CARDIOVERSION      CHOLECYSTECTOMY      LAPAROSCOPIC SALPINGOOPHERECTOMY      TONSILLECTOMY        02/04/19 1706   Note Type   Note type Eval only   Pain Assessment   Pain Assessment No/denies pain   Pain Score No Pain   Home Living   Type of Home Apartment   Home Layout One level;Elevator   Bathroom Shower/Tub Walk-in shower   Bathroom Toilet Standard   Bathroom Equipment Grab bars in shower;Grab bars around toilet   [address] [facility]   Prior Function   Level of [address] with ADLs and functional mobility   Lives With Alone   Receives Help From Family   ADL Assistance Independent   IADLs Independent   Falls in the last 6 months 1 to 4  (1 fall resulting in hospital stay w/ fx of ribs and scapula)   Vocational Retired   Comments PTA, pt was residing in [facility] independent senior apartments and was I with mobility using a rolling walker, I with ADLs, and I with IADLs  She was recently admitted with a fall resulting in fx and is NWB of the LUE  She was at [facility] for rehab for less than one day and readmitted for respiratory distress  Restrictions/Precautions   Weight Bearing Precautions Per Order Yes   LUE Weight Bearing Per Order NWB   Braces or Orthoses Sling   Other Precautions Chair Alarm;Multiple lines;Telemetry;O2;Fall Risk;Pain   Cognition   Overall Cognitive Status WFL   Arousal/Participation Alert   Attention Within functional limits   Orientation Level Oriented X4   Memory Decreased recall of precautions  (VC for NWB of the LUE)   Following Commands Follows one step commands with increased time or repetition   RLE Assessment   RLE Assessment X  (3/5)   LLE Assessment   LLE Assessment X  (3/5)   Bed Mobility   Additional Comments Seated in bedside chair on arrival   Transfers   Sit to Stand 2  Maximal assistance   Additional items Assist x 1;Assist x 2; Increased time required  (standby A x1 for safety)   Stand to Sit 2  Maximal assistance   Additional items Assist x 1; Increased time required;Assist x 2  (standby A x 1 for safety)   Ambulation/Elevation   Gait pattern Poor UE support; Improper Weight shift;Decreased foot clearance; Short stride; Step to;Excessively slow   Gait Assistance 2  Maximal assist   Additional items Assist x 1;Assist x 2  (A x1 for close chair follow)   Assistive Device (HHA, lexii walker provided for next session to trial)   Distance 3 steps away from chair   Balance   Static Sitting Fair -   Dynamic Sitting Fair -  (in bedside chair)   Static Standing Poor -   Dynamic Standing Poor -   Ambulatory Poor -   Endurance Deficit   Endurance Deficit Yes   Endurance Deficit Description fatigue, weakness   Activity Tolerance   Activity Tolerance Patient tolerated treatment well   Medical Staff Made Aware Yes, Kaye Vincent from [facility]    Nurse Made Aware Yes, RN cleared pt for PT eval   Assessment   Prognosis Good   Problem List Decreased strength;Decreased endurance; Impaired balance;Decreased coordination;Decreased mobility; Decreased safety awareness   Assessment Pt is 80 y o  female seen for PT evaluation s/p admit to [facility] on 2/3/2019 w/ Respiratory distress  Pt presented from rehab [facility]) with acute hypoxia and hypercapnic respiratory failure with respiratory distress  She was recently admitted to [facility] under trauma services for a fall at home with several rib fx and left scapula fx and is currently NWB of the [facility]  PT consulted to assess pt's functional mobility and d/c needs  Order placed for PT eval and tx, w/ up w/ A order  Comorbidities affecting pt's physical performance at time of assessment include: anemia, GERD, aortic valve regurgitation, ascending aorta aneurysm, CKD stage 3, hypertension, chronic venous insufficiency, chronic lower extremities lymphedema, chronic diastolic heart failure  PTA, pt was independent w/ all functional mobility w/ RW, ambulates community distances and elevations and retired  Personal factors affecting pt at time of IE include: inability to ambulate household distances, limited home support, positive fall history, inability to perform IADLs, inability to perform ADLs and inability to live alone   Please find objective findings from PT assessment regarding body systems outlined above with impairments and limitations including weakness, impaired balance, decreased endurance, gait deviations, pain, decreased activity tolerance, decreased functional mobility tolerance, impaired judgement and fall risk  The following objective measures performed on IE also reveal limitations: Barthel Index: 30/100  Pt's clinical presentation is currently unstable/unpredictable seen in pt's presentation of pain, WBS, telemetry, supplemental oxygen  Pt tolerated sit to stand transfer with HHA of the RUE and max A x1 with standby x1 for safety  She required max vc for upright postures and demonstrated slight posterior lean in standing  With HHA, she tolerated 3 steps forward with close chair follow  Pt was too fatigued for a second trial with unilateral AD, but a lexii-walker was obtained for the next session  Pt to benefit from continued PT tx to address deficits as defined above and maximize level of functional independent mobility and consistency  From PT/mobility standpoint, recommendation at time of d/c would be STR pending progress in order to facilitate return to PLOF  Barriers to Discharge Decreased caregiver support  (Lives alone)   Goals   Patient Goals To walk   STG Expiration Date 02/14/19   Short Term Goal #1 Pt will demonstrate: 1) increased BLE strength >/= 1 grade for improved transfers 2) supine <> sit transfer with </= min A x1 3) sit <> stand transfer with </=min A x1 4) increased dynamic balance >/= 1 grade to decrease risk for falls 5) Amb >/= 30' with </= min A x1 using least restrictive unilateral device with NWB of the LUE    Treatment Day 0   Plan   Treatment/Interventions Functional transfer training;LE strengthening/ROM; Therapeutic exercise; Endurance training;Cognitive reorientation;Patient/family training;Equipment eval/education; Bed mobility;Gait training;Spoke to nursing;OT   PT Frequency (3-5x/wk) Recommendation   Recommendation Short-term skilled PT  (rehab)   Equipment Recommended (TBD - likely least restrictive unilateral device)   PT - OK to Discharge No   Additional Comments Pending further ambulation trial with AD   Barthel Index   Feeding 5   Bathing 0   Grooming Score 0   Dressing Score 5   Bladder Score 0   Bowels Score 10   Toilet Use Score 5   Transfers (Bed/Chair) Score 5   Mobility (Level Surface) Score 0   Stairs Score 0   Barthel Index Score 30           Sienna Shankar, PT, DPT

## 2019-02-04 NOTE — PROGRESS NOTES
Vancomycin IV Pharmacy-to-Dose Consultation    Demetrius Junior is a 80 y o  female who is currently receiving Vancomycin IV with management by the Pharmacy Consult service  Assessment/Plan:  The patient was reviewed  Renal function is stable and no signs or symptoms of nephrotoxicity and/or infusion reactions were documented in the chart  Based on todays assessment, continue current vancomycin (day # 2) dosing of 1250mg iv q24h, with a plan for trough to be drawn at 0530 on 2/7/19  We will continue to follow the patients culture results and clinical progress daily      Octaviano Spurling, Pharmacist

## 2019-02-04 NOTE — CONSULTS
The patient's vancomycin has been discontinued  Pharmacy will sign off now  Thank you for this consult

## 2019-02-04 NOTE — UTILIZATION REVIEW
Initial Clinical Review    Admission: Date/Time/Statement: 2/3/19 @ 1029 Inpatient Written   Orders Placed This Encounter   Procedures    Inpatient Admission (expected length of stay for this patient Order details is greater than two midnights)     Standing Status:   Standing     Number of Occurrences:   1     Order Specific Question:   Admitting Physician     Answer:   Jeanne Ochoa [[id number]]     Order Specific Question:   Level of Care     Answer:   Level 1 Stepdown [13]     Order Specific Question:   Estimated length of stay     Answer:   More than 2 Midnights     Order Specific Question:   Certification     Answer:   I certify that inpatient services are medically necessary for this patient for a duration of greater than two midnights  See H&P and MD Progress Notes for additional information about the patient's course of treatment  ED: Date/Time/Mode of Arrival:   ED Arrival Information     Expected Arrival Acuity Means of Arrival Escorted By Service Admission Type    - 2/3/2019 06:29 Immediate Ambulance [facility] General Medicine Emergency    Arrival Complaint    Respiratory Distress        Chief Complaint:   Chief Complaint   Patient presents with    Respiratory Distress     as per ems - pt resides at [facility], ems called for resp distress, patient 46% on room air, decreased resp effort     History of Illness: 72-year-old woman with history of CHF, hypertension, aortic regurgitation, ascending aortic aneurysm, AFib on Eliquis, and hyperlipidemia presents for evaluation of respiratory distress  Patient was found this morning at her nursing home unresponsive with depressed respirations  She was discharged yesterday from the trauma service after mechanical fall with left-sided scapula, rib, and wrist fractures  No reported excess pain medication use  No reported falls or new symptoms  On arrival, patient is hypothermic at 95 9 with respirations of 8 and hypertensive at 172/74    On exam, patient is somnolent  She opens her eyes to voice and follows commands  She has a decreased respirations with no other acute findings  She is in a left upper extremity sling  Will administer small dose of IV Narcan  Will place on BiPAP  Will perform septic and cardiac workups  Will speak with patient's daughter for code status and further management  ED Vital Signs:   ED Triage Vitals   Temperature Pulse Respirations Blood Pressure SpO2   02/03/19 0634 02/03/19 0634 02/03/19 0634 02/03/19 0634 02/03/19 0634   (!) 95 9 °F (35 5 °C) 60 (!) 8 (!) 172/74 100 %      Temp Source Heart Rate Source Patient Position - Orthostatic VS BP Location FiO2 (%)   02/03/19 0634 02/03/19 0634 02/03/19 0730 02/03/19 0730 --   Rectal Monitor Lying Left arm       Pain Score       02/03/19 0634       No Pain        Wt Readings from Last 1 Encounters:   02/04/19 65 2 kg (143 lb 11 8 oz)     Vital Signs (abnormal): HR 50S, RESPS 8-28, /77, 198/77, 185/78, BIPAP APPLIED  Pertinent Labs/Diagnostic Test Results: HGB 9 7, BUN 45, CA 3 0, ALT 79, AST 85, GLUC 158, PT 15 8, INR 1 25, BNP 3286, TSH 5 160  Leukocytes, UA Large     Protein,  (2+)     Blood, UA Moderate       RBC, UA Field obscured, unable to enumerate     WBC, UA Innumerable     Epithelial Cells Field obscured, unable to enumerate     Bacteria, UA Innumerable        2/3/19 0637   ph, Dominguez ISTAT 7 293     pCO2, Dominguez i-STAT 76 6     pO2, Dominguez i-STAT 57 0     BE, i-STAT 8     HCO3, Dominguez i-STAT 37 1     CO2, i-STAT 39     O2 Sat, i-STAT 84     SODIUM, I-STAT 143    Potassium, i-STAT 5 8     Calcium, Ionized i-STAT 1 15    Hct, i-STAT 32     Hgb, i-STAT 10 9     Glucose, i-STAT 165     Specimen Type VENOUS      CXR:  Vascular congestion, with persistent small right pleural effusion  CT HEAD:  Cerebral atrophy with chronic small vessel ischemic change  Stable right parafalcine calcified soft tissue mass, most compatible with meningioma    No acute intracranial abnormality  No significant change from prior study  CT CHEST:  1  Groundglass infiltrates with more focal areas of consolidation in the lower lobes of the lungs bilaterally with associated bilateral pleural effusions  These findings have progressed from the prior study  Although the findings likely represent   congestive heart failure and volume overload with bibasilar atelectasis, superimposed pneumonia such as aspiration related, not entirely excluded  Clinical correlation and follow-up examination recommended  2   Cardiomegaly with 5 4 cm ascending aortic aneurysm, stable  3   Incidental thyromegaly with bilateral nodules identified on this CT  According to guidelines published in the February 2015 white paper on incidental thyroid nodules in the Journal of the [employer] of Radiology [facility]), Because the nodule(s)   are greater than 1 5 cm in size, further characterization with thyroid ultrasound is recommended  4   Diffuse abnormal appearance of the esophagus  Correlate for rectal lesion  ED Treatment:   Medication Administration from 02/03/2019 0629 to 02/03/2019 1431       Date/Time Order Dose Route Action     02/03/2019 0645 naloxone (NARCAN) injection 0 4 mg 0 4 mg Intravenous Given     02/03/2019 1053 vancomycin (VANCOCIN) IVPB (premix) 1,000 mg 1,000 mg Intravenous New Bag     02/03/2019 0915 piperacillin-tazobactam (ZOSYN) 3 375 g in sodium chloride 0 9 % 50 mL IVPB 3 375 g Intravenous New Bag     02/03/2019 0940 azithromycin (ZITHROMAX) 500 mg in sodium chloride 0 9% 250mL IVPB 500 mg 500 mg Intravenous New Bag     02/03/2019 1333 acetaminophen (TYLENOL) tablet 650 mg 650 mg Oral Given     02/03/2019 1428 ipratropium (ATROVENT) 0 02 % inhalation solution 0 5 mg 0 5 mg Nebulization Given     02/03/2019 1428 levalbuterol (XOPENEX) inhalation solution 1 25 mg 1 25 mg Nebulization Given        Past Medical/Surgical History:    Active Ambulatory Problems     Diagnosis Date Noted    Paroxysmal atrial fibrillation ([facility] ) 02/17/2016    Benign hypertension with CKD (chronic kidney disease) stage III ([facility] ) 02/17/2016    GERD (gastroesophageal reflux disease) 03/31/2016    Other hyperlipidemia 03/31/2016    Aortic valve regurgitation, nonrheumatic     Ascending aortic aneurysm ([facility] )     Other chest pain 03/26/2018    Lymphedema of both lower extremities 03/26/2018    Chronic diastolic congestive heart failure ([facility] ) 03/30/2018    Hematuria 07/25/2018    Gait difficulty 11/27/2018    Multiple rib fractures 01/23/2019    Closed left scapular fracture 01/23/2019    Acute kidney injury ([facility] ) 01/27/2019    Acute pain due to trauma 01/27/2019    Chronic respiratory acidosis 01/28/2019    Pericardial effusion 01/31/2019    Chronic kidney disease, stage III (moderate) (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    Cataract of right eye 07/30/2014    Chronic stasis dermatitis of right lower extremity 05/10/2017    Chronic venous insufficiency 09/18/2017    Diverticulosis 06/05/2013    Inguinal hernia 06/05/2013    Insomnia 03/03/2016    Urine, incontinence, stress female 03/18/2013     Resolved Ambulatory Problems     Diagnosis Date Noted    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 ([facility] ) 03/31/2016    Hypokalemia 03/31/2016    Acute respiratory insufficiency 01/27/2019    Hyperkalemia 01/28/2019     Past Medical History:   Diagnosis Date    Acid reflux disease     Anemia     Aortic valve regurgitation, nonrheumatic     Ascending aortic aneurysm (HCC)     Atrial fibrillation ([place] [address] )     Hyperlipidemia     Hypertension 2/17/2016    Paroxysmal atrial fibrillation ([place] [address] ) 2/17/2016     Admitting Diagnosis: Achalasia [K22 0]  Respiratory distress [R06 03]  CO2 narcosis [R06 89]  Hypothermia, initial encounter [T68  XXXA]  Ground glass opacity present on imaging of lung [R91 8]  Age/Sex: 80 y o  female  Assessment/Plan:   Abnormal urinalysis   Assessment & Plan     With hematuria and innumerable WBC, unclear at this time if UTI?   Patient is receiving cefepime for pneumonia, will obtain urine culture   Achalasia   Assessment & Plan     Speech pathology evaluation, GI evaluation      Non-toxic multinodular goiter   Assessment & Plan     As seen on CT scan of the chest  Outpatient follow-up if desired      Chronic kidney disease, stage III (moderate) ([facility])   Assessment & Plan     Creatinine appears to be a baseline, during recent admission she was found to have a KI which is currently resolved  Continue to monitor   Hematuria   Assessment & Plan     On and off, ongoing since 2015  If further workup desired, patient could follow up as an outpatient with Urology   Chronic diastolic congestive heart failure ([facility] )   Assessment & Plan     Without acute exacerbation, no signs of volume overload at this time  Patient on metolazone daily without use of other diuretics as per recent discharge  Will hold diuretics for 24 hr, if patient remains hemodynamically stable will resume tomorrow  No indication for IV fluids at this specific time  Recent echocardiogram in January 2019 with ejection fraction of 84%, grade 2 diastolic dysfunction, normal pulmonary pressure   Ascending aortic aneurysm (HCC)   Assessment & Plan     5 4 cm as seen on recent CT scan of the chest, no further concerns at this time      Benign hypertension with CKD (chronic kidney disease) stage III ([facility] )   Assessment & Plan     Will continue Coreg tonight as well as Minipress, holding parameter for systolic 563 and below  Resume amlodipine and day dose of Minipress tomorrow morning  Will monitor closely   Paroxysmal atrial fibrillation (HCC)   Assessment & Plan     Continue with Eliquis for anticoagulation, continue amiodarone as well as Coreg  Telemetry monitoring on step-down level 2   * Respiratory distress   Assessment & Plan     Possibly related to health acquired pneumonia given recent hospitalization and discharged yesterday versus aspiration pneumonia given the presence of achalasia on CT of the chest  Provide vancomycin, cefepime, metronidazole  For now will continue with BiPAP given the presence of both hypoxic and hypercapnic respiratory failure  Repeat an ABG now, if pH is improved discontinue BiPAP and try high-flow nasal cannula  Continue with scheduled nebulizers  Respiratory protocol  Pending blood cultures  Try and obtain sputum culture  Urinary antigens request  Aspiration precaution  Speech evaluation  GI evaluation     Of note, patient was recently evaluated by Pulmonary during recent hospital stay, no formal diagnosis of COPD or asthma, although suspicion for chronic hypoxic respiratory failure unclear if related to CHF versus use of amiodarone? Will ask Pulmonary to evaluate again the patient  Given the poor effort on exam, unclear if wheezing although patient did receive prolonged nebulizer treatment in the ER  For now will start on steroids, Solu-Medrol t i d  IV  Follow further recommendation by Pulmonary group  Check for influenza and RSV  Complete respiratory pathogen profile  First procalcitonin is negative, will repeat tomorrow morning     VTE Prophylaxis: Apixaban (Eliquis)  / sequential compression device   Anticipated Length of Stay:  Patient will be admitted on an Inpatient basis with an anticipated length of stay of  greater than 2 midnights     Justification for Hospital Stay:  Please refer to above    Admission Orders:  Telemetry   Cardiac diet, 2 gm Na, fluid restriction  Aspiration and fall precautions  OOB with assist  Incentive spirometry  Daily weights, I/O  BIPAP  Blood and sputum cxs pending  PT, OT, Speech  Consult cardiology, GI, pharmacy, cm  Foot pumps, knee high compression stockings, warming blanket  Scheduled Meds:   Current Facility-Administered Medications:  acetaminophen 650 mg Oral Q6H PRN   acetaminophen 650 mg Oral Q8H [address]   amiodarone 200 mg Oral Daily   amLODIPine 10 mg Oral Daily   apixaban 5 mg Oral BID   carvedilol 3 125 mg Oral BID With Meals   cefepime 2,000 mg Intravenous Q12H   docusate sodium 100 mg Oral BID   econazole nitrate  Topical BID   fluticasone 1 spray Nasal BID   guaiFENesin 600 mg Oral Q12H [address]   ipratropium 0 5 mg Nebulization TID   isosorbide mononitrate 60 mg Oral Daily   levalbuterol 1 25 mg Nebulization TID   lidocaine 1 patch Topical Daily   metolazone 5 mg Oral Daily   metroNIDAZOLE 500 mg Intravenous Q8H   nitroglycerin 0 4 mg Sublingual Q5 Min PRN   potassium chloride 20 mEq Oral BID   pravastatin 40 mg Oral Daily   prazosin 2 mg Oral QAM   prazosin 5 mg Oral HS   senna 8 8 mg Oral Daily   vancomycin 20 mg/kg (Adjusted) Intravenous Q24H     Continuous Infusions:    PRN Meds:   acetaminophen    nitroglycerin    Network Utilization Review Department  Phone: [phone]; Fax [phone]  [email or web address]  [email or web address]  ATTENTION: Please call with any questions or concerns to [phone]  and carefully listen to the prompts so that you are directed to the right person  Send all requests for admission clinical reviews, approved or denied determinations and any other requests to fax [phone]   All voicemails are confidential

## 2019-02-04 NOTE — PLAN OF CARE
Problem: PHYSICAL THERAPY ADULT  Goal: Performs mobility at highest level of function for planned discharge setting  See evaluation for individualized goals  Treatment/Interventions: Functional transfer training, LE strengthening/ROM, Therapeutic exercise, Endurance training, Cognitive reorientation, Patient/family training, Equipment eval/education, Bed mobility, Gait training, Spoke to nursing, OT  Equipment Recommended:  (TBD - likely least restrictive unilateral device)       See flowsheet documentation for full assessment, interventions and recommendations  Prognosis: Good  Problem List: Decreased strength, Decreased endurance, Impaired balance, Decreased coordination, Decreased mobility, Decreased safety awareness  Assessment: Pt is 80 y o  female seen for PT evaluation s/p admit to [facility] on 2/3/2019 w/ Respiratory distress  Pt presented from rehab [facility]) with acute hypoxia and hypercapnic respiratory failure with respiratory distress  She was recently admitted to [facility] under trauma services for a fall at home with several rib fx and left scapula fx and is currently [facility] of the [facility] [place] [place]  PT consulted to assess pt's functional mobility and d/c needs  Order placed for PT eval and tx, w/ up w/ A order  Comorbidities affecting pt's physical performance at time of assessment include: anemia, GERD, aortic valve regurgitation, ascending aorta aneurysm, CKD stage 3, hypertension, chronic venous insufficiency, chronic lower extremities lymphedema, chronic diastolic heart failure  PTA, pt was independent w/ all functional mobility w/ RW, ambulates community distances and elevations and retired  Personal factors affecting pt at time of IE include: inability to ambulate household distances, limited home support, positive fall history, inability to perform IADLs, inability to perform ADLs and inability to live alone   Please find objective findings from PT assessment regarding body systems outlined above with impairments and limitations including weakness, impaired balance, decreased endurance, gait deviations, pain, decreased activity tolerance, decreased functional mobility tolerance, impaired judgement and fall risk  The following objective measures performed on IE also reveal limitations: Barthel Index: 30/100  Pt's clinical presentation is currently unstable/unpredictable seen in pt's presentation of pain, WBS, telemetry, supplemental oxygen  Pt tolerated sit to stand transfer with HHA of the RUE and max A x1 with standby x1 for safety  She required max vc for upright postures and demonstrated slight posterior lean in standing  With HHA, she tolerated 3 steps forward with close chair follow  Pt was too fatigued for a second trial with unilateral AD, but a lexii-walker was obtained for the next session  Pt to benefit from continued PT tx to address deficits as defined above and maximize level of functional independent mobility and consistency  From PT/mobility standpoint, recommendation at time of d/c would be STR pending progress in order to facilitate return to PLOF  Barriers to Discharge: Decreased caregiver support (Lives alone)     Recommendation: Short-term skilled PT (rehab)     PT - OK to Discharge: No    See flowsheet documentation for full assessment

## 2019-02-04 NOTE — PROGRESS NOTES
Progress Note - Jayshree William 9/29/1930, 80 y o  female MRN: [id number]    Unit/Bed#: [facility] 799-75 Encounter: [id number]    Primary Care Provider: Niraj Logan MD   Date and time admitted to hospital: 2/3/2019  6:29 AM        * Respiratory distress   Assessment & Plan    Possibly related to health acquired pneumonia given recent hospitalization and discharged on February 2nd 2019 versus aspiration pneumonia given the presence of achalasia on CT of the chest versus possible occult urinary tract infection  Provide vancomycin, cefepime, metronidazole  Patient is off BiPAP currently  Respiratory status is improved  Continue with scheduled nebulizers  Respiratory protocol  Pending blood cultures  Try and obtain sputum culture  Urinary antigens request  Aspiration precaution  Speech evaluation  GI evaluation    Of note, patient was recently evaluated by Pulmonary during recent hospital stay, no formal diagnosis of COPD or asthma, although suspicion for chronic hypoxic respiratory failure unclear if related to CHF versus use of amiodarone? Will ask Pulmonary to evaluate again the patient  Given the poor effort on exam, unclear if wheezing although patient did receive prolonged nebulizer treatment in the ER  Steroids discontinued by Pulmonary  Recommending diuretics  Check for influenza and RSV  Complete respiratory pathogen profile  First procalcitonin is negative,     Abnormal urinalysis   Assessment & Plan    With hematuria and innumerable WBC, unclear at this time if UTI? Patient is receiving cefepime for pneumonia  Urine culture positive for infection  Follow up on sensitivities    Cefepime for now     Achalasia   Assessment & Plan    Speech pathology evaluation, GI evaluation     Non-toxic multinodular goiter   Assessment & Plan    As seen on CT scan of the chest  Outpatient follow-up if desired     Hematuria   Assessment & Plan    On and off, ongoing since 2015  If further workup desired, patient could follow up as an outpatient with Urology     Chronic diastolic congestive heart failure ([address] )   Assessment & Plan    Without acute exacerbation, no signs of volume overload at this time  Patient on metolazone daily without use of loop diuretic? Cardiology consult    No indication for IV fluids at this specific time  Recent echocardiogram in 2019 with ejection fraction of 88%, grade 2 diastolic dysfunction, normal pulmonary pressure     Ascending aortic aneurysm (HCC)   Assessment & Plan    5 4 cm as seen on recent CT scan of the chest, no further concerns at this time     Benign hypertension with CKD (chronic kidney disease) stage III ([address] )   Assessment & Plan    Will continue Coreg tonight as well as Minipress, holding parameter for systolic 006 and below  Resume amlodipine and day dose of Minipress   Will monitor closely     Paroxysmal atrial fibrillation (HCC)   Assessment & Plan    Continue with Eliquis for anticoagulation, continue amiodarone as well as Coreg  Telemetry monitoring on step-down level 2       VTE Pharmacologic Prophylaxis:   Pharmacologic: Apixaban (Eliquis)  Mechanical VTE Prophylaxis in Place: No    Patient Centered Rounds: I have performed bedside rounds with nursing staff today  Time Spent for Care: 15 minutes  More than 50% of total time spent on counseling and coordination of care as described above  Current Length of Stay: 1 day(s)    Current Patient Status: Inpatient   Certification Statement: The patient will continue to require additional inpatient hospital stay due to Need to monitor symptoms        Code Status: Level 3 - DNAR and DNI      Subjective:   No acute distress    Objective:     Vitals:   Temp (24hrs), Av 5 °F (36 9 °C), Min:97 9 °F (36 6 °C), Max:99 °F (37 2 °C)    Temp:  [97 9 °F (36 6 °C)-99 °F (37 2 °C)] 98 2 °F (36 8 °C)  HR:  [50-65] 65  Resp:  [15-28] 18  BP: (138-182)/(49-72) 156/49  SpO2:  [95 %-99 %] 99 %  Body mass index is 28 07 kg/m²       Input and Output Summary (last 24 hours): Intake/Output Summary (Last 24 hours) at 02/04/19 0936  Last data filed at 02/04/19 0514   Gross per 24 hour   Intake              120 ml   Output             1200 ml   Net            -1080 ml       Physical Exam:     Physical Exam   Constitutional: She is oriented to person, place, and time  She appears well-developed and well-nourished  HENT:   Head: Normocephalic and atraumatic  Right Ear: External ear normal    Neck: Normal range of motion  Neck supple  No thyromegaly present  Cardiovascular: Normal rate and regular rhythm  Pulmonary/Chest: Effort normal and breath sounds normal    Abdominal: Soft  Bowel sounds are normal  She exhibits no distension  Musculoskeletal: Normal range of motion  Neurological: She is alert and oriented to person, place, and time  Skin: Skin is warm and dry  No erythema  Psychiatric: She has a normal mood and affect  Additional Data:     Labs:      Results from last 7 days  Lab Units 02/04/19  0603  02/03/19  0645   WBC Thousand/uL 5 47  --  7 53   HEMOGLOBIN g/dL 7 7*  --  9 7*   I STAT HEMOGLOBIN   --   < >  --    HEMATOCRIT % 28 0*  --  35 0   HEMATOCRIT, ISTAT   --   < >  --    PLATELETS Thousands/uL 164  --  225   NEUTROS PCT %  --   --  79*   LYMPHS PCT %  --   --  5*   MONOS PCT %  --   --  12   EOS PCT %  --   --  2   < > = values in this interval not displayed  Results from last 7 days  Lab Units 02/04/19  0603 02/03/19  1219   POTASSIUM mmol/L 4 9  --    CHLORIDE mmol/L 110*  --    CO2 mmol/L 34*  --    CO2, I-STAT mmol/L  --  37*   BUN mg/dL 43*  --    CREATININE mg/dL 1 04  --    CALCIUM mg/dL 8 2*  --    ALK PHOS U/L 83  --    ALT U/L 123*  --    AST U/L 116*  --    GLUCOSE, ISTAT mg/dl  --  118       Results from last 7 days  Lab Units 02/03/19  0646   INR  1 25*       * I Have Reviewed All Lab Data Listed Above  * Additional Pertinent Lab Tests Reviewed:  All Labs Within Last 24 Hours Reviewed        Recent Cultures (last 7 days):       Results from last 7 days  Lab Units 02/03/19  1202 02/03/19  0755   URINE CULTURE   --  >100,000 cfu/ml Gram Negative Jun Enteric Like*   LEGIONELLA URINARY ANTIGEN  Negative  --        Last 24 Hours Medication List:     Current Facility-Administered Medications:  acetaminophen 650 mg Oral Q6H PRN Franne Nyhan, MD    acetaminophen 650 mg Oral Q8H [address] DAWN Dietz    amiodarone 200 mg Oral Daily Franne Nyhan, MD    amLODIPine 10 mg Oral Daily Franne Nyhan, MD    apixaban 5 mg Oral BID Franne Nyhan, MD    carvedilol 3 125 mg Oral BID With Meals Franne Nyhan, MD    cefepime 2,000 mg Intravenous Q12H Franne Nyhan, MD Last Rate: 2,000 mg (02/04/19 0043)   docusate sodium 100 mg Oral BID Franne Nyhan, MD    econazole nitrate  Topical BID Franne Nyhan, MD    fluticasone 1 spray Nasal BID Franne Nyhan, MD    furosemide 20 mg Intravenous Once Hetul Koch, DO    guaiFENesin 600 mg Oral Q12H [address] Franne Nyhan, MD    ipratropium 0 5 mg Nebulization TID Rawland Milks Trager, DO    isosorbide mononitrate 60 mg Oral Daily Franne Nyhan, MD    levalbuterol 1 25 mg Nebulization TID [place] Milks Trager, DO    lidocaine 1 patch Topical Daily DAWN Allen    metolazone 5 mg Oral Daily Franne Nyhan, MD    metroNIDAZOLE 500 mg Intravenous Q8H Franne Nyhan, MD Last Rate: 500 mg (02/04/19 0403)   nitroglycerin 0 4 mg Sublingual Q5 Min PRN Franne Nyhan, MD    potassium chloride 20 mEq Oral BID Franne Nyhan, MD    pravastatin 40 mg Oral Daily Franne Nyhan, MD    prazosin 2 mg Oral QAM Franne Nyhan, MD    prazosin 5 mg Oral HS Franne Nyhan, MD    senna 8 8 mg Oral Daily Franne Nyhan, MD    vancomycin 20 mg/kg (Adjusted) Intravenous Q24H Franne Nyhan, MD Last Rate: 1,250 mg (02/04/19 0575)        Today, Patient Was Seen By: James Ceron DO    ** Please Note: Dictation voice to text software may have been used in the creation of this document   **

## 2019-02-04 NOTE — PLAN OF CARE
Problem: OCCUPATIONAL THERAPY ADULT  Goal: Performs self-care activities at highest level of function for planned discharge setting  See evaluation for individualized goals  Treatment Interventions: ADL retraining, Functional transfer training, Endurance training, Patient/family training, Equipment evaluation/education, Compensatory technique education, Fine motor coordination activities, Continued evaluation, Energy conservation, Activityengagement          See flowsheet documentation for full assessment, interventions and recommendations  Limitation: Decreased ADL status, Decreased Safe judgement during ADL, Decreased endurance, Decreased self-care trans, Decreased high-level ADLs, Decreased fine motor control, Non-func L UE  Prognosis: Fair  Assessment: Pt is a 81 YO  Female admitted to [facility] on 2/3/19 w/ fever, chills, productive cough, and worsening dyspnea  Comorbidities include a h/o recent fall w/ L scapula and multiple rib fx, HFpEF, HTN, HLD, PAF, mod-severe AI, CKD, and anemia   Pt with active OT orders and up with assistance  orders PT IS NWB LUE PER PREVIOUS ADMISSION W/ SLING  Pt resides in a an independent living apt with elevator access  Pt was I w/  ADLS, required assist for IADLs, and used a RW  Currently pt is Mod A x 2 for bed mobility and transfers, pt is Max A x 3 for SPT to chair, mod A for UB ADLs, and Max A for LB ADLs   Pt is limited at this time 2*: endurance, activity tolerance, functional mobility, balance, functional standing tolerance, unsupportive home environment, decreased I w/ ADLS/IADLS, decreased safety awareness, decreased insight into deficits, impaired fine motor skills and WBS  The following Occupational Performance Areas to address include: grooming, bathing/shower, toilet hygiene, dressing, health maintenance, functional mobility, community mobility, clothing management and household maintenance  Pt scored overall  30/100 on the Barthel Index   Based on the aforementioned OT evaluation, functional performance deficits, and assessments, pt has been identified as a high complexity evaluation  From OT standpoint, anticipate d/c STR  Pt to continue to benefit from acute immediate OT services to address the following goals 3-5x/week to  w/in 7-10 days:        OT Discharge Recommendation: Short Term Rehab  OT - OK to Discharge: Yes (to STR)

## 2019-02-04 NOTE — ASSESSMENT & PLAN NOTE
Without acute exacerbation, no signs of volume overload at this time  Patient on metolazone daily without use of loop diuretic?   Cardiology consult    No indication for IV fluids at this specific time  Recent echocardiogram in January 2019 with ejection fraction of 17%, grade 2 diastolic dysfunction, normal pulmonary pressure

## 2019-02-04 NOTE — ASSESSMENT & PLAN NOTE
With hematuria and innumerable WBC, unclear at this time if UTI? Patient is receiving cefepime for pneumonia  Urine culture positive for infection  Follow up on sensitivities    Cefepime for now

## 2019-02-04 NOTE — SPEECH THERAPY NOTE
Speech-Language Pathology Bedside Swallow Evaluation      Patient Name: Donnette Holstein    BWMYO'W Date: 2/4/2019     Problem List  Patient Active Problem List   Diagnosis    Paroxysmal atrial fibrillation [facility] )    Benign hypertension with CKD (chronic kidney disease) stage III (HCC)    GERD (gastroesophageal reflux disease)    Other hyperlipidemia    Aortic valve regurgitation, nonrheumatic    Ascending aortic aneurysm (HCC)    Other chest pain    Lymphedema of both lower extremities    Chronic diastolic congestive heart failure (HCC)    Hematuria    Gait difficulty    Multiple rib fractures    Closed left scapular fracture    Acute kidney injury ([address] )    Acute pain due to trauma    Chronic respiratory acidosis    Pericardial effusion    Chronic kidney disease, stage III (moderate) (HCC)    Respiratory distress    Tao esophagus    Cataract of right eye    Chronic stasis dermatitis of right lower extremity    Chronic venous insufficiency    Diverticulosis    Inguinal hernia    Insomnia    Non-toxic multinodular goiter    Urine, incontinence, stress female    Achalasia    Abnormal urinalysis       Past Medical History  Past Medical History:   Diagnosis Date    Acid reflux disease     Anemia     Last assessed: 10/26/16    Aortic valve regurgitation, nonrheumatic     Ascending aortic aneurysm ([address] )     Last assessed: 6/5/13    Atrial fibrillation ([address] )     Hyperlipidemia     Hypertension 2/17/2016    Paroxysmal atrial fibrillation ([address] ) 2/17/2016       Past Surgical History  Past Surgical History:   Procedure Laterality Date    CARDIOVERSION      CHOLECYSTECTOMY      LAPAROSCOPIC SALPINGOOPHERECTOMY      TONSILLECTOMY         Summary   Pt presented with functional appearing oral and pharyngeal stage swallowing skills for safe/adequate tolerance of mechanically altered diet  Pt reported feeling of general fatigue, and requested finely chopped foods   No s/s aspiration observed with thin liquids  Risk for Aspiration: low    Recommendations: mechanically altered/level 2 diet and thin liquids     Recommended Form of Meds: crushed with puree     Aspiration precautions and compensatory swallowing strategies: upright posture, only feed when fully alert, slow rate of feeding and small bites/sips      Current Medical Status per Dr Yudy Ryan H&P 2/3/2019  Jurline Duty is a 80 y o  female who presents with acute hypoxic and hypercapnic respiratory failure with respiratory distress  This is an 70-year-old female with past medical history of anemia, GERD, aortic valve regurgitation, ascending aorta aneurysm, CKD stage 3, hypertension, chronic venous insufficiency, chronic lower extremities lymphedema, chronic diastolic heart failure, who came to the hospital today from rehabilitation center for an episode of respiratory distress  She was recently hospitalized under trauma service after a fall at home reporting several rib fractures plus left scapula fracture  She was discharged in good medical condition yesterday to rehabilitation center  Unfortunately, patient today developed respiratory distress, severe hypoxia and in the ER she was found to be acidotic with severe hypercapnia  She was started on BiPAP and antibiotic for possible aspiration/Hcap  No history can be obtained by the patient, she appears drowsy on BiPAP, she can be woken up although her attention span is very short  Also received 1 dose of Narcan in the ER without change in her respiratory status  Past medical history:  Please see H&P for details    Special Studies:  CXR 2/3/2019 IMPRESSION:  Vascular congestion, with persistent small right pleural effusion  CT head 2/3/2019 IMPRESSION:  Cerebral atrophy with chronic small vessel ischemic change  Stable right parafalcine calcified soft tissue mass, most compatible with meningioma  No acute intracranial abnormality      No significant change from prior study  Social/Education/Vocational Hx:  Pt lives alone      Swallow Information   Current Risks for Dysphagia & Aspiration: difficulty with mastication     Current Symptoms/Concerns: change in respiratory status    Current Diet: soft/level 3 diet and thin liquids      Baseline Diet: pt reported she eats softer foods      Baseline Assessment   Behavior/Cognition: alert    Speech/Language Status: able to participate in conversation and able to follow commands    Patient Positioning: upright in bed    Pain Status/Interventions/Response to Interventions:  No report of or nonverbal indications of pain, however was uncomfortable in bed and required frequent repositioning  Swallow Mechanism Exam   Oral-motor structures and function are WNL for symmetry, strength, ROM & coordination  Facial: symmetrical  Labial: WFL  Lingual: WFL  Velum: symmetrical  Mandible: adequate ROM  Dentition: upper partial, lower implants  Vocal quality:clear/adequate   Volitional Cough: weak     Consistencies Assessed and Performance   Consistencies Administered: thin liquids, puree, mechanical soft solids and hard solids  Specific materials administered included water, apple sauce, oatmeal, mahogany cracker    Oral Stage: mild - WFL for modified diet  Mastication prolonged with mahogany cracker but adequate with the other materials administered today  Bolus formation and transfer were functional with no significant oral residue noted  No overt s/s reduced oral control  Pharyngeal Stage: WFL  Swallow Mechanics:  Swallowing initiation appeared prompt  Laryngeal rise was palpated and judged to be within functional limits  No coughing, throat clearing, change in vocal quality or respiratory status noted today  Esophageal Concerns: none reported      Summary and Recommendations (see above)    Results Reviewed with: patient and RN     Treatment Recommended: No additional ST f/u needed at this time

## 2019-02-04 NOTE — RESTORATIVE TECHNICIAN NOTE
Restorative Specialist Mobility Note       Activity: Chair, Dangle, Stand at bedside, Ambulate in room (two steps)     Assistive Device: Other (Comment) (HHA x 2)     Ambulation Response: Tolerated fairly well  Repositioned: Sitting, Up in chair, Other (Comment) (chair alarm on)              Anti-Embolism Device On: Bilateral, Sequential compression devices, below knee               Assisted therapy during session  For all/additional information please see therapy note(s)        Jreemie Mahan Restorative Technician, BS

## 2019-02-05 ENCOUNTER — TELEPHONE (OUTPATIENT)
Dept: NEPHROLOGY | Facility: CLINIC | Age: 84
End: 2019-02-05

## 2019-02-05 PROBLEM — S22.39XA RIB FRACTURE: Status: ACTIVE | Noted: 2019-02-05

## 2019-02-05 PROBLEM — I10 ESSENTIAL HYPERTENSION: Status: ACTIVE | Noted: 2019-02-05

## 2019-02-05 PROBLEM — R74.01 TRANSAMINITIS: Status: ACTIVE | Noted: 2019-02-05

## 2019-02-05 PROBLEM — E78.5 HYPERLIPIDEMIA: Status: ACTIVE | Noted: 2019-02-05

## 2019-02-05 LAB
ANION GAP SERPL CALCULATED.3IONS-SCNC: 1 MMOL/L (ref 4–13)
BACTERIA UR CULT: ABNORMAL
BUN SERPL-MCNC: 45 MG/DL (ref 5–25)
CALCIUM SERPL-MCNC: 8.3 MG/DL (ref 8.3–10.1)
CHLORIDE SERPL-SCNC: 108 MMOL/L (ref 100–108)
CO2 SERPL-SCNC: 33 MMOL/L (ref 21–32)
CREAT SERPL-MCNC: 1.13 MG/DL (ref 0.6–1.3)
GFR SERPL CREATININE-BSD FRML MDRD: 43 ML/MIN/1.73SQ M
GLUCOSE SERPL-MCNC: 121 MG/DL (ref 65–140)
MAGNESIUM SERPL-MCNC: 3 MG/DL (ref 1.6–2.6)
POTASSIUM SERPL-SCNC: 4.3 MMOL/L (ref 3.5–5.3)
SODIUM SERPL-SCNC: 142 MMOL/L (ref 136–145)

## 2019-02-05 PROCEDURE — 83735 ASSAY OF MAGNESIUM: CPT | Performed by: INTERNAL MEDICINE

## 2019-02-05 PROCEDURE — 99232 SBSQ HOSP IP/OBS MODERATE 35: CPT | Performed by: INTERNAL MEDICINE

## 2019-02-05 PROCEDURE — 94640 AIRWAY INHALATION TREATMENT: CPT

## 2019-02-05 PROCEDURE — 94668 MNPJ CHEST WALL SBSQ: CPT

## 2019-02-05 PROCEDURE — 94760 N-INVAS EAR/PLS OXIMETRY 1: CPT

## 2019-02-05 PROCEDURE — 80048 BASIC METABOLIC PNL TOTAL CA: CPT | Performed by: INTERNAL MEDICINE

## 2019-02-05 PROCEDURE — 97116 GAIT TRAINING THERAPY: CPT

## 2019-02-05 PROCEDURE — 97530 THERAPEUTIC ACTIVITIES: CPT

## 2019-02-05 RX ADMIN — APIXABAN 5 MG: 5 TABLET, FILM COATED ORAL at 08:54

## 2019-02-05 RX ADMIN — FLUTICASONE PROPIONATE 1 SPRAY: 50 SPRAY, METERED NASAL at 08:56

## 2019-02-05 RX ADMIN — ECONAZOLE NITRATE: 10 CREAM TOPICAL at 17:11

## 2019-02-05 RX ADMIN — AMLODIPINE BESYLATE 10 MG: 10 TABLET ORAL at 08:51

## 2019-02-05 RX ADMIN — PRAZOSIN HYDROCHLORIDE 2 MG: 2 CAPSULE ORAL at 08:50

## 2019-02-05 RX ADMIN — ISOSORBIDE MONONITRATE 60 MG: 60 TABLET, EXTENDED RELEASE ORAL at 08:57

## 2019-02-05 RX ADMIN — PRAZOSIN HYDROCHLORIDE 5 MG: 2 CAPSULE ORAL at 21:09

## 2019-02-05 RX ADMIN — POTASSIUM CHLORIDE 20 MEQ: 1500 TABLET, EXTENDED RELEASE ORAL at 08:48

## 2019-02-05 RX ADMIN — CARVEDILOL 3.12 MG: 3.12 TABLET, FILM COATED ORAL at 08:56

## 2019-02-05 RX ADMIN — ACETAMINOPHEN 650 MG: 325 TABLET, FILM COATED ORAL at 14:19

## 2019-02-05 RX ADMIN — DOCUSATE SODIUM 100 MG: 100 CAPSULE, LIQUID FILLED ORAL at 17:10

## 2019-02-05 RX ADMIN — CEFEPIME HYDROCHLORIDE 2000 MG: 1 INJECTION, POWDER, FOR SOLUTION INTRAMUSCULAR; INTRAVENOUS at 23:51

## 2019-02-05 RX ADMIN — ACETAMINOPHEN 650 MG: 325 TABLET, FILM COATED ORAL at 21:07

## 2019-02-05 RX ADMIN — PRAVASTATIN SODIUM 40 MG: 40 TABLET ORAL at 08:54

## 2019-02-05 RX ADMIN — APIXABAN 5 MG: 5 TABLET, FILM COATED ORAL at 17:10

## 2019-02-05 RX ADMIN — IPRATROPIUM BROMIDE 0.5 MG: 0.5 SOLUTION RESPIRATORY (INHALATION) at 13:42

## 2019-02-05 RX ADMIN — LEVALBUTEROL HYDROCHLORIDE 1.25 MG: 1.25 SOLUTION, CONCENTRATE RESPIRATORY (INHALATION) at 13:42

## 2019-02-05 RX ADMIN — FUROSEMIDE 40 MG: 10 INJECTION, SOLUTION INTRAMUSCULAR; INTRAVENOUS at 17:10

## 2019-02-05 RX ADMIN — CEFEPIME HYDROCHLORIDE 2000 MG: 1 INJECTION, POWDER, FOR SOLUTION INTRAMUSCULAR; INTRAVENOUS at 12:03

## 2019-02-05 RX ADMIN — GUAIFENESIN 600 MG: 600 TABLET, EXTENDED RELEASE ORAL at 21:08

## 2019-02-05 RX ADMIN — IPRATROPIUM BROMIDE 0.5 MG: 0.5 SOLUTION RESPIRATORY (INHALATION) at 20:07

## 2019-02-05 RX ADMIN — IPRATROPIUM BROMIDE 0.5 MG: 0.5 SOLUTION RESPIRATORY (INHALATION) at 07:38

## 2019-02-05 RX ADMIN — ACETAMINOPHEN 650 MG: 325 TABLET, FILM COATED ORAL at 06:13

## 2019-02-05 RX ADMIN — AMIODARONE HYDROCHLORIDE 200 MG: 200 TABLET ORAL at 08:57

## 2019-02-05 RX ADMIN — FLUTICASONE PROPIONATE 1 SPRAY: 50 SPRAY, METERED NASAL at 17:11

## 2019-02-05 RX ADMIN — GUAIFENESIN 600 MG: 600 TABLET, EXTENDED RELEASE ORAL at 08:55

## 2019-02-05 RX ADMIN — LIDOCAINE 1 PATCH: 50 PATCH CUTANEOUS at 08:58

## 2019-02-05 RX ADMIN — ECONAZOLE NITRATE: 10 CREAM TOPICAL at 09:06

## 2019-02-05 RX ADMIN — CARVEDILOL 3.12 MG: 3.12 TABLET, FILM COATED ORAL at 17:10

## 2019-02-05 RX ADMIN — LEVALBUTEROL HYDROCHLORIDE 1.25 MG: 1.25 SOLUTION, CONCENTRATE RESPIRATORY (INHALATION) at 20:07

## 2019-02-05 RX ADMIN — LEVALBUTEROL HYDROCHLORIDE 1.25 MG: 1.25 SOLUTION, CONCENTRATE RESPIRATORY (INHALATION) at 07:38

## 2019-02-05 RX ADMIN — POTASSIUM CHLORIDE 20 MEQ: 1500 TABLET, EXTENDED RELEASE ORAL at 17:10

## 2019-02-05 RX ADMIN — FUROSEMIDE 40 MG: 10 INJECTION, SOLUTION INTRAMUSCULAR; INTRAVENOUS at 08:59

## 2019-02-05 NOTE — PROGRESS NOTES
Progress Note - Cardiology   Juan A Comp 80 y o  female MRN: [id number]  Unit/Bed#: [facility] 256-39 Encounter: [id number]    Assessment:  Principal Problem:    Respiratory distress  Active Problems:    Paroxysmal atrial fibrillation (HCC)    Benign hypertension with CKD (chronic kidney disease) stage III (HCC)    Ascending aortic aneurysm (HCC)    Chronic diastolic congestive heart failure (HCC)    Hematuria    Chronic kidney disease, stage III (moderate) (HCC)    Non-toxic multinodular goiter    Achalasia    Abnormal urinalysis    #HFpEF, decompensated   -likely due to stopping of diuretic on discharge  -used to be on torsemide 60mg BID, prn 5mg metolazone as OP before last admission  -dry weight reported 135lb; currently 143lb; wt 142lb on 1/30 during last admission  #Severe AI, TAA 51mm  -pt has declined intervention in the past; medical mgmt  #PAF, NSR currently  -on eliquis 5mg BID, amio 200mg  #CKD, Cr near baseline  #Anemia of chronic dz  #HTN  #HLD  #Recent L scapular, multiple rib fx  #Hypercapnic and hypoxic respiratory failure, improving    Plan:  1  Continue lasix 40mg IV BID for another day  Hold metolazone for now  Patient will need to eventually be converted and discharged on lower dose (compared to previously) of BID torsemide with prn metolazone  Appreciate nephrology input on this matter  2  Continue amio 200mg, pravastatin 40mg, Imdur 60mg, Coreg 3 125mg BID, amlodipine 10mg, Eliquis 5mg BID  Home prazosin is not a great BP med for an elderly patient with her co-morbidities  Will plan to gradually wean off and uptitrate Coreg if necessary  3  Check BMP, Mg  Keep K >4 5, Mg >2 5  Subjective/Objective     Subjective: Feels similar to yesterday, breathing stable, denies orthopnea/PND  Denies CP, dizziness, palpitations  On less O2 today  She had a net -1L UOP over the last 24hrs on lasix 40mg IV BID  BP stable      Objective:  Vitals: BP (!) 171/82   Pulse 64   Temp 98 4 °F (36 9 °C) (Oral) Resp 18   Ht 5' (1 524 m)   Wt 65 2 kg (143 lb 11 8 oz) Comment: upon arrival to P5  bed zero'd prior  SpO2 97%   BMI 28 07 kg/m²   Vitals:    02/03/19 0634 02/04/19 0531   Weight: 72 6 kg (160 lb) 65 2 kg (143 lb 11 8 oz)     Orthostatic Blood Pressures      Most Recent Value   Blood Pressure   171/82 filed at 02/05/2019 6995   Patient Position - Orthostatic VS  Lying filed at 02/04/2019 0531            Intake/Output Summary (Last 24 hours) at 02/05/19 1014  Last data filed at 02/05/19 0701   Gross per 24 hour   Intake              580 ml   Output             1800 ml   Net            -1220 ml       Physical Exam:   General appearance: alert and in no acute distress  Head: Normocephalic, without obvious abnormality, atraumatic  Neck: +ve JVD and supple, symmetrical, trachea midline  Lungs: diminished, bibasilar rales, no wheezing  Heart: S1, S2 normal and no S3 or S4  3/6 systolic and diastolic decrescendo murmurs  No gallop  No rub  Abdomen: soft, non-tender; no masses,  no organomegaly  Extremities: extremities normal, atraumatic, no cyanosis, 1+ edema  Pulses: 2+ and symmetric bilaterally  Skin: Skin color, texture, turgor normal  No rashes or lesions  Neurologic: Grossly normal  Alert and oriented      Medications:    Current Facility-Administered Medications:     acetaminophen (TYLENOL) tablet 650 mg, 650 mg, Oral, Q6H PRN, Jimmy Gardner MD, 650 mg at 02/03/19 1333    acetaminophen (TYLENOL) tablet 650 mg, 650 mg, Oral, Q8H BOB, DAWN Dietz, 650 mg at 02/05/19 5132    amiodarone tablet 200 mg, 200 mg, Oral, Daily, Jimmy Gardner MD, 200 mg at 02/05/19 0857    amLODIPine (NORVASC) tablet 10 mg, 10 mg, Oral, Daily, Jimmy Gardner MD, 10 mg at 02/05/19 0851    apixaban (ELIQUIS) tablet 5 mg, 5 mg, Oral, BID, Jimmy Gardner MD, 5 mg at 02/05/19 0854    carvedilol (COREG) tablet 3 125 mg, 3 125 mg, Oral, BID With Meals, Jimmy Gardner MD, 3 125 mg at 02/05/19 0862    cefepime (MAXIPIME) 2 g/50 mL dextrose IVPB, 2,000 mg, Intravenous, Q12H, Petros Ng MD, Last Rate: 100 mL/hr at 02/04/19 2352, 2,000 mg at 02/04/19 2352    docusate sodium (COLACE) capsule 100 mg, 100 mg, Oral, BID, Petros Ng MD, 100 mg at 02/04/19 1722    econazole nitrate 1 % cream, , Topical, BID, Petros Ng MD    fluticasone (FLONASE) 50 mcg/act nasal spray 1 spray, 1 spray, Nasal, BID, Petros Ng MD, 1 spray at 02/05/19 0856    furosemide (LASIX) injection 40 mg, 40 mg, Intravenous, BID (diuretic), Elder Rincon MD, 40 mg at 02/05/19 0859    guaiFENesin (MUCINEX) 12 hr tablet 600 mg, 600 mg, Oral, Q12H [address], Petros Ng MD, 600 mg at 02/05/19 0855    ipratropium (ATROVENT) 0 02 % inhalation solution 0 5 mg, 0 5 mg, Nebulization, TID, Alonzo Acron Trager, DO, 0 5 mg at 02/05/19 9761    isosorbide mononitrate (IMDUR) 24 hr tablet 60 mg, 60 mg, Oral, Daily, Petros Ng MD, 60 mg at 02/05/19 0857    levalbuterol (XOPENEX) inhalation solution 1 25 mg, 1 25 mg, Nebulization, TID, Alonzo Acron Trager, DO, 1 25 mg at 02/05/19 0738    lidocaine (LIDODERM) 5 % patch 1 patch, 1 patch, Topical, Daily, DAWN Dietz, 1 patch at 02/05/19 0858    nitroglycerin (NITROSTAT) SL tablet 0 4 mg, 0 4 mg, Sublingual, Q5 Min PRN, Petros Ng MD    potassium chloride (K-DUR,KLOR-CON) CR tablet 20 mEq, 20 mEq, Oral, BID, Petros Ng MD, 20 mEq at 02/05/19 0848    pravastatin (PRAVACHOL) tablet 40 mg, 40 mg, Oral, Daily, Petros Ng MD, 40 mg at 02/05/19 0854    prazosin (MINIPRESS) capsule 2 mg, 2 mg, Oral, QAM, Petros Ng MD, 2 mg at 02/05/19 0850    prazosin (MINIPRESS) capsule 5 mg, 5 mg, Oral, HS, Petros Ng MD, 5 mg at 02/04/19 2102    senna 8 8 mg/5 mL oral syrup 8 8 mg, 8 8 mg, Oral, Daily, Petros Ng MD, 8 8 mg at 02/04/19 2201    Lab Results:    Results from last 7 days  Lab Units 02/03/19  0645   TROPONIN I ng/mL 0 02       Results from last 7 days  Lab Units 02/04/19  0603 02/03/19  1219 02/03/19  0645  01/30/19  0857   WBC Thousand/uL 5 47  --  7 53  --  10 90*   HEMOGLOBIN g/dL 7 7*  --  9 7*  --  9 4*   I STAT HEMOGLOBIN g/dl  --  8 5*  --   < >  --    HEMATOCRIT % 28 0*  --  35 0  --  33 4*   HEMATOCRIT, ISTAT %  --  25*  --   < >  --    PLATELETS Thousands/uL 164  --  225  --  146*   < > = values in this interval not displayed  Results from last 7 days  Lab Units 02/04/19  0603 02/03/19  1219 02/03/19  0647 02/03/19  0637 02/02/19  0504   POTASSIUM mmol/L 4 9  --  4 8  --  4 3   CHLORIDE mmol/L 110*  --  106  --  102   CO2 mmol/L 34*  --  30  --  34*   CO2, I-STAT mmol/L  --  37*  --  39*  37*  --    BUN mg/dL 43*  --  45*  --  59*   CREATININE mg/dL 1 04  --  1 06  --  1 14   CALCIUM mg/dL 8 2*  --  8 6  --  8 5   ALK PHOS U/L 83  --  95  --   --    ALT U/L 123*  --  79*  --   --    AST U/L 116*  --  85*  --   --    GLUCOSE, ISTAT mg/dl  --  118  --  165*  169*  --        Results from last 7 days  Lab Units 02/03/19  0646   INR  1 25*   PTT seconds 30       Results from last 7 days  Lab Units 02/04/19  0603 01/30/19  0455   MAGNESIUM mg/dL 3 2* 2 7*       Telemetry: Personally reviewed  Imaging: Personally reviewed  EKG: Personally reviewed       Ab Byers MD  Cardiology Fellow

## 2019-02-05 NOTE — PLAN OF CARE
Problem: PHYSICAL THERAPY ADULT  Goal: Performs mobility at highest level of function for planned discharge setting  See evaluation for individualized goals  Treatment/Interventions: Functional transfer training, LE strengthening/ROM, Therapeutic exercise, Endurance training, Cognitive reorientation, Patient/family training, Equipment eval/education, Bed mobility, Gait training, Spoke to nursing, OT  Equipment Recommended:  (TBD - likely least restrictive unilateral device)       See flowsheet documentation for full assessment, interventions and recommendations  Prognosis: Good  Problem List: Decreased strength, Decreased endurance, Impaired balance, Decreased coordination, Decreased mobility, Decreased safety awareness, Pain  Assessment: Pt was agreeable to PT treatment session  She reported fear of falling with ambulation due to hx of fall, but was motivated to participate  She stood at the bedside chair x2 for hygiene due to bowel movement with max A x2 for sit to stand and tolerated standing for ~1 minute x1 with A x1  With a 3rd stand, ambulation was attempted using the lexii-walker for RUE support and pt demonstrated retropulsion with fatigue  She attempted to take a step, but reported increased fatigue and was assisted back into sitting  Pt would continue to benefit from skilled PT to improve strength, endurance, balance, and mobility to return to OF  Barriers to Discharge: Decreased caregiver support  Barriers to Discharge Comments: Lives alone  Recommendation: Short-term skilled PT (rehab)     PT - OK to Discharge: No    See flowsheet documentation for full assessment

## 2019-02-05 NOTE — ASSESSMENT & PLAN NOTE
- heavily encouraged incentive spirometry during awake hours  - PRN pain control   - maintain oxygenation

## 2019-02-05 NOTE — PROGRESS NOTES
GASTROENTEROLOGY  PROGRESS NOTE     Unit/Bed#: [facility] 519-01   Encounter: [id number]    PATIENT INFORMATION     Uday Mendoza   80 y o  female   MRN: [id number]  Hospital Stay Days: 2    ASSESSMENT/PLAN     Principal Problem:    Respiratory distress  Active Problems:    Paroxysmal atrial fibrillation (HCC)    Benign hypertension with CKD (chronic kidney disease) stage III (HCC)    Ascending aortic aneurysm (HCC)    Chronic diastolic congestive heart failure (HCC)    Hematuria    Chronic kidney disease, stage III (moderate) (HCC)    Non-toxic multinodular goiter    Achalasia    Abnormal urinalysis    1  Abnormal CT findings concerning for achalasia - patient denies any symptoms of achalasia  Swallow evaluation and recommendations reviewed  Possible causes include pseudoachalasia, distal esophageal mass, or strictures  · Discussed abnormal esophageal CT findings and options for barium esophogram and/or endoscopy for further evaluation with patient's daughter Gavi Felix) per her request  · Family would like to defer any further workup at this time  · Can pursue elective outpatient workup if they choose to do so in the future  · Continue dysphagia diet per Speech therapy recommendations  · Supportive care per primary team    2  GERD - stable and asymptomatic at this time  · Lifestyle and dietary modifications as detailed previously  · Continue to monitor    3  Transaminitis - possibly secondary to medication  No abdominal complaints currently  · Continue to trend liver chemistries    SUBJECTIVE     Patient seen and examined  No acute events overnight  Patient is tired but offers no acute GI complaints today      OBJECTIVE     Vitals: Temp (24hrs), Av 1 °F (36 7 °C), Min:97 5 °F (36 4 °C), Max:98 5 °F (36 9 °C)   Temperature: 98 5 °F (36 9 °C)  Vitals:    19 0659 19 0737 19 0742 19 1055   BP: (!) 171/82   (!) 107/39   BP Location:       Pulse:    55   Resp:    18   Temp:    98 5 °F (36 9 °C)   TempSrc:    Oral   SpO2:  96% 97% 97%   Weight:       Height:          Body mass index is 28 07 kg/m²  I/O last 24 hours: In: 700 [P O :700]  Out: 1800 [Urine:1800]    Physical Exam   Constitutional: She appears well-developed  No distress  Elderly female   HENT:   Head: Normocephalic and atraumatic  Nose: Nose normal    Mouth/Throat: No oropharyngeal exudate  Eyes: Conjunctivae and EOM are normal    Neck: Neck supple  No tracheal deviation present  Cardiovascular: Normal rate and regular rhythm  Pulmonary/Chest: Effort normal  No respiratory distress  She has no wheezes  Scattered coarse breath sounds   Abdominal: Soft  Bowel sounds are normal  She exhibits no distension  There is no tenderness  There is no rebound and no guarding  Musculoskeletal: She exhibits tenderness (Shoulders/back, left arm)  Neurological: She is alert  No cranial nerve deficit  Answers questions, follows commands, somnolent but easily arouses to verbal and physical stimuli   Skin: Skin is dry  She is not diaphoretic  Cool   Psychiatric: She has a normal mood and affect  Her behavior is normal    Nursing note and vitals reviewed  Invasive Devices     Peripheral Intravenous Line            Peripheral IV 02/03/19 Left Antecubital 2 days          Drain            Urethral Catheter Temperature probe 16 Fr  2 days              LABORATORY DATA     Labs: I have personally reviewed pertinent reports  Results from last 7 days  Lab Units 02/04/19  0603 02/03/19  1219 02/03/19  0645  01/30/19  0857   WBC Thousand/uL 5 47  --  7 53  --  10 90*   HEMOGLOBIN g/dL 7 7*  --  9 7*  --  9 4*   I STAT HEMOGLOBIN g/dl  --  8 5*  --   < >  --    HEMATOCRIT % 28 0*  --  35 0  --  33 4*   HEMATOCRIT, ISTAT %  --  25*  --   < >  --    PLATELETS Thousands/uL 164  --  225  --  146*   NEUTROS PCT %  --   --  79*  --  88*   MONOS PCT %  --   --  12  --  6   < > = values in this interval not displayed     Results from last 7 days  Lab Units 02/04/19  0603 02/03/19  1219 02/03/19  0647 02/03/19  0637 02/02/19  0504   POTASSIUM mmol/L 4 9  --  4 8  --  4 3   CHLORIDE mmol/L 110*  --  106  --  102   CO2 mmol/L 34*  --  30  --  34*   CO2, I-STAT mmol/L  --  37*  --  39*  37*  --    BUN mg/dL 43*  --  45*  --  59*   CREATININE mg/dL 1 04  --  1 06  --  1 14   CALCIUM mg/dL 8 2*  --  8 6  --  8 5   ALK PHOS U/L 83  --  95  --   --    ALT U/L 123*  --  79*  --   --    AST U/L 116*  --  85*  --   --    GLUCOSE, ISTAT mg/dl  --  118  --  165*  169*  --        Results from last 7 days  Lab Units 02/04/19  0603 01/30/19  0455   MAGNESIUM mg/dL 3 2* 2 7*       Results from last 7 days  Lab Units 02/04/19  0603 02/01/19  0457   PHOSPHORUS mg/dL 3 4 3 7        Results from last 7 days  Lab Units 02/03/19  0646   INR  1 25*   PTT seconds 30       Results from last 7 days  Lab Units 02/03/19  0704   LACTIC ACID mmol/L 1 5       Results from last 7 days  Lab Units 02/03/19  0645   TROPONIN I ng/mL 0 02       IMAGING & DIAGNOSTIC TESTING     Radiology Results: I have personally reviewed pertinent films in PACS  Xr Chest 1 View Portable    Result Date: 2/4/2019  Impression: Vascular congestion, with persistent small right pleural effusion  Workstation performed: [id number]     Ct Head Without Contrast    Result Date: 2/3/2019  Impression: Cerebral atrophy with chronic small vessel ischemic change  Stable right parafalcine calcified soft tissue mass, most compatible with meningioma  No acute intracranial abnormality  No significant change from prior study  Workstation performed: [id number]     Ct Chest Without Contrast    Addendum Date: 2/3/2019    ADDENDUM: ADDENDUM There is a voice recognition software related error in the IMPRESSION of the report  A phrase which reads " 4  Diffuse abnormal appearance of the esophagus  Correlate for rectal lesion  " should read, " 4  Diffuse abnormal appearance of the esophagus    Correlate for ACHALASIA  "  Result Date: 2/3/2019  Impression: 1  Groundglass infiltrates with more focal areas of consolidation in the lower lobes of the lungs bilaterally with associated bilateral pleural effusions  These findings have progressed from the prior study  Although the findings likely represent congestive heart failure and volume overload with bibasilar atelectasis, superimposed pneumonia such as aspiration related, not entirely excluded  Clinical correlation and follow-up examination recommended  2   Cardiomegaly with 5 4 cm ascending aortic aneurysm, stable  3   Incidental thyromegaly with bilateral nodules identified on this CT  According to guidelines published in the February 2015 white paper on incidental thyroid nodules in the Journal of the [employer] of Radiology [facility]), Because the nodule(s) are greater than 1 5 cm in size, further characterization with thyroid ultrasound is recommended  4   Diffuse abnormal appearance of the esophagus  Correlate for rectal lesion  The study was marked in [facility] for immediate notification  Workstation performed: [id number]     Other Diagnostic Testing: I have personally reviewed pertinent reports        ACTIVE MEDICATIONS     Current Facility-Administered Medications   Medication Dose Route Frequency    acetaminophen (TYLENOL) tablet 650 mg  650 mg Oral Q6H PRN    acetaminophen (TYLENOL) tablet 650 mg  650 mg Oral Q8H [address]    amiodarone tablet 200 mg  200 mg Oral Daily    amLODIPine (NORVASC) tablet 10 mg  10 mg Oral Daily    apixaban (ELIQUIS) tablet 5 mg  5 mg Oral BID    carvedilol (COREG) tablet 3 125 mg  3 125 mg Oral BID With Meals    cefepime (MAXIPIME) 2 g/50 mL dextrose IVPB  2,000 mg Intravenous Q12H    docusate sodium (COLACE) capsule 100 mg  100 mg Oral BID    econazole nitrate 1 % cream   Topical BID    fluticasone (FLONASE) 50 mcg/act nasal spray 1 spray  1 spray Nasal BID    furosemide (LASIX) injection 40 mg  40 mg Intravenous BID (diuretic)    guaiFENesin (MUCINEX) 12 hr tablet 600 mg  600 mg Oral Q12H [facility]    ipratropium (ATROVENT) 0 02 % inhalation solution 0 5 mg  0 5 mg Nebulization TID    isosorbide mononitrate (IMDUR) 24 hr tablet 60 mg  60 mg Oral Daily    levalbuterol (XOPENEX) inhalation solution 1 25 mg  1 25 mg Nebulization TID    lidocaine (LIDODERM) 5 % patch 1 patch  1 patch Topical Daily    nitroglycerin (NITROSTAT) SL tablet 0 4 mg  0 4 mg Sublingual Q5 Min PRN    potassium chloride (K-DUR,KLOR-CON) CR tablet 20 mEq  20 mEq Oral BID    pravastatin (PRAVACHOL) tablet 40 mg  40 mg Oral Daily    prazosin (MINIPRESS) capsule 2 mg  2 mg Oral QAM    prazosin (MINIPRESS) capsule 5 mg  5 mg Oral HS    senna 8 8 mg/5 mL oral syrup 8 8 mg  8 8 mg Oral Daily     VTE Pharmacologic Prophylaxis: Eliquis  VTE Mechanical Prophylaxis: sequential compression device    ==  ROMAN Angeles    [address]  Internal Medicine Resident PGY-2

## 2019-02-05 NOTE — PROGRESS NOTES
[facility] Hospitalist Service - Internal Medicine Progress Note       PATIENT INFORMATION      Patient: Nery Carroll 80 y o  female   MRN: [id number]  PCP: Jazz Desai MD  Unit/Bed#: [facility] 519-01 Encounter: [id number]  Date Of Visit: 02/05/19       ASSESSMENTS & PLAN     Acute hypoxic and hypercapnic respiratory failure   Assessment & Plan    - progressively weaned off BiPAP down to 3 L via nasal cannula currently   - history of recent rib/scapular fractures noted - encourage incentive spirometry  - CT of chest previously revealed ground-glass infiltrates suggestive of possible pneumonia - remains on IV Cefepime regimen with a mild procalcitonin bump to 0 47 noted - continue to trend and if improves, discontinue Cefepime (transaminitis noted)   - continue to maintain oxygenation as tolerated - nebulizer treatments on board  - viral pathogen screen including Influenza/RSV negative  - appreciate pulmonology input - corticosteroids previously discontinued   - CHF exacerbation noted (see plan below)   - will appreciate PT/OT input     Acute on chronic diastolic CHF   Assessment & Plan    - appreciate cardiology input  - continue Coreg/Imdur and Lasix IV BID currently     - low-sodium diet with fluid restrictions enforced - net fluid balance for approximately (-) 2 8 L thus far      Recent left-sided multiple rib and scapular fractures   Assessment & Plan    - heavily encouraged incentive spirometry during awake hours  - PRN pain control   - maintain oxygenation      Paroxysmal atrial fibrillation    Assessment & Plan    - rate controlled on Coreg/Amiodarone  - continue Eliquis for anticoagulation     Achalasia   Assessment & Plan    - gastroenterology following and discussed with family regarding abnormal esophageal CT findings and they have elected to defer any further workup at this time (including endoscopy +/- barium esophagram) due to age/comorbidities - currently on a mechanical soft diet with thin liquids     Essential hypertension   Assessment & Plan    - continue Prazosin/Imdur/Norvasc/Coreg     Hyperlipidemia   Assessment & Plan    - continue Pravachol     Transaminitis   Assessment & Plan    - appreciate gastroenterology input  - limit/avoid hepatotoxins if possible - note current Cefepime administration        VTE Prophylaxis:  Eliquis      SUBJECTIVE     Seen/examined earlier in the day during nursing rounds  Patient is sitting upright in a chair oxygenating on nasal cannula supplementation  She complains of generalized weakness/fatigue but states her shortness of breath has mildly improved from yesterday  Denies any chest pain at this time  OBJECTIVE     Vitals:   Temp (24hrs), Av 3 °F (36 8 °C), Min:97 9 °F (36 6 °C), Max:98 5 °F (36 9 °C)    Temp:  [97 9 °F (36 6 °C)-98 5 °F (36 9 °C)] 98 2 °F (36 8 °C)  HR:  [48-64] 57  Resp:  [18-24] 18  BP: (107-171)/(38-82) 131/60  SpO2:  [94 %-97 %] 96 %  Body mass index is 28 07 kg/m²  Input and Output Summary (last 24 hours):        Intake/Output Summary (Last 24 hours) at 19 1810  Last data filed at 19 1638   Gross per 24 hour   Intake              298 ml   Output             1725 ml   Net            -1427 ml       Physical Exam:     GENERAL:  Weak/fatigued - improving conversational dyspnea  HEAD:  Normocephalic - atraumatic  EYES: PERRL - EOMI   MOUTH:  Mucosa moist  NECK:  Supple - full range of motion  CARDIAC:  Regular rate/rhythm - S1/S2 positive  PULMONARY:  Diminished bibasilar breath sounds with rales   ABDOMEN:  Soft - nontender/nondistended - active bowel sounds  MUSCULOSKELETAL:  Motor strength/range of motion quite deconditioned  NEUROLOGIC:  Alert/oriented   SKIN:  Chronic wrinkles/blemishes   PSYCHIATRIC:  Mood/affect somewhat flat      ADDITIONAL DATA       Labs & Recent Cultures:       Results from last 7 days  Lab Units 19  0603  19  0645   WBC Thousand/uL 5 47  --  7 53   HEMOGLOBIN g/dL 7 7*  --  9 7* I STAT HEMOGLOBIN   --   < >  --    HEMATOCRIT % 28 0*  --  35 0   HEMATOCRIT, ISTAT   --   < >  --    PLATELETS Thousands/uL 164  --  225   NEUTROS PCT %  --   --  79*   LYMPHS PCT %  --   --  5*   MONOS PCT %  --   --  12   EOS PCT %  --   --  2   < > = values in this interval not displayed  Results from last 7 days  Lab Units 02/05/19  1043 02/04/19  0603 02/03/19  1219   SODIUM mmol/L 142 144  --    POTASSIUM mmol/L 4 3 4 9  --    CHLORIDE mmol/L 108 110*  --    CO2 mmol/L 33* 34*  --    CO2, I-STAT mmol/L  --   --  37*   BUN mg/dL 45* 43*  --    CREATININE mg/dL 1 13 1 04  --    CALCIUM mg/dL 8 3 8 2*  --    ALK PHOS U/L  --  83  --    ALT U/L  --  123*  --    AST U/L  --  116*  --    GLUCOSE, ISTAT mg/dl  --   --  118       Results from last 7 days  Lab Units 02/03/19  0646   INR  1 25*           Results from last 7 days  Lab Units 02/03/19  1215 02/03/19  1202 02/03/19  0755 02/03/19  0704   BLOOD CULTURE   --   --   --  No Growth at 48 hrs  No Growth at 48 hrs     URINE CULTURE   --   --  >100,000 cfu/ml Escherichia coli*  --    INFLUENZA B PCR  Not Detected  --   --   --    RSV PCR  Not Detected  --   --   --    LEGIONELLA URINARY ANTIGEN   --  Negative  --   --          Last 24 Hours Medication List:     Current Facility-Administered Medications:  acetaminophen 650 mg Oral Q6H PRN Eladio Samson MD    acetaminophen 650 mg Oral Q8H [address] DAWN Dietz    amiodarone 200 mg Oral Daily Eladio Samson MD    amLODIPine 10 mg Oral Daily Eladio Samson MD    apixaban 5 mg Oral BID Eladio Samson MD    carvedilol 3 125 mg Oral BID With Meals Eladio Samson MD    cefepime 2,000 mg Intravenous Q12H Eladio Samson MD Last Rate: 2,000 mg (02/05/19 1203)   docusate sodium 100 mg Oral BID Eladio Samson MD    econazole nitrate  Topical BID Eladio Samson MD    fluticasone 1 spray Nasal BID Eladio Samson MD    furosemide 40 mg Intravenous BID (diuretic) Cyrus Caal MD guaiFENesin 600 mg Oral Q12H [facility] Kathie Mckeon MD    ipratropium 0 5 mg Nebulization TID Zana Groom Trager, DO    isosorbide mononitrate 60 mg Oral Daily Kathie Mckeon MD    levalbuterol 1 25 mg Nebulization TID Zana Groom Trager, DO    lidocaine 1 patch Topical Daily DAWN Barbour    nitroglycerin 0 4 mg Sublingual Q5 Min PRN Kathie Mckeon MD    potassium chloride 20 mEq Oral BID Kathie Mckeon MD    pravastatin 40 mg Oral Daily Kathie Mckeon MD    prazosin 2 mg Oral QAM Kathie Mckeon MD    prazosin 5 mg Oral HS Kathie Mckeon MD    senna 8 8 mg Oral Daily Kathie Mckeon MD           Time Spent for Care: 32 minutes  More than 50% of total time spent on counseling and coordination of care as described above  Current Length of Stay: 2 day(s)      Code Status: Level 3 - DNAR and DNI         ** Please Note: This note is constructed using a voice recognition dictation system   **

## 2019-02-05 NOTE — ASSESSMENT & PLAN NOTE
- gastroenterology following and discussed with family regarding abnormal esophageal CT findings and they have elected to defer any further workup at this time (including endoscopy +/- barium esophagram) due to age/comorbidities - currently on a mechanical soft diet with thin liquids

## 2019-02-05 NOTE — ASSESSMENT & PLAN NOTE
- appreciate cardiology input  - continue Coreg/Imdur and Lasix IV BID currently     - low-sodium diet with fluid restrictions enforced - net fluid balance for approximately (-) 2 8 L thus far

## 2019-02-05 NOTE — PROGRESS NOTES
Progress Note - Pulmonary   Nia Sylvester 80 y o  female MRN: [id number]  Unit/Bed#: [facility] 519-01 Encounter: [id number]    Assessment/Plan:    1  Acute hypoxic and hypercapnic respiratory failure secondary to volume overload   1  Currently requiring 2L NC and saturating at 95%  No oxygen requirements at baseline  2  Continue to titrate oxygen to maintain SpO2 greater than or equal to 88%  3  Continue nebulizer treatments TID  4  Pulmonary toilet: incentive spirometry, increase activity as tolerated, OOB as tolerated  2  Acute on chronic diastolic heart failure   1  Continue Lasix for diuresis   2  Strict I/Os and daily weights  3  Would repeat CXR after appropriate diuresis   4  Home regimen consists of 40 mg torsemide BIS and 2 5 zaroxlyn daily   3  Abnormal chest CT with diffuse groundglass opacities with increasing bilateral pleural effusions as well as evidence of achalasia   1  Procalcitonin mildly elevated   2  Continue antibiotic therapy with cefepime      Chief Complaint:    "I feel worse than yesterday "    Subjective:    Patient was seen and examined today  Patient is accompanied by her daughter who states she had a bad night last night  She slept in the car and did not get a restful night sleep  Today she feels very tired and "run down " Denies shortness of breath, chest pain, wheeze, fevers, chills, dizziness, nausea, vomiting, abdominal pain, or leg swelling  Patient states her cough is stable and continues to be productive of sputum  Objective:    Vitals: Blood pressure (!) 107/39, pulse 55, temperature 98 5 °F (36 9 °C), temperature source Oral, resp  rate 18, height 5' (1 524 m), weight 65 2 kg (143 lb 11 8 oz), SpO2 97 %  2L NC,Body mass index is 28 07 kg/m²        Intake/Output Summary (Last 24 hours) at 02/05/19 1157  Last data filed at 02/05/19 0800   Gross per 24 hour   Intake              760 ml   Output             1800 ml   Net            -1040 ml       Invasive Devices     Peripheral Intravenous Line            Peripheral IV 02/03/19 Left Antecubital 2 days          Drain            Urethral Catheter Temperature probe 16 Fr  2 days                Physical Exam:     Physical Exam   Constitutional: She is oriented to person, place, and time  She appears well-developed and well-nourished  HENT:   Head: Normocephalic and atraumatic  Eyes: EOM are normal    Neck: Normal range of motion  Cardiovascular: Normal rate and regular rhythm  Murmur heard  Pulmonary/Chest: No respiratory distress  She has no wheezes  She has rales (bibasilar)  Poor inspiratory effort   Abdominal: Soft  Bowel sounds are normal  She exhibits no distension  There is no tenderness  Musculoskeletal: She exhibits no edema, tenderness or deformity  Neurological: She is alert and oriented to person, place, and time  No cranial nerve deficit  Skin: Skin is warm and dry  No rash noted  No erythema  Psychiatric: She has a normal mood and affect  Her behavior is normal        Labs: I have personally reviewed pertinent lab results  , ABG: No results found for: PHART, OIN6YSJ, PO2ART, OVC6DJT, Z7GKACPK, BEART, SOURCE, BNP: No results found for: BNP, CBC: No results found for: WBC, HGB, HCT, MCV, PLT, ADJUSTEDWBC, MCH, MCHC, RDW, MPV, NRBC, CMP:   Lab Results   Component Value Date    SODIUM 142 02/05/2019    K 4 3 02/05/2019     02/05/2019    CO2 33 (H) 02/05/2019    BUN 45 (H) 02/05/2019    CREATININE 1 13 02/05/2019    CALCIUM 8 3 02/05/2019    EGFR 43 02/05/2019   , PT/INR: No results found for: PT, INR, Troponin: No results found for: TROPONINI    Imaging and other studies: None to review

## 2019-02-05 NOTE — TELEPHONE ENCOUNTER
Called and left a second voicemail in regards to scheduling for a hospital follow up   Left our call back number  ----- Message from Christ Shahid DO sent at 2/2/2019  3:29 PM EST -----  Regarding: CKD hospital follow up appointment  Please schedule hospital follow-up for CKD in 2-3 weeks with 1st available provider  Patient should have BMP prior to office visit  Thanks

## 2019-02-05 NOTE — ASSESSMENT & PLAN NOTE
- appreciate gastroenterology input  - limit/avoid hepatotoxins if possible - note current Cefepime administration

## 2019-02-05 NOTE — PHYSICAL THERAPY NOTE
PT Treatment       02/05/19 0850   Pain Assessment   Pain Assessment No/denies pain   Pain Score No Pain   Restrictions/Precautions   Weight Bearing Precautions Per Order Yes   LUE Weight Bearing Per Order NWB   Braces or Orthoses Sling   Other Precautions Chair Alarm;Multiple lines;Telemetry;O2;Fall Risk;Pain   General   Family/Caregiver Present No   Cognition   Overall Cognitive Status WFL   Attention Within functional limits   Orientation Level Oriented X4   Memory Decreased recall of precautions  (VC for NWB of LUE)   Following Commands Follows one step commands with increased time or repetition   Bed Mobility   Additional Comments Seated in bedside chair on arrival   Transfers   Sit to Stand 2  Maximal assistance   Additional items Assist x 2; Increased time required;Verbal cues   Stand to Sit 3  Moderate assistance   Additional items Assist x 2; Increased time required;Verbal cues   Ambulation/Elevation   Gait pattern Poor UE support; Improper Weight shift;Decreased foot clearance; Short stride; Step to;Excessively slow   Gait Assistance 2  Maximal assist   Additional items Assist x 1;Assist x 2  (Ax1 close chair follow)   Assistive Device Aramis-walker   Distance 1 step from chair   Balance   Static Sitting Fair -   Dynamic Sitting Fair -   Static Standing Poor -   Dynamic Standing Poor -   Ambulatory Poor -   Endurance Deficit   Endurance Deficit Yes   Endurance Deficit Description fatigue, weakness, pain   Activity Tolerance   Activity Tolerance Patient tolerated treatment well   Medical Staff Made Aware Augustin Lightning with Restorative staff present   Nurse Made Aware Yes, RN cleared pt for PT session   Assessment   Prognosis Good   Problem List Decreased strength;Decreased endurance; Impaired balance;Decreased coordination;Decreased mobility; Decreased safety awareness;Pain   Assessment Pt was agreeable to PT treatment session  She reported fear of falling with ambulation due to hx of fall, but was motivated to participate  She stood at the bedside chair x2 for hygiene due to bowel movement with max A x2 for sit to stand and tolerated standing for ~1 minute x1 with A x1  With a 3rd stand, ambulation was attempted using the lexii-walker for RUE support and pt demonstrated retropulsion with fatigue  She attempted to take a step, but reported increased fatigue and was assisted back into sitting  Pt would continue to benefit from skilled PT to improve strength, endurance, balance, and mobility to return to PLOF  Barriers to Discharge Decreased caregiver support   Barriers to Discharge Comments Lives alone   Goals   Patient Goals to walk   STG Expiration Date 02/14/19   Short Term Goal #1 Pt will demonstrate: 1) increased BLE strength >/= 1 grade for improved transfers 2) supine <> sit transfer with </= min A x1 3) sit <> stand transfer with </=min A x1 4) increased dynamic balance >/= 1 grade to decrease risk for falls 5) Amb >/= 30' with </= min A x1 using least restrictive unilateral device with NWB of the LUE    Treatment Day 1   Plan   Treatment/Interventions Functional transfer training;LE strengthening/ROM; Therapeutic exercise; Endurance training;Patient/family training;Equipment eval/education; Bed mobility;Gait training;Spoke to nursing;OT   PT Frequency (3-5x/wk)   Recommendation   Recommendation Short-term skilled PT  (rehab)   Equipment Recommended (TBD - likely least restrictive unilateral device)     Miguelito Bustos, PT, DPT

## 2019-02-05 NOTE — ASSESSMENT & PLAN NOTE
- progressively weaned off BiPAP down to 3 L via nasal cannula currently   - history of recent rib/scapular fractures noted - encourage incentive spirometry  - CT of chest previously revealed ground-glass infiltrates suggestive of possible pneumonia - remains on IV Cefepime regimen with a mild procalcitonin bump to 0 47 noted - continue to trend and if improves, discontinue Cefepime (transaminitis noted)   - continue to maintain oxygenation as tolerated - nebulizer treatments on board  - viral pathogen screen including Influenza/RSV negative  - appreciate pulmonology input - corticosteroids previously discontinued   - CHF exacerbation noted (see plan below)

## 2019-02-05 NOTE — TELEPHONE ENCOUNTER
----- Message from Scarlett Boas, DO sent at 2/3/2019  8:31 AM EST -----  Regarding: hospital follow up for CKD  Patient was discharged 2/2 and readmitted 2/3 but renal not consulted as sCr at baseline  Ensure this patient is called in about 1 week to schedule outpatient nephrology follow up in the office in about 3-4 weeks for CKD with BMP prior to visit  Thanks

## 2019-02-05 NOTE — RESTORATIVE TECHNICIAN NOTE
Restorative Specialist Mobility Note       Activity: Chair, Dangle, Stand at bedside     Assistive Device: Other (Comment) (lexii walker)     Ambulation Response: Tolerated fairly well  Repositioned: Sitting, Up in chair                  Assisted therapy during session  For all/additional information please see therapy note(s)        Beckie Albert Restorative Technician, BS

## 2019-02-06 ENCOUNTER — TELEPHONE (OUTPATIENT)
Dept: NEPHROLOGY | Facility: CLINIC | Age: 84
End: 2019-02-06

## 2019-02-06 LAB
ALBUMIN SERPL BCP-MCNC: 2.5 G/DL (ref 3.5–5)
ALP SERPL-CCNC: 82 U/L (ref 46–116)
ALT SERPL W P-5'-P-CCNC: 77 U/L (ref 12–78)
ANION GAP SERPL CALCULATED.3IONS-SCNC: 3 MMOL/L (ref 4–13)
AST SERPL W P-5'-P-CCNC: 42 U/L (ref 5–45)
BILIRUB SERPL-MCNC: 0.35 MG/DL (ref 0.2–1)
BUN SERPL-MCNC: 39 MG/DL (ref 5–25)
CALCIUM SERPL-MCNC: 8.2 MG/DL (ref 8.3–10.1)
CHLORIDE SERPL-SCNC: 105 MMOL/L (ref 100–108)
CO2 SERPL-SCNC: 32 MMOL/L (ref 21–32)
CREAT SERPL-MCNC: 0.94 MG/DL (ref 0.6–1.3)
GFR SERPL CREATININE-BSD FRML MDRD: 54 ML/MIN/1.73SQ M
GLUCOSE SERPL-MCNC: 104 MG/DL (ref 65–140)
HCT VFR BLD AUTO: 28.6 % (ref 34.8–46.1)
HGB BLD-MCNC: 8.2 G/DL (ref 11.5–15.4)
POTASSIUM SERPL-SCNC: 4.6 MMOL/L (ref 3.5–5.3)
PROCALCITONIN SERPL-MCNC: 0.24 NG/ML
PROT SERPL-MCNC: 6 G/DL (ref 6.4–8.2)
SODIUM SERPL-SCNC: 140 MMOL/L (ref 136–145)

## 2019-02-06 PROCEDURE — 80053 COMPREHEN METABOLIC PANEL: CPT | Performed by: INTERNAL MEDICINE

## 2019-02-06 PROCEDURE — 94760 N-INVAS EAR/PLS OXIMETRY 1: CPT

## 2019-02-06 PROCEDURE — 94668 MNPJ CHEST WALL SBSQ: CPT

## 2019-02-06 PROCEDURE — 99232 SBSQ HOSP IP/OBS MODERATE 35: CPT | Performed by: INTERNAL MEDICINE

## 2019-02-06 PROCEDURE — 84145 PROCALCITONIN (PCT): CPT | Performed by: INTERNAL MEDICINE

## 2019-02-06 PROCEDURE — 94640 AIRWAY INHALATION TREATMENT: CPT

## 2019-02-06 PROCEDURE — 85014 HEMATOCRIT: CPT | Performed by: INTERNAL MEDICINE

## 2019-02-06 PROCEDURE — 85018 HEMOGLOBIN: CPT | Performed by: INTERNAL MEDICINE

## 2019-02-06 PROCEDURE — 97535 SELF CARE MNGMENT TRAINING: CPT

## 2019-02-06 RX ORDER — SENNOSIDES 8.8 MG/5ML
8.8 LIQUID ORAL DAILY PRN
Status: DISCONTINUED | OUTPATIENT
Start: 2019-02-06 | End: 2019-02-09

## 2019-02-06 RX ORDER — DOCUSATE SODIUM 100 MG/1
100 CAPSULE, LIQUID FILLED ORAL 2 TIMES DAILY PRN
Status: DISCONTINUED | OUTPATIENT
Start: 2019-02-06 | End: 2019-02-09

## 2019-02-06 RX ADMIN — LEVALBUTEROL HYDROCHLORIDE 1.25 MG: 1.25 SOLUTION, CONCENTRATE RESPIRATORY (INHALATION) at 13:35

## 2019-02-06 RX ADMIN — FUROSEMIDE 40 MG: 10 INJECTION, SOLUTION INTRAMUSCULAR; INTRAVENOUS at 17:03

## 2019-02-06 RX ADMIN — FLUTICASONE PROPIONATE 1 SPRAY: 50 SPRAY, METERED NASAL at 17:06

## 2019-02-06 RX ADMIN — CEFEPIME HYDROCHLORIDE 2000 MG: 1 INJECTION, POWDER, FOR SOLUTION INTRAMUSCULAR; INTRAVENOUS at 10:53

## 2019-02-06 RX ADMIN — CARVEDILOL 3.12 MG: 3.12 TABLET, FILM COATED ORAL at 09:11

## 2019-02-06 RX ADMIN — ACETAMINOPHEN 650 MG: 325 TABLET, FILM COATED ORAL at 06:26

## 2019-02-06 RX ADMIN — IPRATROPIUM BROMIDE 0.5 MG: 0.5 SOLUTION RESPIRATORY (INHALATION) at 13:35

## 2019-02-06 RX ADMIN — POTASSIUM CHLORIDE 20 MEQ: 1500 TABLET, EXTENDED RELEASE ORAL at 17:03

## 2019-02-06 RX ADMIN — IPRATROPIUM BROMIDE 0.5 MG: 0.5 SOLUTION RESPIRATORY (INHALATION) at 07:32

## 2019-02-06 RX ADMIN — AMIODARONE HYDROCHLORIDE 200 MG: 200 TABLET ORAL at 09:13

## 2019-02-06 RX ADMIN — ISOSORBIDE MONONITRATE 60 MG: 60 TABLET, EXTENDED RELEASE ORAL at 09:14

## 2019-02-06 RX ADMIN — PRAVASTATIN SODIUM 40 MG: 40 TABLET ORAL at 09:14

## 2019-02-06 RX ADMIN — PRAZOSIN HYDROCHLORIDE 2 MG: 2 CAPSULE ORAL at 09:10

## 2019-02-06 RX ADMIN — LEVALBUTEROL HYDROCHLORIDE 1.25 MG: 1.25 SOLUTION, CONCENTRATE RESPIRATORY (INHALATION) at 07:33

## 2019-02-06 RX ADMIN — FUROSEMIDE 40 MG: 10 INJECTION, SOLUTION INTRAMUSCULAR; INTRAVENOUS at 09:09

## 2019-02-06 RX ADMIN — LIDOCAINE 1 PATCH: 50 PATCH CUTANEOUS at 09:14

## 2019-02-06 RX ADMIN — APIXABAN 5 MG: 5 TABLET, FILM COATED ORAL at 17:03

## 2019-02-06 RX ADMIN — CARVEDILOL 3.12 MG: 3.12 TABLET, FILM COATED ORAL at 17:04

## 2019-02-06 RX ADMIN — LEVALBUTEROL HYDROCHLORIDE 1.25 MG: 1.25 SOLUTION, CONCENTRATE RESPIRATORY (INHALATION) at 19:00

## 2019-02-06 RX ADMIN — AMLODIPINE BESYLATE 10 MG: 10 TABLET ORAL at 09:12

## 2019-02-06 RX ADMIN — GUAIFENESIN 600 MG: 600 TABLET, EXTENDED RELEASE ORAL at 21:38

## 2019-02-06 RX ADMIN — PRAZOSIN HYDROCHLORIDE 5 MG: 2 CAPSULE ORAL at 21:38

## 2019-02-06 RX ADMIN — FLUTICASONE PROPIONATE 1 SPRAY: 50 SPRAY, METERED NASAL at 09:26

## 2019-02-06 RX ADMIN — ECONAZOLE NITRATE: 10 CREAM TOPICAL at 09:31

## 2019-02-06 RX ADMIN — ECONAZOLE NITRATE: 10 CREAM TOPICAL at 17:07

## 2019-02-06 RX ADMIN — IPRATROPIUM BROMIDE 0.5 MG: 0.5 SOLUTION RESPIRATORY (INHALATION) at 19:00

## 2019-02-06 RX ADMIN — GUAIFENESIN 600 MG: 600 TABLET, EXTENDED RELEASE ORAL at 09:13

## 2019-02-06 RX ADMIN — ACETAMINOPHEN 650 MG: 325 TABLET, FILM COATED ORAL at 21:38

## 2019-02-06 RX ADMIN — POTASSIUM CHLORIDE 20 MEQ: 1500 TABLET, EXTENDED RELEASE ORAL at 09:13

## 2019-02-06 RX ADMIN — APIXABAN 5 MG: 5 TABLET, FILM COATED ORAL at 09:10

## 2019-02-06 NOTE — PROGRESS NOTES
[facility] Hospitalist Service - Internal Medicine Progress Note       PATIENT INFORMATION      Patient: Stephen Muñoz 80 y o  female   MRN: [id number]  PCP: Julien Doe MD  Unit/Bed#: [facility] 519-01 Encounter: [id number]  Date Of Visit: 02/06/19       ASSESSMENTS & PLAN     Acute hypoxic and hypercapnic respiratory failure   Assessment & Plan    - progressively weaned off BiPAP down to 3 L yesterday now down to 1 L via nasal cannula  - history of recent rib/scapular fractures noted - encourage incentive spirometry  - CT of chest previously revealed ground-glass infiltrates suggestive of possible pneumonia - will discontinue IV Cefepime today as procalcitonin is downtrending   - continue to maintain oxygenation as tolerated - nebulizer treatments on board  - viral pathogen screen including Influenza/RSV negative  - appreciate pulmonology input - corticosteroids previously discontinued   - CHF exacerbation noted (see plan below)   - PT/OT input appreciated recommending skilled rehab once medically stable     Acute on chronic diastolic CHF   Assessment & Plan    - appreciate cardiology input  - continue Coreg/Imdur and Lasix IV BID currently     - low-sodium diet with fluid restrictions enforced - net fluid balance for approximately (-) 5 0 L thus far      Recent left-sided multiple rib and scapular fractures   Assessment & Plan    - heavily encouraged incentive spirometry during awake hours  - PRN pain control   - maintain oxygenation      Paroxysmal atrial fibrillation    Assessment & Plan    - rate controlled on Coreg/Amiodarone  - continue Eliquis for anticoagulation     Achalasia   Assessment & Plan    - gastroenterology following and discussed with family regarding abnormal esophageal CT findings and they have elected to defer any further workup at this time (including endoscopy +/- barium esophagram) due to age/comorbidities - currently on a mechanical soft diet with thin liquids     Essential hypertension Assessment & Plan    - continue Prazosin/Imdur/Norvasc/Coreg     Hyperlipidemia   Assessment & Plan    - continue Pravachol     Transaminitis   Assessment & Plan    - appreciate gastroenterology input  - limit/avoid hepatotoxins if possible - Cefepime discontinued  - AST/ALT normalized today       VTE Prophylaxis:  Eliquis      SUBJECTIVE     Seen/examined earlier this afternoon  She is sitting upright in a chair and states that her breathing has improved  She also notes that her diarrhea only exacerbates when given stool softener/laxatives which she continues to tell nursing staff to stop administering at night  Denies any fever/chills or other constitutional symptoms at this time  OBJECTIVE     Vitals:   Temp (24hrs), Av 9 °F (36 6 °C), Min:97 5 °F (36 4 °C), Max:98 2 °F (36 8 °C)    Temp:  [97 5 °F (36 4 °C)-98 2 °F (36 8 °C)] 97 5 °F (36 4 °C)  HR:  [53-66] 53  Resp:  [16-22] 22  BP: (125-173)/(46-76) 129/59  SpO2:  [91 %-97 %] 92 %  Body mass index is 28 85 kg/m²  Input and Output Summary (last 24 hours):        Intake/Output Summary (Last 24 hours) at 19 1610  Last data filed at 19 1117   Gross per 24 hour   Intake              408 ml   Output             2576 ml   Net            -2168 ml       Physical Exam:     GENERAL:  Weak/fatigued - resolved conversational dyspnea  HEAD:  Normocephalic - atraumatic  EYES: PERRL - EOMI   MOUTH:  Mucosa moist  NECK:  Supple - full range of motion  CARDIAC:  Regular rate/rhythm - S1/S2 positive  PULMONARY:  Diminished to auscultation bilaterally  ABDOMEN:  Soft - nontender/nondistended - active bowel sounds  MUSCULOSKELETAL:  Motor strength/range of motion remains deconditioned  NEUROLOGIC:  Alert/oriented   SKIN:  Chronic wrinkles/blemishes   PSYCHIATRIC:  Mood/affect somewhat flat      ADDITIONAL DATA       Labs & Recent Cultures:       Results from last 7 days  Lab Units 19  0845 19  0603  19  0645   WBC Thousand/uL  -- 5 47  --  7 53   HEMOGLOBIN g/dL 8 2* 7 7*  --  9 7*   I STAT HEMOGLOBIN   --   --   < >  --    HEMATOCRIT % 28 6* 28 0*  --  35 0   HEMATOCRIT, ISTAT   --   --   < >  --    PLATELETS Thousands/uL  --  164  --  225   NEUTROS PCT %  --   --   --  79*   LYMPHS PCT %  --   --   --  5*   MONOS PCT %  --   --   --  12   EOS PCT %  --   --   --  2   < > = values in this interval not displayed  Results from last 7 days  Lab Units 02/06/19  0504  02/03/19  1219   SODIUM mmol/L 140  < >  --    POTASSIUM mmol/L 4 6  < >  --    CHLORIDE mmol/L 105  < >  --    CO2 mmol/L 32  < >  --    CO2, I-STAT mmol/L  --   --  37*   BUN mg/dL 39*  < >  --    CREATININE mg/dL 0 94  < >  --    CALCIUM mg/dL 8 2*  < >  --    ALK PHOS U/L 82  < >  --    ALT U/L 77  < >  --    AST U/L 42  < >  --    GLUCOSE, ISTAT mg/dl  --   --  118   < > = values in this interval not displayed  Results from last 7 days  Lab Units 02/03/19  0646   INR  1 25*           Results from last 7 days  Lab Units 02/03/19  1215 02/03/19  1202 02/03/19  0755 02/03/19  0704   BLOOD CULTURE   --   --   --  No Growth at 72 hrs  No Growth at 72 hrs     URINE CULTURE   --   --  >100,000 cfu/ml Escherichia coli*  --    INFLUENZA B PCR  Not Detected  --   --   --    RSV PCR  Not Detected  --   --   --    LEGIONELLA URINARY ANTIGEN   --  Negative  --   --          Last 24 Hours Medication List:     Current Facility-Administered Medications:  acetaminophen 650 mg Oral Q6H PRN Jewell Jimenes MD    acetaminophen 650 mg Oral Q8H [facility] DAWN Dietz    amiodarone 200 mg Oral Daily Jewell Jimenes MD    amLODIPine 10 mg Oral Daily Jewell Jimenes MD    apixaban 5 mg Oral BID Jewell Jimenes MD    carvedilol 3 125 mg Oral BID With Meals Jewell Jimenes MD    cefepime 2,000 mg Intravenous Q12H Jewell Jimenes MD Last Rate: 2,000 mg (02/06/19 1053)   docusate sodium 100 mg Oral BID PRN Marisa Garcia MD    econazole nitrate  Topical BID Jewell Jimenes MD fluticasone 1 spray Nasal BID Judy Marshall MD    furosemide 40 mg Intravenous BID (diuretic) Sendy Mcdermott MD    guaiFENesin 600 mg Oral Q12H [address] Judy Marshall MD    ipratropium 0 5 mg Nebulization TID Lizandro Herbert, DO    isosorbide mononitrate 60 mg Oral Daily Judy Marshall MD    levalbuterol 1 25 mg Nebulization TID Lizandro Herbert, DO    lidocaine 1 patch Topical Daily Juve Pain, CRNP    nitroglycerin 0 4 mg Sublingual Q5 Min PRN Judy Marshall MD    potassium chloride 20 mEq Oral BID Judy Marshall MD    pravastatin 40 mg Oral Daily Judy Marshall MD    prazosin 2 mg Oral QAM Judy Marshall MD    prazosin 5 mg Oral HS Judy Marshall MD    senna 8 8 mg Oral Daily PRN Augie Leyva MD           Time Spent for Care: 32 minutes  More than 50% of total time spent on counseling and coordination of care as described above  Current Length of Stay: 3 day(s)      Code Status: Level 3 - DNAR and DNI         ** Please Note: This note is constructed using a voice recognition dictation system   **

## 2019-02-06 NOTE — PROGRESS NOTES
Progress Note - Cardiology   Tyree Meigs 80 y o  female MRN: [id number]  Unit/Bed#: [facility] 055-94 Encounter: [id number]    Assessment:  Principal Problem:    Acute hypoxic and hypercapnic respiratory failure  Active Problems:    Paroxysmal atrial fibrillation     Ascending aortic aneurysm (HCC)    Acute on chronic diastolic CHF    Hematuria    Chronic kidney disease, stage III (moderate) (HCC)    Non-toxic multinodular goiter    Achalasia    Abnormal urinalysis    Recent left-sided multiple rib and scapular fractures    Hyperlipidemia    Transaminitis    Essential hypertension    #HFpEF, decompensated, improving  -likely due to stopping of diuretic on discharge  -used to be on torsemide 60mg BID, prn 5mg metolazone as OP before last admission  -dry weight reported 135lb; currently 147lb but unclear if standing scale; wt 142lb on 1/30 during last admission  #Severe AI, TAA 51mm  -pt has declined intervention in the past; medical mgmt  #PAF, NSR currently  -on eliquis 5mg BID, amio 200mg  #CKD, Cr near baseline  #Anemia of chronic dz  #HTN  #HLD  #Recent L scapular, multiple rib fx  #Hypercapnic and hypoxic respiratory failure, improving    Plan:  1  Continue lasix 40mg IV BID for another day  Will convert to torsemide 20mg PO BID tomorrow  2  Continue amio 200mg, pravastatin 40mg, Imdur 60mg, Coreg 3 125mg BID, amlodipine 10mg, Eliquis 5mg BID  3  Check BMP, Mg  Keep K >4 5, Mg >2 5   4  Pt to follow with NP at [facility], [place] on discharge  Subjective/Objective     Subjective: No events overnight  States dyspnea is improving  Remains on 2L NC  Denies dizziness, palpitations, CP   -1600cc last 24 hrs      Objective:  Vitals: /59 (BP Location: Right arm)   Pulse (!) 53   Temp 97 5 °F (36 4 °C) (Oral)   Resp 22   Ht 5' (1 524 m)   Wt 67 kg (147 lb 11 3 oz)   SpO2 92%   BMI 28 85 kg/m²   Vitals:    02/04/19 0531 02/06/19 0600   Weight: 65 2 kg (143 lb 11 8 oz) 67 kg (147 lb 11 3 oz)     Orthostatic Blood Pressures      Most Recent Value   Blood Pressure  129/59 filed at 02/06/2019 1059   Patient Position - Orthostatic VS  Sitting filed at 02/06/2019 1059            Intake/Output Summary (Last 24 hours) at 02/06/19 1602  Last data filed at 02/06/19 1117   Gross per 24 hour   Intake              408 ml   Output             2576 ml   Net            -2168 ml       Physical Exam:   General appearance: alert and in no acute distress  Head: Normocephalic, without obvious abnormality, atraumatic  Neck: no JVD and supple, symmetrical, trachea midline  Lungs: +ve bibasilar rales, no wheezing  Heart: S1, S2 normal and no S3 or S4  3/6 systolic and diastolic murmur over precordium  No gallop  No rub  Abdomen: soft, non-tender; no masses,  no organomegaly  Extremities: extremities normal, atraumatic, no cyanosis, 1+ edema  Pulses: 2+ and symmetric bilaterally  Skin: Skin color, texture, turgor normal  No rashes or lesions  Neurologic: Grossly normal  Alert and oriented      Medications:    Current Facility-Administered Medications:     acetaminophen (TYLENOL) tablet 650 mg, 650 mg, Oral, Q6H PRN, Zehra Torre MD, 650 mg at 02/03/19 1333    acetaminophen (TYLENOL) tablet 650 mg, 650 mg, Oral, Q8H [facility], DAWN Dietz, 650 mg at 02/06/19 1112    amiodarone tablet 200 mg, 200 mg, Oral, Daily, Zehra Torre MD, 200 mg at 02/06/19 0913    amLODIPine (NORVASC) tablet 10 mg, 10 mg, Oral, Daily, Zehra Torre MD, 10 mg at 02/06/19 0912    apixaban (ELIQUIS) tablet 5 mg, 5 mg, Oral, BID, Zehra Torre MD, 5 mg at 02/06/19 0910    carvedilol (COREG) tablet 3 125 mg, 3 125 mg, Oral, BID With Meals, Zehra Torre MD, 3 125 mg at 02/06/19 0911    cefepime (MAXIPIME) 2 g/50 mL dextrose IVPB, 2,000 mg, Intravenous, Q12H, Zehra Torre MD, Last Rate: 100 mL/hr at 02/06/19 1053, 2,000 mg at 02/06/19 1053    docusate sodium (COLACE) capsule 100 mg, 100 mg, Oral, BID PRN, Eleni Parks MD    econazole nitrate 1 % cream, , Topical, BID, Selvin Reeves MD    fluticasone (FLONASE) 50 mcg/act nasal spray 1 spray, 1 spray, Nasal, BID, Selvin Reeves MD, 1 spray at 02/06/19 0926    furosemide (LASIX) injection 40 mg, 40 mg, Intravenous, BID (diuretic), Alesia Laughlin MD, 40 mg at 02/06/19 0909    guaiFENesin (MUCINEX) 12 hr tablet 600 mg, 600 mg, Oral, Q12H [address], Selvin Reeves MD, 600 mg at 02/06/19 0913    ipratropium (ATROVENT) 0 02 % inhalation solution 0 5 mg, 0 5 mg, Nebulization, TID, [place] Juwan Trager, DO, 0 5 mg at 02/06/19 1335    isosorbide mononitrate (IMDUR) 24 hr tablet 60 mg, 60 mg, Oral, Daily, Selvin Reeves MD, 60 mg at 02/06/19 0914    levalbuterol (XOPENEX) inhalation solution 1 25 mg, 1 25 mg, Nebulization, TID, Hopewell Juwan Trager, DO, 1 25 mg at 02/06/19 1335    lidocaine (LIDODERM) 5 % patch 1 patch, 1 patch, Topical, Daily, DAWN Dietz, 1 patch at 02/06/19 0914    nitroglycerin (NITROSTAT) SL tablet 0 4 mg, 0 4 mg, Sublingual, Q5 Min PRN, Selvin Reeves MD    potassium chloride (K-DUR,KLOR-CON) CR tablet 20 mEq, 20 mEq, Oral, BID, Selvin Reeves MD, 20 mEq at 02/06/19 0913    pravastatin (PRAVACHOL) tablet 40 mg, 40 mg, Oral, Daily, Selvin Reeves MD, 40 mg at 02/06/19 0914    prazosin (MINIPRESS) capsule 2 mg, 2 mg, Oral, QAM, Selvin Reeves MD, 2 mg at 02/06/19 0910    prazosin (MINIPRESS) capsule 5 mg, 5 mg, Oral, HS, Selvin Reeves MD, 5 mg at 02/05/19 2109    senna 8 8 mg/5 mL oral syrup 8 8 mg, 8 8 mg, Oral, Daily PRN, Arian Monroy MD    Lab Results:    Results from last 7 days  Lab Units 02/03/19  0645   TROPONIN I ng/mL 0 02       Results from last 7 days  Lab Units 02/06/19  0845 02/04/19  0603 02/03/19  1219 02/03/19  0645   WBC Thousand/uL  --  5 47  --  7 53   HEMOGLOBIN g/dL 8 2* 7 7*  --  9 7*   I STAT HEMOGLOBIN g/dl  --   --  8 5*  --    HEMATOCRIT % 28 6* 28 0*  --  35 0   HEMATOCRIT, ISTAT %  --   --  25*  --    PLATELETS Thousands/uL  -- 164  --  225           Results from last 7 days  Lab Units 02/06/19  0504 02/05/19  1043 02/04/19  0603 02/03/19  1219 02/03/19  0647 02/03/19  0637   POTASSIUM mmol/L 4 6 4 3 4 9  --  4 8  --    CHLORIDE mmol/L 105 108 110*  --  106  --    CO2 mmol/L 32 33* 34*  --  30  --    CO2, I-STAT mmol/L  --   --   --  37*  --  39*  37*   BUN mg/dL 39* 45* 43*  --  45*  --    CREATININE mg/dL 0 94 1 13 1 04  --  1 06  --    CALCIUM mg/dL 8 2* 8 3 8 2*  --  8 6  --    ALK PHOS U/L 82  --  83  --  95  --    ALT U/L 77  --  123*  --  79*  --    AST U/L 42  --  116*  --  85*  --    GLUCOSE, ISTAT mg/dl  --   --   --  118  --  165*  169*       Results from last 7 days  Lab Units 02/03/19  0646   INR  1 25*   PTT seconds 30       Results from last 7 days  Lab Units 02/05/19  1043 02/04/19  0603   MAGNESIUM mg/dL 3 0* 3 2*       Telemetry: Personally reviewed  Imaging: Personally reviewed  EKG: Personally reviewed       Olamide Jones MD  Cardiology Fellow

## 2019-02-06 NOTE — OCCUPATIONAL THERAPY NOTE
[address] Progress Note     Patient Name: Zee SHAH Date: 2/6/2019  Problem List  Patient Active Problem List   Diagnosis    Paroxysmal atrial fibrillation     Benign hypertension with CKD (chronic kidney disease) stage III (HCC)    GERD (gastroesophageal reflux disease)    Other hyperlipidemia    Aortic valve regurgitation, nonrheumatic    Ascending aortic aneurysm (HCC)    Other chest pain    Lymphedema of both lower extremities    Acute on chronic diastolic CHF    Hematuria    Gait difficulty    Multiple rib fractures    Closed left scapular fracture    Acute kidney injury ([address] )    Acute pain due to trauma    Chronic respiratory acidosis    Pericardial effusion    Chronic kidney disease, stage III (moderate) (HCC)    Acute hypoxic and hypercapnic respiratory failure    Ato esophagus    Cataract of right eye    Chronic stasis dermatitis of right lower extremity    Chronic venous insufficiency    Diverticulosis    Inguinal hernia    Insomnia    Non-toxic multinodular goiter    Urine, incontinence, stress female    Achalasia    Abnormal urinalysis    Recent left-sided multiple rib and scapular fractures    Hyperlipidemia    Transaminitis    Essential hypertension           02/06/19 1150   Restrictions/Precautions   Weight Bearing Precautions Per Order Yes   LUE Weight Bearing Per Order NWB   Braces or Orthoses Sling   General   Response to Previous Treatment Patient with no complaints from previous session   Lifestyle   Autonomy at baseline pt I in ADLs, receives assist for IADLs   Reciprocal Relationships supportive family   Service to Others retired   Intrinsic Gratification pt enjoys reading and watching    Pain Assessment   Pain Assessment No/denies pain   Pain Score No Pain   ADL   Where Assessed Chair   Grooming Assistance 4  Minimal Assistance   Grooming Deficit Wash/dry hands; Wash/dry face;Denture care; Teeth care;Brushing hair; Increased time to complete   UB Bathing Assistance 3  Moderate Assistance   UB Bathing Deficit Chest;Right arm;Left arm; Abdomen; Increased time to complete;Supervision/safety   LB Bathing Assistance 2  Maximal Assistance   LB Bathing Deficit Right lower leg including foot; Left lower leg including foot   UB Dressing Assistance 3  Moderate Assistance   UB Dressing Deficit Thread RUE; Thread LUE   LB Dressing Assistance 2  Maximal Assistance   LB Dressing Deficit Don/doff R sock; Don/doff L sock; Increased time to complete   Toileting Assistance  1  Total Assistance   Toileting Deficit Perineal hygiene  (had BM upon standing)   Functional Standing Tolerance   Time 3 minutes   Activity perineal hygiene   Comments HHA x 2   Bed Mobility   Additional Comments pt oob upon arrival   Transfers   Sit to Stand 3  Moderate assistance   Additional items Assist x 2; Increased time required;Verbal cues   Stand to Sit 3  Moderate assistance   Additional items Assist x 2; Increased time required;Verbal cues   Coordination   Gross Motor ADLs   Fine Motor screw/unscrew lids   Cognition   Overall Cognitive Status WFL   Arousal/Participation Cooperative   Attention Within functional limits   Orientation Level Oriented X4   Memory Decreased recall of precautions   Following Commands Follows one step commands with increased time or repetition   Activity Tolerance   Activity Tolerance Patient limited by fatigue   Medical Staff Made Aware RN aware and updated   Assessment   Assessment Patient participated in Skilled OT session this date with interventions consisting of ADL re training with the use of correct body mechnaics, Energy Conservation techniques, safety awareness and fall prevention techniques, one handed dressing technique,  therapeutic activities to: increase activity tolerance, increase postural control, increase trunk control and increase OOB/ sitting tolerance    Patient agreeable to OT treatment session, upon arrival patient was found seated OOB to Chair  In comparison to previous session, patient with improvements in transfers and standing tolerance*   Patient requiring verbal cues for correct technique, frequent rest periods and ocassional safety reminders  Patient continues to be functioning below baseline level, occupational performance remains limited secondary to factors listed above and increased risk for falls and injury  From OT standpoint, recommendation at time of d/c would be Short Term Rehab  Patient to benefit from continued Occupational Therapy treatment while in the hospital to address deficits as defined above and maximize level of functional independence with ADLs and functional mobility  Plan   Treatment Interventions ADL retraining;Functional transfer training; Endurance training; Compensatory technique education;Continued evaluation; Energy conservation; Activityengagement   Goal Expiration Date 02/14/19   Treatment Day 2   OT Frequency 3-5x/wk   Recommendation   OT Discharge Recommendation Short Term Rehab   OT - OK to Discharge Yes   Barthel Index   Feeding 5   Bathing 0   Grooming Score 0   Dressing Score 5   Bladder Score 0   Bowels Score 5   Toilet Use Score 5   Transfers (Bed/Chair) Score 5   Mobility (Level Surface) Score 0   Stairs Score 0   Barthel Index Score 25   Modified Afton Scale   Modified Afton Scale 4     Dora Fernandez MS, OTR/L

## 2019-02-06 NOTE — PLAN OF CARE
Problem: OCCUPATIONAL THERAPY ADULT  Goal: Performs self-care activities at highest level of function for planned discharge setting  See evaluation for individualized goals  Treatment Interventions: ADL retraining, Functional transfer training, Endurance training, Patient/family training, Equipment evaluation/education, Compensatory technique education, Fine motor coordination activities, Continued evaluation, Energy conservation, Activityengagement          See flowsheet documentation for full assessment, interventions and recommendations  Outcome: Progressing  Limitation: Decreased ADL status, Decreased Safe judgement during ADL, Decreased endurance, Decreased self-care trans, Decreased high-level ADLs, Decreased fine motor control, Non-func L UE  Prognosis: Fair  Assessment: Patient participated in Skilled OT session this date with interventions consisting of ADL re training with the use of correct body mechnaics, Energy Conservation techniques, safety awareness and fall prevention techniques, one handed dressing technique,  therapeutic activities to: increase activity tolerance, increase postural control, increase trunk control and increase OOB/ sitting tolerance   Patient agreeable to OT treatment session, upon arrival patient was found seated OOB to Chair  In comparison to previous session, patient with improvements in transfers and standing tolerance*   Patient requiring verbal cues for correct technique, frequent rest periods and ocassional safety reminders  Patient continues to be functioning below baseline level, occupational performance remains limited secondary to factors listed above and increased risk for falls and injury  From OT standpoint, recommendation at time of d/c would be Short Term Rehab     Patient to benefit from continued Occupational Therapy treatment while in the hospital to address deficits as defined above and maximize level of functional independence with ADLs and functional mobility  OT Discharge Recommendation: Short Term Rehab  OT - OK to Discharge:  Yes

## 2019-02-06 NOTE — TELEPHONE ENCOUNTER
Patient is still currently admitted I will reach out to schedule an appointment when the patient is discharged

## 2019-02-06 NOTE — TELEPHONE ENCOUNTER
----- Message from Bipin Matson DO sent at 2/3/2019  8:31 AM EST -----  Regarding: hospital follow up for CKD  Patient was discharged 2/2 and readmitted 2/3 but renal not consulted as sCr at baseline  Ensure this patient is called in about 1 week to schedule outpatient nephrology follow up in the office in about 3-4 weeks for CKD with BMP prior to visit  Thanks

## 2019-02-07 LAB
ALBUMIN SERPL BCP-MCNC: 2.5 G/DL (ref 3.5–5)
ALP SERPL-CCNC: 88 U/L (ref 46–116)
ALT SERPL W P-5'-P-CCNC: 65 U/L (ref 12–78)
ANION GAP SERPL CALCULATED.3IONS-SCNC: 2 MMOL/L (ref 4–13)
AST SERPL W P-5'-P-CCNC: 32 U/L (ref 5–45)
BILIRUB SERPL-MCNC: 0.42 MG/DL (ref 0.2–1)
BUN SERPL-MCNC: 35 MG/DL (ref 5–25)
CALCIUM SERPL-MCNC: 8.6 MG/DL (ref 8.3–10.1)
CHLORIDE SERPL-SCNC: 105 MMOL/L (ref 100–108)
CO2 SERPL-SCNC: 36 MMOL/L (ref 21–32)
CREAT SERPL-MCNC: 0.92 MG/DL (ref 0.6–1.3)
GFR SERPL CREATININE-BSD FRML MDRD: 56 ML/MIN/1.73SQ M
GLUCOSE SERPL-MCNC: 105 MG/DL (ref 65–140)
HCT VFR BLD AUTO: 28.1 % (ref 34.8–46.1)
HGB BLD-MCNC: 8 G/DL (ref 11.5–15.4)
POTASSIUM SERPL-SCNC: 4.6 MMOL/L (ref 3.5–5.3)
PROT SERPL-MCNC: 6.2 G/DL (ref 6.4–8.2)
SODIUM SERPL-SCNC: 143 MMOL/L (ref 136–145)

## 2019-02-07 PROCEDURE — 80053 COMPREHEN METABOLIC PANEL: CPT | Performed by: INTERNAL MEDICINE

## 2019-02-07 PROCEDURE — 94668 MNPJ CHEST WALL SBSQ: CPT

## 2019-02-07 PROCEDURE — 97530 THERAPEUTIC ACTIVITIES: CPT

## 2019-02-07 PROCEDURE — 85018 HEMOGLOBIN: CPT | Performed by: INTERNAL MEDICINE

## 2019-02-07 PROCEDURE — 94640 AIRWAY INHALATION TREATMENT: CPT

## 2019-02-07 PROCEDURE — 99232 SBSQ HOSP IP/OBS MODERATE 35: CPT | Performed by: INTERNAL MEDICINE

## 2019-02-07 PROCEDURE — 85014 HEMATOCRIT: CPT | Performed by: INTERNAL MEDICINE

## 2019-02-07 PROCEDURE — 97116 GAIT TRAINING THERAPY: CPT

## 2019-02-07 PROCEDURE — 94760 N-INVAS EAR/PLS OXIMETRY 1: CPT

## 2019-02-07 RX ORDER — POTASSIUM CHLORIDE 20MEQ/15ML
20 LIQUID (ML) ORAL 2 TIMES DAILY
Status: DISCONTINUED | OUTPATIENT
Start: 2019-02-07 | End: 2019-02-09

## 2019-02-07 RX ORDER — TORSEMIDE 20 MG/1
20 TABLET ORAL 2 TIMES DAILY
Status: DISCONTINUED | OUTPATIENT
Start: 2019-02-07 | End: 2019-02-08

## 2019-02-07 RX ADMIN — IPRATROPIUM BROMIDE 0.5 MG: 0.5 SOLUTION RESPIRATORY (INHALATION) at 07:23

## 2019-02-07 RX ADMIN — LEVALBUTEROL HYDROCHLORIDE 1.25 MG: 1.25 SOLUTION, CONCENTRATE RESPIRATORY (INHALATION) at 07:23

## 2019-02-07 RX ADMIN — AMLODIPINE BESYLATE 10 MG: 10 TABLET ORAL at 09:32

## 2019-02-07 RX ADMIN — FLUTICASONE PROPIONATE 1 SPRAY: 50 SPRAY, METERED NASAL at 17:02

## 2019-02-07 RX ADMIN — APIXABAN 5 MG: 5 TABLET, FILM COATED ORAL at 17:04

## 2019-02-07 RX ADMIN — IPRATROPIUM BROMIDE 0.5 MG: 0.5 SOLUTION RESPIRATORY (INHALATION) at 13:37

## 2019-02-07 RX ADMIN — CEFEPIME HYDROCHLORIDE 2000 MG: 1 INJECTION, POWDER, FOR SOLUTION INTRAMUSCULAR; INTRAVENOUS at 23:15

## 2019-02-07 RX ADMIN — GUAIFENESIN 600 MG: 600 TABLET, EXTENDED RELEASE ORAL at 22:01

## 2019-02-07 RX ADMIN — AMIODARONE HYDROCHLORIDE 200 MG: 200 TABLET ORAL at 09:33

## 2019-02-07 RX ADMIN — ECONAZOLE NITRATE 1 APPLICATION: 10 CREAM TOPICAL at 09:35

## 2019-02-07 RX ADMIN — DICLOFENAC 2 G: 10 GEL TOPICAL at 16:58

## 2019-02-07 RX ADMIN — ACETAMINOPHEN 650 MG: 325 TABLET, FILM COATED ORAL at 13:29

## 2019-02-07 RX ADMIN — APIXABAN 5 MG: 5 TABLET, FILM COATED ORAL at 09:32

## 2019-02-07 RX ADMIN — CEFEPIME HYDROCHLORIDE 2000 MG: 1 INJECTION, POWDER, FOR SOLUTION INTRAMUSCULAR; INTRAVENOUS at 00:04

## 2019-02-07 RX ADMIN — TORSEMIDE 20 MG: 20 TABLET ORAL at 16:01

## 2019-02-07 RX ADMIN — LEVALBUTEROL HYDROCHLORIDE 1.25 MG: 1.25 SOLUTION, CONCENTRATE RESPIRATORY (INHALATION) at 13:37

## 2019-02-07 RX ADMIN — GUAIFENESIN 600 MG: 600 TABLET, EXTENDED RELEASE ORAL at 09:33

## 2019-02-07 RX ADMIN — FLUTICASONE PROPIONATE 1 SPRAY: 50 SPRAY, METERED NASAL at 09:34

## 2019-02-07 RX ADMIN — ISOSORBIDE MONONITRATE 60 MG: 60 TABLET, EXTENDED RELEASE ORAL at 09:32

## 2019-02-07 RX ADMIN — ACETAMINOPHEN 650 MG: 325 TABLET, FILM COATED ORAL at 05:22

## 2019-02-07 RX ADMIN — ACETAMINOPHEN 650 MG: 325 TABLET, FILM COATED ORAL at 22:01

## 2019-02-07 RX ADMIN — FUROSEMIDE 40 MG: 10 INJECTION, SOLUTION INTRAMUSCULAR; INTRAVENOUS at 09:33

## 2019-02-07 RX ADMIN — CARVEDILOL 3.12 MG: 3.12 TABLET, FILM COATED ORAL at 09:32

## 2019-02-07 RX ADMIN — CARVEDILOL 3.12 MG: 3.12 TABLET, FILM COATED ORAL at 16:01

## 2019-02-07 RX ADMIN — POTASSIUM CHLORIDE 20 MEQ: 20 SOLUTION ORAL at 22:01

## 2019-02-07 RX ADMIN — PRAVASTATIN SODIUM 40 MG: 40 TABLET ORAL at 09:33

## 2019-02-07 RX ADMIN — IPRATROPIUM BROMIDE 0.5 MG: 0.5 SOLUTION RESPIRATORY (INHALATION) at 19:14

## 2019-02-07 RX ADMIN — LEVALBUTEROL HYDROCHLORIDE 1.25 MG: 1.25 SOLUTION, CONCENTRATE RESPIRATORY (INHALATION) at 19:14

## 2019-02-07 RX ADMIN — PRAZOSIN HYDROCHLORIDE 2 MG: 2 CAPSULE ORAL at 09:32

## 2019-02-07 RX ADMIN — PRAZOSIN HYDROCHLORIDE 5 MG: 2 CAPSULE ORAL at 22:01

## 2019-02-07 RX ADMIN — ECONAZOLE NITRATE: 10 CREAM TOPICAL at 17:02

## 2019-02-07 RX ADMIN — LIDOCAINE 1 PATCH: 50 PATCH CUTANEOUS at 09:43

## 2019-02-07 RX ADMIN — CEFEPIME HYDROCHLORIDE 2000 MG: 1 INJECTION, POWDER, FOR SOLUTION INTRAMUSCULAR; INTRAVENOUS at 13:29

## 2019-02-07 RX ADMIN — POTASSIUM CHLORIDE 20 MEQ: 1500 TABLET, EXTENDED RELEASE ORAL at 09:33

## 2019-02-07 NOTE — PLAN OF CARE
Problem: Potential for Falls  Goal: Patient will remain free of falls  INTERVENTIONS:  - Assess patient frequently for physical needs  -  Identify cognitive and physical deficits and behaviors that affect risk of falls  -  [place] fall precautions as indicated by assessment   - Educate patient/family on patient safety including physical limitations  - Instruct patient to call for assistance with activity based on assessment  - Modify environment to reduce risk of injury  - Consider OT/PT consult to assist with strengthening/mobility    Outcome: Progressing      Problem: Prexisting or High Potential for Compromised Skin Integrity  Goal: Skin integrity is maintained or improved  INTERVENTIONS:  - Identify patients at risk for skin breakdown  - Assess and monitor skin integrity  - Assess and monitor nutrition and hydration status  - Monitor labs (i e  albumin)  - Assess for incontinence   - Turn and reposition patient  - Assist with mobility/ambulation  - Relieve pressure over bony prominences  - Avoid friction and shearing  - Provide appropriate hygiene as needed including keeping skin clean and dry  - Evaluate need for skin moisturizer/barrier cream  - Collaborate with interdisciplinary team (i e  Nutrition, Rehabilitation, etc )   - Patient/family teaching   Outcome: Progressing      Problem: Nutrition/Hydration-ADULT  Goal: Nutrient/Hydration intake appropriate for improving, restoring or maintaining nutritional needs  Monitor and assess patient's nutrition/hydration status for malnutrition (ex- brittle hair, bruises, dry skin, pale skin and conjunctiva, muscle wasting, smooth red tongue, and disorientation)  Collaborate with interdisciplinary team and initiate plan and interventions as ordered  Monitor patient's weight and dietary intake as ordered or per policy  Utilize nutrition screening tool and intervene per policy   Determine patient's food preferences and provide high-protein, high-caloric foods as appropriate       INTERVENTIONS:  - Monitor oral intake, urinary output, labs, and treatment plans  - Assess nutrition and hydration status and recommend course of action  - Evaluate amount of meals eaten  - Assist patient with eating if necessary   - Allow adequate time for meals  - Recommend/ encourage appropriate diets, oral nutritional supplements, and vitamin/mineral supplements  - Order, calculate, and assess calorie counts as needed  - Recommend, monitor, and adjust tube feedings and TPN/PPN based on assessed needs  - Assess need for intravenous fluids  - Provide specific nutrition/hydration education as appropriate  - Include patient/family/caregiver in decisions related to nutrition   Outcome: Progressing      Problem: DISCHARGE PLANNING - CARE MANAGEMENT  Goal: Discharge to post-acute care or home with appropriate resources  INTERVENTIONS:  - Conduct assessment to determine patient/family and health care team treatment goals, and need for post-acute services based on payer coverage, community resources, and patient preferences, and barriers to discharge  - Address psychosocial, clinical, and financial barriers to discharge as identified in assessment in conjunction with the patient/family and health care team  - Arrange appropriate level of post-acute services according to patient's   needs and preference and payer coverage in collaboration with the physician and health care team  - Communicate with and update the patient/family, physician, and health care team regarding progress on the discharge plan  - Arrange appropriate transportation to post-acute venues   Outcome: Progressing      Problem: PAIN - ADULT  Goal: Verbalizes/displays adequate comfort level or baseline comfort level  Interventions:  - Encourage patient to monitor pain and request assistance  - Assess pain using appropriate pain scale  - Administer analgesics based on type and severity of pain and evaluate response  - Implement non-pharmacological measures as appropriate and evaluate response  - Consider cultural and social influences on pain and pain management  - Notify physician/advanced practitioner if interventions unsuccessful or patient reports new pain   Outcome: Progressing      Problem: INFECTION - ADULT  Goal: Absence or prevention of progression during hospitalization  INTERVENTIONS:  - Assess and monitor for signs and symptoms of infection  - Monitor lab/diagnostic results  - Monitor all insertion sites, i e  indwelling lines, tubes, and drains  - Monitor endotracheal (as able) and nasal secretions for changes in amount and color  - [place] appropriate cooling/warming therapies per order  - Administer medications as ordered  - Instruct and encourage patient and family to use good hand hygiene technique  - Identify and instruct in appropriate isolation precautions for identified infection/condition   Outcome: Progressing    Goal: Absence of fever/infection during neutropenic period  INTERVENTIONS:  - Monitor WBC  - Implement neutropenic guidelines   Outcome: Progressing      Problem: SAFETY ADULT  Goal: Maintain or return to baseline ADL function  INTERVENTIONS:  -  Assess patient's ability to carry out ADLs; assess patient's baseline for ADL function and identify physical deficits which impact ability to perform ADLs (bathing, care of mouth/teeth, toileting, grooming, dressing, etc )  - Assess/evaluate cause of self-care deficits   - Assess range of motion  - Assess patient's mobility; develop plan if impaired  - Assess patient's need for assistive devices and provide as appropriate  - Encourage maximum independence but intervene and supervise when necessary  ¯ Involve family in performance of ADLs  ¯ Assess for home care needs following discharge   ¯ Request OT consult to assist with ADL evaluation and planning for discharge  ¯ Provide patient education as appropriate   Outcome: Progressing    Goal: Maintain or return mobility status to optimal level  INTERVENTIONS:  - Assess patient's baseline mobility status (ambulation, transfers, stairs, etc )    - Identify cognitive and physical deficits and behaviors that affect mobility  - Identify mobility aids required to assist with transfers and/or ambulation (gait belt, sit-to-stand, lift, walker, cane, etc )  - [place] fall precautions as indicated by assessment  - Record patient progress and toleration of activity level on Mobility SBAR; progress patient to next Phase/Stage  - Instruct patient to call for assistance with activity based on assessment  - Request Rehabilitation consult to assist with strengthening/weightbearing, etc    Outcome: Progressing    Goal: Patient will remain free of falls  INTERVENTIONS:  - Assess patient frequently for physical needs  -  Identify cognitive and physical deficits and behaviors that affect risk of falls    -  [place] fall precautions as indicated by assessment   - Educate patient/family on patient safety including physical limitations  - Instruct patient to call for assistance with activity based on assessment  - Modify environment to reduce risk of injury  - Consider OT/PT consult to assist with strengthening/mobility    Outcome: Progressing      Problem: DISCHARGE PLANNING  Goal: Discharge to home or other facility with appropriate resources  INTERVENTIONS:  - Identify barriers to discharge w/patient and caregiver  - Arrange for needed discharge resources and transportation as appropriate  - Identify discharge learning needs (meds, wound care, etc )  - Arrange for interpretive services to assist at discharge as needed  - Refer to Case Management Department for coordinating discharge planning if the patient needs post-hospital services based on physician/advanced practitioner order or complex needs related to functional status, cognitive ability, or social support system   Outcome: Progressing      Problem: Knowledge Deficit  Goal: Patient/family/caregiver demonstrates understanding of disease process, treatment plan, medications, and discharge instructions  Complete learning assessment and assess knowledge base    Interventions:  - Provide teaching at level of understanding  - Provide teaching via preferred learning methods   Outcome: Progressing      Problem: RESPIRATORY - ADULT  Goal: Achieves optimal ventilation and oxygenation  INTERVENTIONS:  - Assess for changes in respiratory status  - Assess for changes in mentation and behavior  - Position to facilitate oxygenation and minimize respiratory effort  - Oxygen administration by appropriate delivery method based on oxygen saturation (per order) or ABGs  - Initiate smoking cessation education as indicated  - Encourage broncho-pulmonary hygiene including cough, deep breathe, Incentive Spirometry  - Assess the need for suctioning and aspirate as needed  - Assess and instruct to report SOB or any respiratory difficulty  - Respiratory Therapy support as indicated  Outcome: Progressing      Problem: SKIN/TISSUE INTEGRITY - ADULT  Goal: Skin integrity remains intact  INTERVENTIONS  - Identify patients at risk for skin breakdown  - Assess and monitor skin integrity  - Assess and monitor nutrition and hydration status  - Monitor labs (i e  albumin)  - Assess for incontinence   - Turn and reposition patient  - Assist with mobility/ambulation  - Relieve pressure over bony prominences  - Avoid friction and shearing  - Provide appropriate hygiene as needed including keeping skin clean and dry  - Evaluate need for skin moisturizer/barrier cream  - Collaborate with interdisciplinary team (i e  Nutrition, Rehabilitation, etc )   - Patient/family teaching  Outcome: Progressing    Goal: Incision(s), wounds(s) or drain site(s) healing without S/S of infection  INTERVENTIONS  - Assess and document risk factors for skin impairment   - Assess and document dressing, incision, wound bed, drain sites and surrounding tissue  - Initiate Nutrition services consult and/or wound management as needed  Outcome: Progressing

## 2019-02-07 NOTE — PROGRESS NOTES
Cardiology Progress Note - Trent Hameed 80 y o  female MRN: [id number]    Unit/Bed#: [facility] 519-01 Encounter: [id number]        Subjective:    No significant events overnight  Dyspnea seems better  Did have a desat  Earlier today  ROS    Objective:   Vitals: Blood pressure 107/51, pulse 69, temperature 97 5 °F (36 4 °C), temperature source Oral, resp  rate 20, height 5' (1 524 m), weight 64 4 kg (141 lb 15 6 oz), SpO2 (!) 86 %  , Body mass index is 27 73 kg/m² , Orthostatic Blood Pressures      Most Recent Value   Blood Pressure  107/51 filed at 02/07/2019 1100   Patient Position - Orthostatic VS  Lying filed at 02/07/2019 2872         Systolic (82GAT), QCH:916 , Min:107 , CVM:540     Diastolic (48AXV), YPI:34, Min:48, Max:72      Intake/Output Summary (Last 24 hours) at 02/07/19 1158  Last data filed at 02/07/19 1001   Gross per 24 hour   Intake             1220 ml   Output             2370 ml   Net            -1150 ml     Weight (last 2 days)     Date/Time   Weight    02/07/19 0522  64 4 (141 98)    02/06/19 0600  67 (147 71)                Telemetry Review: No significant arrhythmias seen on telemetry review  NSR      Physical Exam   Constitutional: She is oriented to person, place, and time  No distress  HENT:   Mouth/Throat: No oropharyngeal exudate  Eyes: No scleral icterus  Neck: No JVD present  Cardiovascular: Normal rate and regular rhythm  Murmur heard  Pulmonary/Chest: Effort normal  She has no wheezes  She has no rales  Abdominal: Soft  Bowel sounds are normal  She exhibits no distension  There is no tenderness  There is no rebound  Musculoskeletal: She exhibits edema  Neurological: She is alert and oriented to person, place, and time  Skin: Skin is warm and dry  She is not diaphoretic  Psychiatric: She has a normal mood and affect   Her behavior is normal          Laboratory Results:    Results from last 7 days  Lab Units 02/03/19  0645   TROPONIN I ng/mL 0 02       CBC with diff:   Results from last 7 days  Lab Units 02/07/19  0530 02/06/19  0845 02/04/19  0603 02/03/19  1219 02/03/19  0645 02/03/19  0637   WBC Thousand/uL  --   --  5 47  --  7 53  --    HEMOGLOBIN g/dL 8 0* 8 2* 7 7*  --  9 7*  --    I STAT HEMOGLOBIN g/dl  --   --   --  8 5*  --  10 9*  10 9*   HEMATOCRIT % 28 1* 28 6* 28 0*  --  35 0  --    HEMATOCRIT, ISTAT %  --   --   --  25*  --  32*  32*   MCV fL  --   --  92  --  93  --    PLATELETS Thousands/uL  --   --  164  --  225  --    MCH pg  --   --  25 3*  --  25 7*  --    MCHC g/dL  --   --  27 5*  --  27 7*  --    RDW %  --   --  20 8*  --  21 0*  --    MPV fL  --   --  10 8  --  11 0  --    NRBC AUTO /100 WBCs  --   --   --   --  0  --          CMP:  Results from last 7 days  Lab Units 02/07/19  0530 02/06/19  0504 02/05/19  1043 02/04/19  0603 02/03/19  1219 02/03/19  0647 02/03/19  0637 02/02/19  0504 02/01/19  0457   POTASSIUM mmol/L 4 6 4 6 4 3 4 9  --  4 8  --  4 3 4 5   CHLORIDE mmol/L 105 105 108 110*  --  106  --  102 102   CO2 mmol/L 36* 32 33* 34*  --  30  --  34* 36*   CO2, I-STAT mmol/L  --   --   --   --  37*  --  39*  37*  --   --    BUN mg/dL 35* 39* 45* 43*  --  45*  --  59* 61*   CREATININE mg/dL 0 92 0 94 1 13 1 04  --  1 06  --  1 14 1 42*   GLUCOSE, ISTAT mg/dl  --   --   --   --  118  --  165*  169*  --   --    CALCIUM mg/dL 8 6 8 2* 8 3 8 2*  --  8 6  --  8 5 8 5   AST U/L 32 42  --  116*  --  85*  --   --   --    ALT U/L 65 77  --  123*  --  79*  --   --   --    ALK PHOS U/L 88 82  --  83  --  95  --   --   --    EGFR ml/min/1 73sq m 56 54 43 48  --  47 40 43 33         BMP:  Results from last 7 days  Lab Units 02/07/19  0530 02/06/19  0504 02/05/19  1043 02/04/19  0603 02/03/19  1219 02/03/19  0647 02/03/19  0637  02/02/19  0504 02/01/19  0457   POTASSIUM mmol/L 4 6 4 6 4 3 4 9  --  4 8  --   --  4 3 4 5   CHLORIDE mmol/L 105 105 108 110*  --  106  --   --  102 102   CO2 mmol/L 36* 32 33* 34*  --  30  --   --  34* 36*   CO2, I-STAT mmol/L  --   --   --   --  37*  --  39*  37*  --   --   --    BUN mg/dL 35* 39* 45* 43*  --  45*  --   --  59* 61*   CREATININE mg/dL 0 92 0 94 1 13 1 04  --  1 06  --   --  1 14 1 42*   GLUCOSE, ISTAT mg/dl  --   --   --   --  118  --  165*  169*  < >  --   --    CALCIUM mg/dL 8 6 8 2* 8 3 8 2*  --  8 6  --   --  8 5 8 5   < > = values in this interval not displayed  BNP: No results for input(s): BNP in the last 72 hours  Magnesium:   Results from last 7 days  Lab Units 19  1043 19  0603   MAGNESIUM mg/dL 3 0* 3 2*       Coags:   Results from last 7 days  Lab Units 19  0646   PTT seconds 30   INR  1 25*       TSH: No results found for: TSH    Hemoglobin A1C       Lipid Profile:       Cardiac testing:   Results for orders placed during the hospital encounter of 19   Echo complete with contrast if indicated    [facility] [address]  [facility], [address]  [phone]    Transthoracic Echocardiogram  2D, M-mode, Doppler, and Color Doppler    Study date:  2019    Patient: Evaristo Perez  MR number: [id number]  Account number: [de-identified]  : 29-Sep-1930  Age: 80 years  Gender: Female  Status: Inpatient  Location: Bedside  Height: 61 in  Weight: 160 lb  BP: 108/ 35 mmHg    Indications: Heart Failure    Diagnoses: I50 9 - Heart failure, unspecified    Sonographer:  HUBER Browne  Primary Physician:  Boubacar Fernando MD  Referring Physician:  Deshaun Osorio MD  Group:  [facility]  Interpreting Physician:  Kevin Magallanes MD    SUMMARY    LEFT VENTRICLE:  Systolic function was normal  Ejection fraction was estimated to be 65 %  There were no regional wall motion abnormalities  Wall thickness was mildly increased  There was moderate concentric hypertrophy    Features were consistent with a pseudonormal left ventricular filling pattern, with concomitant abnormal relaxation and increased filling pressure (grade 2 diastolic dysfunction)  LEFT ATRIUM:  The atrium was markedly dilated  RIGHT ATRIUM:  The atrium was moderately dilated  MITRAL VALVE:  There was mild to moderate regurgitation  AORTIC VALVE:  There was moderate regurgitation  TRICUSPID VALVE:  There was mild regurgitation  Pulmonary artery systolic pressure was mildly increased  AORTA:  There was marked dilatation of the ascending aorta  (55mm)    IVC, HEPATIC VEINS:  The inferior vena cava was dilated  Respirophasic changes were blunted (less than 50% variation)  PERICARDIUM:  A small to moderate pericardial effusion was identified circumferential to the heart  The fluid had no internal echoes  There was no evidence of hemodynamic compromise  There was a small left pleural effusion  HISTORY: PRIOR HISTORY: COPD, CHF, Afib, Cardioversion, HLD, HTN    PROCEDURE: The procedure was performed at the bedside  This was a routine study  The transthoracic approach was used  The study included complete 2D imaging, M-mode, complete spectral Doppler, and color Doppler  The heart rate was 52 bpm,  at the start of the study  Images were obtained from the parasternal, apical, subcostal, and suprasternal notch acoustic windows  Image quality was adequate  LEFT VENTRICLE: Size was normal  Systolic function was normal  Ejection fraction was estimated to be 65 %  There were no regional wall motion abnormalities  Wall thickness was mildly increased  There was moderate concentric hypertrophy  DOPPLER: Features were consistent with a pseudonormal left ventricular filling pattern, with concomitant abnormal relaxation and increased filling pressure (grade 2 diastolic dysfunction)  RIGHT VENTRICLE: The size was normal  Systolic function was normal  Wall thickness was normal     LEFT ATRIUM: The atrium was markedly dilated  RIGHT ATRIUM: The atrium was moderately dilated  MITRAL VALVE: There was mild diffuse thickening   There was normal leaflet separation  DOPPLER: The transmitral velocity was within the normal range  There was no evidence for stenosis  There was mild to moderate regurgitation  AORTIC VALVE: The valve was trileaflet  Leaflets exhibited normal thickness and normal cuspal separation  DOPPLER: Transaortic velocity was minimally increased  There was no evidence for stenosis  There was moderate regurgitation  TRICUSPID VALVE: The valve structure was normal  There was normal leaflet separation  DOPPLER: The transtricuspid velocity was within the normal range  There was no evidence for stenosis  There was mild regurgitation  Pulmonary artery  systolic pressure was mildly increased  PULMONIC VALVE: Leaflets exhibited normal thickness, no calcification, and normal cuspal separation  DOPPLER: The transpulmonic velocity was within the normal range  There was no significant regurgitation  PERICARDIUM: A small to moderate pericardial effusion was identified circumferential to the heart  The fluid had no internal echoes  There was no evidence of hemodynamic compromise  There was a small left pleural effusion  The pericardium  was normal in appearance  AORTA: The root exhibited normal size  There was marked dilatation of the ascending aorta  (55mm)    SYSTEMIC VEINS: IVC: The inferior vena cava was dilated  Respirophasic changes were blunted (less than 50% variation)      SYSTEM MEASUREMENT TABLES    2D  %FS: 42 29 %  Ao Diam: 3 67 cm  EDV(Teich): 139 86 ml  EF(Teich): 72 83 %  ESV(Teich): 37 99 ml  IVSd: 1 3 cm  LA Area: 35 24 cm2  LA Diam: 4 38 cm  LVEDV MOD A4C: 88 59 ml  LVEF MOD A4C: 63 97 %  LVESV MOD A4C: 31 92 ml  LVIDd: 5 38 cm  LVIDs: 3 1 cm  LVLd A4C: 6 34 cm  LVLs A4C: 5 47 cm  LVPWd: 1 43 cm  RA Area: 26 02 cm2  RVIDd: 4 22 cm  SV MOD A4C: 56 67 ml  SV(Teich): 101 86 ml    CW  AR Dec Cannon: 1 95 m/s2  AR Dec Time: 1792 45 ms  AR PHT: 519 81 ms  AR Vmax: 3 49 m/s  AR maxP 76 mmHg  TR Vmax: 2 72 m/s  TR maxP 6 mmHg    MM  TAPSE: 2 82 cm    PW  E': 0 05 m/s  E/E': 15 21  MV A Abisai: 0 66 m/s  MV [place]: 3 51 m/s2  MV DecT: 199 87 ms  MV E Abisai: 0 7 m/s  MV E/A Ratio: 1 06  MV PHT: 57 96 ms  MVA By PHT: 3 8 cm2    [facility] Accredited Echocardiography Laboratory    Prepared and electronically signed by    Zion Nichols MD  Signed 31-Jan-2019 16:14:57       No results found for this or any previous visit  No results found for this or any previous visit  No results found for this or any previous visit      Meds/Allergies   current meds:   Current Facility-Administered Medications   Medication Dose Route Frequency    acetaminophen (TYLENOL) tablet 650 mg  650 mg Oral Q6H PRN    acetaminophen (TYLENOL) tablet 650 mg  650 mg Oral Q8H [address]    amiodarone tablet 200 mg  200 mg Oral Daily    amLODIPine (NORVASC) tablet 10 mg  10 mg Oral Daily    apixaban (ELIQUIS) tablet 5 mg  5 mg Oral BID    carvedilol (COREG) tablet 3 125 mg  3 125 mg Oral BID With Meals    cefepime (MAXIPIME) 2 g/50 mL dextrose IVPB  2,000 mg Intravenous Q12H    docusate sodium (COLACE) capsule 100 mg  100 mg Oral BID PRN    econazole nitrate 1 % cream   Topical BID    fluticasone (FLONASE) 50 mcg/act nasal spray 1 spray  1 spray Nasal BID    furosemide (LASIX) injection 40 mg  40 mg Intravenous BID (diuretic)    guaiFENesin (MUCINEX) 12 hr tablet 600 mg  600 mg Oral Q12H BOB    ipratropium (ATROVENT) 0 02 % inhalation solution 0 5 mg  0 5 mg Nebulization TID    isosorbide mononitrate (IMDUR) 24 hr tablet 60 mg  60 mg Oral Daily    levalbuterol (XOPENEX) inhalation solution 1 25 mg  1 25 mg Nebulization TID    lidocaine (LIDODERM) 5 % patch 1 patch  1 patch Topical Daily    nitroglycerin (NITROSTAT) SL tablet 0 4 mg  0 4 mg Sublingual Q5 Min PRN    potassium chloride (K-DUR,KLOR-CON) CR tablet 20 mEq  20 mEq Oral BID    pravastatin (PRAVACHOL) tablet 40 mg  40 mg Oral Daily    prazosin (MINIPRESS) capsule 2 mg  2 mg Oral QAM    prazosin (MINIPRESS) capsule 5 mg  5 mg Oral HS    senna 8 8 mg/5 mL oral syrup 8 8 mg  8 8 mg Oral Daily PRN    torsemide (DEMADEX) tablet 20 mg  20 mg Oral BID     Prescriptions Prior to Admission   Medication    acetaminophen (TYLENOL) 325 mg tablet    amiodarone 200 mg tablet    amLODIPine (NORVASC) 10 mg tablet    apixaban (ELIQUIS) 5 mg    Calcium Carbonate (CALTRATE 600 PO)    carvedilol (COREG) 3 125 mg tablet    fluticasone (FLONASE) 50 mcg/act nasal spray    ipratropium (ATROVENT) 0 02 % nebulizer solution    isosorbide mononitrate (IMDUR) 60 mg 24 hr tablet    KLOR-CON M20 20 MEQ tablet    levalbuterol (XOPENEX) 1 25 mg/0 5 mL nebulizer solution    metolazone (ZAROXOLYN) 5 mg tablet    nitroglycerin (NITROSTAT) 0 4 mg SL tablet    pravastatin (PRAVACHOL) 40 mg tablet    prazosin (MINIPRESS) 2 mg capsule    prazosin (MINIPRESS) 5 mg capsule    econazole nitrate 1 % cream          Assessment:  Principal Problem:    Acute hypoxic and hypercapnic respiratory failure  Active Problems:    Paroxysmal atrial fibrillation     Ascending aortic aneurysm (HCC)    Acute on chronic diastolic CHF    Hematuria    Chronic kidney disease, stage III (moderate) (HCC)    Non-toxic multinodular goiter    Achalasia    Abnormal urinalysis    Recent left-sided multiple rib and scapular fractures    Hyperlipidemia    Transaminitis    Essential hypertension    Plan:    Acute on Chronic Diastolic CHF: Overall she seems to be more compensated and will switch to torsemide as her diuretic  PAF: Continue amiodarone and eliquis  Counseling / Coordination of Care  Total floor / unit time spent today 25 minutes  Greater than 50% of total time was spent with the patient and / or family counseling and / or coordination of care  A description of the counseling / coordination of care

## 2019-02-07 NOTE — PROGRESS NOTES
[facility] - Internal Medicine Progress Note       PATIENT INFORMATION      Patient: Jaime Hope 80 y o  female   MRN: [id number]  PCP: Stefany Antoine MD  Unit/Bed#: [facility] 519-01 Encounter: [id number]  Date Of Visit: 02/07/19       ASSESSMENTS & PLAN     Acute hypoxic and hypercapnic respiratory failure   Assessment & Plan    - progressively weaned off BiPAP down to 1 L yesterday now intermittently on room air today at rest  - history of recent rib/scapular fractures noted - encourage incentive spirometry  - CT of chest previously revealed ground-glass infiltrates suggestive of possible pneumonia - discontinued IV Cefepime as procalcitonin is downtrending   - continue to maintain oxygenation as tolerated - nebulizer treatments on board  - viral pathogen screen including Influenza/RSV negative  - appreciate pulmonology input - corticosteroids previously discontinued   - CHF exacerbation noted (see plan below)   - PT/OT input appreciated recommending skilled rehab - hopeful discharge in the next 24-48 hours     Acute on chronic diastolic CHF   Assessment & Plan    - appreciate cardiology input  - continue Coreg/Imdur - transitioned Lasix IV -> oral Demadex today  - low-sodium diet with fluid restrictions enforced - net fluid balance for approximately (-) 6 59 L thus far      Recent left-sided multiple rib and scapular fractures   Assessment & Plan    - heavily encouraged incentive spirometry during awake hours  - PRN pain control   - maintain oxygenation      Paroxysmal atrial fibrillation    Assessment & Plan    - rate controlled on Coreg/Amiodarone  - continue Eliquis for anticoagulation     Achalasia   Assessment & Plan    - gastroenterology following and discussed with family regarding abnormal esophageal CT findings and they have elected to defer any further workup at this time (including endoscopy +/- barium esophagram) due to age/comorbidities - currently on a mechanical soft diet with thin liquids     Essential hypertension   Assessment & Plan    - continue Prazosin/Imdur/Norvasc/Coreg     Hyperlipidemia   Assessment & Plan    - continue Pravachol     Transaminitis   Assessment & Plan    - appreciate gastroenterology input  - limit/avoid hepatotoxins if possible - Cefepime discontinued  - AST/ALT normalized now       VTE Prophylaxis:  Eliquis      SUBJECTIVE     Seen/examined earlier today  Sitting upright in a chair and was oxygenating on room air during my encounter at rest   States her shortness of breath has improved although weakness/fatigue persists  Remains in a more pleasant demeanor today  OBJECTIVE     Vitals:   Temp (24hrs), Av 2 °F (36 8 °C), Min:97 5 °F (36 4 °C), Max:98 8 °F (37 1 °C)    Temp:  [97 5 °F (36 4 °C)-98 8 °F (37 1 °C)] 97 9 °F (36 6 °C)  HR:  [59-69] 60  Resp:  [18-22] 18  BP: (107-172)/(48-72) 160/72  SpO2:  [86 %-96 %] 96 %  Body mass index is 27 73 kg/m²  Input and Output Summary (last 24 hours):        Intake/Output Summary (Last 24 hours) at 19 1750  Last data filed at 19 1641   Gross per 24 hour   Intake             1310 ml   Output             2970 ml   Net            -1660 ml       Physical Exam:     GENERAL:  Weak/fatigued   HEAD:  Normocephalic - atraumatic  EYES: PERRL - EOMI   MOUTH:  Mucosa moist  NECK:  Supple - full range of motion  CARDIAC:  Regular rate/rhythm - S1/S2 positive  PULMONARY:  Diminished bibasilar breath sounds  ABDOMEN:  Soft - nontender/nondistended - active bowel sounds  MUSCULOSKELETAL:  Motor strength/range of motion quite deconditioned  NEUROLOGIC:  Alert/oriented   SKIN:  Chronic wrinkles/blemishes   PSYCHIATRIC:  Mood/affect pleasant today      ADDITIONAL DATA       Labs & Recent Cultures:       Results from last 7 days  Lab Units 19  0530  19  0603  19  0645   WBC Thousand/uL  --   --  5 47  --  7 53   HEMOGLOBIN g/dL 8 0*  < > 7 7*  --  9 7*   I STAT HEMOGLOBIN   --   --   --   < >  -- HEMATOCRIT % 28 1*  < > 28 0*  --  35 0   HEMATOCRIT, ISTAT   --   --   --   < >  --    PLATELETS Thousands/uL  --   --  164  --  225   NEUTROS PCT %  --   --   --   --  79*   LYMPHS PCT %  --   --   --   --  5*   MONOS PCT %  --   --   --   --  12   EOS PCT %  --   --   --   --  2   < > = values in this interval not displayed  Results from last 7 days  Lab Units 02/07/19  0530  02/03/19  1219   SODIUM mmol/L 143  < >  --    POTASSIUM mmol/L 4 6  < >  --    CHLORIDE mmol/L 105  < >  --    CO2 mmol/L 36*  < >  --    CO2, I-STAT mmol/L  --   --  37*   BUN mg/dL 35*  < >  --    CREATININE mg/dL 0 92  < >  --    CALCIUM mg/dL 8 6  < >  --    ALK PHOS U/L 88  < >  --    ALT U/L 65  < >  --    AST U/L 32  < >  --    GLUCOSE, ISTAT mg/dl  --   --  118   < > = values in this interval not displayed  Results from last 7 days  Lab Units 02/03/19  0646   INR  1 25*           Results from last 7 days  Lab Units 02/03/19  1215 02/03/19  1202 02/03/19  0755 02/03/19  0704   BLOOD CULTURE   --   --   --  No Growth After 4 Days  No Growth After 4 Days     URINE CULTURE   --   --  >100,000 cfu/ml Escherichia coli*  --    INFLUENZA B PCR  Not Detected  --   --   --    RSV PCR  Not Detected  --   --   --    LEGIONELLA URINARY ANTIGEN   --  Negative  --   --          Last 24 Hours Medication List:     Current Facility-Administered Medications:  acetaminophen 650 mg Oral Q6H PRN Trisha Jovel MD    acetaminophen 650 mg Oral Q8H [address] DAWN Dietz    amiodarone 200 mg Oral Daily Trisha Jovel MD    amLODIPine 10 mg Oral Daily Trisha Jovel MD    apixaban 5 mg Oral BID Trisha Jovel MD    carvedilol 3 125 mg Oral BID With Meals Trisha Jovel MD    cefepime 2,000 mg Intravenous Q12H Trisha Jovel MD Last Rate: 2,000 mg (02/07/19 4391)   diclofenac sodium 2 g Topical 4x Daily PRN Shanna Singh MD    docusate sodium 100 mg Oral BID PRN Shanna Singh MD    econazole nitrate  Topical BID Trisha Jovel, MD    fluticasone 1 spray Nasal BID Luli Morales MD    guaiFENesin 600 mg Oral Q12H [address] Luli Morales MD    ipratropium 0 5 mg Nebulization TID David Closs Trager, DO    isosorbide mononitrate 60 mg Oral Daily Luli Morales MD    levalbuterol 1 25 mg Nebulization TID [place] Closs Trager, DO    lidocaine 1 patch Topical Daily DAWN Lebron    nitroglycerin 0 4 mg Sublingual Q5 Min PRN Luli Morales MD    potassium chloride 20 mEq Oral BID Luli Morales MD    pravastatin 40 mg Oral Daily Luli Morales MD    prazosin 2 mg Oral QAM Luli Morales MD    prazosin 5 mg Oral HS Luli Morales MD    senna 8 8 mg Oral Daily PRN Megan Taylor MD    torsemide 20 mg Oral BID Susu Newman MD           Time Spent for Care: 32 minutes  More than 50% of total time spent on counseling and coordination of care as described above  Current Length of Stay: 4 day(s)      Code Status: Level 3 - DNAR and DNI         ** Please Note: This note is constructed using a voice recognition dictation system   **

## 2019-02-07 NOTE — UTILIZATION REVIEW
Continued Stay Review    2/6  continue Coreg/Imdur and Lasix IV BID currently     - low-sodium diet with fluid restrictions enforced - net fluid balance for approximately (-) 5 0 L thus far       Nupur Armando Date: 2/7/2019    Vital Signs: /72 (BP Location: Right arm)   Pulse 60   Temp 97 9 °F (36 6 °C) (Oral)   Resp 18   Ht 5' (1 524 m)   Wt 64 4 kg (141 lb 15 6 oz)   SpO2 96%   BMI 27 73 kg/m²       Acute on Chronic Diastolic CHF  -  Overall she seems to be more compensated and will switch to torsemide as her diuretic  Dyspnea seems better  Did have a desat Earlier today   @  1100 on 2L sat dropped to 86%   @ 1300   On  2L  Sat 94%    PAF  -  Continue amiodarone and eliquis       Scheduled Meds:   Current Facility-Administered Medications:  acetaminophen 650 mg Oral Q6H PRN Trae Coleman MD    acetaminophen 650 mg Oral Q8H [facility] DAWN Dietz    amiodarone 200 mg Oral Daily Trae Coleman MD    amLODIPine 10 mg Oral Daily Trae Coleman MD    apixaban 5 mg Oral BID Trae Coleman MD    carvedilol 3 125 mg Oral BID With Meals Trae Coleman MD    cefepime 2,000 mg Intravenous Q12H Trae Coleman MD Last Rate: 2,000 mg (02/07/19 1329)   diclofenac sodium 2 g Topical 4x Daily PRN Axel Sanchez MD    docusate sodium 100 mg Oral BID PRN Axel Sanchez MD    econazole nitrate  Topical BID Trae Coleman MD    fluticasone 1 spray Nasal BID Trae Coleman MD    guaiFENesin 600 mg Oral Q12H [facility] Trae Coleman MD    ipratropium 0 5 mg Nebulization TID Em Herbert DO    isosorbide mononitrate 60 mg Oral Daily Trae Coleman MD    levalbuterol 1 25 mg Nebulization TID Em Herbert, DO    lidocaine 1 patch Topical Daily DAWN Johnston    nitroglycerin 0 4 mg Sublingual Q5 Min PRN Trae Coleman MD    potassium chloride 20 mEq Oral BID Trae Coleman MD    pravastatin 40 mg Oral Daily Curtis Mckeon MD    prazosin 2 mg Oral QAM Curtis Mckeon MD    prazosin 5 mg Oral HS Curtis Mckeon MD    senna 8 8 mg Oral Daily PRN Kathie Palmer MD    torsemide 20 mg Oral BID Geoff Dee MD        Discharge Plan: tbd

## 2019-02-07 NOTE — PHYSICAL THERAPY NOTE
PT Treatment       02/07/19 0949   Pain Assessment   Pain Assessment No/denies pain   Pain Score No Pain   Restrictions/Precautions   Weight Bearing Precautions Per Order Yes   LUE Weight Bearing Per Order NWB   Braces or Orthoses Sling   Other Precautions Chair Alarm;Cognitive;Multiple lines;O2;Fall Risk;Pain   General   Chart Reviewed Yes   Response to Previous Treatment Patient with no complaints from previous session  Family/Caregiver Present No   Cognition   Overall Cognitive Status WFL   Arousal/Participation Alert   Attention Within functional limits   Orientation Level Oriented X4   Memory Decreased recall of precautions   Following Commands Follows one step commands with increased time or repetition   Comments Pt is pleasant and cooperative, but fearful of falling and requires increased encouragement  Subjective   Subjective Reports fear of falling   Bed Mobility   Additional Comments Seated in bedside chair on arrival   Transfers   Sit to Stand 3  Moderate assistance   Additional items Assist x 2; Increased time required;Verbal cues   Stand to Sit 3  Moderate assistance   Additional items Assist x 2; Increased time required;Verbal cues   Ambulation/Elevation   Gait pattern Poor UE support; Improper Weight shift; Forward Flexion;Narrow FRANC; Decreased foot clearance; Short stride; Step to;Excessively slow   Gait Assistance 2  Maximal assist   Additional items Assist x 1; Tactile cues; Verbal cues   Assistive Device (HHA)   Distance 5' with chair follow   Balance   Static Sitting Fair -   Dynamic Sitting Fair -   Static Standing Poor +   Dynamic Standing Poor +   Ambulatory Poor   Endurance Deficit   Endurance Deficit Yes   Endurance Deficit Description anxiety, weakness, fatigue   Activity Tolerance   Activity Tolerance Patient tolerated treatment well   Medical Staff [address] [place] [address] with restorative present   Nurse Made Aware Yes, RN cleared pt for PT session   Assessment   Prognosis Good   Problem List Decreased strength;Decreased endurance; Impaired balance;Decreased coordination;Decreased mobility; Decreased safety awareness;Pain   Assessment Pt was agreeable to PT treatment session  Pt tolerated sit to stand x4 with mod A x2  She tolerated standing at the bedside chair for up to ~2 minutes for hygiene due to bowel movement on standing  Ambulation was attempted with lexii-walker and pt demonstrated lateral LOB and poor RUE support  She was unable to safely progress the walker forward  On the next trial, pt ambulated 5' with HHA with max A x1 and close chair follow  Pt would continue to benefit from skilled PT to improve strength, endurance, balance, and mobility to return to PLOF  Barriers to Discharge Decreased caregiver support   Barriers to Discharge Comments Lives alone   Goals   Patient Goals To get better   STG Expiration Date 02/14/19   Short Term Goal #1 Pt will demonstrate: 1) increased BLE strength >/= 1 grade for improved transfers 2) supine <> sit transfer with </= min A x1 3) sit <> stand transfer with </=min A x1 4) increased dynamic balance >/= 1 grade to decrease risk for falls 5) Amb >/= 30' with </= min A x1 using least restrictive unilateral device with NWB of the LUE    Treatment Day 2   Plan   Treatment/Interventions Functional transfer training;LE strengthening/ROM; Therapeutic exercise; Endurance training;Patient/family training;Equipment eval/education; Bed mobility;Gait training;Spoke to nursing;OT   Progress Progressing toward goals   PT Frequency (3-5x/wk)   Recommendation   Recommendation Short-term skilled PT  (rehab)   Equipment Recommended (TBD)   PT - OK to Discharge Yes   Additional Comments To rehab when medically stable     Jay Lynn, PT, DPT

## 2019-02-07 NOTE — RESTORATIVE TECHNICIAN NOTE
Restorative Specialist Mobility Note       Activity: Ambulate in room (Assisted PT, please refer to PT notes for all information )     Assistive Device: Other (Comment) (HHAx1)     Ambulation Response: Tolerated fairly well  Repositioned: Sitting, Up in chair           Range of Motion: Active, All extremities  Anti-Embolism Device On: Bilateral, Sequential compression devices, below knee     Patient left resting comfortably in chair, with call bell and table within reach

## 2019-02-07 NOTE — PLAN OF CARE
Problem: PHYSICAL THERAPY ADULT  Goal: Performs mobility at highest level of function for planned discharge setting  See evaluation for individualized goals  Treatment/Interventions: Functional transfer training, LE strengthening/ROM, Therapeutic exercise, Endurance training, Cognitive reorientation, Patient/family training, Equipment eval/education, Bed mobility, Gait training, Spoke to nursing, OT  Equipment Recommended:  (TBD - likely least restrictive unilateral device)       See flowsheet documentation for full assessment, interventions and recommendations  Prognosis: Good  Problem List: Decreased strength, Decreased endurance, Impaired balance, Decreased coordination, Decreased mobility, Decreased safety awareness, Pain  Assessment: Pt was agreeable to PT treatment session  Pt tolerated sit to stand x4 with mod A x2  She tolerated standing at the bedside chair for up to ~2 minutes for hygiene due to bowel movement on standing  Ambulation was attempted with lexii-walker and pt demonstrated lateral LOB and poor RUE support  She was unable to safely progress the walker forward  On the next trial, pt ambulated 5' with HHA with max A x1 and close chair follow  Pt would continue to benefit from skilled PT to improve strength, endurance, balance, and mobility to return to PLOF  Barriers to Discharge: Decreased caregiver support  Barriers to Discharge Comments: Lives alone  Recommendation: Short-term skilled PT (rehab)     PT - OK to Discharge: Yes    See flowsheet documentation for full assessment

## 2019-02-08 ENCOUNTER — APPOINTMENT (INPATIENT)
Dept: RADIOLOGY | Facility: HOSPITAL | Age: 84
DRG: 291 | End: 2019-02-08
Attending: INTERNAL MEDICINE
Payer: MEDICARE

## 2019-02-08 LAB
ALBUMIN SERPL BCP-MCNC: 2.4 G/DL (ref 3.5–5)
ALP SERPL-CCNC: 81 U/L (ref 46–116)
ALT SERPL W P-5'-P-CCNC: 50 U/L (ref 12–78)
ANION GAP SERPL CALCULATED.3IONS-SCNC: 1 MMOL/L (ref 4–13)
AST SERPL W P-5'-P-CCNC: 28 U/L (ref 5–45)
BACTERIA BLD CULT: NORMAL
BACTERIA BLD CULT: NORMAL
BILIRUB SERPL-MCNC: 0.37 MG/DL (ref 0.2–1)
BUN SERPL-MCNC: 35 MG/DL (ref 5–25)
CALCIUM SERPL-MCNC: 8.4 MG/DL (ref 8.3–10.1)
CHLORIDE SERPL-SCNC: 105 MMOL/L (ref 100–108)
CO2 SERPL-SCNC: 36 MMOL/L (ref 21–32)
CREAT SERPL-MCNC: 0.96 MG/DL (ref 0.6–1.3)
GFR SERPL CREATININE-BSD FRML MDRD: 53 ML/MIN/1.73SQ M
GLUCOSE SERPL-MCNC: 105 MG/DL (ref 65–140)
GLUCOSE SERPL-MCNC: 181 MG/DL (ref 65–140)
HCT VFR BLD AUTO: 26.9 % (ref 34.8–46.1)
HGB BLD-MCNC: 7.6 G/DL (ref 11.5–15.4)
POTASSIUM SERPL-SCNC: 4.7 MMOL/L (ref 3.5–5.3)
PROT SERPL-MCNC: 5.9 G/DL (ref 6.4–8.2)
SODIUM SERPL-SCNC: 142 MMOL/L (ref 136–145)

## 2019-02-08 PROCEDURE — 82948 REAGENT STRIP/BLOOD GLUCOSE: CPT

## 2019-02-08 PROCEDURE — 94760 N-INVAS EAR/PLS OXIMETRY 1: CPT

## 2019-02-08 PROCEDURE — 80053 COMPREHEN METABOLIC PANEL: CPT | Performed by: INTERNAL MEDICINE

## 2019-02-08 PROCEDURE — 85014 HEMATOCRIT: CPT | Performed by: INTERNAL MEDICINE

## 2019-02-08 PROCEDURE — 99232 SBSQ HOSP IP/OBS MODERATE 35: CPT | Performed by: INTERNAL MEDICINE

## 2019-02-08 PROCEDURE — 99222 1ST HOSP IP/OBS MODERATE 55: CPT | Performed by: INTERNAL MEDICINE

## 2019-02-08 PROCEDURE — 94640 AIRWAY INHALATION TREATMENT: CPT

## 2019-02-08 PROCEDURE — 97535 SELF CARE MNGMENT TRAINING: CPT

## 2019-02-08 PROCEDURE — 94668 MNPJ CHEST WALL SBSQ: CPT

## 2019-02-08 PROCEDURE — 71045 X-RAY EXAM CHEST 1 VIEW: CPT

## 2019-02-08 PROCEDURE — 85018 HEMOGLOBIN: CPT | Performed by: INTERNAL MEDICINE

## 2019-02-08 RX ORDER — BUMETANIDE 0.25 MG/ML
1 INJECTION, SOLUTION INTRAMUSCULAR; INTRAVENOUS ONCE
Status: COMPLETED | OUTPATIENT
Start: 2019-02-08 | End: 2019-02-08

## 2019-02-08 RX ORDER — BUMETANIDE 0.25 MG/ML
2 INJECTION, SOLUTION INTRAMUSCULAR; INTRAVENOUS 2 TIMES DAILY
Status: DISCONTINUED | OUTPATIENT
Start: 2019-02-08 | End: 2019-02-09

## 2019-02-08 RX ADMIN — LEVALBUTEROL HYDROCHLORIDE 1.25 MG: 1.25 SOLUTION, CONCENTRATE RESPIRATORY (INHALATION) at 19:30

## 2019-02-08 RX ADMIN — FLUTICASONE PROPIONATE 1 SPRAY: 50 SPRAY, METERED NASAL at 17:49

## 2019-02-08 RX ADMIN — ACETAMINOPHEN 650 MG: 325 TABLET, FILM COATED ORAL at 05:08

## 2019-02-08 RX ADMIN — IPRATROPIUM BROMIDE 0.5 MG: 0.5 SOLUTION RESPIRATORY (INHALATION) at 19:30

## 2019-02-08 RX ADMIN — AMIODARONE HYDROCHLORIDE 200 MG: 200 TABLET ORAL at 08:58

## 2019-02-08 RX ADMIN — CARVEDILOL 3.12 MG: 3.12 TABLET, FILM COATED ORAL at 17:48

## 2019-02-08 RX ADMIN — IPRATROPIUM BROMIDE 0.5 MG: 0.5 SOLUTION RESPIRATORY (INHALATION) at 13:26

## 2019-02-08 RX ADMIN — LEVALBUTEROL HYDROCHLORIDE 1.25 MG: 1.25 SOLUTION, CONCENTRATE RESPIRATORY (INHALATION) at 13:26

## 2019-02-08 RX ADMIN — IPRATROPIUM BROMIDE 0.5 MG: 0.5 SOLUTION RESPIRATORY (INHALATION) at 07:37

## 2019-02-08 RX ADMIN — PRAVASTATIN SODIUM 40 MG: 40 TABLET ORAL at 08:58

## 2019-02-08 RX ADMIN — PRAZOSIN HYDROCHLORIDE 2 MG: 2 CAPSULE ORAL at 08:58

## 2019-02-08 RX ADMIN — TORSEMIDE 20 MG: 20 TABLET ORAL at 08:58

## 2019-02-08 RX ADMIN — LEVALBUTEROL HYDROCHLORIDE 1.25 MG: 1.25 SOLUTION, CONCENTRATE RESPIRATORY (INHALATION) at 07:37

## 2019-02-08 RX ADMIN — FLUTICASONE PROPIONATE 1 SPRAY: 50 SPRAY, METERED NASAL at 08:58

## 2019-02-08 RX ADMIN — APIXABAN 5 MG: 5 TABLET, FILM COATED ORAL at 08:58

## 2019-02-08 RX ADMIN — ACETAMINOPHEN 650 MG: 325 TABLET, FILM COATED ORAL at 21:05

## 2019-02-08 RX ADMIN — LIDOCAINE 1 PATCH: 50 PATCH CUTANEOUS at 08:58

## 2019-02-08 RX ADMIN — ECONAZOLE NITRATE: 10 CREAM TOPICAL at 17:48

## 2019-02-08 RX ADMIN — GUAIFENESIN 600 MG: 600 TABLET, EXTENDED RELEASE ORAL at 20:41

## 2019-02-08 RX ADMIN — BUMETANIDE 1 MG: 0.25 INJECTION INTRAMUSCULAR; INTRAVENOUS at 09:49

## 2019-02-08 RX ADMIN — PRAZOSIN HYDROCHLORIDE 5 MG: 2 CAPSULE ORAL at 21:02

## 2019-02-08 RX ADMIN — GUAIFENESIN 600 MG: 600 TABLET, EXTENDED RELEASE ORAL at 08:58

## 2019-02-08 RX ADMIN — APIXABAN 5 MG: 5 TABLET, FILM COATED ORAL at 17:48

## 2019-02-08 RX ADMIN — BUMETANIDE 2 MG: 0.25 INJECTION INTRAMUSCULAR; INTRAVENOUS at 17:48

## 2019-02-08 RX ADMIN — AMLODIPINE BESYLATE 10 MG: 10 TABLET ORAL at 08:58

## 2019-02-08 RX ADMIN — CEFEPIME HYDROCHLORIDE 2000 MG: 1 INJECTION, POWDER, FOR SOLUTION INTRAMUSCULAR; INTRAVENOUS at 23:06

## 2019-02-08 RX ADMIN — POTASSIUM CHLORIDE 20 MEQ: 20 SOLUTION ORAL at 08:58

## 2019-02-08 RX ADMIN — CARVEDILOL 3.12 MG: 3.12 TABLET, FILM COATED ORAL at 08:57

## 2019-02-08 RX ADMIN — CEFEPIME HYDROCHLORIDE 2000 MG: 1 INJECTION, POWDER, FOR SOLUTION INTRAMUSCULAR; INTRAVENOUS at 11:32

## 2019-02-08 RX ADMIN — POTASSIUM CHLORIDE 20 MEQ: 20 SOLUTION ORAL at 17:48

## 2019-02-08 RX ADMIN — ISOSORBIDE MONONITRATE 60 MG: 60 TABLET, EXTENDED RELEASE ORAL at 08:58

## 2019-02-08 NOTE — PROGRESS NOTES
Patient rang call bell for having a BM and subsequently began c/o SOB with increased WOB  Respiratory at bedside, thought pt might need BIPAP d/t auscultation of crackles and inspiratory wheezes  Patients o2 sats dropped to 83% on 4L, turned up to 6L NC where sats increased to 97% and remained there  SLIM notified by respiratory for potential need for bipap, will come to see patient who now appears more comfortable and is saturating well on 6L NC  Will continue to assess

## 2019-02-08 NOTE — PROGRESS NOTES
[facility] Hospitalist [facility] - Internal Medicine Progress Note       PATIENT INFORMATION      Patient: Zee Mcclain 80 y o  female   MRN: [id number]  PCP: Jarrett Alexnader MD  Unit/Bed#: [facility] 519-01 Encounter: [id number]  Date Of Visit: 02/08/19       ASSESSMENTS & PLAN     Acute hypoxic and hypercapnic respiratory failure   Assessment & Plan    - progressively weaned off BiPAP down room air at one point yesterday however has worsened since early morning and currently back on 2 L at rest  - history of recent rib/scapular fractures noted - encourage incentive spirometry  - CT of chest previously revealed ground-glass infiltrates suggestive of possible pneumonia - discontinued IV Cefepime as procalcitonin is downtrending   - continue to maintain oxygenation as tolerated - nebulizer treatments on board  - viral pathogen screen including Influenza/RSV negative  - appreciate pulmonology input - corticosteroids previously discontinued   - CHF exacerbation noted (see plan below)   - PT/OT input appreciated recommending skilled rehab once medically stable  - appreciate palliative care input regarding symptomatic management and emotional support as patient now increasingly frustrated about persistence/recurrence of acute issues     Acute on chronic diastolic CHF   Assessment & Plan    - appreciate cardiology input  - continue Coreg/Imdur - transitioned back to IV Lasix from oral diuretic today due to worsening vascular pulmonary congestion with effusions on CXR (check repeat imaging tomorrow morning)   - low-sodium diet with fluid restrictions enforced - net fluid balance for approximately (-) 6 92 L thus far    - echocardiogram in January 2019 revealed an EF of 65% with mild TR/pulmonary HTN, mild-moderate MR, and moderate AR     Recent left-sided multiple rib and scapular fractures   Assessment & Plan    - heavily encouraged incentive spirometry during awake hours  - PRN pain control   - maintain oxygenation Paroxysmal atrial fibrillation    Assessment & Plan    - rate controlled on Coreg/Amiodarone  - continue Eliquis for anticoagulation     Achalasia   Assessment & Plan    - gastroenterology following and discussed with family regarding abnormal esophageal CT findings and they have elected to defer any further workup at this time (including endoscopy +/- barium esophagram) due to age/comorbidities - currently on a mechanical soft diet with thin liquids     Essential hypertension   Assessment & Plan    - continue Prazosin/Imdur/Norvasc/Coreg     Hyperlipidemia   Assessment & Plan    - continue Pravachol     Transaminitis   Assessment & Plan    - appreciate gastroenterology input  - limit/avoid hepatotoxins if possible - Cefepime discontinued  - AST/ALT normalized       VTE Prophylaxis:  Eliquis      SUBJECTIVE     Seen/examined earlier this morning during nursing rounds with daughter at bedside  Patient reported increased shortness of breath overnight into early this morning requiring being placed back on nasal cannula supplementation and transition back to intravenous diuretics  She is sitting upright in a chair during my encounter and is evidently weak/fatigued  She expressed frustration over recurrence of the symptoms for which palliative care has been consulted for assistance in emotional support and symptomatic relief  OBJECTIVE     Vitals:   Temp (24hrs), Av 8 °F (36 6 °C), Min:97 6 °F (36 4 °C), Max:98 °F (36 7 °C)    Temp:  [97 6 °F (36 4 °C)-98 °F (36 7 °C)] 97 6 °F (36 4 °C)  HR:  [60-61] 61  Resp:  [18-24] 24  BP: (140-152)/(62-79) 140/79  SpO2:  [97 %-98 %] 98 %  Body mass index is 27 47 kg/m²  Input and Output Summary (last 24 hours):        Intake/Output Summary (Last 24 hours) at 19 1659  Last data filed at 19 1600   Gross per 24 hour   Intake             1250 ml   Output             1575 ml   Net             -325 ml       Physical Exam:     GENERAL:  Weak/fatigued   HEAD: Normocephalic - atraumatic  EYES: PERRL - EOMI   MOUTH:  Mucosa moist  NECK:  Supple - full range of motion  CARDIAC:  Regular rate/rhythm - S1/S2 positive  PULMONARY:  Diminished bibasilar breath sounds with trace crackles  ABDOMEN:  Soft - nontender/nondistended - active bowel sounds  MUSCULOSKELETAL:  Motor strength/range of motion markedly deconditioned  NEUROLOGIC:  Alert/oriented   SKIN:  Chronic wrinkles/blemishes   PSYCHIATRIC:  Mood/affect flat currently       ADDITIONAL DATA       Labs & Recent Cultures:       Results from last 7 days  Lab Units 02/08/19 0459 02/04/19  0603  02/03/19  0645   WBC Thousand/uL  --   --  5 47  --  7 53   HEMOGLOBIN g/dL 7 6*  < > 7 7*  --  9 7*   I STAT HEMOGLOBIN   --   --   --   < >  --    HEMATOCRIT % 26 9*  < > 28 0*  --  35 0   HEMATOCRIT, ISTAT   --   --   --   < >  --    PLATELETS Thousands/uL  --   --  164  --  225   NEUTROS PCT %  --   --   --   --  79*   LYMPHS PCT %  --   --   --   --  5*   MONOS PCT %  --   --   --   --  12   EOS PCT %  --   --   --   --  2   < > = values in this interval not displayed  Results from last 7 days  Lab Units 02/08/19 0459 02/03/19  1219   SODIUM mmol/L 142  < >  --    POTASSIUM mmol/L 4 7  < >  --    CHLORIDE mmol/L 105  < >  --    CO2 mmol/L 36*  < >  --    CO2, I-STAT mmol/L  --   --  37*   BUN mg/dL 35*  < >  --    CREATININE mg/dL 0 96  < >  --    CALCIUM mg/dL 8 4  < >  --    ALK PHOS U/L 81  < >  --    ALT U/L 50  < >  --    AST U/L 28  < >  --    GLUCOSE, ISTAT mg/dl  --   --  118   < > = values in this interval not displayed  Results from last 7 days  Lab Units 02/03/19  0646   INR  1 25*           Results from last 7 days  Lab Units 02/03/19  1215 02/03/19  1202 02/03/19  0755 02/03/19  0704   BLOOD CULTURE   --   --   --  No Growth After 5 Days  No Growth After 5 Days     URINE CULTURE   --   --  >100,000 cfu/ml Escherichia coli*  --    INFLUENZA B PCR  Not Detected  --   --   --    RSV PCR  Not Detected  --   -- --    LEGIONELLA URINARY ANTIGEN   --  Negative  --   --          Last 24 Hours Medication List:     Current Facility-Administered Medications:  acetaminophen 650 mg Oral Q6H PRN Sandy Issa MD    acetaminophen 650 mg Oral Q8H [facility] DAWN Dietz    amiodarone 200 mg Oral Daily Sandy Issa MD    amLODIPine 10 mg Oral Daily Sandy Issa MD    apixaban 5 mg Oral BID Sandy Issa MD    bumetanide 2 mg Intravenous BID Izaiah Guerra MD    carvedilol 3 125 mg Oral BID With Meals Sandy Issa MD    cefepime 2,000 mg Intravenous Q12H Sandy Issa MD Last Rate: Stopped (02/08/19 1219)   diclofenac sodium 2 g Topical 4x Daily PRN Riley Avitia MD    docusate sodium 100 mg Oral BID PRN Riley Avitai MD    econazole nitrate  Topical BID Sandy Issa MD    fluticasone 1 spray Nasal BID Sandy Issa MD    guaiFENesin 600 mg Oral Q12H [facility] Sandy Issa MD    ipratropium 0 5 mg Nebulization TID Kash Herbert DO    isosorbide mononitrate 60 mg Oral Daily Sandy Issa MD    levalbuterol 1 25 mg Nebulization TID Kash Herbert,     lidocaine 1 patch Topical Daily DAWN Freeman    nitroglycerin 0 4 mg Sublingual Q5 Min PRN Sandy Issa MD    potassium chloride 20 mEq Oral BID Sandy Issa MD    pravastatin 40 mg Oral Daily Sandy Issa MD    prazosin 2 mg Oral QAM Sandy Issa MD    prazosin 5 mg Oral HS Sandy Issa MD    senna 8 8 mg Oral Daily PRN Riley Avitia MD           Time Spent for Care: 32 minutes  More than 50% of total time spent on counseling and coordination of care as described above  Current Length of Stay: 5 day(s)      Code Status: Level 3 - DNAR and DNI         ** Please Note: This note is constructed using a voice recognition dictation system   **

## 2019-02-08 NOTE — PLAN OF CARE
DISCHARGE PLANNING     Discharge to home or other facility with appropriate resources Progressing        DISCHARGE PLANNING - CARE MANAGEMENT     Discharge to post-acute care or home with appropriate resources Progressing        INFECTION - ADULT     Absence or prevention of progression during hospitalization Progressing     Absence of fever/infection during neutropenic period Progressing        Knowledge Deficit     Patient/family/caregiver demonstrates understanding of disease process, treatment plan, medications, and discharge instructions Progressing        Nutrition/Hydration-ADULT     Nutrient/Hydration intake appropriate for improving, restoring or maintaining nutritional needs Progressing        PAIN - ADULT     Verbalizes/displays adequate comfort level or baseline comfort level Progressing        Potential for Falls     Patient will remain free of falls Progressing        Prexisting or High Potential for Compromised Skin Integrity     Skin integrity is maintained or improved Progressing        RESPIRATORY - ADULT     Achieves optimal ventilation and oxygenation Progressing        SAFETY ADULT     Maintain or return to baseline ADL function Progressing     Maintain or return mobility status to optimal level Progressing     Patient will remain free of falls Progressing        SKIN/TISSUE INTEGRITY - ADULT     Skin integrity remains intact Progressing     Incision(s), wounds(s) or drain site(s) healing without S/S of infection Progressing

## 2019-02-08 NOTE — CONSULTS
Consultation - Balbina Mendoza 80 y o  female MRN: [id number]  Unit/Bed#: [facility] 519-01 Encounter: [id number]      Assessment/Plan     Assessment:  Patient Active Problem List   Diagnosis    Paroxysmal atrial fibrillation     Benign hypertension with CKD (chronic kidney disease) stage III (HCC)    GERD (gastroesophageal reflux disease)    Other hyperlipidemia    Aortic valve regurgitation, nonrheumatic    Ascending [facility] ([facility])    Other chest pain    Lymphedema of both lower extremities    Acute on chronic diastolic CHF    Hematuria    Gait difficulty    Multiple rib fractures    Closed left scapular fracture    Acute kidney injury ([address] )    Acute pain due to trauma    Chronic respiratory acidosis    Pericardial effusion    Chronic kidney disease, stage III (moderate) ([facility])    Acute hypoxic and hypercapnic respiratory failure    Tao esophagus    Cataract of right eye    Chronic stasis dermatitis of right lower extremity    Chronic venous insufficiency    Diverticulosis    Inguinal hernia    Insomnia    Non-toxic multinodular goiter    Urine, incontinence, stress female    Achalasia    Abnormal urinalysis    Recent left-sided multiple rib and scapular fractures    Hyperlipidemia    Transaminitis    Essential hypertension       Plan:  · Goals  · Level 3 DNR  · Patient admits to being "tired" and "doesn't want to fight anymore"  · Living will on file and supports DNR status  · POA is Mena correia  Patient incompetent to make medical decisions at this time  · Attempted to call daughter and secondary healthcare agent (son-in-law Paul Monique)  Neither answered but left messages on machines    · Will continue current measures at this time until further discussion regarding goals of care  · Symptom management  · Patient denies pain  · Supplemental O2 for dyspnea at this time until goals clarified    Palliative care will continue to follow- call with questions or concerns [phone]    History of Present Illness   Physician Requesting Consult: Chrissy Mckinney MD  Reason for Consult / Principal Problem: goals of care  Hx and PE limited by: altered mental status  HPI: Marcus Carroll is a 80y o  year old female , past medical history of paroxysmal AFib, hypertension, CKD 3, hyperlipidemia, 5 5cm ascending aortic aneurysm, aortic regurgitation, diastolic heart failure, presented to [facility] Emergency room on 02/03 with acute hypoxic and hypercapnic respiratory failure and respiratory distress  Patient recently admitted under trauma service status post fall in her independent apartment at [facility]  Patient sustained several rib fractures, left scapular fracture  Patient was discharged to rehab on 02/02  Unfortunately very she developed respiratory distress, hypoxia  Upon according to emergency room she was started on BiPAP and antibiotics for possible aspiration verses HCAP and concern of UTI  Critical care team consulted for advanced care management  Pulmonary team also consulted for respiratory failure management  GI consulted for concern of achalasia  They recommended esophagram and possible EGD, but family decided to defer at this time secondary to symptomatic improvement and high risk for procedure  Speech swallow evaluation completed and recommended level 2 dysphagia diet  Over the course of several days patient seemed to be slightly improving both clinically and symptomatically  This morning patient desaturated while having a BM, required increase of O2 to 6L  Family requested palliative care consultation for support and assistance with goals of care  Of note patient previously enrolled on PALS prior to admission  Patient admits to being "very tired" of being in the hospital  She endorses fatigue and wishes she could "just go home"  Patient is depressed about the fall and all of the "burden" it has caused   She is confused regarding the timeline of events  She denies pain, but admits to being short of breath more than baseline  Patient denies splinting secondary to rib fractures  She complaints of sensation to urinate though has a abbott in place  Patient denies sleeping well at night and says her appetite is quite poor, but has "always been"  Inpatient consult to Palliative Care  Consult performed by: Evelyn Macario  Consult ordered by: Keila Billy          Review of Systems   Unable to perform ROS: Mental status change (confusion)   Constitutional: Positive for fatigue  Appetite change: poor appetite at baseline  Respiratory: Positive for shortness of breath  Cardiovascular: Negative for chest pain  Gastrointestinal: Negative for abdominal pain, constipation and nausea  Genitourinary: Positive for urgency (associated with abbott placement)  Musculoskeletal: Negative for arthralgias (despite scapular and rib fractures)  Allergic/Immunologic:        ASA and percocet allergies   Neurological: Positive for weakness  Psychiatric/Behavioral: Positive for confusion, dysphoric mood and sleep disturbance  Historical Information   Past Medical History:   Diagnosis Date    Acid reflux disease     Anemia     Last assessed: 10/26/16    Aortic valve regurgitation, nonrheumatic     Ascending aortic aneurysm (HCC)     Last assessed: 6/5/13    Atrial fibrillation ([facility]     Hyperlipidemia     Hypertension 2/17/2016    Paroxysmal atrial fibrillation ([facility] [facility] ) 2/17/2016     Past Surgical History:   Procedure Laterality Date    CARDIOVERSION      CHOLECYSTECTOMY      LAPAROSCOPIC SALPINGOOPHERECTOMY      TONSILLECTOMY       Social History     Social History    Marital status:       Spouse name: N/A    Number of children: N/A    Years of education: N/A     Social History Main Topics    Smoking status: Never Smoker    Smokeless tobacco: Never Used    Alcohol use No    Drug use: No    Sexual activity: No     Other Topics Concern    None     Social History Narrative    Assistive devices: walker     Family History   Problem Relation Age of Onset    No Known Problems Mother     Stomach cancer Father     Heart disease Sister         Cardiac Disorder    Breast cancer Sister     Colon cancer Brother     Cancer Brother        Meds/Allergies   all current active meds have been reviewed and current meds:   Current Facility-Administered Medications   Medication Dose Route Frequency    acetaminophen (TYLENOL) tablet 650 mg  650 mg Oral Q6H PRN    acetaminophen (TYLENOL) tablet 650 mg  650 mg Oral Q8H [address]    amiodarone tablet 200 mg  200 mg Oral Daily    amLODIPine (NORVASC) tablet 10 mg  10 mg Oral Daily    apixaban (ELIQUIS) tablet 5 mg  5 mg Oral BID    bumetanide (BUMEX) injection 2 mg  2 mg Intravenous BID    carvedilol (COREG) tablet 3 125 mg  3 125 mg Oral BID With Meals    cefepime (MAXIPIME) 2 g/50 mL dextrose IVPB  2,000 mg Intravenous Q12H    diclofenac sodium (VOLTAREN) 1 % topical gel 2 g  2 g Topical 4x Daily PRN    docusate sodium (COLACE) capsule 100 mg  100 mg Oral BID PRN    econazole nitrate 1 % cream   Topical BID    fluticasone (FLONASE) 50 mcg/act nasal spray 1 spray  1 spray Nasal BID    guaiFENesin (MUCINEX) 12 hr tablet 600 mg  600 mg Oral Q12H BOB    ipratropium (ATROVENT) 0 02 % inhalation solution 0 5 mg  0 5 mg Nebulization TID    isosorbide mononitrate (IMDUR) 24 hr tablet 60 mg  60 mg Oral Daily    levalbuterol (XOPENEX) inhalation solution 1 25 mg  1 25 mg Nebulization TID    lidocaine (LIDODERM) 5 % patch 1 patch  1 patch Topical Daily    nitroglycerin (NITROSTAT) SL tablet 0 4 mg  0 4 mg Sublingual Q5 Min PRN    potassium chloride 10 % oral solution 20 mEq  20 mEq Oral BID    pravastatin (PRAVACHOL) tablet 40 mg  40 mg Oral Daily    prazosin (MINIPRESS) capsule 2 mg  2 mg Oral QAM    prazosin (MINIPRESS) capsule 5 mg  5 mg Oral HS    senna 8 8 mg/5 mL oral syrup 8 8 mg  8 8 mg Oral Daily PRN       Allergies   Allergen Reactions    Aspirin     Percocet [Oxycodone-Acetaminophen]        Objective     Physical Exam    Lab Results:   I have personally reviewed pertinent labs  , CBC:   Lab Results   Component Value Date    HGB 7 6 (L) 02/08/2019    HCT 26 9 (L) 02/08/2019   , CMP:   Lab Results   Component Value Date    SODIUM 142 02/08/2019    K 4 7 02/08/2019     02/08/2019    CO2 36 (H) 02/08/2019    BUN 35 (H) 02/08/2019    CREATININE 0 96 02/08/2019    CALCIUM 8 4 02/08/2019    AST 28 02/08/2019    ALT 50 02/08/2019    ALKPHOS 81 02/08/2019    EGFR 53 02/08/2019     Imaging Studies: I have personally reviewed pertinent reports  EKG, Pathology, and Other Studies: I have personally reviewed pertinent reports  Code Status: Level 3 - DNAR and DNI  Advance Directive and Living Will: Yes    Power of :    POLST:      Counseling / Coordination of Care  Total floor / unit time spent today 35+ minutes  Greater than 50% of total time was spent with the patient and / or family counseling and / or coordination of care  A description of the counseling / coordination of care: time spent with patient, communication with primary team, nursing staff, and calling family

## 2019-02-08 NOTE — PROGRESS NOTES
Patients daughter says the patient feels lightheaded  Patients /85, pulse ox 96% on 2L, HR 65, sitting in chair resting after eating at least 50% of her meal  Dr Oly Hope made aware, no new orders at this time  Will monitor patient

## 2019-02-08 NOTE — OCCUPATIONAL THERAPY NOTE
[address] Progress Note     Patient Name: Marcus Carroll  QZAAS'N Date: 2/8/2019  Problem List  Patient Active Problem List   Diagnosis    Paroxysmal atrial fibrillation     Benign hypertension with CKD (chronic kidney disease) stage III (HCC)    GERD (gastroesophageal reflux disease)    Other hyperlipidemia    Aortic valve regurgitation, nonrheumatic    Ascending aortic aneurysm (HCC)    Other chest pain    Lymphedema of both lower extremities    Acute on chronic diastolic CHF    Hematuria    Gait difficulty    Multiple rib fractures    Closed left scapular fracture    Acute kidney injury ([address] )    Acute pain due to trauma    Chronic respiratory acidosis    Pericardial effusion    Chronic kidney disease, stage III (moderate) (HCC)    Acute hypoxic and hypercapnic respiratory failure    Tao esophagus    Cataract of right eye    Chronic stasis dermatitis of right lower extremity    Chronic venous insufficiency    Diverticulosis    Inguinal hernia    Insomnia    Non-toxic multinodular goiter    Urine, incontinence, stress female    Achalasia    Abnormal urinalysis    Recent left-sided multiple rib and scapular fractures    Hyperlipidemia    Transaminitis    Essential hypertension     Past Medical History  Past Medical History:   Diagnosis Date    Acid reflux disease     Anemia     Last assessed: 10/26/16    Aortic valve regurgitation, nonrheumatic     Ascending [facility] ([facility] )     Last assessed: 6/5/13    Atrial fibrillation ([facility] )     Hyperlipidemia     Hypertension 2/17/2016    Paroxysmal atrial fibrillation ([facility] [facility] ) 2/17/2016     Past Surgical History  Past Surgical History:   Procedure Laterality Date    CARDIOVERSION      CHOLECYSTECTOMY      LAPAROSCOPIC SALPINGOOPHERECTOMY      TONSILLECTOMY          02/08/19 0902   Restrictions/Precautions   Weight Bearing Precautions Per Order Yes   LLE Weight Bearing Per Order NWB   Braces or Orthoses Sling General   Response to Previous Treatment Patient with no complaints from previous session   Lifestyle   Autonomy at baseline pt I in ADLs, receives assist for IADLs   Reciprocal Relationships supportive family   Service to Others retired   Intrinsic Gratification pt enjoys reading and watching    Pain Assessment   Pain Assessment No/denies pain   Pain Score No Pain   ADL   Where Assessed Edge of bed   Eating Assistance 5  Supervision/Setup   Grooming Assistance 4  Minimal Assistance   Grooming Deficit Wash/dry hands; Wash/dry face; Teeth care;Denture care;Brushing hair; Increased time to complete   UB Bathing Assistance 3  Moderate Assistance   UB Bathing Deficit Right arm;Left arm; Abdomen; Increased time to complete   LB Bathing Assistance 3  Moderate Assistance   LB Bathing Deficit Right upper leg;Left upper leg;Right lower leg including foot; Left lower leg including foot   UB Dressing Assistance 3  Moderate Assistance   UB Dressing Deficit Thread RUE; Thread LUE   LB Dressing Assistance 3  Moderate Assistance   LB Dressing Deficit Don/doff R sock; Don/doff L sock   Transfers   Sit to Stand 3  Moderate assistance   Additional items Assist x 1; Increased time required;Verbal cues   Stand to Sit 3  Moderate assistance   Additional items Assist x 1; Increased time required;Verbal cues   Stand pivot 3  Moderate assistance   Additional items Assist x 2; Increased time required;Verbal cues   Cognition   Overall Cognitive Status WFL   Arousal/Participation Lethargic   Attention Within functional limits   Orientation Level Oriented X4   Memory Decreased recall of precautions   Following Commands Follows one step commands with increased time or repetition   Comments pt more lethargic this session   Activity Tolerance   Activity Tolerance Patient tolerated treatment well   Medical Staff Made Aware ROBERT Butt updated on O2 % t/o session   Assessment   Assessment Patient participated in Skilled OT session this date with interventions consisting of ADL re training with the use of correct body mechnaics, Energy Conservation techniques, safety awareness and fall prevention techniques,  therapeutic activities to: increase activity tolerance, increase postural control, increase trunk control and increase OOB/ sitting tolerance   Patient agreeable to OT treatment session, upon arrival patient was found supine in bed  Session began w/ ADLs at edge of bed to monitor 02 saturation  Pt instructed on one handed dressing techniques  Pt progressed down to 4L w/ 99% O2 seated EOB w/ supervision of RN  Per RN félix to transfer to chair w/ better oxygen saturation  Pt transferred to chair on 4L and remained at 96% saturation  In comparison to previous session, patient with improvements in sit<>stand transfer*   Patient requiring verbal cues for correct technique, frequent rest periods and ocassional safety reminders  Patient continues to be functioning below baseline level, occupational performance remains limited secondary to factors listed above and increased risk for falls and injury  From OT standpoint, recommendation at time of d/c would be Short Term Rehab  Patient to benefit from continued Occupational Therapy treatment while in the hospital to address deficits as defined above and maximize level of functional independence with ADLs and functional mobility  Plan   Treatment Interventions ADL retraining;Functional transfer training; Endurance training;Patient/family training; Compensatory technique education;Continued evaluation; Energy conservation; Activityengagement   Treatment Day 3   OT Frequency 3-5x/wk   Recommendation   OT Discharge Recommendation Short Term Rehab   OT - OK to Discharge Yes   Barthel Index   Feeding 5   Bathing 0   Grooming Score 0   Dressing Score 5   Bladder Score 0   Bowels Score 10   Toilet Use Score 5   Transfers (Bed/Chair) Score 10   Mobility (Level Surface) Score 0   Stairs Score 0   Barthel Index Score 35 Modified Jude Scale   Modified [place] Scale 4     Leyda Montenegro MS, OTR/L

## 2019-02-08 NOTE — PROGRESS NOTES
Cardiology Progress Note - Katie Champion 80 y o  female MRN: [id number]    Unit/Bed#: [facility] 519-01 Encounter: [id number]        Subjective:    No significant events overnight  No chest pain  She did have increase shortness of breath  ROS    Objective:   Vitals: Blood pressure 140/79, pulse 61, temperature 97 6 °F (36 4 °C), temperature source Oral, resp  rate (!) 24, height 5' (1 524 m), weight 63 8 kg (140 lb 10 5 oz), SpO2 98 %  , Body mass index is 27 47 kg/m² , Orthostatic Blood Pressures      Most Recent Value   Blood Pressure  140/79 filed at 02/08/2019 0817   Patient Position - Orthostatic VS  Sitting filed at 02/07/2019 6223         Systolic (75RBQ), DHB:206 , Min:107 , WFI:007     Diastolic (65EHP), IUH:58, Min:51, Max:79      Intake/Output Summary (Last 24 hours) at 02/08/19 0940  Last data filed at 02/08/19 0849   Gross per 24 hour   Intake             1570 ml   Output             1875 ml   Net             -305 ml     Weight (last 2 days)     Date/Time   Weight    02/08/19 0600  63 8 (140 65)    02/08/19 0459  63 8 (140 65)    02/07/19 0522  64 4 (141 98)    02/06/19 0600  67 (147 71)                    Physical Exam   Constitutional: She is oriented to person, place, and time  No distress  HENT:   Mouth/Throat: No oropharyngeal exudate  Eyes: No scleral icterus  Neck: JVD present  Cardiovascular: Normal rate and regular rhythm  Murmur heard  Pulmonary/Chest: Effort normal  She has rales  Abdominal: Soft  Bowel sounds are normal  She exhibits no distension  There is no tenderness  There is no rebound  Musculoskeletal: She exhibits edema  Neurological: She is alert and oriented to person, place, and time  Skin: Skin is warm and dry  She is not diaphoretic  Psychiatric: She has a normal mood and affect   Her behavior is normal          Laboratory Results:    Results from last 7 days  Lab Units 02/03/19  0645   TROPONIN I ng/mL 0 02       CBC with diff:   Results from last 7 days  Lab Units 02/08/19  0459 02/07/19  0530 02/06/19  0845 02/04/19  0603 02/03/19  1219 02/03/19  0645 02/03/19  0637   WBC Thousand/uL  --   --   --  5 47  --  7 53  --    HEMOGLOBIN g/dL 7 6* 8 0* 8 2* 7 7*  --  9 7*  --    I STAT HEMOGLOBIN g/dl  --   --   --   --  8 5*  --  10 9*  10 9*   HEMATOCRIT % 26 9* 28 1* 28 6* 28 0*  --  35 0  --    HEMATOCRIT, ISTAT %  --   --   --   --  25*  --  32*  32*   MCV fL  --   --   --  92  --  93  --    PLATELETS Thousands/uL  --   --   --  164  --  225  --    MCH pg  --   --   --  25 3*  --  25 7*  --    MCHC g/dL  --   --   --  27 5*  --  27 7*  --    RDW %  --   --   --  20 8*  --  21 0*  --    MPV fL  --   --   --  10 8  --  11 0  --    NRBC AUTO /100 WBCs  --   --   --   --   --  0  --          CMP:  Results from last 7 days  Lab Units 02/08/19  0459 02/07/19  0530 02/06/19  0504 02/05/19  1043 02/04/19  0603 02/03/19  1219 02/03/19  0647 02/03/19  0637 02/02/19  0504   POTASSIUM mmol/L 4 7 4 6 4 6 4 3 4 9  --  4 8  --  4 3   CHLORIDE mmol/L 105 105 105 108 110*  --  106  --  102   CO2 mmol/L 36* 36* 32 33* 34*  --  30  --  34*   CO2, I-STAT mmol/L  --   --   --   --   --  37*  --  39*  37*  --    BUN mg/dL 35* 35* 39* 45* 43*  --  45*  --  59*   CREATININE mg/dL 0 96 0 92 0 94 1 13 1 04  --  1 06  --  1 14   GLUCOSE, ISTAT mg/dl  --   --   --   --   --  118  --  165*  169*  --    CALCIUM mg/dL 8 4 8 6 8 2* 8 3 8 2*  --  8 6  --  8 5   AST U/L 28 32 42  --  116*  --  85*  --   --    ALT U/L 50 65 77  --  123*  --  79*  --   --    ALK PHOS U/L 81 88 82  --  83  --  95  --   --    EGFR ml/min/1 73sq m 53 56 54 43 48  --  47 40 43         BMP:  Results from last 7 days  Lab Units 02/08/19  0459 02/07/19  0530 02/06/19  0504 02/05/19  1043 02/04/19  0603 02/03/19  1219 02/03/19  0647  02/02/19  0504   POTASSIUM mmol/L 4 7 4 6 4 6 4 3 4 9  --  4 8  --  4 3   CHLORIDE mmol/L 105 105 105 108 110*  --  106  --  102   CO2 mmol/L 36* 36* 32 33* 34*  --  30  --  34*   CO2, I-STAT mmol/L  --   --   --   --   --  37*  --   < >  --    BUN mg/dL 35* 35* 39* 45* 43*  --  45*  --  59*   CREATININE mg/dL 0 96 0 92 0 94 1 13 1 04  --  1 06  --  1 14   GLUCOSE, ISTAT mg/dl  --   --   --   --   --  118  --   < >  --    CALCIUM mg/dL 8 4 8 6 8 2* 8 3 8 2*  --  8 6  --  8 5   < > = values in this interval not displayed  BNP: No results for input(s): BNP in the last 72 hours  Magnesium:   Results from last 7 days  Lab Units 19  1043 19  0603   MAGNESIUM mg/dL 3 0* 3 2*       Coags:   Results from last 7 days  Lab Units 19  0646   PTT seconds 30   INR  1 25*       TSH: No results found for: TSH    Hemoglobin A1C       Lipid Profile:       Cardiac testing:   Results for orders placed during the hospital encounter of 19   Echo complete with contrast if indicated    [facility] [address]  [facility] [place], [address] [facility]  [phone]    Transthoracic Echocardiogram  2D, M-mode, Doppler, and Color Doppler    Study date:  2019    Patient: Evaristo Perez  MR number: [id number]  Account number: [de-identified]  : 29-Sep-1930  Age: 80 years  Gender: Female  Status: Inpatient  Location: Bedside  Height: 61 in  Weight: 160 lb  BP: 108/ 35 mmHg    Indications: Heart Failure    Diagnoses: I50 9 - Heart failure, unspecified    Sonographer:  HUBER Browne  Primary Physician:  Boubacar Fernando MD  Referring Physician:  Deshaun Osorio MD  Group:  [facility]  Interpreting Physician:  Kevin Magallanes MD    SUMMARY    LEFT VENTRICLE:  Systolic function was normal  Ejection fraction was estimated to be 65 %  There were no regional wall motion abnormalities  Wall thickness was mildly increased  There was moderate concentric hypertrophy  Features were consistent with a pseudonormal left ventricular filling pattern, with concomitant abnormal relaxation and increased filling pressure (grade 2 diastolic dysfunction)      LEFT ATRIUM:  The atrium was markedly dilated  RIGHT ATRIUM:  The atrium was moderately dilated  MITRAL VALVE:  There was mild to moderate regurgitation  AORTIC VALVE:  There was moderate regurgitation  TRICUSPID VALVE:  There was mild regurgitation  Pulmonary artery systolic pressure was mildly increased  AORTA:  There was marked dilatation of the ascending aorta  (55mm)    IVC, HEPATIC VEINS:  The inferior vena cava was dilated  Respirophasic changes were blunted (less than 50% variation)  PERICARDIUM:  A small to moderate pericardial effusion was identified circumferential to the heart  The fluid had no internal echoes  There was no evidence of hemodynamic compromise  There was a small left pleural effusion  HISTORY: PRIOR HISTORY: COPD, CHF, Afib, Cardioversion, HLD, HTN    PROCEDURE: The procedure was performed at the bedside  This was a routine study  The transthoracic approach was used  The study included complete 2D imaging, M-mode, complete spectral Doppler, and color Doppler  The heart rate was 52 bpm,  at the start of the study  Images were obtained from the parasternal, apical, subcostal, and suprasternal notch acoustic windows  Image quality was adequate  LEFT VENTRICLE: Size was normal  Systolic function was normal  Ejection fraction was estimated to be 65 %  There were no regional wall motion abnormalities  Wall thickness was mildly increased  There was moderate concentric hypertrophy  DOPPLER: Features were consistent with a pseudonormal left ventricular filling pattern, with concomitant abnormal relaxation and increased filling pressure (grade 2 diastolic dysfunction)  RIGHT VENTRICLE: The size was normal  Systolic function was normal  Wall thickness was normal     LEFT ATRIUM: The atrium was markedly dilated  RIGHT ATRIUM: The atrium was moderately dilated  MITRAL VALVE: There was mild diffuse thickening  There was normal leaflet separation   DOPPLER: The transmitral velocity was within the normal range  There was no evidence for stenosis  There was mild to moderate regurgitation  AORTIC VALVE: The valve was trileaflet  Leaflets exhibited normal thickness and normal cuspal separation  DOPPLER: Transaortic velocity was minimally increased  There was no evidence for stenosis  There was moderate regurgitation  TRICUSPID VALVE: The valve structure was normal  There was normal leaflet separation  DOPPLER: The transtricuspid velocity was within the normal range  There was no evidence for stenosis  There was mild regurgitation  Pulmonary artery  systolic pressure was mildly increased  PULMONIC VALVE: Leaflets exhibited normal thickness, no calcification, and normal cuspal separation  DOPPLER: The transpulmonic velocity was within the normal range  There was no significant regurgitation  PERICARDIUM: A small to moderate pericardial effusion was identified circumferential to the heart  The fluid had no internal echoes  There was no evidence of hemodynamic compromise  There was a small left pleural effusion  The pericardium  was normal in appearance  AORTA: The root exhibited normal size  There was marked dilatation of the ascending aorta  (55mm)    SYSTEMIC VEINS: IVC: The inferior vena cava was dilated  Respirophasic changes were blunted (less than 50% variation)      SYSTEM MEASUREMENT TABLES    2D  %FS: 42 29 %  Ao Diam: 3 67 cm  EDV(Teich): 139 86 ml  EF(Teich): 72 83 %  ESV(Teich): 37 99 ml  IVSd: 1 3 cm  LA Area: 35 24 cm2  LA Diam: 4 38 cm  LVEDV MOD A4C: 88 59 ml  LVEF MOD A4C: 63 97 %  LVESV MOD A4C: 31 92 ml  LVIDd: 5 38 cm  LVIDs: 3 1 cm  LVLd A4C: 6 34 cm  LVLs A4C: 5 47 cm  LVPWd: 1 43 cm  RA Area: 26 02 cm2  RVIDd: 4 22 cm  SV MOD A4C: 56 67 ml  SV(Teich): 101 86 ml    CW  AR Dec [place]: 1 95 m/s2  AR Dec Time: 1792 45 ms  AR PHT: 519 81 ms  AR Vmax: 3 49 m/s  AR maxP 76 mmHg  TR Vmax: 2 72 m/s  TR maxP 6 mmHg    MM  TAPSE: 2 82 cm    PW  E': 0 05 m/s  E/E': 15 21  MV A Abisai: 0 66 m/s  MV Dec [place]: 3 51 m/s2  MV DecT: 199 87 ms  MV E Abisai: 0 7 m/s  MV E/A Ratio: 1 06  MV PHT: 57 96 ms  MVA By PHT: 3 8 cm2    [employer] Accredited Echocardiography Laboratory    Prepared and electronically signed by    Alexandra Deras MD  Signed 31-Jan-2019 16:14:57       No results found for this or any previous visit  No results found for this or any previous visit  No results found for this or any previous visit      Meds/Allergies   current meds:   Current Facility-Administered Medications   Medication Dose Route Frequency    acetaminophen (TYLENOL) tablet 650 mg  650 mg Oral Q6H PRN    acetaminophen (TYLENOL) tablet 650 mg  650 mg Oral Q8H [facility]    amiodarone tablet 200 mg  200 mg Oral Daily    amLODIPine (NORVASC) tablet 10 mg  10 mg Oral Daily    apixaban (ELIQUIS) tablet 5 mg  5 mg Oral BID    bumetanide (BUMEX) injection 1 mg  1 mg Intravenous Once    bumetanide (BUMEX) injection 2 mg  2 mg Intravenous BID    carvedilol (COREG) tablet 3 125 mg  3 125 mg Oral BID With Meals    cefepime (MAXIPIME) 2 g/50 mL dextrose IVPB  2,000 mg Intravenous Q12H    diclofenac sodium (VOLTAREN) 1 % topical gel 2 g  2 g Topical 4x Daily PRN    docusate sodium (COLACE) capsule 100 mg  100 mg Oral BID PRN    econazole nitrate 1 % cream   Topical BID    fluticasone (FLONASE) 50 mcg/act nasal spray 1 spray  1 spray Nasal BID    guaiFENesin (MUCINEX) 12 hr tablet 600 mg  600 mg Oral Q12H BOB    ipratropium (ATROVENT) 0 02 % inhalation solution 0 5 mg  0 5 mg Nebulization TID    isosorbide mononitrate (IMDUR) 24 hr tablet 60 mg  60 mg Oral Daily    levalbuterol (XOPENEX) inhalation solution 1 25 mg  1 25 mg Nebulization TID    lidocaine (LIDODERM) 5 % patch 1 patch  1 patch Topical Daily    nitroglycerin (NITROSTAT) SL tablet 0 4 mg  0 4 mg Sublingual Q5 Min PRN    potassium chloride 10 % oral solution 20 mEq  20 mEq Oral BID    pravastatin (PRAVACHOL) tablet 40 mg  40 mg Oral Daily    prazosin (MINIPRESS) capsule 2 mg  2 mg Oral QAM    prazosin (MINIPRESS) capsule 5 mg  5 mg Oral HS    senna 8 8 mg/5 mL oral syrup 8 8 mg  8 8 mg Oral Daily PRN     Prescriptions Prior to Admission   Medication    acetaminophen (TYLENOL) 325 mg tablet    amiodarone 200 mg tablet    amLODIPine (NORVASC) 10 mg tablet    apixaban (ELIQUIS) 5 mg    Calcium Carbonate (CALTRATE 600 PO)    carvedilol (COREG) 3 125 mg tablet    fluticasone (FLONASE) 50 mcg/act nasal spray    ipratropium (ATROVENT) 0 02 % nebulizer solution    isosorbide mononitrate (IMDUR) 60 mg 24 hr tablet    KLOR-CON M20 20 MEQ tablet    levalbuterol (XOPENEX) 1 25 mg/0 5 mL nebulizer solution    metolazone (ZAROXOLYN) 5 mg tablet    nitroglycerin (NITROSTAT) 0 4 mg SL tablet    pravastatin (PRAVACHOL) 40 mg tablet    prazosin (MINIPRESS) 2 mg capsule    prazosin (MINIPRESS) 5 mg capsule    econazole nitrate 1 % cream          Assessment:  Principal Problem:    Acute hypoxic and hypercapnic respiratory failure  Active Problems:    Paroxysmal atrial fibrillation     Ascending aortic aneurysm (HCC)    Acute on chronic diastolic CHF    Hematuria    Chronic kidney disease, stage III (moderate) (HCC)    Non-toxic multinodular goiter    Achalasia    Abnormal urinalysis    Recent left-sided multiple rib and scapular fractures    Hyperlipidemia    Transaminitis    Essential hypertension    Plan:    Acute on Chronic Diastolic CHF / Aortic Regurgitation/ Ascending aortic aneurysm / PAF    Rales on exam again today  Switch back to iv diuretic  She received torsemide this am  Will give 1 mg of bumex iv as well  Trend renal function  HR stable  Continue remainder of medical therapy for PAF  Counseling / Coordination of Care  Total floor / unit time spent today 25 minutes  Greater than 50% of total time was spent with the patient and / or family counseling and / or coordination of care    A description of the counseling / coordination of care

## 2019-02-08 NOTE — PHYSICAL THERAPY NOTE
PT Refusal    Pt chart reviewed  Attempted to see  On arrival, pt reported that she "asked them to pull the plug" and is refusing all help/services  She demonstrated increased confusion compared to the last treatment session and repeated that statement several times  She was educated on her progress with therapy and the benefits of continuing to maintain quality of life  She refused services, but was agreeable to continue gait training on Monday  Call bell was provided and pt was educated on its use for needs  She reported that since they "pulled the plug", no one will answer anymore, and tested the button  RN promptly responded and pt demonstrated slightly improved mood  Will continue to follow and attempt to see as medically appropriate      Solomon Spence, PT, DPT

## 2019-02-08 NOTE — PLAN OF CARE
Problem: OCCUPATIONAL THERAPY ADULT  Goal: Performs self-care activities at highest level of function for planned discharge setting  See evaluation for individualized goals  Treatment Interventions: ADL retraining, Functional transfer training, Endurance training, Patient/family training, Equipment evaluation/education, Compensatory technique education, Fine motor coordination activities, Continued evaluation, Energy conservation, Activityengagement          See flowsheet documentation for full assessment, interventions and recommendations  Outcome: Progressing  Limitation: Decreased ADL status, Decreased Safe judgement during ADL, Decreased endurance, Decreased self-care trans, Decreased high-level ADLs, Decreased fine motor control, Non-func L UE  Prognosis: Fair  Assessment: Patient participated in Skilled OT session this date with interventions consisting of ADL re training with the use of correct body mechnaics, Energy Conservation techniques, safety awareness and fall prevention techniques,  therapeutic activities to: increase activity tolerance, increase postural control, increase trunk control and increase OOB/ sitting tolerance   Patient agreeable to OT treatment session, upon arrival patient was found supine in bed  Session began w/ ADLs at edge of bed to monitor 02 saturation  Pt instructed on one handed dressing techniques  Pt progressed down to 4L w/ 99% O2 seated EOB w/ supervision of RN  Per RN okay to transfer to chair w/ better oxygen saturation  Pt transferred to chair on 4L and remained at 96% saturation  In comparison to previous session, patient with improvements in sit<>stand transfer*   Patient requiring verbal cues for correct technique, frequent rest periods and ocassional safety reminders  Patient continues to be functioning below baseline level, occupational performance remains limited secondary to factors listed above and increased risk for falls and injury     From OT standpoint, recommendation at time of d/c would be Short Term Rehab  Patient to benefit from continued Occupational Therapy treatment while in the hospital to address deficits as defined above and maximize level of functional independence with ADLs and functional mobility  OT Discharge Recommendation: Short Term Rehab  OT - OK to Discharge:  Yes

## 2019-02-08 NOTE — RESTORATIVE TECHNICIAN NOTE
Restorative Specialist Mobility Note       Activity: Other (Comment) (Patient refused working with PT, please refer to PT notes for all information )

## 2019-02-09 ENCOUNTER — APPOINTMENT (INPATIENT)
Dept: RADIOLOGY | Facility: HOSPITAL | Age: 84
DRG: 291 | End: 2019-02-09
Payer: MEDICARE

## 2019-02-09 VITALS
WEIGHT: 141.31 LBS | BODY MASS INDEX: 27.74 KG/M2 | HEIGHT: 60 IN | TEMPERATURE: 97.4 F | RESPIRATION RATE: 18 BRPM | HEART RATE: 61 BPM | SYSTOLIC BLOOD PRESSURE: 178 MMHG | OXYGEN SATURATION: 93 % | DIASTOLIC BLOOD PRESSURE: 68 MMHG

## 2019-02-09 PROBLEM — Z71.89 GOALS OF CARE, COUNSELING/DISCUSSION: Status: ACTIVE | Noted: 2019-02-09

## 2019-02-09 LAB
ANION GAP SERPL CALCULATED.3IONS-SCNC: 5 MMOL/L (ref 4–13)
BUN SERPL-MCNC: 34 MG/DL (ref 5–25)
CALCIUM SERPL-MCNC: 8.6 MG/DL (ref 8.3–10.1)
CHLORIDE SERPL-SCNC: 104 MMOL/L (ref 100–108)
CO2 SERPL-SCNC: 34 MMOL/L (ref 21–32)
CREAT SERPL-MCNC: 0.99 MG/DL (ref 0.6–1.3)
GFR SERPL CREATININE-BSD FRML MDRD: 51 ML/MIN/1.73SQ M
GLUCOSE SERPL-MCNC: 108 MG/DL (ref 65–140)
HCT VFR BLD AUTO: 28.2 % (ref 34.8–46.1)
HGB BLD-MCNC: 8.1 G/DL (ref 11.5–15.4)
POTASSIUM SERPL-SCNC: 4.6 MMOL/L (ref 3.5–5.3)
SODIUM SERPL-SCNC: 143 MMOL/L (ref 136–145)

## 2019-02-09 PROCEDURE — 71045 X-RAY EXAM CHEST 1 VIEW: CPT

## 2019-02-09 PROCEDURE — 94760 N-INVAS EAR/PLS OXIMETRY 1: CPT

## 2019-02-09 PROCEDURE — 94640 AIRWAY INHALATION TREATMENT: CPT

## 2019-02-09 PROCEDURE — 85014 HEMATOCRIT: CPT | Performed by: INTERNAL MEDICINE

## 2019-02-09 PROCEDURE — 99233 SBSQ HOSP IP/OBS HIGH 50: CPT | Performed by: INTERNAL MEDICINE

## 2019-02-09 PROCEDURE — 80048 BASIC METABOLIC PNL TOTAL CA: CPT | Performed by: INTERNAL MEDICINE

## 2019-02-09 PROCEDURE — 99232 SBSQ HOSP IP/OBS MODERATE 35: CPT | Performed by: INTERNAL MEDICINE

## 2019-02-09 PROCEDURE — 85018 HEMOGLOBIN: CPT | Performed by: INTERNAL MEDICINE

## 2019-02-09 RX ORDER — METHADONE HYDROCHLORIDE 10 MG/ML
2.5 CONCENTRATE ORAL
Status: DISCONTINUED | OUTPATIENT
Start: 2019-02-09 | End: 2019-02-10

## 2019-02-09 RX ORDER — OXYCODONE HYDROCHLORIDE 5 MG/1
2.5 TABLET ORAL
Status: DISCONTINUED | OUTPATIENT
Start: 2019-02-09 | End: 2019-02-10

## 2019-02-09 RX ORDER — SENNOSIDES 8.8 MG/5ML
8.8 LIQUID ORAL DAILY PRN
Status: DISCONTINUED | OUTPATIENT
Start: 2019-02-09 | End: 2019-02-10

## 2019-02-09 RX ORDER — LORAZEPAM 2 MG/ML
0.5 INJECTION INTRAMUSCULAR EVERY 4 HOURS PRN
Status: DISCONTINUED | OUTPATIENT
Start: 2019-02-09 | End: 2019-02-10

## 2019-02-09 RX ADMIN — PRAZOSIN HYDROCHLORIDE 2 MG: 2 CAPSULE ORAL at 08:05

## 2019-02-09 RX ADMIN — OXYCODONE HYDROCHLORIDE 2.5 MG: 5 TABLET ORAL at 19:48

## 2019-02-09 RX ADMIN — AMLODIPINE BESYLATE 10 MG: 10 TABLET ORAL at 08:05

## 2019-02-09 RX ADMIN — LORAZEPAM 0.5 MG: 2 INJECTION INTRAMUSCULAR; INTRAVENOUS at 15:21

## 2019-02-09 RX ADMIN — ECONAZOLE NITRATE: 10 CREAM TOPICAL at 08:06

## 2019-02-09 RX ADMIN — LIDOCAINE 1 PATCH: 50 PATCH CUTANEOUS at 08:30

## 2019-02-09 RX ADMIN — OXYCODONE HYDROCHLORIDE 2.5 MG: 5 TABLET ORAL at 14:42

## 2019-02-09 RX ADMIN — PRAVASTATIN SODIUM 40 MG: 40 TABLET ORAL at 08:05

## 2019-02-09 RX ADMIN — LEVALBUTEROL HYDROCHLORIDE 1.25 MG: 1.25 SOLUTION, CONCENTRATE RESPIRATORY (INHALATION) at 07:03

## 2019-02-09 RX ADMIN — METHADONE HYDROCHLORIDE 2.5 MG: 10 CONCENTRATE ORAL at 21:52

## 2019-02-09 RX ADMIN — ISOSORBIDE MONONITRATE 60 MG: 60 TABLET, EXTENDED RELEASE ORAL at 08:05

## 2019-02-09 RX ADMIN — GUAIFENESIN 600 MG: 600 TABLET, EXTENDED RELEASE ORAL at 08:06

## 2019-02-09 RX ADMIN — IPRATROPIUM BROMIDE 0.5 MG: 0.5 SOLUTION RESPIRATORY (INHALATION) at 07:03

## 2019-02-09 RX ADMIN — AMIODARONE HYDROCHLORIDE 200 MG: 200 TABLET ORAL at 08:05

## 2019-02-09 RX ADMIN — POTASSIUM CHLORIDE 20 MEQ: 20 SOLUTION ORAL at 08:05

## 2019-02-09 RX ADMIN — LORAZEPAM 0.5 MG: 2 INJECTION INTRAMUSCULAR; INTRAVENOUS at 23:56

## 2019-02-09 RX ADMIN — CARVEDILOL 3.12 MG: 3.12 TABLET, FILM COATED ORAL at 07:51

## 2019-02-09 RX ADMIN — FLUTICASONE PROPIONATE 1 SPRAY: 50 SPRAY, METERED NASAL at 08:06

## 2019-02-09 RX ADMIN — APIXABAN 5 MG: 5 TABLET, FILM COATED ORAL at 08:05

## 2019-02-09 RX ADMIN — BUMETANIDE 2 MG: 0.25 INJECTION INTRAMUSCULAR; INTRAVENOUS at 08:19

## 2019-02-09 NOTE — SOCIAL WORK
CM was consulted for request for hospice  Spoke with patient's daughter and she states that she would like a referral made to hospice for the hospice house  Referral made through [facility] to hospice    Daughter Tan Ramos [phone]  Son in [place] cell [phone]

## 2019-02-09 NOTE — PROGRESS NOTES
[facility] Hospitalist Service - Internal Medicine Progress Note       PATIENT INFORMATION      Patient: Jaime Hope 80 y o  female   MRN: [id number]  PCP: Stefany Antoine MD  Unit/Bed#: [facility] 519-01 Encounter: [id number]  Date Of Visit: 02/09/19       ASSESSMENTS & PLAN     Acute hypoxic and hypercapnic respiratory failure   Assessment & Plan    - progressively weaned off BiPAP down room air at one point but has worsened over the last few days - currently on 3-4 L via nasal cannula  - history of recent rib/scapular fractures noted - encouraged incentive spirometry      - CT of chest previously revealed ground-glass infiltrates suggestive of possible pneumonia - discontinued IV Cefepime as procalcitonin is downtrending   - continue to maintain oxygenation as tolerated - nebulizer treatments on board  - viral pathogen screen including Influenza/RSV negative  - appreciate pulmonology input - corticosteroids previously discontinued   - CHF exacerbation noted (see plan below)   - PT/OT input appreciated recommending skilled rehab once medically stable  - appreciate palliative care input who after discussion with patient/daughter, have transitioned patient to comfort measures only     Acute on chronic diastolic CHF   Assessment & Plan    - appreciated cardiology input  - cardiac regimen now discontinued as patient is comfort measures only  - echocardiogram in January 2019 revealed an EF of 65% with mild TR/pulmonary HTN, mild-moderate MR, and moderate AR     Recent left-sided multiple rib and scapular fractures   Assessment & Plan    - heavily encouraged incentive spirometry during awake hours  - PRN pain control   - maintain oxygenation      Paroxysmal atrial fibrillation    Assessment & Plan    - previously rate controlled on Coreg/Amiodarone  - Eliquis discontinued (comfort measures)     Achalasia   Assessment & Plan    - gastroenterology previously evaluated and discussed with family regarding abnormal esophageal CT findings and they have elected to defer any further workup at this time (including endoscopy +/- barium esophagram) due to age/comorbidities - was maintained on a mechanical soft diet with thin liquids - now liberal regular diet due to comfort measures      Essential hypertension   Assessment & Plan    - previously on Prazosin/Imdur/Norvasc/Coreg     Hyperlipidemia   Assessment & Plan    - previously on Pravachol     Transaminitis   Assessment & Plan    - appreciated gastroenterology input  - limited/avoided hepatotoxins as possible - Cefepime discontinued  - AST/ALT normalized       VTE Prophylaxis:  Comfort measures      SUBJECTIVE     Seen/examined earlier this morning during nursing rounds with daughter and case management at bedside  After meeting positive care prior to my encounter, all are agreeable to comfort measures only status and eventual transition to hospice care  She notes her weakness/shortness of breath waxes/wanes  OBJECTIVE     Vitals:   Temp (24hrs), Av 5 °F (36 4 °C), Min:97 4 °F (36 3 °C), Max:97 6 °F (36 4 °C)    Temp:  [97 4 °F (36 3 °C)-97 6 °F (36 4 °C)] 97 4 °F (36 3 °C)  HR:  [56-68] 61  Resp:  [18-31] 18  BP: (163-178)/(68-71) 178/68  SpO2:  [94 %-97 %] 96 %  Body mass index is 27 6 kg/m²  Input and Output Summary (last 24 hours):        Intake/Output Summary (Last 24 hours) at 19 1626  Last data filed at 19 1416   Gross per 24 hour   Intake               50 ml   Output             2100 ml   Net            -2050 ml       Physical Exam:     GENERAL:  Weak/fatigued   HEAD:  Normocephalic - atraumatic  EYES: PERRL - EOMI   MOUTH:  Mucosa moist  NECK:  Supple - full range of motion  CARDIAC:  Regular rate/rhythm - S1/S2 positive  PULMONARY:  Diminished to auscultation bilaterally with trace crackles  ABDOMEN:  Soft - nontender/nondistended - active bowel sounds  MUSCULOSKELETAL:  Motor strength/range of motion markedly deconditioned  NEUROLOGIC:  Alert/oriented SKIN:  Chronic wrinkles/blemishes   PSYCHIATRIC:  Mood/affect flat       ADDITIONAL DATA       Labs & Recent Cultures:       Results from last 7 days  Lab Units 02/09/19  0552  02/04/19  0603  02/03/19  0645   WBC Thousand/uL  --   --  5 47  --  7 53   HEMOGLOBIN g/dL 8 1*  < > 7 7*  --  9 7*   I STAT HEMOGLOBIN   --   --   --   < >  --    HEMATOCRIT % 28 2*  < > 28 0*  --  35 0   HEMATOCRIT, ISTAT   --   --   --   < >  --    PLATELETS Thousands/uL  --   --  164  --  225   NEUTROS PCT %  --   --   --   --  79*   LYMPHS PCT %  --   --   --   --  5*   MONOS PCT %  --   --   --   --  12   EOS PCT %  --   --   --   --  2   < > = values in this interval not displayed  Results from last 7 days  Lab Units 02/09/19  0552 02/08/19  0459  02/03/19  1219   SODIUM mmol/L 143 142  < >  --    POTASSIUM mmol/L 4 6 4 7  < >  --    CHLORIDE mmol/L 104 105  < >  --    CO2 mmol/L 34* 36*  < >  --    CO2, I-STAT mmol/L  --   --   --  37*   BUN mg/dL 34* 35*  < >  --    CREATININE mg/dL 0 99 0 96  < >  --    CALCIUM mg/dL 8 6 8 4  < >  --    ALK PHOS U/L  --  81  < >  --    ALT U/L  --  50  < >  --    AST U/L  --  28  < >  --    GLUCOSE, ISTAT mg/dl  --   --   --  118   < > = values in this interval not displayed  Results from last 7 days  Lab Units 02/03/19  0646   INR  1 25*           Results from last 7 days  Lab Units 02/03/19  1215 02/03/19  1202 02/03/19  0755 02/03/19  0704   BLOOD CULTURE   --   --   --  No Growth After 5 Days  No Growth After 5 Days     URINE CULTURE   --   --  >100,000 cfu/ml Escherichia coli*  --    INFLUENZA B PCR  Not Detected  --   --   --    RSV PCR  Not Detected  --   --   --    LEGIONELLA URINARY ANTIGEN   --  Negative  --   --          Last 24 Hours Medication List:     Current Facility-Administered Medications:  acetaminophen 650 mg Oral Q6H PRN Jimmy Gardner MD   acetaminophen 650 mg Oral Q8H [address] DAWN Dietz   diclofenac sodium 2 g Topical 4x Daily PRN Sonia Smyth MD econazole nitrate  Topical BID Pertos Ng MD   ipratropium 0 5 mg Nebulization TID Alonzo Acron Trager, DO   levalbuterol 1 25 mg Nebulization TID Alonzo Acron Trager, DO   lidocaine 1 patch Topical Daily DAWN Dietz   LORazepam 0 5 mg Intravenous Q4H PRN Agustina Cheema MD   methadone 2 5 mg Oral HS Agustina Cheema MD   nitroglycerin 0 4 mg Sublingual Q5 Min PRN Petros Ng MD   oxyCODONE 2 5 mg Oral Q3H PRN Agustina Cheema MD   senna 8 8 mg Oral Daily PRN Agustina Cheema MD          Time Spent for Care: 32 minutes  More than 50% of total time spent on counseling and coordination of care as described above  Current Length of Stay: 6 day(s)      Code Status: Level 4 - Comfort Care         ** Please Note: This note is constructed using a voice recognition dictation system   **

## 2019-02-09 NOTE — SOCIAL WORK
CM followed up with [address] [place] [address] [place] regarding her contact with Pt's daughter Brian Hatch [phone] today, and reiterated to Brian Hatch that contact with  [facility] will still take place as previously reported  GRETCHEN will continue to follow

## 2019-02-09 NOTE — RESTORATIVE TECHNICIAN NOTE
Restorative Specialist Mobility Note       Activity: Bedrest, Dangle, Stand at bedside, Turn, Chair     Assistive Device: Other (Comment) (HHA x 2)     Ambulation Response:  Tolerated fairly well  Repositioned: Sitting, Up in chair

## 2019-02-09 NOTE — PROGRESS NOTES
Cardiology Progress Note - Donnette Holstein 80 y o  female MRN: [id number]    Unit/Bed#: [facility] 519-01 Encounter: [id number]        Subjective:    No significant events overnight  No major clinical change  No dyspnea at rest      ROS    Objective:   Vitals: Blood pressure (!) 178/68, pulse 61, temperature (!) 97 4 °F (36 3 °C), resp  rate 18, height 5' (1 524 m), weight 64 1 kg (141 lb 5 oz), SpO2 97 %  , Body mass index is 27 6 kg/m² , Orthostatic Blood Pressures      Most Recent Value   Blood Pressure   178/68 filed at 2019 0757   Patient Position - Orthostatic VS  Sitting filed at 2019 4953         Systolic (76FQN), FQQ:860 , Min:163 , UMM:751     Diastolic (70AOM), NK, Min:68, Max:71      Intake/Output Summary (Last 24 hours) at 19 0914  Last data filed at 19 2301   Gross per 24 hour   Intake              450 ml   Output             1400 ml   Net             -950 ml     Weight (last 2 days)     Date/Time   Weight    19 0557  64 1 (141 32)    19 0600  63 8 (140 65)    19 0459  63 8 (140 65)    19 0522  64 4 (141 98)                    Physical Exam   Constitutional: She is oriented to person, place, and time  No distress  HENT:   Mouth/Throat: No oropharyngeal exudate  Eyes: No scleral icterus  Neck: JVD present  Cardiovascular: Normal rate and regular rhythm  Murmur heard  Pulmonary/Chest: Effort normal  She has no wheezes  She has rales  Abdominal: Soft  Bowel sounds are normal  She exhibits no distension  There is no tenderness  There is no rebound  Musculoskeletal: She exhibits edema  Neurological: She is alert and oriented to person, place, and time  Skin: Skin is warm  She is not diaphoretic  Psychiatric: She has a normal mood and affect   Her behavior is normal          Laboratory Results:    Results from last 7 days  Lab Units 19  0645   TROPONIN I ng/mL 0 02       CBC with diff:   Results from last 7 days  Lab Units 19  0552 02/08/19  0459 02/07/19  0530 02/06/19  0845 02/04/19  0603 02/03/19  1219 02/03/19  0645   WBC Thousand/uL  --   --   --   --  5 47  --  7 53   HEMOGLOBIN g/dL 8 1* 7 6* 8 0* 8 2* 7 7*  --  9 7*   I STAT HEMOGLOBIN g/dl  --   --   --   --   --  8 5*  --    HEMATOCRIT % 28 2* 26 9* 28 1* 28 6* 28 0*  --  35 0   HEMATOCRIT, ISTAT %  --   --   --   --   --  25*  --    MCV fL  --   --   --   --  92  --  93   PLATELETS Thousands/uL  --   --   --   --  164  --  225   MCH pg  --   --   --   --  25 3*  --  25 7*   MCHC g/dL  --   --   --   --  27 5*  --  27 7*   RDW %  --   --   --   --  20 8*  --  21 0*   MPV fL  --   --   --   --  10 8  --  11 0   NRBC AUTO /100 WBCs  --   --   --   --   --   --  0         CMP:  Results from last 7 days  Lab Units 02/09/19  0552 02/08/19  0459 02/07/19  0530 02/06/19  0504 02/05/19  1043 02/04/19  0603 02/03/19  1219 02/03/19  0647 02/03/19  0637   POTASSIUM mmol/L 4 6 4 7 4 6 4 6 4 3 4 9  --  4 8  --    CHLORIDE mmol/L 104 105 105 105 108 110*  --  106  --    CO2 mmol/L 34* 36* 36* 32 33* 34*  --  30  --    CO2, I-STAT mmol/L  --   --   --   --   --   --  37*  --  39*  37*   BUN mg/dL 34* 35* 35* 39* 45* 43*  --  45*  --    CREATININE mg/dL 0 99 0 96 0 92 0 94 1 13 1 04  --  1 06  --    GLUCOSE, ISTAT mg/dl  --   --   --   --   --   --  118  --  165*  169*   CALCIUM mg/dL 8 6 8 4 8 6 8 2* 8 3 8 2*  --  8 6  --    AST U/L  --  28 32 42  --  116*  --  85*  --    ALT U/L  --  50 65 77  --  123*  --  79*  --    ALK PHOS U/L  --  81 88 82  --  83  --  95  --    EGFR ml/min/1 73sq m 51 53 56 54 43 48  --  47 40         BMP:  Results from last 7 days  Lab Units 02/09/19  0552 02/08/19  0459 02/07/19  0530 02/06/19  0504 02/05/19  1043 02/04/19  0603 02/03/19  1219 02/03/19  0647   POTASSIUM mmol/L 4 6 4 7 4 6 4 6 4 3 4 9  --  4 8   CHLORIDE mmol/L 104 105 105 105 108 110*  --  106   CO2 mmol/L 34* 36* 36* 32 33* 34*  --  30   CO2, I-STAT mmol/L  --   --   --   --   --   --  37*  --    BUN mg/dL 34* 35* 35* 39* 45* 43*  --  45*   CREATININE mg/dL 0 99 0 96 0 92 0 94 1 13 1 04  --  1 06   GLUCOSE, ISTAT mg/dl  --   --   --   --   --   --  118  --    CALCIUM mg/dL 8 6 8 4 8 6 8 2* 8 3 8 2*  --  8 6       BNP: No results for input(s): BNP in the last 72 hours  Magnesium:   Results from last 7 days  Lab Units 19  1043 19  0603   MAGNESIUM mg/dL 3 0* 3 2*       Coags:   Results from last 7 days  Lab Units 19  0646   PTT seconds 30   INR  1 25*       TSH: No results found for: TSH    Hemoglobin A1C       Lipid Profile:       Cardiac testing:   Results for orders placed during the hospital encounter of 19   Echo complete with contrast if indicated    Narrative [facility]  [facility] [address] [facility]  [phone]    Transthoracic Echocardiogram  2D, M-mode, Doppler, and Color Doppler    Study date:  2019    Patient: Chang Bland  MR number: [id number]  Account number: [de-identified]  : 29-Sep-1930  Age: 80 years  Gender: Female  Status: Inpatient  Location: Bedside  Height: 61 in  Weight: 160 lb  BP: 108/ 35 mmHg    Indications: Heart Failure    Diagnoses: I50 9 - Heart failure, unspecified    Sonographer:  HUBER Chapman  Primary Physician:  Bryanna Mckeon MD  Referring Physician:  Vel Casey MD  Group:  [facility]  Interpreting Physician:  Colby Stein MD    SUMMARY    LEFT VENTRICLE:  Systolic function was normal  Ejection fraction was estimated to be 65 %  There were no regional wall motion abnormalities  Wall thickness was mildly increased  There was moderate concentric hypertrophy  Features were consistent with a pseudonormal left ventricular filling pattern, with concomitant abnormal relaxation and increased filling pressure (grade 2 diastolic dysfunction)  LEFT ATRIUM:  The atrium was markedly dilated  RIGHT ATRIUM:  The atrium was moderately dilated      MITRAL VALVE:  There was mild to moderate regurgitation  AORTIC VALVE:  There was moderate regurgitation  TRICUSPID VALVE:  There was mild regurgitation  Pulmonary artery systolic pressure was mildly increased  AORTA:  There was marked dilatation of the ascending aorta  (55mm)    IVC, HEPATIC VEINS:  The inferior vena cava was dilated  Respirophasic changes were blunted (less than 50% variation)  PERICARDIUM:  A small to moderate pericardial effusion was identified circumferential to the heart  The fluid had no internal echoes  There was no evidence of hemodynamic compromise  There was a small left pleural effusion  HISTORY: PRIOR HISTORY: COPD, CHF, Afib, Cardioversion, HLD, HTN    PROCEDURE: The procedure was performed at the bedside  This was a routine study  The transthoracic approach was used  The study included complete 2D imaging, M-mode, complete spectral Doppler, and color Doppler  The heart rate was 52 bpm,  at the start of the study  Images were obtained from the parasternal, apical, subcostal, and suprasternal notch acoustic windows  Image quality was adequate  LEFT VENTRICLE: Size was normal  Systolic function was normal  Ejection fraction was estimated to be 65 %  There were no regional wall motion abnormalities  Wall thickness was mildly increased  There was moderate concentric hypertrophy  DOPPLER: Features were consistent with a pseudonormal left ventricular filling pattern, with concomitant abnormal relaxation and increased filling pressure (grade 2 diastolic dysfunction)  RIGHT VENTRICLE: The size was normal  Systolic function was normal  Wall thickness was normal     LEFT ATRIUM: The atrium was markedly dilated  RIGHT ATRIUM: The atrium was moderately dilated  MITRAL VALVE: There was mild diffuse thickening  There was normal leaflet separation  DOPPLER: The transmitral velocity was within the normal range  There was no evidence for stenosis  There was mild to moderate regurgitation      AORTIC VALVE: The valve was trileaflet  Leaflets exhibited normal thickness and normal cuspal separation  DOPPLER: Transaortic velocity was minimally increased  There was no evidence for stenosis  There was moderate regurgitation  TRICUSPID VALVE: The valve structure was normal  There was normal leaflet separation  DOPPLER: The transtricuspid velocity was within the normal range  There was no evidence for stenosis  There was mild regurgitation  Pulmonary artery  systolic pressure was mildly increased  PULMONIC VALVE: Leaflets exhibited normal thickness, no calcification, and normal cuspal separation  DOPPLER: The transpulmonic velocity was within the normal range  There was no significant regurgitation  PERICARDIUM: A small to moderate pericardial effusion was identified circumferential to the heart  The fluid had no internal echoes  There was no evidence of hemodynamic compromise  There was a small left pleural effusion  The pericardium  was normal in appearance  AORTA: The root exhibited normal size  There was marked dilatation of the ascending aorta  (55mm)    SYSTEMIC VEINS: IVC: The inferior vena cava was dilated  Respirophasic changes were blunted (less than 50% variation)      SYSTEM MEASUREMENT TABLES    2D  %FS: 42 29 %  Ao Diam: 3 67 cm  EDV(Teich): 139 86 ml  EF(Teich): 72 83 %  ESV(Teich): 37 99 ml  IVSd: 1 3 cm  LA Area: 35 24 cm2  LA Diam: 4 38 cm  LVEDV MOD A4C: 88 59 ml  LVEF MOD A4C: 63 97 %  LVESV MOD A4C: 31 92 ml  LVIDd: 5 38 cm  LVIDs: 3 1 cm  LVLd A4C: 6 34 cm  LVLs A4C: 5 47 cm  LVPWd: 1 43 cm  RA Area: 26 02 cm2  RVIDd: 4 22 cm  SV MOD A4C: 56 67 ml  SV(Teich): 101 86 ml    CW  AR Dec Rapides: 1 95 m/s2  AR Dec Time: 1792 45 ms  AR PHT: 519 81 ms  AR Vmax: 3 49 m/s  AR maxP 76 mmHg  TR Vmax: 2 72 m/s  TR maxP 6 mmHg    MM  TAPSE: 2 82 cm    PW  E': 0 05 m/s  E/E': 15 21  MV A Abisai: 0 66 m/s  MV Dec Rapides: 3 51 m/s2  MV DecT: 199 87 ms  MV E Abisai: 0 7 m/s  MV E/A Ratio: 1 06  MV PHT: 57 96 ms  MVA By PHT: 3 8 cm2    [facility]    Prepared and electronically signed by    Colby Stein MD  Signed 31-Jan-2019 16:14:57       No results found for this or any previous visit  No results found for this or any previous visit  No results found for this or any previous visit      Meds/Allergies   current meds:   Current Facility-Administered Medications   Medication Dose Route Frequency    acetaminophen (TYLENOL) tablet 650 mg  650 mg Oral Q6H PRN    acetaminophen (TYLENOL) tablet 650 mg  650 mg Oral Q8H [facility]    diclofenac sodium (VOLTAREN) 1 % topical gel 2 g  2 g Topical 4x Daily PRN    econazole nitrate 1 % cream   Topical BID    ipratropium (ATROVENT) 0 02 % inhalation solution 0 5 mg  0 5 mg Nebulization TID    levalbuterol (XOPENEX) inhalation solution 1 25 mg  1 25 mg Nebulization TID    lidocaine (LIDODERM) 5 % patch 1 patch  1 patch Topical Daily    nitroglycerin (NITROSTAT) SL tablet 0 4 mg  0 4 mg Sublingual Q5 Min PRN    oxyCODONE (ROXICODONE) IR tablet 2 5 mg  2 5 mg Oral Q3H PRN    senna 8 8 mg/5 mL oral syrup 8 8 mg  8 8 mg Oral Daily PRN     Prescriptions Prior to Admission   Medication    acetaminophen (TYLENOL) 325 mg tablet    amiodarone 200 mg tablet    amLODIPine (NORVASC) 10 mg tablet    apixaban (ELIQUIS) 5 mg    Calcium Carbonate (CALTRATE 600 PO)    carvedilol (COREG) 3 125 mg tablet    fluticasone (FLONASE) 50 mcg/act nasal spray    ipratropium (ATROVENT) 0 02 % nebulizer solution    isosorbide mononitrate (IMDUR) 60 mg 24 hr tablet    KLOR-CON M20 20 MEQ tablet    levalbuterol (XOPENEX) 1 25 mg/0 5 mL nebulizer solution    metolazone (ZAROXOLYN) 5 mg tablet    nitroglycerin (NITROSTAT) 0 4 mg SL tablet    pravastatin (PRAVACHOL) 40 mg tablet    prazosin (MINIPRESS) 2 mg capsule    prazosin (MINIPRESS) 5 mg capsule    econazole nitrate 1 % cream          Assessment:  Principal Problem:    Acute hypoxic and hypercapnic respiratory failure  Active Problems:    Paroxysmal atrial fibrillation     Ascending aortic aneurysm (HCC)    Acute on chronic diastolic CHF    Hematuria    Chronic kidney disease, stage III (moderate) (HCC)    Non-toxic multinodular goiter    Achalasia    Abnormal urinalysis    Recent left-sided multiple rib and scapular fractures    Hyperlipidemia    Transaminitis    Essential hypertension    Plan:    Acute on Chronic Diastolic CHF / Aortic Regurgitation/ Ascending aortic aneurysm / PAF    Palliative care team met with the patient and family and plan is hospice care  Continue with supportive care  Counseling / Coordination of Care  Total floor / unit time spent today 25 minutes  Greater than 50% of total time was spent with the patient and / or family counseling and / or coordination of care  A description of the counseling / coordination of care

## 2019-02-09 NOTE — SOCIAL WORK
Spoke with Apoorva at [facility]   She states that patient is not appropriate for inpatient hospice at this time and also that their are no beds available at time  Kristi White is aware that Fabio Wooten and her  would be available by phone to speak with her and would not be here in the hospital      Spoke with Fabio Wooten also about her options and she asked that I check to see if their was a bed available at [facility]  There is a bed available at

## 2019-02-09 NOTE — PROGRESS NOTES
Patient's respiration rate was 31/min while asleep  Her respirations were shallow and she was grunting with each breath  Patient awoken and prompted to attempt deeper breaths  Patient was unable to comply and stated that it felt hard to breath  Josh Nunez of [facility] notified, RT at bedside to administer breathing treatment  After treatment, pt continues to have shallow, tachynpnic breathing but she states that it feels easier to breath

## 2019-02-09 NOTE — PROGRESS NOTES
02/09/19 [facility]  Attempted visits  PT sleeping but said prayer and left rosary      Assessment Completed by: Unit visit

## 2019-02-09 NOTE — PLAN OF CARE
Problem: Potential for Falls  Goal: Patient will remain free of falls  INTERVENTIONS:  - Assess patient frequently for physical needs  -  Identify cognitive and physical deficits and behaviors that affect risk of falls  -  [place] fall precautions as indicated by assessment   - Educate patient/family on patient safety including physical limitations  - Instruct patient to call for assistance with activity based on assessment  - Modify environment to reduce risk of injury  - Consider OT/PT consult to assist with strengthening/mobility    Outcome: Progressing      Problem: Prexisting or High Potential for Compromised Skin Integrity  Goal: Skin integrity is maintained or improved  INTERVENTIONS:  - Identify patients at risk for skin breakdown  - Assess and monitor skin integrity  - Assess and monitor nutrition and hydration status  - Monitor labs (i e  albumin)  - Assess for incontinence   - Turn and reposition patient  - Assist with mobility/ambulation  - Relieve pressure over bony prominences  - Avoid friction and shearing  - Provide appropriate hygiene as needed including keeping skin clean and dry  - Evaluate need for skin moisturizer/barrier cream  - Collaborate with interdisciplinary team (i e  Nutrition, Rehabilitation, etc )   - Patient/family teaching   Outcome: Progressing      Problem: Nutrition/Hydration-ADULT  Goal: Nutrient/Hydration intake appropriate for improving, restoring or maintaining nutritional needs  Monitor and assess patient's nutrition/hydration status for malnutrition (ex- brittle hair, bruises, dry skin, pale skin and conjunctiva, muscle wasting, smooth red tongue, and disorientation)  Collaborate with interdisciplinary team and initiate plan and interventions as ordered  Monitor patient's weight and dietary intake as ordered or per policy  Utilize nutrition screening tool and intervene per policy   Determine patient's food preferences and provide high-protein, high-caloric foods as appropriate       INTERVENTIONS:  - Monitor oral intake, urinary output, labs, and treatment plans  - Assess nutrition and hydration status and recommend course of action  - Evaluate amount of meals eaten  - Assist patient with eating if necessary   - Allow adequate time for meals  - Recommend/ encourage appropriate diets, oral nutritional supplements, and vitamin/mineral supplements  - Order, calculate, and assess calorie counts as needed  - Recommend, monitor, and adjust tube feedings and TPN/PPN based on assessed needs  - Assess need for intravenous fluids  - Provide specific nutrition/hydration education as appropriate  - Include patient/family/caregiver in decisions related to nutrition   Outcome: Progressing      Problem: DISCHARGE PLANNING - CARE MANAGEMENT  Goal: Discharge to post-acute care or home with appropriate resources  INTERVENTIONS:  - Conduct assessment to determine patient/family and health care team treatment goals, and need for post-acute services based on payer coverage, community resources, and patient preferences, and barriers to discharge  - Address psychosocial, clinical, and financial barriers to discharge as identified in assessment in conjunction with the patient/family and health care team  - Arrange appropriate level of post-acute services according to patient's   needs and preference and payer coverage in collaboration with the physician and health care team  - Communicate with and update the patient/family, physician, and health care team regarding progress on the discharge plan  - Arrange appropriate transportation to post-acute venues   Outcome: Progressing      Problem: PAIN - ADULT  Goal: Verbalizes/displays adequate comfort level or baseline comfort level  Interventions:  - Encourage patient to monitor pain and request assistance  - Assess pain using appropriate pain scale  - Administer analgesics based on type and severity of pain and evaluate response  - Implement non-pharmacological measures as appropriate and evaluate response  - Consider cultural and social influences on pain and pain management  - Notify physician/advanced practitioner if interventions unsuccessful or patient reports new pain   Outcome: Progressing      Problem: INFECTION - ADULT  Goal: Absence or prevention of progression during hospitalization  INTERVENTIONS:  - Assess and monitor for signs and symptoms of infection  - Monitor lab/diagnostic results  - Monitor all insertion sites, i e  indwelling lines, tubes, and drains  - Monitor endotracheal (as able) and nasal secretions for changes in amount and color  - [place] appropriate cooling/warming therapies per order  - Administer medications as ordered  - Instruct and encourage patient and family to use good hand hygiene technique  - Identify and instruct in appropriate isolation precautions for identified infection/condition   Outcome: Progressing    Goal: Absence of fever/infection during neutropenic period  INTERVENTIONS:  - Monitor WBC  - Implement neutropenic guidelines   Outcome: Progressing      Problem: SAFETY ADULT  Goal: Maintain or return to baseline ADL function  INTERVENTIONS:  -  Assess patient's ability to carry out ADLs; assess patient's baseline for ADL function and identify physical deficits which impact ability to perform ADLs (bathing, care of mouth/teeth, toileting, grooming, dressing, etc )  - Assess/evaluate cause of self-care deficits   - Assess range of motion  - Assess patient's mobility; develop plan if impaired  - Assess patient's need for assistive devices and provide as appropriate  - Encourage maximum independence but intervene and supervise when necessary  ¯ Involve family in performance of ADLs  ¯ Assess for home care needs following discharge   ¯ Request OT consult to assist with ADL evaluation and planning for discharge  ¯ Provide patient education as appropriate   Outcome: Progressing    Goal: Maintain or return mobility status to optimal level  INTERVENTIONS:  - Assess patient's baseline mobility status (ambulation, transfers, stairs, etc )    - Identify cognitive and physical deficits and behaviors that affect mobility  - Identify mobility aids required to assist with transfers and/or ambulation (gait belt, sit-to-stand, lift, walker, cane, etc )  - [place] fall precautions as indicated by assessment  - Record patient progress and toleration of activity level on Mobility SBAR; progress patient to next Phase/Stage  - Instruct patient to call for assistance with activity based on assessment  - Request Rehabilitation consult to assist with strengthening/weightbearing, etc    Outcome: Progressing    Goal: Patient will remain free of falls  INTERVENTIONS:  - Assess patient frequently for physical needs  -  Identify cognitive and physical deficits and behaviors that affect risk of falls    -  [place] fall precautions as indicated by assessment   - Educate patient/family on patient safety including physical limitations  - Instruct patient to call for assistance with activity based on assessment  - Modify environment to reduce risk of injury  - Consider OT/PT consult to assist with strengthening/mobility    Outcome: Progressing      Problem: DISCHARGE PLANNING  Goal: Discharge to home or other facility with appropriate resources  INTERVENTIONS:  - Identify barriers to discharge w/patient and caregiver  - Arrange for needed discharge resources and transportation as appropriate  - Identify discharge learning needs (meds, wound care, etc )  - Arrange for interpretive services to assist at discharge as needed  - Refer to Case Management Department for coordinating discharge planning if the patient needs post-hospital services based on physician/advanced practitioner order or complex needs related to functional status, cognitive ability, or social support system   Outcome: Progressing      Problem: Knowledge Deficit  Goal: Patient/family/caregiver demonstrates understanding of disease process, treatment plan, medications, and discharge instructions  Complete learning assessment and assess knowledge base    Interventions:  - Provide teaching at level of understanding  - Provide teaching via preferred learning methods   Outcome: Progressing      Problem: RESPIRATORY - ADULT  Goal: Achieves optimal ventilation and oxygenation  INTERVENTIONS:  - Assess for changes in respiratory status  - Assess for changes in mentation and behavior  - Position to facilitate oxygenation and minimize respiratory effort  - Oxygen administration by appropriate delivery method based on oxygen saturation (per order) or ABGs  - Initiate smoking cessation education as indicated  - Encourage broncho-pulmonary hygiene including cough, deep breathe, Incentive Spirometry  - Assess the need for suctioning and aspirate as needed  - Assess and instruct to report SOB or any respiratory difficulty  - Respiratory Therapy support as indicated   Outcome: Progressing      Problem: SKIN/TISSUE INTEGRITY - ADULT  Goal: Skin integrity remains intact  INTERVENTIONS  - Identify patients at risk for skin breakdown  - Assess and monitor skin integrity  - Assess and monitor nutrition and hydration status  - Monitor labs (i e  albumin)  - Assess for incontinence   - Turn and reposition patient  - Assist with mobility/ambulation  - Relieve pressure over bony prominences  - Avoid friction and shearing  - Provide appropriate hygiene as needed including keeping skin clean and dry  - Evaluate need for skin moisturizer/barrier cream  - Collaborate with interdisciplinary team (i e  Nutrition, Rehabilitation, etc )   - Patient/family teaching   Outcome: Progressing    Goal: Incision(s), wounds(s) or drain site(s) healing without S/S of infection  INTERVENTIONS  - Assess and document risk factors for skin impairment   - Assess and document dressing, incision, wound bed, drain sites and surrounding tissue  - Initiate Nutrition services consult and/or wound management as needed   Outcome: Progressing

## 2019-02-09 NOTE — PROGRESS NOTES
Progress note - Palliative and Supportive Care   Lisa Cruz 80 y o  female [id number]    Assessment:   -   Patient Active Problem List   Diagnosis    Paroxysmal atrial fibrillation     Benign hypertension with CKD (chronic kidney disease) stage III (HCC)    GERD (gastroesophageal reflux disease)    Other hyperlipidemia    Aortic valve regurgitation, nonrheumatic    Ascending aortic aneurysm (HCC)    Other chest pain    Lymphedema of both lower extremities    Acute on chronic diastolic CHF    Hematuria    Gait difficulty    Multiple rib fractures    Closed left scapular fracture    Acute kidney injury ([address] )    Acute pain due to trauma    Chronic respiratory acidosis    Pericardial effusion    Chronic kidney disease, stage III (moderate) (HCC)    Acute hypoxic and hypercapnic respiratory failure    Tao esophagus    Cataract of right eye    Chronic stasis dermatitis of right lower extremity    Chronic venous insufficiency    Diverticulosis    Inguinal hernia    Insomnia    Non-toxic multinodular goiter    Urine, incontinence, stress female    Achalasia    Abnormal urinalysis    Recent left-sided multiple rib and scapular fractures    Hyperlipidemia    Transaminitis    Essential hypertension    Goals of care, counseling/discussion       Plan:  1  Symptom management -    - Pain: Start methadone 2 5 mg po qhs  OxyIR 2 5 mg po q3hr PRN available as well  - Anxiety/Respiratory distress: Start ativan 0 5 mg IV q4hr PRN anxiety/increased WOB  Maintain Oxygen via NC for comfort  - Existential distress: Spiritual services consult placed  - Resume Bowel regimen     - Change to regular diet  - D/C all non-comfort related medications, labs/interventions  - Change code status to level 4     - CM notified     2  Goals -    -Hospice cares  Remain as comfortable as possible        Code Status:comfort measures - Level 4   Decisional apparatus:  Patient is not competent on my exam today   If competence is lost, patient's substitute decision maker would default to adult daughter [place] 169  Advance Directive / Living Will / POLST:  NA     I have reviewed the patient's controlled substance dispensing history in the Prescription Drug Monitoring Program in compliance with the [facility] regulations before prescribing any controlled substances  Interval history:       24 hour events reviewed  Patient continues with intermittent episodes of tachypnea and increased WOB  She is seen and examined with family at bedside  She states, "This is not living  Just pull the plug " Family meeting with patient's HCA/DTR, Brandon Fenton, and son-in-law today outside of patient's room  Both daughter and son in law wish to pursue comfort oriented cares and enhance quality of life as much as possible  They understand patient does not wish to take any pills but would like medications for comfort  Family also wishes for hospice cares at a facility since patient cannot do hospice cares at [facility] where she previously resided  Family and I both returned after the meeting to convey same to patient who stated "I just want a pill to end this " I explained to family and patient hospice cares are comfort oriented neither prolong nor shorten life       MEDICATIONS / ALLERGIES:     all current active meds have been reviewed and current meds:   Current Facility-Administered Medications   Medication Dose Route Frequency    acetaminophen (TYLENOL) tablet 650 mg  650 mg Oral Q6H PRN    acetaminophen (TYLENOL) tablet 650 mg  650 mg Oral Q8H [facility]    diclofenac sodium (VOLTAREN) 1 % topical gel 2 g  2 g Topical 4x Daily PRN    econazole nitrate 1 % cream   Topical BID    ipratropium (ATROVENT) 0 02 % inhalation solution 0 5 mg  0 5 mg Nebulization TID    levalbuterol (XOPENEX) inhalation solution 1 25 mg  1 25 mg Nebulization TID    lidocaine (LIDODERM) 5 % patch 1 patch  1 patch Topical Daily    methadone (DOLOPHINE) 10 mg/mL oral concentrated solution 2 5 mg  2 5 mg Oral HS    nitroglycerin (NITROSTAT) SL tablet 0 4 mg  0 4 mg Sublingual Q5 Min PRN    oxyCODONE (ROXICODONE) IR tablet 2 5 mg  2 5 mg Oral Q3H PRN    senna 8 8 mg/5 mL oral syrup 8 8 mg  8 8 mg Oral Daily PRN       Allergies   Allergen Reactions    Aspirin     Percocet [Oxycodone-Acetaminophen]        OBJECTIVE:    Physical Exam  Physical Exam   Constitutional: She has a sickly appearance  She appears ill  Nasal cannula in place  HENT:   Head: Normocephalic and atraumatic  Eyes: No scleral icterus  Neck: Neck supple  Cardiovascular: Normal rate  Pulmonary/Chest: Accessory muscle usage present  Tachypnea noted  She has decreased breath sounds  Abdominal: Soft  Bowel sounds are normal    Musculoskeletal: She exhibits edema  Neurological: She is alert  Skin: She is diaphoretic  There is pallor  Psychiatric: She has a normal mood and affect  Nursing note and vitals reviewed  Lab Results: I have personally reviewed pertinent labs  Counseling / Coordination of Care  Total floor / unit time spent today 45+ minutes  Greater than 50% of total time was spent with the patient and / or family counseling and / or coordination of care   A description of the counseling / coordination of care: discussion of goals of care with family at bedside, symptom management, psychosocial support, coordination with primary team and specialist

## 2019-02-10 LAB
BILIRUB UR QL STRIP: NEGATIVE
CLARITY UR: ABNORMAL
COLOR UR: YELLOW
GLUCOSE UR STRIP-MCNC: NEGATIVE MG/DL
HGB UR QL STRIP.AUTO: ABNORMAL
KETONES UR STRIP-MCNC: NEGATIVE MG/DL
LEUKOCYTE ESTERASE UR QL STRIP: ABNORMAL
NITRITE UR QL STRIP: NEGATIVE
PH UR STRIP.AUTO: 6 [PH] (ref 4.5–8)
PROT UR STRIP-MCNC: ABNORMAL MG/DL
SP GR UR STRIP.AUTO: >=1.03 (ref 1–1.03)
UROBILINOGEN UR QL STRIP.AUTO: 0.2 E.U./DL

## 2019-02-10 PROCEDURE — 99233 SBSQ HOSP IP/OBS HIGH 50: CPT | Performed by: INTERNAL MEDICINE

## 2019-02-10 PROCEDURE — 99232 SBSQ HOSP IP/OBS MODERATE 35: CPT | Performed by: INTERNAL MEDICINE

## 2019-02-10 RX ORDER — MORPHINE SULFATE 4 MG/ML
4 INJECTION, SOLUTION INTRAMUSCULAR; INTRAVENOUS EVERY 4 HOURS
Status: DISCONTINUED | OUTPATIENT
Start: 2019-02-10 | End: 2019-02-11 | Stop reason: HOSPADM

## 2019-02-10 RX ORDER — LORAZEPAM 2 MG/ML
1 INJECTION INTRAMUSCULAR EVERY 4 HOURS
Status: DISCONTINUED | OUTPATIENT
Start: 2019-02-10 | End: 2019-02-11 | Stop reason: HOSPADM

## 2019-02-10 RX ORDER — LORAZEPAM 2 MG/ML
1 INJECTION INTRAMUSCULAR EVERY 4 HOURS PRN
Status: DISCONTINUED | OUTPATIENT
Start: 2019-02-10 | End: 2019-02-11 | Stop reason: HOSPADM

## 2019-02-10 RX ORDER — LORAZEPAM 2 MG/ML
0.5 INJECTION INTRAMUSCULAR EVERY 4 HOURS
Status: DISCONTINUED | OUTPATIENT
Start: 2019-02-10 | End: 2019-02-10

## 2019-02-10 RX ORDER — HALOPERIDOL 5 MG/ML
1 INJECTION INTRAMUSCULAR EVERY 6 HOURS PRN
Status: DISCONTINUED | OUTPATIENT
Start: 2019-02-10 | End: 2019-02-11 | Stop reason: HOSPADM

## 2019-02-10 RX ADMIN — MORPHINE SULFATE 4 MG: 4 INJECTION INTRAVENOUS at 17:06

## 2019-02-10 RX ADMIN — LORAZEPAM 1 MG: 2 INJECTION, SOLUTION INTRAMUSCULAR; INTRAVENOUS at 17:06

## 2019-02-10 RX ADMIN — MORPHINE SULFATE 2 MG: 2 INJECTION, SOLUTION INTRAMUSCULAR; INTRAVENOUS at 09:10

## 2019-02-10 RX ADMIN — LORAZEPAM 0.5 MG: 2 INJECTION INTRAMUSCULAR; INTRAVENOUS at 09:10

## 2019-02-10 RX ADMIN — MORPHINE SULFATE 2 MG: 2 INJECTION, SOLUTION INTRAMUSCULAR; INTRAVENOUS at 13:01

## 2019-02-10 RX ADMIN — LORAZEPAM 0.5 MG: 2 INJECTION INTRAMUSCULAR; INTRAVENOUS at 12:56

## 2019-02-10 RX ADMIN — HALOPERIDOL LACTATE 1 MG: 5 INJECTION, SOLUTION INTRAMUSCULAR at 16:38

## 2019-02-10 RX ADMIN — MORPHINE SULFATE 2 MG: 2 INJECTION, SOLUTION INTRAMUSCULAR; INTRAVENOUS at 16:29

## 2019-02-10 NOTE — PLAN OF CARE
DISCHARGE PLANNING     Discharge to home or other facility with appropriate resources Progressing        DISCHARGE PLANNING - CARE MANAGEMENT     Discharge to post-acute care or home with appropriate resources Progressing        INFECTION - ADULT     Absence or prevention of progression during hospitalization Progressing        Knowledge Deficit     Patient/family/caregiver demonstrates understanding of disease process, treatment plan, medications, and discharge instructions Progressing        Nutrition/Hydration-ADULT     Nutrient/Hydration intake appropriate for improving, restoring or maintaining nutritional needs Progressing        PAIN - ADULT     Verbalizes/displays adequate comfort level or baseline comfort level Progressing        Potential for Falls     Patient will remain free of falls Progressing        Prexisting or High Potential for Compromised Skin Integrity     Skin integrity is maintained or improved Progressing        RESPIRATORY - ADULT     Achieves optimal ventilation and oxygenation Progressing        SAFETY ADULT     Maintain or return to baseline ADL function Progressing     Maintain or return mobility status to optimal level Progressing     Patient will remain free of falls Progressing        SKIN/TISSUE INTEGRITY - ADULT     Skin integrity remains intact Progressing     Incision(s), wounds(s) or drain site(s) healing without S/S of infection Progressing

## 2019-02-10 NOTE — PROGRESS NOTES
Progress note - Palliative and Supportive Care   Henny Harvey 80 y o  female [id number]    Assessment:   -   Patient Active Problem List   Diagnosis    Paroxysmal atrial fibrillation     Benign hypertension with CKD (chronic kidney disease) stage III (HCC)    GERD (gastroesophageal reflux disease)    Other hyperlipidemia    Aortic valve regurgitation, nonrheumatic    Ascending aortic aneurysm (HCC)    Other chest pain    Lymphedema of both lower extremities    Acute on chronic diastolic CHF    Hematuria    Gait difficulty    Multiple rib fractures    Closed left scapular fracture    Acute kidney injury ([address]    Acute pain due to trauma    Chronic respiratory acidosis    Pericardial effusion    Chronic kidney disease, stage III (moderate) (HCC)    Acute hypoxic and hypercapnic respiratory failure    Tao esophagus    Cataract of right eye    Chronic stasis dermatitis of right lower extremity    Chronic venous insufficiency    Diverticulosis    Inguinal hernia    Insomnia    Non-toxic multinodular goiter    Urine, incontinence, stress female    Achalasia    Abnormal urinalysis    Recent left-sided multiple rib and scapular fractures    Hyperlipidemia    Transaminitis    Essential hypertension    Goals of care, counseling/discussion       Plan:  1  Symptom management -    - Morphine 2 mg IV q4hr and ativan 0 5 mg IV q4hr  - Oxygen for comfort  - Family and spiritual services at bedside  2  Goals -    -IPU appropriate now  However, unstable for transport at this time  I stayed at patient's bedside providing her support until her daughter arrived  We will continue end of life care in the hospital at this time and reevaluate patient tomorrow  Code Status: Comfort cares - Level 4   Decisional apparatus:  Patient is not competent on my exam today  If competence is lost, patient's substitute decision maker would default to adults daughter by [place] 169     Advance Directive / Living Will / POLST:  NA     I have reviewed the patient's controlled substance dispensing history in the Prescription Drug Monitoring Program in compliance with the [facility] regulations before prescribing any controlled substances  Interval history:    24 hour events reviewed  Patient is seen and examined without family present at bedside  She is in respiratory distress, RN notified immediately and patient medicated with ativan and morphine  I contacted patient's ALIE as daughter has left her cell phone at home with her  while attending [facility]  I notified them of change in clinical condition and how patient is no longer stable for transport to hospice IPU  MEDICATIONS / ALLERGIES:     all current active meds have been reviewed and current meds:   Current Facility-Administered Medications   Medication Dose Route Frequency    acetaminophen (TYLENOL) tablet 650 mg  650 mg Oral Q6H PRN    diclofenac sodium (VOLTAREN) 1 % topical gel 2 g  2 g Topical 4x Daily PRN    econazole nitrate 1 % cream   Topical BID    lidocaine (LIDODERM) 5 % patch 1 patch  1 patch Topical Daily    LORazepam (ATIVAN) 2 mg/mL injection 0 5 mg  0 5 mg Intravenous Q4H PRN    LORazepam (ATIVAN) 2 mg/mL injection 0 5 mg  0 5 mg Intravenous Q4H    morphine injection 2 mg  2 mg Intravenous Q3H PRN    morphine injection 2 mg  2 mg Intravenous Q4H    nitroglycerin (NITROSTAT) SL tablet 0 4 mg  0 4 mg Sublingual Q5 Min PRN       Allergies   Allergen Reactions    Aspirin     Percocet [Oxycodone-Acetaminophen]        OBJECTIVE:    Physical Exam  Physical Exam   Constitutional: She has a sickly appearance  She appears ill  She appears distressed  Nasal cannula in place  HENT:   Head: Normocephalic and atraumatic  Eyes: No scleral icterus  Neck: JVD present  Cardiovascular: An irregular rhythm present  Tachycardia present  Murmur heard  Pulmonary/Chest: Accessory muscle usage present  Tachypnea noted   She has rales    Abdominal: Soft  Bowel sounds are normal    Musculoskeletal: She exhibits edema  Skin: She is diaphoretic  There is pallor  Nursing note and vitals reviewed  Lab Results: Reviewed  Imaging Studies: Reviewed  Counseling / Coordination of Care  Total floor / unit time spent today 55+ minutes  Greater than 50% of total time was spent with the patient and / or family counseling and / or coordination of care  A description of the counseling / coordination of care: end of life cares, symptom management

## 2019-02-10 NOTE — PROGRESS NOTES
[facility] Hospitalist Service - Internal Medicine Progress Note       PATIENT INFORMATION      Patient: Henny Harvey 80 y o  female   MRN: [id number]  PCP: Mariposa Cota MD  Unit/Bed#: [facility] 519-01 Encounter: [id number]  Date Of Visit: 02/10/19       ASSESSMENTS & PLAN     Acute hypoxic and hypercapnic respiratory failure   Assessment & Plan    - clinically worsened overnight w/ respiratory distress and lethargy - re-evaluated by palliative care who feel she is now appropriate for inpatient hospice   - progressively weaned off BiPAP down room air at one point but has worsened over the last few days - currently on 4 L via nasal cannula  - history of recent rib/scapular fractures noted - encouraged incentive spirometry      - CT of chest previously revealed ground-glass infiltrates suggestive of possible pneumonia - discontinued IV Cefepime as procalcitonin is downtrending   - continue to maintain oxygenation as tolerated - nebulizer treatments on board  - viral pathogen screen including Influenza/RSV negative  - appreciate pulmonology input - corticosteroids previously discontinued   - CHF exacerbation noted (see plan below)   - PT/OT input appreciated recommending skilled rehab once medically stable  - appreciate palliative care input who after discussion with patient/daughter, have transitioned patient to comfort measures only     Acute on chronic diastolic CHF   Assessment & Plan    - appreciated cardiology input  - cardiac regimen now discontinued as patient is comfort measures only  - echocardiogram in January 2019 revealed an EF of 65% with mild TR/pulmonary HTN, mild-moderate MR, and moderate AR     Recent left-sided multiple rib and scapular fractures   Assessment & Plan    - encouraged incentive spirometry during awake hours  - PRN pain control   - maintain oxygenation      Paroxysmal atrial fibrillation    Assessment & Plan    - previously rate controlled on Coreg/Amiodarone  - Eliquis discontinued (comfort measures)     Achalasia   Assessment & Plan    - gastroenterology previously evaluated and discussed with family regarding abnormal esophageal CT findings and they have elected to defer any further workup at this time (including endoscopy +/- barium esophagram) due to age/comorbidities - was maintained on a mechanical soft diet with thin liquids - now liberal regular diet due to comfort measures      Essential hypertension   Assessment & Plan    - previously on Prazosin/Imdur/Norvasc/Coreg     Hyperlipidemia   Assessment & Plan    - previously on Pravachol     Transaminitis   Assessment & Plan    - appreciated gastroenterology input  - limited/avoided hepatotoxins as possible - Cefepime discontinued  - AST/ALT normalized       VTE Prophylaxis:  Comfort measures      SUBJECTIVE     Seen/examined earlier today during nursing rounds with daughter at bedside  Deemed appropriate for inpatient hospice earlier this morning due to rapid decline overnight  No information obtainable from patient due to lethargy  Reassured daughter that all attempts would be made to keep her mother as pain-free and comfortable as possible  OBJECTIVE     Vitals:   No data recorded  SpO2:  [93 %-94 %] 93 %  Body mass index is 27 6 kg/m²  Input and Output Summary (last 24 hours):        Intake/Output Summary (Last 24 hours) at 2/10/2019 1611  Last data filed at 2/9/2019 2245  Gross per 24 hour   Intake 170 ml   Output 600 ml   Net -430 ml       Physical Exam:     GENERAL:  Weak/fatigued   HEAD:  Normocephalic - atraumatic  MOUTH:  Mucosa moist  NECK:  Supple - no adenopathy  CARDIAC:  Regular rate/rhythm - S1/S2 positive  PULMONARY:  Diminished bibasilar breath sounds and respiratory effort  ABDOMEN:  Soft - nontender/nondistended - active bowel sounds  MUSCULOSKELETAL:  Motor strength/range of motion quite deconditioned  NEUROLOGIC:  Lethargic  SKIN:  Chronic wrinkles/blemishes       ADDITIONAL DATA       Labs & Recent Cultures:     Results from last 7 days   Lab Units 02/09/19  0552  02/04/19  0603   WBC Thousand/uL  --   --  5 47   HEMOGLOBIN g/dL 8 1*   < > 7 7*   HEMATOCRIT % 28 2*   < > 28 0*   PLATELETS Thousands/uL  --   --  164    < > = values in this interval not displayed  Results from last 7 days   Lab Units 02/09/19  0552 02/08/19  0459   SODIUM mmol/L 143 142   POTASSIUM mmol/L 4 6 4 7   CHLORIDE mmol/L 104 105   CO2 mmol/L 34* 36*   BUN mg/dL 34* 35*   CREATININE mg/dL 0 99 0 96   CALCIUM mg/dL 8 6 8 4   ALK PHOS U/L  --  81   ALT U/L  --  50   AST U/L  --  28         Last 24 Hours Medication List:     Current Facility-Administered Medications:  acetaminophen 650 mg Oral Q6H PRN Ryanne Acosta MD   diclofenac sodium 2 g Topical 4x Daily PRN Ester Segura MD   econazole nitrate  Topical BID Ryanne Acosta MD   lidocaine 1 patch Topical Daily DAWN Dietz   LORazepam 0 5 mg Intravenous Q4H PRN Agustina Santiago MD   LORazepam 0 5 mg Intravenous Q4H Agustina Santiago MD   morphine injection 2 mg Intravenous Q3H PRN Agustina Santiago MD   morphine injection 2 mg Intravenous Q4H Agustina Santiago MD   nitroglycerin 0 4 mg Sublingual Q5 Min PRN Ryanne Acosta MD          Time Spent for Care: 32 minutes  More than 50% of total time spent on counseling and coordination of care as described above  Current Length of Stay: 7 day(s)      Code Status: Level 4 - Comfort Care         ** Please Note: This note is constructed using a voice recognition dictation system   **

## 2019-02-10 NOTE — HOSPICE NOTE
Late entry for phone call to pts dtr at her request on 2 9 19 at approx 730pm  Sw pts dtr [facility] in reference to hospice options  Dtr [facility] reporting that pt does not wish to return to [facility] and that [facility] is seeking assistance and guidance in reference to dc options  Discussed option of pt returning to her apt with private pay caregivers or transitioning to a different SNF that will be private pay out of pocket  Dtr [facility] requesting assistance with this  Discussed that this liaison will fu with the case management team on Sunday to ask them to reach out to dtr to offer choice to make referrals  Discussed that once dtr has chosen a facility she will need to utilize the hospice company that the facility is contracted with  Discussed the importance of finding placement for the pt then coordinating the hospice services  Dtr in agreement with the above  Liaison will follow

## 2019-02-10 NOTE — PROGRESS NOTES
02/10/19 [address]   Spiritual Beliefs/Perceptions   Concept of God Accepting   God's Role in Disease Natural   Relationship with God Close   Support Systems Children;Family members   Stress Factors   Family Stress Factors Health changes   Coping Responses   Family Coping Sadness   Plan of Care   Comments Cultivated a relationship of care and support with Pt's daughter  Reviewed family support systems  Arranged for on-call  to administer sacraments     Assessment Completed by: Unit visit

## 2019-02-11 ENCOUNTER — TRANSITIONAL CARE MANAGEMENT (OUTPATIENT)
Dept: INTERNAL MEDICINE CLINIC | Facility: CLINIC | Age: 84
End: 2019-02-11

## 2019-02-11 ENCOUNTER — TELEPHONE (OUTPATIENT)
Dept: INTERNAL MEDICINE CLINIC | Facility: CLINIC | Age: 84
End: 2019-02-11

## 2019-02-11 PROCEDURE — 99232 SBSQ HOSP IP/OBS MODERATE 35: CPT | Performed by: INTERNAL MEDICINE

## 2019-02-11 PROCEDURE — 99239 HOSP IP/OBS DSCHRG MGMT >30: CPT | Performed by: INTERNAL MEDICINE

## 2019-02-11 PROCEDURE — TCMXX

## 2019-02-11 RX ADMIN — ECONAZOLE NITRATE: 10 CREAM TOPICAL at 08:28

## 2019-02-11 RX ADMIN — LIDOCAINE 1 PATCH: 50 PATCH CUTANEOUS at 08:37

## 2019-02-11 RX ADMIN — MORPHINE SULFATE 2 MG: 2 INJECTION, SOLUTION INTRAMUSCULAR; INTRAVENOUS at 11:15

## 2019-02-11 RX ADMIN — HALOPERIDOL LACTATE 1 MG: 5 INJECTION, SOLUTION INTRAMUSCULAR at 12:10

## 2019-02-11 RX ADMIN — LORAZEPAM 1 MG: 2 INJECTION, SOLUTION INTRAMUSCULAR; INTRAVENOUS at 06:24

## 2019-02-11 NOTE — TELEPHONE ENCOUNTER
Went to call patient because she was discharged from hospital today  She is currently admitted to hospice

## 2019-02-11 NOTE — PROGRESS NOTES
Progress note - Palliative and Supportive Care   Lisa Cruz 80 y o  female [id number]    Assessment:   -   Patient Active Problem List   Diagnosis    Paroxysmal atrial fibrillation     Benign hypertension with CKD (chronic kidney disease) stage III (HCC)    GERD (gastroesophageal reflux disease)    Other hyperlipidemia    Aortic valve regurgitation, nonrheumatic    Ascending aortic aneurysm (HCC)    Other chest pain    Lymphedema of both lower extremities    Acute on chronic diastolic CHF    Hematuria    Gait difficulty    Multiple rib fractures    Closed left scapular fracture    Acute kidney injury ([address] )    Acute pain due to trauma    Chronic respiratory acidosis    Pericardial effusion    Chronic kidney disease, stage III (moderate) (HCC)    Acute hypoxic and hypercapnic respiratory failure    Tao esophagus    Cataract of right eye    Chronic stasis dermatitis of right lower extremity    Chronic venous insufficiency    Diverticulosis    Inguinal hernia    Insomnia    Non-toxic multinodular goiter    Urine, incontinence, stress female    Achalasia    Abnormal urinalysis    Recent left-sided multiple rib and scapular fractures    Hyperlipidemia    Transaminitis    Essential hypertension    Goals of care, counseling/discussion    Acute on chronic respiratory failure with hypoxia and hypercapnia (HCC)       Plan:  1  Symptom management -    - Morphine 2 mg IV q4hr and ativan 0 5 mg IV q4hr  - Oxygen for comfort  - Family and spiritual services at bedside  2  Goals -    -IPU appropriate for end of life symptom management  Discussed with hospice liaison- Yelena Crockett, ROBERT  Code Status: Comfort cares - Level 4   Decisional apparatus:  Patient is not competent on my exam today  If competence is lost, patient's substitute decision maker would default to adults daughter by [place] 169     Advance Directive / Living Will / POLST:  NA     I have reviewed the patient's controlled substance dispensing history in the Prescription Drug Monitoring Program in compliance with the [facility] regulations before prescribing any controlled substances  Interval history:    Patient has stabilized since yesterday  She appears uncomfortable with increased work of breathing but is able to communicate minimally with me  MEDICATIONS / ALLERGIES:     all current active meds have been reviewed and current meds:   Current Facility-Administered Medications   Medication Dose Route Frequency    acetaminophen (TYLENOL) tablet 650 mg  650 mg Oral Q6H PRN    diclofenac sodium (VOLTAREN) 1 % topical gel 2 g  2 g Topical 4x Daily PRN    econazole nitrate 1 % cream   Topical BID    haloperidol lactate (HALDOL) injection 1 mg  1 mg Intravenous Q6H PRN    lidocaine (LIDODERM) 5 % patch 1 patch  1 patch Topical Daily    LORazepam (ATIVAN) 2 mg/mL injection 1 mg  1 mg Intravenous Q4H    LORazepam (ATIVAN) 2 mg/mL injection 1 mg  1 mg Intravenous Q4H PRN    morphine (PF) 4 mg/mL injection 4 mg  4 mg Intravenous Q4H    morphine injection 2 mg  2 mg Intravenous Q3H PRN    nitroglycerin (NITROSTAT) SL tablet 0 4 mg  0 4 mg Sublingual Q5 Min PRN       Allergies   Allergen Reactions    Aspirin     Percocet [Oxycodone-Acetaminophen]        OBJECTIVE:    Physical Exam  Physical Exam   Constitutional: No distress  HENT:   Head: Normocephalic and atraumatic  Right Ear: External ear normal    Left Ear: External ear normal    Eyes: Right eye exhibits no discharge  Left eye exhibits no discharge  Cardiovascular: Normal rate  Murmur heard  Pulmonary/Chest:   Appears labored  Abdominal: Soft  She exhibits no distension  Musculoskeletal: She exhibits edema  Neurological:   Opens eyes and answers a few questions with yes/no responses  Skin: There is pallor  Nursing note and vitals reviewed  Lab Results: Reviewed  Imaging Studies: Reviewed         Counseling / Coordination of Care  Total floor / unit time spent today 25+ minutes  Greater than 50% of total time was spent with the patient and / or family counseling and / or coordination of care   A description of the counseling / coordination of care: end of life cares, symptom management, discussion with hospice team

## 2019-02-11 NOTE — SOCIAL WORK
The patient is approved for inpatient hospice house and daughter has signed admission paperwork  Scheduled BLS transport for 13:30 with [facility]  Completed CMN and faxed it to [facility]  Hospice, MD, RN and family are aware of transport time

## 2019-02-11 NOTE — PROGRESS NOTES
02/09/19 1600   Clinical Encounter Type   Visited With Patient   Consult Patient care   Quaker Encounters   Quaker Needs Prayer;Quaker articles   Sacramental Encounters   Sacrament of Sick-Anointing Family requested anointing

## 2019-02-11 NOTE — HOSPICE NOTE
Pt reassessed for inpatient hospice by liaison and Dr Ronnie Linda  Pt working to breathe, facial grimacing noted on exam, requested RN administer morphine for comfort  Daughter Mariela Labs at bedside and in agreement with administration of morphine and with pt going to inpatient unit  Dr Jacquie Jansen approved patient to come to [facility]  Consents signed and faxed to [facility]  Nurse to call report prior to 130pm transport at [phone]

## 2019-02-11 NOTE — PROGRESS NOTES
02/11/19 [address]   Spiritual Beliefs/Perceptions   Support Systems Children;Family members   Stress Factors   Patient Stress Factors Health changes   Coping Responses   Patient Coping Other (Comment)   Plan of Care   Comments Offer prayer and a caring presence     Assessment Completed by: Unit visit

## 2019-02-11 NOTE — DISCHARGE SUMMARY
Discharge Summary - [facility] Hospitalist Service - Internal Medicine      Patient Information: Ronnie Rodriguez 80 y o  female MRN: [id number]  Unit/Bed#: [facility] 482-14 Encounter: [id number]    Discharging Physician / Practitioner: Nolan Mosqueda MD  PCP: Blanche Desir MD  Admission Date:   Admission Orders (From admission, onward)    Ordered        02/03/19 1029  Inpatient Admission (expected length of stay for this patient Order details is greater than two midnights)  Once     Order ID Start Status   [id number] 02/03/19 1030 Completed              Discharge Date: 02/11/19      Reason for Admission:  Shortness of breath      Discharge Diagnoses:     Principal Problem:    Acute hypoxic and hypercapnic respiratory failure    Acute on chronic diastolic CHF    Active Problems:    Recent left-sided multiple rib and scapular fractures    Paroxysmal atrial fibrillation     Achalasia    Hyperlipidemia    Essential hypertension    Resolved Problems:    Transaminitis      Consultations During Hospital Stay:  · Palliative care  · Cardiology  · Gastroenterology  · Pulmonology      Hospital Course:     Acute hypoxic and hypercapnic respiratory failure   Assessment & Plan     - clinically worsened over last two days w/ respiratory distress and lethargy - re-evaluated by palliative care who feel she is now appropriate for inpatient hospice   - progressively weaned off BiPAP down room air at one point but has worsened over the last few days - remained on  4 L via nasal cannula overnight  - history of recent rib/scapular fractures noted - encouraged incentive spirometry      - CT of chest previously revealed ground-glass infiltrates suggestive of possible pneumonia - discontinued IV Cefepime as procalcitonin is downtrending   - continue to maintain oxygenation as tolerated - nebulizer treatments on board  - viral pathogen screen including Influenza/RSV negative  - appreciated pulmonology input - corticosteroids previously discontinued   - CHF exacerbation noted (see plan below)   - PT/OT input appreciated recommending skilled rehab once medically stable  - appreciate palliative care input who after discussion with patient/daughter, have transitioned patient to comfort measures only with plan to discharge to inpatient hospice today      Acute on chronic diastolic CHF   Assessment & Plan     - appreciated cardiology input  - cardiac regimen now discontinued as patient is comfort measures only  - echocardiogram in January 2019 revealed an EF of 65% with mild TR/pulmonary HTN, mild-moderate MR, and moderate AR      Recent left-sided multiple rib and scapular fractures   Assessment & Plan     - encouraged incentive spirometry during awake hours  - PRN pain control   - maintain oxygenation        Paroxysmal atrial fibrillation    Assessment & Plan     - previously rate-controlled on Coreg/Amiodarone  - Eliquis discontinued (comfort measures)      Achalasia   Assessment & Plan     - gastroenterology previously evaluated and discussed with family regarding abnormal esophageal CT findings and they have elected to defer any further workup at this time (including endoscopy +/- barium esophagram) due to age/comorbidities - was maintained on a mechanical soft diet with thin liquids - now liberalized regular diet as tolerated due to comfort measures       Essential hypertension   Assessment & Plan     - previously on Prazosin/Imdur/Norvasc/Coreg      Hyperlipidemia   Assessment & Plan     - previously on Pravachol      Transaminitis   Assessment & Plan     - appreciated gastroenterology input  - limited/avoided hepatotoxins as possible - Cefepime was previously discontinued  - AST/ALT normalized           Condition at Discharge: serious       Discharge Day Visit / Exam:     Vitals: Blood Pressure: (!) 178/68 (02/09/19 0757)  Pulse: 61 (02/09/19 0757)  Temperature: (!) 97 4 °F (36 3 °C) (02/09/19 0757)  Temp Source: Oral (02/08/19 2250)  Respirations: 18 (02/09/19 0757)  Height: 5' (152 4 cm) (02/03/19 1500)  Weight - Scale: 64 1 kg (141 lb 5 oz) (02/09/19 0557)  SpO2: 93 % (02/09/19 1567)      Physical exam - I had a face-to-face encounter with the patient on day of discharge  Discussion with Patient and/or Family:  The patient has been advised to return to the ER immediately if any symptoms recur or worsen  Discharge instructions/Information to Patient and/or Family:   See after visit summary for information provided to patient and/or family  Provisions for Follow-Up Care:  See after visit summary for information related to follow-up care and any pertinent home health orders  Disposition:   [facility] hospice      Discharge Medications:  See after visit summary for reconciled discharge medications provided to patient and/or family  Discharge Statement:  I spent 38 minutes discharging the patient  This time was spent on the day of discharge  I had direct contact with the patient on the day of discharge  Greater than 50% of the total time was spent examining patient, answering all patient questions, arranging and discussing plan of care with patient as well as directly providing post-discharge instructions  Additional time then spent on discharge activities     ** Please Note: This note is constructed using a voice recognition dictation system   **

## 2019-02-11 NOTE — PLAN OF CARE
Problem: Potential for Falls  Goal: Patient will remain free of falls  Description  INTERVENTIONS:  - Assess patient frequently for physical needs  -  Identify cognitive and physical deficits and behaviors that affect risk of falls    -  [place] fall precautions as indicated by assessment   - Educate patient/family on patient safety including physical limitations  - Instruct patient to call for assistance with activity based on assessment  - Modify environment to reduce risk of injury  Outcome: Progressing     Problem: Prexisting or High Potential for Compromised Skin Integrity  Goal: Skin integrity is maintained or improved  Description  INTERVENTIONS:  - Identify patients at risk for skin breakdown  - Assess and monitor skin integrity  - Assess and monitor nutrition and hydration status  - Assess for incontinence   - Turn and reposition patient  - Assist with mobility/ambulation  - Relieve pressure over bony prominences  - Avoid friction and shearing  - Provide appropriate hygiene as needed including keeping skin clean and dry  - Evaluate need for skin moisturizer/barrier cream  - Collaborate with interdisciplinary team (i e  Nutrition, Rehabilitation, etc )   - Patient/family teaching   Outcome: Progressing     Problem: DISCHARGE PLANNING - CARE MANAGEMENT  Goal: Discharge to post-acute care or home with appropriate resources  Description  INTERVENTIONS:  - Conduct assessment to determine patient/family and health care team treatment goals, and need for post-acute services based on payer coverage, community resources, and patient preferences, and barriers to discharge  - Address psychosocial, clinical, and financial barriers to discharge as identified in assessment in conjunction with the patient/family and health care team  - Arrange appropriate level of post-acute services according to patient's   needs and preference and payer coverage in collaboration with the physician and health care team  - Communicate with and update the patient/family, physician, and health care team regarding progress on the discharge plan  Outcome: Progressing     Problem: PAIN - ADULT  Goal: Verbalizes/displays adequate comfort level or baseline comfort level  Description  Interventions:  - Encourage patient to monitor pain and request assistance  - Assess pain using appropriate pain scale  - Administer analgesics based on type and severity of pain and evaluate response  - Implement non-pharmacological measures as appropriate and evaluate response  - Consider cultural and social influences on pain and pain management  - Notify physician/advanced practitioner if interventions unsuccessful or patient reports new pain   Outcome: Progressing     Problem: INFECTION - ADULT  Goal: Absence or prevention of progression during hospitalization  Description  INTERVENTIONS:  - Assess and monitor for signs and symptoms of infection  - Monitor lab/diagnostic results  - Monitor all insertion sites, i e  indwelling lines, tubes, and drains  - [place] appropriate cooling/warming therapies per order  - Administer medications as ordered  - Instruct and encourage patient and family to use good hand hygiene technique  - Identify and instruct in appropriate isolation precautions for identified infection/condition   Outcome: Progressing     Problem: SAFETY ADULT  Goal: Patient will remain free of falls  Description  INTERVENTIONS:  - Assess patient frequently for physical needs  -  Identify cognitive and physical deficits and behaviors that affect risk of falls    -  [place] fall precautions as indicated by assessment   - Educate patient/family on patient safety including physical limitations  - Instruct patient to call for assistance with activity based on assessment  - Modify environment to reduce risk of injury  Outcome: Progressing     Problem: DISCHARGE PLANNING  Goal: Discharge to home or other facility with appropriate resources  Description  INTERVENTIONS:  - Identify barriers to discharge w/patient and caregiver  - Arrange for needed discharge resources and transportation as appropriate  - Identify discharge learning needs (meds, wound care, etc )  - Arrange for interpretive services to assist at discharge as needed  - Refer to Case Management Department for coordinating discharge planning if the patient needs post-hospital services based on physician/advanced practitioner order or complex needs related to functional status, cognitive ability, or social support system   Outcome: Progressing     Problem: Knowledge Deficit  Goal: Patient/family/caregiver demonstrates understanding of disease process, treatment plan, medications, and discharge instructions  Description  Complete learning assessment and assess knowledge base    Interventions:  - Provide teaching at level of understanding  - Provide teaching via preferred learning methods   Outcome: Progressing     Problem: RESPIRATORY - ADULT  Goal: Achieves optimal ventilation and oxygenation  Description  INTERVENTIONS:  - Assess for changes in respiratory status  - Assess for changes in mentation and behavior  - Position to facilitate oxygenation and minimize respiratory effort  - Oxygen administration by appropriate delivery method based on oxygen saturation (per order) or ABGs  - Initiate smoking cessation education as indicated  - Encourage broncho-pulmonary hygiene including cough, deep breathe, Incentive Spirometry  - Assess the need for suctioning and aspirate as needed  - Assess and instruct to report SOB or any respiratory difficulty  - Respiratory Therapy support as indicated   Outcome: Progressing     Problem: SKIN/TISSUE INTEGRITY - ADULT  Goal: Skin integrity remains intact  Description  INTERVENTIONS  - Identify patients at risk for skin breakdown  - Assess and monitor skin integrity  - Assess and monitor nutrition and hydration status  - Monitor labs (i e  albumin)  - Assess for incontinence   - Turn and reposition patient  - Assist with mobility/ambulation  - Relieve pressure over bony prominences  - Avoid friction and shearing  - Provide appropriate hygiene as needed including keeping skin clean and dry  - Evaluate need for skin moisturizer/barrier cream  - Collaborate with interdisciplinary team (i e  Nutrition, Rehabilitation, etc )   - Patient/family teaching   Outcome: Progressing  Goal: Incision(s), wounds(s) or drain site(s) healing without S/S of infection  Description  INTERVENTIONS  - Assess and document risk factors for skin impairment   - Assess and document dressing, incision, wound bed, drain sites and surrounding tissue  - Initiate Nutrition services consult and/or wound management as needed   Outcome: Progressing

## 2019-02-12 ENCOUNTER — EPISODE CHANGES (OUTPATIENT)
Dept: CASE MANAGEMENT | Facility: HOSPITAL | Age: 84
End: 2019-02-12

## 2019-02-15 ENCOUNTER — PATIENT OUTREACH (OUTPATIENT)
Dept: CASE MANAGEMENT | Facility: OTHER | Age: 84
End: 2019-02-15

## 2020-02-16 NOTE — TELEPHONE ENCOUNTER
Called [phone] ( gave me her cell)   I left her a message to thank her for the update and to call me back if she needs to talk to me 83

## 2020-06-22 NOTE — PROGRESS NOTES
Progress Note - ICU Transfer to SD/MS tele   Bailey Felipe 80 y o  female MRN: [id number]  [address]   Unit/Bed#: ICU 15 Encounter: [id number]    Code Status: Level 1 - Full Code  POA:    POLST:      Reason for ICU admission: Hyperkalemia, hypercapnea    Active problems:   Principal Problem:    Multiple rib fractures  Active Problems:    Closed left scapular fracture    Paroxysmal atrial fibrillation (HCC)    Benign hypertension with CKD (chronic kidney disease) stage III (HCC)    Chronic diastolic congestive heart failure ([address] )    Acute kidney injury ([facility] [address] )    Acute respiratory insufficiency    Acute pain due to trauma    Chronic kidney disease, stage III (moderate) (HCC)    Chronic respiratory acidosis    Hyperkalemia  Resolved Problems:    * No resolved hospital problems  *       Consultants: Cardiology, Nephrology     History of Present Illness: Per chart review,  "Bailey Felipe is a 80 y o  female who presented on 1/23 following a mechanical fall  Her injuries include left scapula fracture, left ribs 3, 4, 5, 9, 10 fractures  Orthopedic surgery was following for the non-op scapula fracture  Medical history includes paroxysmal afib on eliquis, CHF, HTN  Acute pain has been following for epidural management  The eliquis has been held due to epidural  She has been on her home torsemide 60mg BID but night dose on 1/27/19 was held  She has been taking her home potassium repletion regiment of 20 BID "      Summary of clinical course: Per chart review, "Afternoon BMP on 1/27/19 was checked showing K of 6 2   She was given 50 ml 50% dextrose and 10U insulin at [address] [facility] on 1/27  At 9PM recheck, the potassium had increased to 6 6  ABG was done at midnight showing hypercapnea with pCO2 of 77 9 with metabolic compensation of HCO3 41 1 for pH 7 35  EKG showing normal T waves  She was then given 10U insulin, amp dextrose, Kayexalate, 1L NS bolus, and albuterol breathing treatment   She was also placed on BiPAP 10/5 to help remove CO2 "    On 1/28, patient's IV fluids were discontinues and home torsemide 60mg BID was restarted  BiPAP was discontinued  She was started on low NA diet with 1500cc fluid restrictions  Home prazosin was discontinued with the plan to uptitrate Coreg as necessary  ACE-inhibitor was discontinued as well, but all other home medications were continued  Potassium level decreased to 4 9 and EKG was appropriate given the patient's age and co-morbidities  Mobilization Plan: Ambulation with rolled walker at baseline; NWB LUE in sling    Nutrition Plan: Renal diet (Potassium 2 GM)      Portions of the record may have been created with voice recognition software  Occasional wrong word or "sound a like" substitutions may have occurred due to the inherent limitations of voice recognition software  Read the chart carefully and recognize, using context, where substitutions have occurred        Onelia Hayes MD, PGY-1  1/28/2019   3:57 PM Ambulatory

## 2021-09-25 NOTE — PROGRESS NOTES
Progress Note - Demarcus Thomason 9/29/1930, 80 y o  female MRN: [id number]    Unit/Bed#: [facility] 733-17 Encounter: [id number]    Primary Care Provider: Jb Arora MD   Date and time admitted to hospital: 1/23/2019 11:46 AM        * Multiple rib fractures   Assessment & Plan    - Left 3, 4, 5, 9, 10 rib fractures  - Rib fracture protocol  - Pain control: EDC placed on 1/26, patient's pain slightly improved  Appreciate APS input   Continue scheduled tylenol and lidoderm patches, PO oxycodone 2 5 mg q4h prn    - Pulmonary toilet and encourage IS  - PT/OT and OOB, placement pending at [address] [employer] left scapular fracture   Assessment & Plan    -non operative management, nonweightbearing left upper extremity in sling  -follow-up with orthopedics as an outpatient, appreciate recommendations     Paroxysmal atrial fibrillation (HCC)   Assessment & Plan    -Hx of afib, rate controlled on home regimen, will continue  -home Eliquis is on hold while EDC in place, will resume once removed     Benign hypertension with CKD (chronic kidney disease) stage III (HCC)   Assessment & Plan    -back on home meds-Imdur, amlodipine, will hold on ACE-I in setting of DESMOND on CKD  -Norvasc dose decreased for labile BPs     Chronic diastolic congestive heart failure (HCC)   Assessment & Plan    - back on home coreg, diuretic held at this time per Nephrology recs  -monitor I/Os closely  -no signs of fluid overload at this time, O2 sats stable on RA     Acute kidney injury [facility])   Assessment & Plan    -Nephrology following patient (acute on chronic condition), less likely glomerular as UA with improvement in proteinuria/hematuria, hold ACE-I, diuretics and K supplementation, DC voltaren gel, and continue D5 1/4 NS @ 30 ml/hr as recommended by nephrology  -continue to monitor kidney function and electrolytes, f/u Nephro recs, avoid nephrotoxic agents       Acute pain due to trauma   Assessment & Plan    -epidural catheter placed on 1/26, appreciate APS recs  -rib protocol and po narcotics, continue to encourage use as patient complains of pain with associated splinting/poor inspiratory effort and sedentary state    -continue bowel regimen, patient is now having BMs     Acute respiratory insufficiency   Assessment & Plan    -multifactorial: secondary to rib fractures/chest wall trauma, underlying COPD/CHF/CKD  -continue epidural catheter, analgesics  -IS, pulmonary toilet, continuous pulse ox  -monitor for fluid overload (hx of CHF, CXR on 1/27 showed left pleural effusion and atelectasis/infiltrate---afebrile/no leukocytosis)  -chronic hypercarbia likely 2/2 COPD, this in combination with baseline CHF/kidney insufficiency and now in setting of multiple rib fractures places patient at high risk of respiratory compromise  -will need OP f/u with Pulmonology at discharge for further management of chronic hypercarbia/pulmonary function tests  -PT/OT, OOB to chair, wean oxygen as able           Prophylaxis:  SCDs, subcutaneous heparin  Disposition:  [facility], possibly by the end of the week    Bedside nurse rounds completed with nurse Eleazar Thompson  Patient aware plan of care for the day  Chief Complaint:  I am feeling a bit better today    Subjective:  Patient states that her pain is a bit better  She also feels better because she was able to have a large bowel movement yesterday  She would like to continue to take all the stool softeners to stay regular      Objective:     Meds/Allergies     Current Facility-Administered Medications:     acetaminophen (TYLENOL) tablet 975 mg, 975 mg, Oral, Q8H [address], Paola Wharton MD, 975 mg at 01/30/19 1981    amiodarone tablet 200 mg, 200 mg, Oral, Daily, Paola Wharton MD, 200 mg at 01/30/19 0806    amLODIPine (NORVASC) tablet 5 mg, 5 mg, Oral, Q12H [address], Tiff Chávez MD, 5 mg at 01/29/19 0915    bisacodyl (DULCOLAX) rectal suppository 10 mg, 10 mg, Rectal, Daily PRN, Mindy Pulido PA-C    calcium carbonate (OYSTER SHELL,OSCAL) 500 mg tablet 1 tablet, 1 tablet, Oral, Daily With Breakfast, Tigre Ryan MD, 1 tablet at 01/30/19 0807    carvedilol (COREG) tablet 3 125 mg, 3 125 mg, Oral, BID With Meals, Manjula K Mini, DO, 3 125 mg at 01/30/19 0804    dextrose 5 % and sodium chloride 0 2 % infusion, 30 mL/hr, Intravenous, Continuous, Rick Monteiro MD, Last Rate: 30 mL/hr at 01/30/19 1031, 30 mL/hr at 01/30/19 1031    docusate sodium (COLACE) capsule 100 mg, 100 mg, Oral, BID, Javier Cochran MD, 100 mg at 01/30/19 0807    econazole nitrate 1 % cream, , Topical, BID, Tigre Ryan MD    fluticasone [facility]) 50 mcg/act nasal spray 1 spray, 1 spray, Nasal, BID, Tigre Ryan MD, 1 spray at 01/30/19 0808    heparin (porcine) subcutaneous injection 5,000 Units, 5,000 Units, Subcutaneous, Q8H [address], Madhu Gonzalez PA-C, 5,000 Units at 01/30/19 0528    HYDROmorphone (DILAUDID) injection 0 2 mg, 0 2 mg, Intravenous, Q4H PRN, Madhu Gonzalez PA-C    isosorbide mononitrate (IMDUR) 24 hr tablet 60 mg, 60 mg, Oral, Daily, Manjula K Mini, DO, 60 mg at 01/30/19 0804    lidocaine (LIDODERM) 5 % patch 1 patch, 1 patch, Topical, Daily, Madhu Gonzalez PA-C, 1 patch at 01/30/19 0807    nitroglycerin (NITROSTAT) SL tablet 0 4 mg, 0 4 mg, Sublingual, Q5 Min PRN, Tigre Ryan MD    ondansetron [facility]) injection 4 mg, 4 mg, Intravenous, Q6H PRN, Ankita Theodore MD, 4 mg at 01/26/19 1323    oxyCODONE (ROXICODONE) IR tablet 2 5 mg, 2 5 mg, Oral, Q4H PRN, Madhu Gonzalez PA-C, 2 5 mg at 01/29/19 2210    polyethylene glycol (MIRALAX) packet 17 g, 17 g, Oral, Daily, Mariah Newberry PA-C, 17 g at 01/30/19 0807    pravastatin (PRAVACHOL) tablet 40 mg, 40 mg, Oral, Daily, Tigre Ryan MD, 40 mg at 01/30/19 0807    prazosin (MINIPRESS) capsule 2 mg, 2 mg, Oral, HS, Rick Monteiro MD, 2 mg at 01/29/19 0101    prazosin (MINIPRESS) capsule 2 mg, 2 mg, Oral, QAM, Rick Monteiro, MD    ropivacaine 0 2% PCEA, , Epidural, Continuous, Kalina España MD    [facility]) tablet 8 6 mg, 1 tablet, Oral, HS, Gutierrez Chávez, MOLLY, 8 6 mg at 01/29/19 2210    Vitals: Blood pressure 123/50, pulse 68, temperature 98 8 °F (37 1 °C), resp  rate 18, height 5' 1" (1 549 m), weight 64 8 kg (142 lb 13 7 oz), SpO2 100 %  Body mass index is 26 99 kg/m²   SpO2: SpO2: 100 %, SpO2 Device: O2 Device: Nasal cannula    ABG: Lab Results   Component Value Date    PHART 7 420 01/28/2019    XKR0CKS 61 5 (HH) 01/28/2019    PO2ART 51 0 (LL) 01/28/2019    QWK8JVD 39 0 (H) 01/28/2019    BEART 12 6 01/28/2019    SOURCE Radial, Right 01/28/2019         Intake/Output Summary (Last 24 hours) at 01/30/19 1321  Last data filed at 01/30/19 0930   Gross per 24 hour   Intake           1400 3 ml   Output             1550 ml   Net           -149 7 ml       Invasive Devices     Peripheral Intravenous Line            Peripheral IV 01/27/19 Right Antecubital 2 days    Peripheral IV 01/28/19 Left;Upper Arm 1 day          Epidural Line            Epidural Catheter 01/26/19 4 days          Drain            External Urinary Catheter 2 days                          Nutrition/GI Proph/Bowel Reg:  Regular    Physical Exam:   GENERAL APPEARANCE:  No acute distress  HEENT:  Normocephalic, atraumatic  CV:  Regular rate and rhythm, no murmurs gallops or rubs  LUNGS:  Clear to auscultation bilaterally  ABD:  Soft, nontender, nondistended  EXT:  Moving all extremities equally  NEURO:  GCS 15, nonfocal exam  SKIN:  Regular rate and rhythm, no murmurs gallops or rubs      Lab Results:   BMP/CMP:   Lab Results   Component Value Date    SODIUM 144 01/30/2019    K 4 6 01/30/2019     01/30/2019    CO2 35 (H) 01/30/2019    BUN 46 (H) 01/30/2019    CREATININE 1 03 01/30/2019    CALCIUM 8 4 01/30/2019    EGFR 49 01/30/2019    and CBC:   Lab Results   Component Value Date    WBC 10 90 (H) 01/30/2019    HGB 9 4 (L) 01/30/2019    HCT 33 4 (L) 01/30/2019 MCV 89 01/30/2019     (L) 01/30/2019    MCH 25 1 (L) 01/30/2019    MCHC 28 1 (L) 01/30/2019    RDW 20 6 (H) 01/30/2019    MPV 11 5 01/30/2019    NRBC 0 01/30/2019     Imaging/EKG Studies: Results: I have personally reviewed pertinent reports      Other Studies:  No new  VTE Prophylaxis:  SCDs, Lovenox with patient

## 2024-12-11 NOTE — ASSESSMENT & PLAN NOTE
5 4 cm as seen on recent CT scan of the chest, no further concerns at this time Body Location Override (Optional - Billing Will Still Be Based On Selected Body Map Location If Applicable): xiphoid Detail Level: Detailed Depth Of Biopsy: dermis Was A Bandage Applied: Yes Size Of Lesion In Cm: 0.8 X Size Of Lesion In Cm: 0 Biopsy Type: H and E Biopsy Method: Personna blade Anesthesia Type: 2% lidocaine with epinephrine Anesthesia Volume In Cc: 1 Hemostasis: Electrocautery Wound Care: Vaseline Dressing: pressure dressing Destruction After The Procedure: No Type Of Destruction Used: Electrodesiccation Curettage Text: The wound bed was treated with curettage after the biopsy was performed. Cryotherapy Text: The wound bed was treated with cryotherapy after the biopsy was performed. Electrodesiccation Text: The wound bed was treated with electrodesiccation after the biopsy was performed. Electrodesiccation And Curettage Text: The wound bed was treated with electrodesiccation and curettage after the biopsy was performed. Silver Nitrate Text: The wound bed was treated with silver nitrate after the biopsy was performed. Lab: [id number] Lab Facility: [id number] Medical Necessity Information: It is in your best interest to select a reason for this procedure from the list below. All of these items fulfill various CMS LCD requirements except the new and changing color options. Consent: The provider's intent is to obtain a tissue sample solely for diagnostic purposes. Written consent to obtain tissue sample was obtained and risks were reviewed including but not limited to scarring, infection, bleeding, scabbing, incomplete removal, nerve damage and allergy to anesthesia. Post-Care Instructions: i reviewed with patient in detail post care instructions and written instructions were provided? apply aquaphor or vaseline daily until healed with bandage. Patient verbalizes understanding. Written instructions provided. Written instructions provided. Notification Instructions: Patient will be notified of biopsy results. However, patient instructed to call the office if not contacted within 2 weeks. Billing Type: Third-Party Bill Information: Selecting Yes will display possible errors in your note based on the variables you have selected. This validation is only offered as a suggestion for you. PLEASE NOTE THAT THE VALIDATION TEXT WILL BE REMOVED WHEN YOU FINALIZE YOUR NOTE. IF YOU WANT TO FAX A PRELIMINARY NOTE YOU WILL NEED TO TOGGLE THIS TO 'NO' IF YOU DO NOT WANT IT IN YOUR FAXED NOTE.

## 2025-02-03 NOTE — MALNUTRITION/BMI
This medical record reflects one or more clinical indicators suggestive of malnutrition and/or morbid obesity  Malnutrition Findings:   Malnutrition type: Chronic illness (malnutrition related to disease/condition as evidenced by mild depletion of muscle mass seen at clavicle and <75% energy intake for > 1 month to be treated with nutrition supplements )  Degree of Malnutrition: Malnutrition of moderate degree  Malnutrition Characteristics: Inadequate energy, Muscle loss        See Nutrition note dated 2/6/19 for additional details  Completed nutrition assessment is viewable in the nutrition documentation  Brother

## 2025-02-18 NOTE — PROGRESS NOTES
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    Had follow up MRI/MRCP which reviewed in detail today. This showed multiple pancreatic cysts, the largest measuring 1.8 x 1.3 cm in the head. An additional 2 subcentimeter cysts also noted. No worrisome features, asymptomatic. No history of pancreatitis. 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 Explained the epidemiology of pancreatic cysts and how common they are. Also explained the pre-malignant nature of IPMNs in general but that the overall risk is actually quite low. Recommend ongoing surveillance imaging be done in 6 months with repeat MRI/MRCP to ensure stability and at that point if stable then the risk of transformation is exceedingly low. If stable at that point, then would recommend follow up MRI/MRCP in 18 months per the updated [place] guidelines. Patient is agreeable with this plan.     RECOMMENDATIONS:  -- MRI MRCP in 6 months, follow up with me after    RTC earlier if unexplained weight loss of > 10 lb, jaundice, diagnosis of acute pancreatitis or chronic diarrhea lasting more than 2 weeks.     All patient's questions were answered and they agreed with this plan.     It was a pleasure to participate in the care of this patient; please contact us with any further questions.  A total of 40 minutes was spent on the day of the visit doing chart review, history and exam, documentation, coordination of care, and further activities per the note.     Melody Montoya PA-C  Division of Gastroenterology,  Assessment/Plan:  Continue current meds  Stable weight on torsemide (plan only covers 3 a day so she takes 40mg in am and 20mg in pm) then metolazole prn       Problem List Items Addressed This Visit        Cardiovascular and Mediastinum    Paroxysmal atrial fibrillation (HCC)    Aortic valve regurgitation, nonrheumatic    Relevant Medications    metolazone (ZAROXOLYN) 2 5 mg tablet (Start on 10/4/2018)    Other Relevant Orders    Comprehensive metabolic panel    CBC and differential    Essential hypertension    Relevant Medications    metolazone (ZAROXOLYN) 2 5 mg tablet (Start on 10/4/2018)    Ascending aortic aneurysm (HCC)    Chronic diastolic congestive heart failure (HCC)    Relevant Medications    metolazone (ZAROXOLYN) 2 5 mg tablet (Start on 10/4/2018)    Other Relevant Orders    Comprehensive metabolic panel    CBC and differential       Other    Other hyperlipidemia    Lymphedema of both lower extremities      Other Visit Diagnoses     Candidal intertrigo    -  Primary    Relevant Medications    diclofenac sodium (VOLTAREN) 1 %    econazole nitrate 1 % cream    Primary osteoarthritis of both knees        Relevant Medications    diclofenac sodium (VOLTAREN) 1 %            Subjective:      Patient ID: Carson Veliz is a 80 y o  female  HPI  Here for a follow up  No new issues  LE edema is stable  She is compliant with daily weights (staying at 148lbs) and fluid restriction, low salt diet  She treated herself to a hotdog a few days ago for her birthday  She had a weeping area on the left lower leg last month  She took an extra dose of metolazone which helped  The following portions of the patient's history were reviewed and updated as appropriate: allergies, past family history, past medical history, past social history, past surgical history and problem list     Review of Systems   Constitutional: Negative for fatigue and fever  HENT: Negative for sinus pain, sinus pressure and sore throat  "Hepatology, and Nutrition  [facility]  2/18/2025      OBJECTIVE:  Physical Exam:    Constitutional: Ht 1.676 m (5' 6\")   Wt 89.8 kg (198 lb)   BMI 31.96 kg/m    General: Alert, no distress, well-appearing  HEENT: Atraumatic, normocephalic, sclera anicteric  Respiratory: Breathing unlabored on RA. Able to speak in full sentences without increased effort.   Skin: No jaundice  Neurologic: AAOx3, no obvious neurologic abnormality  Psychiatric: Normal Affect, appropriate mood    IMAGING:  EXAM: MR ABDOMEN MRCP W/O and W CONTRAST  LOCATION: [facility]  DATE: 2/5/2025                                                                IMPRESSION:  1.  Pancreatic cystic lesions noted. No worrisome enhancement. These are compatible with pancreatic cystic neoplasms.  2.  Fatty liver.  3.  A few incidental hemorrhagic renal cysts bilaterally.     EXAM: CT CHEST/ABDOMEN/PELVIS W CONTRAST  LOCATION: [facility]  DATE: 12/28/2024                                                            IMPRESSION:   1.  No acute CT abnormality of the chest, abdomen, or pelvis.  2.  Linear, nodular soft tissue thickening within the right lower lobe, which appears infectious/inflammatory.  3.  Scattered small pulmonary nodules, largest measuring 7 mm the left upper lobe. These are indeterminate in the setting of previous malignancy.  4.  Mild hepatic steatosis.  5.  Colonic diverticulosis.  6.  1.6 cm cystic lesion of the pancreatic head, likely a sidebranch IPMN. Follow-up recommendations, as per below.       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 " Respiratory: Positive for shortness of breath  Negative for cough and wheezing  Cardiovascular: Positive for leg swelling  Negative for chest pain and palpitations  Gastrointestinal: Negative for abdominal pain, constipation, diarrhea, nausea and vomiting  Musculoskeletal: Positive for arthralgias (knees)  Skin: Positive for rash (under breasts)  Objective:      BP (!) 150/48   Pulse (!) 45   Ht 5' (1 524 m)   Wt 67 3 kg (148 lb 6 4 oz)   SpO2 96%   BMI 28 98 kg/m²          Physical Exam   Constitutional: No distress  Cardiovascular: Normal rate and regular rhythm  Murmur heard  Systolic murmur is present with a grade of 3/6   2+ bilateral LE edema   Skin: She is not diaphoretic      See above for imaging.     Patient denies any current abdominal pain, jaundice, frequent diarrhea, or unintentional weight loss. Reports she is on ozempic, lost 50 lbs since June of 2024.     Patient denies any family history of pancreatic disease or personal history of pancreatitis.    Never smoker. No alcohol use, no history of heavy alcohol use.

## 2025-05-28 NOTE — ASSESSMENT & PLAN NOTE
- back on home coreg and torsemide Received call from RN in IR that patient was taken to the ER from [facility] d/t vomiting, diaphoresis and HR in the 30-50s. Was notified as an FYI. Patient will need to potentially be rescheduled for [facility] based on ER viist.    Attending Only

## 2025-06-11 NOTE — ASSESSMENT & PLAN NOTE
With hematuria and innumerable WBC, unclear at this time if UTI?   Patient is receiving cefepime for pneumonia, will obtain urine culture Detail Level: Generalized